# Patient Record
Sex: FEMALE | Race: BLACK OR AFRICAN AMERICAN | Employment: OTHER | URBAN - METROPOLITAN AREA
[De-identification: names, ages, dates, MRNs, and addresses within clinical notes are randomized per-mention and may not be internally consistent; named-entity substitution may affect disease eponyms.]

---

## 2016-09-16 LAB — PAP SMEAR, EXTERNAL: NORMAL

## 2016-11-16 LAB — HEMOCCULT STL QL: NEGATIVE

## 2017-01-02 DIAGNOSIS — E78.00 PURE HYPERCHOLESTEROLEMIA: ICD-10-CM

## 2017-01-03 RX ORDER — SIMVASTATIN 40 MG/1
TABLET, FILM COATED ORAL
Qty: 90 TAB | Refills: 1 | Status: SHIPPED | OUTPATIENT
Start: 2017-01-03 | End: 2018-04-12 | Stop reason: SDUPTHER

## 2017-01-03 NOTE — TELEPHONE ENCOUNTER
She is on the schedule for follow up this month-lipids last checked in August. November A1c not at goal

## 2017-01-27 ENCOUNTER — TELEPHONE (OUTPATIENT)
Dept: FAMILY MEDICINE CLINIC | Age: 67
End: 2017-01-27

## 2017-01-27 NOTE — TELEPHONE ENCOUNTER
----- Message from [facility] sent at 1/27/2017  9:46 AM EST -----  Regarding: Dr. Jaylon Perez  Pt requesting a call back today if poss with A1C numbers. Pt can be reached at [phone].

## 2017-01-30 NOTE — TELEPHONE ENCOUNTER
Spoke with patient, ID verified X 2. Patient wanted to know what her blood pressure and A1C was at last office visit. Blood pressure was 117/78 at last office visit. Last A1C was 7.8 and it was discussed at her last office visit with Dr. Paola Rock.

## 2017-02-06 ENCOUNTER — HOSPITAL ENCOUNTER (OUTPATIENT)
Dept: MAMMOGRAPHY | Age: 67
Discharge: HOME | End: 2017-02-06
Payer: MEDICARE

## 2017-02-06 DIAGNOSIS — Z12.31 VISIT FOR SCREENING MAMMOGRAM: ICD-10-CM

## 2017-02-06 PROCEDURE — 77063 BREAST TOMOSYNTHESIS BI: CPT

## 2017-02-27 ENCOUNTER — TELEPHONE (OUTPATIENT)
Dept: FAMILY MEDICINE CLINIC | Age: 67
End: 2017-02-27

## 2017-03-03 DIAGNOSIS — R11.14 BILIOUS VOMITING WITH NAUSEA: Primary | ICD-10-CM

## 2017-03-06 ENCOUNTER — TELEPHONE (OUTPATIENT)
Dept: FAMILY MEDICINE CLINIC | Age: 67
End: 2017-03-06

## 2017-03-06 NOTE — TELEPHONE ENCOUNTER
----- Message from David Gould sent at 3/6/2017  2:25 PM EST -----  Regarding: Lyndsey/telephone  Pt is returning a call from last Thursday or Friday. Pts number is [phone].

## 2017-03-06 NOTE — TELEPHONE ENCOUNTER
----- Message from Tova Craft sent at 3/6/2017  2:25 PM EST -----  Regarding: Lyndsey/telephone  Pt is returning a call from last Thursday or Friday. Pts number is [phone].

## 2017-03-08 LAB
ALBUMIN SERPL-MCNC: 4.3 G/DL (ref 3.6–4.8)
ALBUMIN/GLOB SERPL: 1.6 {RATIO} (ref 1.1–2.5)
ALP SERPL-CCNC: 79 IU/L (ref 39–117)
ALT SERPL-CCNC: 14 IU/L (ref 0–32)
AMYLASE SERPL-CCNC: 68 U/L (ref 31–124)
AST SERPL-CCNC: 13 IU/L (ref 0–40)
BILIRUB SERPL-MCNC: 0.3 MG/DL (ref 0–1.2)
BUN SERPL-MCNC: 12 MG/DL (ref 8–27)
BUN/CREAT SERPL: 14 (ref 11–26)
CALCIUM SERPL-MCNC: 9.5 MG/DL (ref 8.7–10.3)
CHLORIDE SERPL-SCNC: 94 MMOL/L (ref 96–106)
CO2 SERPL-SCNC: 25 MMOL/L (ref 18–29)
CREAT SERPL-MCNC: 0.86 MG/DL (ref 0.57–1)
GLOBULIN SER CALC-MCNC: 2.7 G/DL (ref 1.5–4.5)
GLUCOSE SERPL-MCNC: 136 MG/DL (ref 65–99)
LIPASE SERPL-CCNC: 35 U/L (ref 0–59)
POTASSIUM SERPL-SCNC: 4.1 MMOL/L (ref 3.5–5.2)
PROT SERPL-MCNC: 7 G/DL (ref 6–8.5)
SODIUM SERPL-SCNC: 136 MMOL/L (ref 134–144)

## 2017-03-13 ENCOUNTER — HOSPITAL ENCOUNTER (OUTPATIENT)
Dept: ULTRASOUND IMAGING | Age: 67
Discharge: HOME | End: 2017-03-13
Payer: MEDICARE

## 2017-03-13 DIAGNOSIS — N64.4 BREAST PAIN: ICD-10-CM

## 2017-03-13 PROCEDURE — 76642 ULTRASOUND BREAST LIMITED: CPT

## 2017-03-14 ENCOUNTER — OFFICE VISIT (OUTPATIENT)
Dept: FAMILY MEDICINE CLINIC | Age: 67
End: 2017-03-14

## 2017-03-14 VITALS
OXYGEN SATURATION: 100 % | TEMPERATURE: 97.9 F | RESPIRATION RATE: 18 BRPM | SYSTOLIC BLOOD PRESSURE: 94 MMHG | HEART RATE: 68 BPM | WEIGHT: 203.4 LBS | BODY MASS INDEX: 30.83 KG/M2 | DIASTOLIC BLOOD PRESSURE: 66 MMHG | HEIGHT: 68 IN

## 2017-03-14 DIAGNOSIS — I10 ESSENTIAL HYPERTENSION: ICD-10-CM

## 2017-03-14 DIAGNOSIS — R11.15 NON-INTRACTABLE CYCLICAL VOMITING WITH NAUSEA: ICD-10-CM

## 2017-03-14 DIAGNOSIS — M79.2 NEUROPATHIC PAIN OF FOOT, UNSPECIFIED LATERALITY: ICD-10-CM

## 2017-03-14 DIAGNOSIS — Z78.9 ACE INHIBITOR INTOLERANCE: ICD-10-CM

## 2017-03-14 DIAGNOSIS — H40.053 INTRAOCULAR PRESSURE INCREASE, BILATERAL: ICD-10-CM

## 2017-03-14 DIAGNOSIS — D50.8 OTHER IRON DEFICIENCY ANEMIA: ICD-10-CM

## 2017-03-14 DIAGNOSIS — E55.9 VITAMIN D DEFICIENCY: ICD-10-CM

## 2017-03-14 DIAGNOSIS — M79.7 FIBROMYALGIA: ICD-10-CM

## 2017-03-14 DIAGNOSIS — Z90.710 S/P TAH (TOTAL ABDOMINAL HYSTERECTOMY): ICD-10-CM

## 2017-03-14 DIAGNOSIS — E89.40 POSTSURGICAL MENOPAUSE: ICD-10-CM

## 2017-03-14 DIAGNOSIS — M79.2 RADICULAR PAIN IN RIGHT ARM: ICD-10-CM

## 2017-03-14 DIAGNOSIS — N64.4 BREAST PAIN, LEFT: ICD-10-CM

## 2017-03-14 DIAGNOSIS — E66.9 OBESITY, CLASS I, BMI 30-34.9: ICD-10-CM

## 2017-03-14 DIAGNOSIS — H26.9 CATARACT: ICD-10-CM

## 2017-03-14 DIAGNOSIS — E78.00 HIGH CHOLESTEROL: ICD-10-CM

## 2017-03-14 DIAGNOSIS — D56.3 THALASSEMIA MINOR: ICD-10-CM

## 2017-03-14 LAB — HBA1C MFR BLD HPLC: 8.4 %

## 2017-03-14 RX ORDER — SOLIFENACIN SUCCINATE 10 MG/1
10 TABLET, FILM COATED ORAL DAILY
COMMUNITY

## 2017-03-14 RX ORDER — ASPIRIN 81 MG/1
81 TABLET ORAL DAILY
Qty: 90 TAB | Refills: 3
Start: 2017-03-14

## 2017-03-14 NOTE — PROGRESS NOTES
Chief Complaint   Patient presents with    Diabetes    Results     labs    Referral Follow Up       1. Have you been to the ER, urgent care clinic since your last visit? Hospitalized since your last visit? No    2. Have you seen or consulted any other health care providers outside of the [address] [place] [address] since your last visit? Include any pap smears or colon screening. No    In the event something were to happen to you and you were unable to speak on your behalf, do you have an Advance Directive/ Living Will in place stating your wishes? NO    If yes, do we have a copy on file NO    If no, would you like information:    PT offered and accepted. Writer asked PT if there was any other concerns or issues she would like to talk to Dr. Rebecca Krueger about; PT stated \"No, I think that's it. \"  Writer faxed Dr. Khadijah Nichols, Dr. Edd Harden, Dr. Rowena Paz, Dr. Rowena Paz, Dr. King Valdez, and Dr. Yokasta Alberto for recent ov notes.

## 2017-03-14 NOTE — MR AVS SNAPSHOT
Visit Information Date & Time Provider Department Dept. Phone Encounter #  
 3/14/2017  8:45 AM [facility] [phone] [id number] Follow-up Instructions Return in about 3 months (around 6/14/2017) for bp. Upcoming Health Maintenance Date Due  
 EYE EXAM RETINAL OR DILATED Q1 4/12/2017* GLAUCOMA SCREENING Q2Y 4/13/2017* FOOT EXAM Q1 7/26/2017 MICROALBUMIN Q1 8/24/2017 LIPID PANEL Q1 8/24/2017 HEMOGLOBIN A1C Q6M 9/14/2017 MEDICARE YEARLY EXAM 12/8/2017 BREAST CANCER SCRN MAMMOGRAM 2/6/2019 COLONOSCOPY 5/27/2020 DTaP/Tdap/Td series (2 - Td) 3/14/2027 *Topic was postponed. The date shown is not the original due date. Allergies as of 3/14/2017  Review Complete On: 3/14/2017 By: Nataliya Vazquez DO Severity Noted Reaction Type Reactions Influenza Virus Vaccine, Specific High 10/13/2016    Swelling R ARM PAIN, NUMBNESS, SWELLING 
R HAND NUMBNESS AND TINGLING, WORSE IN 4TH AND FIFTH FINGERS Lisinopril  10/28/2009    Cough Singulair [Montelukast]  02/28/2013    Other (comments)  
 headache Current Immunizations  Reviewed on 2/29/2016 Name Date H1N1 FLU VACCINE 3/9/2010 Hepatitis B Vaccine 1/27/2009, 8/20/2008, 6/9/2008 Influenza High Dose Vaccine PF 9/7/2016, 11/11/2015 Influenza Vaccine Split 10/24/2012, 11/8/2011, 10/8/2010 Influenza Vaccine Whole 1/27/2009 Pneumococcal Conjugate (PCV-13) 11/11/2015 Pneumococcal Polysaccharide (PPSV-23) 12/7/2016 Pneumococcal Vaccine (Unspecified Type) 12/21/2005 Zoster Vaccine, Live 1/20/2014  
 dT Vaccine 6/9/2008 Not reviewed this visit You Were Diagnosed With   
  
 Codes Comments Uncontrolled type 2 diabetes mellitus with diabetic neuropathic arthropathy, without long-term current use of insulin ([facility]    -  Primary ICD-10-CM: E11.610, E11.65 ICD-9-CM: 250.62, 713.5 Essential hypertension     ICD-10-CM: I10 
ICD-9-CM: 401.9 Fibromyalgia     ICD-10-CM: M79.7 ICD-9-CM: 729.1 Radicular pain in right arm     ICD-10-CM: M79.2 ICD-9-CM: 729.2 into 4th and 5th fingers, due to neck vs shoulder spur vs other unchanged after PT and Neurontin Other iron deficiency anemia     ICD-10-CM: D50.8 stable High cholesterol     ICD-10-CM: E78.00 ICD-9-CM: 272.0 stable Vitamin D deficiency     ICD-10-CM: E55.9 ICD-9-CM: 268.9 stable Postsurgical menopause     ICD-10-CM: E89.40 ICD-9-CM: 627.4 S/P MARTIN (total abdominal hysterectomy)     ICD-10-CM: Z90.710 ICD-9-CM: V88.01 Intraocular pressure increase, bilateral     ICD-10-CM: H40.053 ICD-9-CM: 365.00 Cataract     ICD-10-CM: H26.9 ICD-9-CM: 366.9 ACE inhibitor intolerance     ICD-10-CM: Z78.9 ICD-9-CM: 995.27, E980.4 Breast pain, left     ICD-10-CM: N64.4 ICD-9-CM: 611.71 Non-intractable cyclical vomiting with nausea     ICD-10-CM: G43. A0 ICD-9-CM: 536.2 due to Viral vs other, resolved Thalassemia minor     ICD-10-CM: D56.3 ICD-9-CM: 282.46 Neuropathic pain of foot, unspecified laterality     ICD-10-CM: G57.90 ICD-9-CM: 355.8 unchanged off Neurontin Obesity, Class I, BMI 30-34.9     ICD-10-CM: E66.9 ICD-9-CM: 278.00 Vitals BP Pulse Temp Resp Height(growth percentile) Weight(growth percentile) 94/66 (BP 1 Location: Right arm, BP Patient Position: Sitting) 68 97.9 °F (36.6 °C) (Oral) 18 5' 8\" (1.727 m) 203 lb 6.4 oz (92.3 kg) SpO2 BMI OB Status Smoking Status 100% 30.93 kg/m2 Hysterectomy Never Smoker Vitals History BMI and BSA Data Body Mass Index Body Surface Area 30.93 kg/m 2 2.1 m 2 Preferred Pharmacy Pharmacy Name [phone], [address] [phone] Your Updated Medication List  
  
   
This list is accurate as of: 3/14/17  9:55 AM.  Always use your most recent med list.  
  
  
  
  
 aspirin delayed-release 81 mg tablet Take 1 Tab by mouth daily. Blood-Glucose Meter monitoring kit One Touch Ultra 2 Use to test blood sugar 1-2 per day (blood sugar before breakfast or 2 hours after any meal or at bedtime) DX.  E11.65  
  
 chlorhexidine 0.12 % solution Commonly known as:  PERIDEX EVENING PRIMROSE 500 mg Cap Generic drug:  evening primrose oil Take 1 Cap by mouth two (2) times a day. FLEXERIL 10 mg tablet Generic drug:  cyclobenzaprine Take 10 mg by mouth as needed. fluticasone 50 mcg/actuation nasal spray Commonly known as:  Bryan Guild 2 Sprays by Both Nostrils route daily. gabapentin 100 mg capsule Commonly known as:  NEURONTIN Take 1 Cap by mouth three (3) times daily. Indications: NEUROPATHIC PAIN  
  
 glimepiride 2 mg tablet Commonly known as:  AMARYL  
take 1 tablet by mouth every morning  
  
 glucose blood VI test strips strip Commonly known as:  ASCENSIA AUTODISC VI, ONE TOUCH ULTRA TEST VI  
Use to test blood sugar 1-2 per day (blood sugar before breakfast or 2 hours after any meal or at bedtime) DX. E11.65  
  
 simvastatin 40 mg tablet Commonly known as:  ZOCOR  
take 1 tablet by mouth at bedtime  
  
 traMADol 50 mg tablet Commonly known as:  ULTRAM  
Take 50 mg by mouth every six (6) hours as needed. traZODone 50 mg tablet Commonly known as:  Hessie Gurrola Take 50 mg by mouth as needed. Takes at bedtime prn  
  
 VAGIFEM 10 mcg Tab vaginal tablet Generic drug:  estradiol Insert 10 mcg into vagina daily. valACYclovir 500 mg tablet Commonly known as:  VALTREX  
  
 valsartan-hydroCHLOROthiazide 160-25 mg per tablet Commonly known as:  DIOVAN-HCT Take 1 Tab by mouth daily. Indications: HYPERTENSION  
  
 VESIcare 10 mg tablet Generic drug:  solifenacin Take 10 mg by mouth daily. VITAMIN B-12 1,000 mcg tablet Generic drug:  cyanocobalamin Take 1,000 mcg by mouth daily. XIGDUO XR 10-1,000 mg Tbph  
Generic drug:  dapagliflozin-metformin  
take 1 tablet by mouth daily for TYPE 2 DIABETES MELLITUS We Performed the Following AMB POC HEMOGLOBIN A1C [45113 CPT(R)] Follow-up Instructions Return in about 3 months (around 6/14/2017) for bp. Introducing [facility]! Dear Jose G House: Thank you for requesting a [employer] account. Our records indicate that you already have an active [employer] account. You can access your account anytime at [email or web address] Did you know that you can access your hospital and ER discharge instructions at any time in [employer]? You can also review all of your test results from your hospital stay or ER visit. Additional Information If you have questions, please visit the Frequently Asked Questions section of the [employer] website at [email or web address] Remember, [employer] is NOT to be used for urgent needs. For medical emergencies, dial 911. Now available from your iPhone and Android! Please provide this summary of care documentation to your next provider. Your primary care clinician is listed as Selena Arroyo. If you have any questions after today's visit, please call [phone].

## 2017-03-14 NOTE — PROGRESS NOTES
HISTORY OF PRESENT ILLNESS  Dinorah Luevano is a 77 y.o. female presents with Diabetes; Results (labs); and Referral Follow Up    Agree with nurse note. Diabetic, hypertensive pt with high cholesterol, Vit D deficiency, thalassemia minor, hx of iron deficiency anemia, and ACE inhibitor intolerance presents to the office with a BP of 94/66. For BP, she takes Diovan--25 mg daily, tolerating well. She weighs 203 lbs, gained 1 lb since last ov. In 11/2016, Hgb A1C was 7.8, up from 7.5 and 6.7. In 08/2016, LDL was 99. For DM, she takes Amaryl 2 mg qAM and Xigduo 10-1,000 mg daily, tolerating well. For cholesterol, she takes Zocor 40 mg once a week, tolerating well. Pt called our office on 2/27/17 because she was vomiting yellow-green bile. She was not able to eat anything and felt nauseated. She requested labs be ordered. Denies diarrhea, fever/chills, chest pain, abdominal pain, or other associated sxs. She requests her most recent labs from 3/7/17. Lipase was 35. Amylase was 68. Glucose was 136. Chloride was 94. HIV nonreactive. Hep C was negative. Pt with cataracts and increased IOPs. She is followed by optometrist, Dr. Irma Cabrales and plans to schedule her yearly exam soon. Pt is followed by podiatrist, Dr. Tresa Barrett, whom she last saw 7/26/16. Dx'd arthralgia of foot, contracted tendon, and hammer toe. Pt saw orthopedist, Dr. Jamey Kingsley on 2/16/17 for L knee pain. She had already seen Dr. Mandie Chávez who had injected her R shoulder with Cortisone the day prior. Dr. Isael Portillo also injected her L knee with Depomedrol due to DJD of L knee. F/U prn. Pt with fibromyalgia is followed by rheumatologist, Dr. Ori Chua. Pt with neuropathic foot pain and R arm radicular pain. She took Neurontin but reports the pain is unchanged. She sees her neurologist in 04/2017. Pt reports L breast pain radiating into her LUE.  She followed up with her GYN, Dr. Eamon Cortes who ordered a breast US. She had that performed yesterday and the results were normal. She now believes it may be due to dancing and rolling on the floor for her recent dance performance. Health Maintenance    Pt's most recent colonoscopy was on 5/27/15 with GI, Dr. Nora Osuna. F/U 5 years. Postsurgical menopausal pt is s/p MARTIN-BSO due to endometriosis and fibroids since 1994. She is followed by GYN, Dr. Sheran Meigs. Pt's most recent mammogram was on 2/6/17; normal.      Written by clem Sanchez, as dictated by Dr. Kayla Ramirez DO.    ROCHELLE    Review of Systems negative except as noted above in HPI. ALLERGIES:    Allergies   Allergen Reactions    Influenza Virus Vaccine, Specific Swelling     R ARM PAIN, NUMBNESS, SWELLING  R HAND NUMBNESS AND TINGLING, WORSE IN 4TH AND FIFTH FINGERS    Lisinopril Cough    Singulair [Montelukast] Other (comments)     headache       CURRENT MEDICATIONS:    Outpatient Prescriptions Marked as Taking for the 3/14/17 encounter (Office Visit) with Ervin Lopez DO   Medication Sig Dispense Refill    solifenacin (VESICARE) 10 mg tablet Take 10 mg by mouth daily.  aspirin delayed-release 81 mg tablet Take 1 Tab by mouth daily. 90 Tab 3    simvastatin (ZOCOR) 40 mg tablet take 1 tablet by mouth at bedtime 90 Tab 1    glimepiride (AMARYL) 2 mg tablet take 1 tablet by mouth every morning 60 Tab 5    valACYclovir (VALTREX) 500 mg tablet       chlorhexidine (PERIDEX) 0.12 % solution       XIGDUO XR 10-1,000 mg TBph take 1 tablet by mouth daily for TYPE 2 DIABETES MELLITUS 30 Tab 11    valsartan-hydrochlorothiazide (DIOVAN-HCT) 160-25 mg per tablet Take 1 Tab by mouth daily.  Indications: HYPERTENSION 30 Tab 5    glucose blood VI test strips (ASCENSIA AUTODISC VI, ONE TOUCH ULTRA TEST VI) strip Use to test blood sugar 1-2 per day (blood sugar before breakfast or 2 hours after any meal or at bedtime) DX. E11.65 100 Strip 11    Blood-Glucose Meter monitoring kit One Touch Ultra 2 Use to test blood sugar 1-2 per day (blood sugar before breakfast or 2 hours after any meal or at bedtime) DX.   E11.65 1 Kit 0    estradiol (VAGIFEM) 10 mcg tab vaginal tablet Insert 10 mcg into vagina daily.  fluticasone (FLONASE) 50 mcg/actuation nasal spray 2 Sprays by Both Nostrils route daily. 1 Bottle 0    cyanocobalamin (VITAMIN B-12) 1,000 mcg tablet Take 1,000 mcg by mouth daily.  evening primrose oil (EVENING PRIMROSE) 500 mg cap Take 1 Cap by mouth two (2) times a day.  cyclobenzaprine (FLEXERIL) 10 mg tablet Take 10 mg by mouth as needed.  trazodone (DESYREL) 50 mg tablet Take 50 mg by mouth as needed. Takes at bedtime prn       tramadol (ULTRAM) 50 mg tablet Take 50 mg by mouth every six (6) hours as needed. PAST MEDICAL HISTORY:    Past Medical History:   Diagnosis Date    Allergic rhinitis     Dr. Peter Cueva 2015    Dr. Antonia Humphries.  Chest pain 01/15/09    Dr. Juan Carlos Conway Chickenpox childhood    DDD (degenerative disc disease), cervical 10/2015    Dr. Bill Velazquez.  DDD (degenerative disc disease), lumbar 2014    Dr. Foster Lynch.  Diabetes ([address] 06/2007    Alfredito Bell, OD. Dr. Demond Smith, pod.  Endometriosis     Dr. Paula Aviles    Essential hypertension, benign     Dr. Sai Vizcaino, uterine     Dr. Fede Leong 08/2009    Dr. Hay Graff. Dr. Demond Smith.  Hand paresthesia 2011    Dr. Debi Mcgarry. Dr. Donald Pan.  CEUDOJZA(356.3) 2012    Dr. Bola Anand. Dr. Hay Graff, ENT.  Hemoptysis 06/13/07    Dr. Liz Almaraz    Hiatal hernia 05/27/15    small. Bina Jansen MD   [facility] Internal hemorrhoids 12/2006, 05/27/15    Dr. Yanique Loredo. Dr. Maria De Jesus Saldana.  Intraocular pressure increase 07/27/05    bilaterally. Dr. Ambrosio Clark. Dr. Alfredito Bell.  Knee pain 04/04/13    Bilaterally. due to fall. Dr. Bishop Howell.       Left knee pain 11/26/14    Left. due to OA, torn medial and lateral meniscal tears. Dr. Lyla Ngo. Dr. Margaret López.  Lumbar spondylosis 3/2008    Dr. Keenan Terrell. Dr. Marilyn Gamez    Paresthesia of foot     Left  due to Ornelas's Neuromo Sxs. Rashid Wilfrdeobrodie.  Proteinuria 2009    Dr. Jil Castellanos Right shoulder pain 01/30/06    Dr. Home Verdugo. impingement tendinitis    Right shoulder pain 07/2014    Dr. Kali Oneal.  Schatzki's ring 05/27/15    with partial obstruction at GE junction. Dr. Aleena Crane. Rossana Hammer     Dr. Keenan Terrell. Dr. Marilyn Gamez    Stenosis colon [facility]) 05/27/15    SIGMOID WITH TORTUOUS DESCENDING COLON. Dr. Mariusz Fuller. [facility].  Thalassemia minor 1980s    Vitamin D deficiency 05/2011    Kate Bruner NP       PAST SURGICAL HISTORY:    Past Surgical History:   Procedure Laterality Date    CYSTOSCOPY  07/2005    Dr. Venus Tony FOOT/TOES SURGERY PROC UNLISTED      Left Ornelsa's Neuroma. [id number] [facility] HX BREAST BIOPSY Left     12 years ago neg    HX BREAST LUMPECTOMY  10/15/08    Left intraductal papilloma. Dr. Shu Knott HX COLONOSCOPY  05/27/15    due to anemia, rectal bleeding. due q 5 yrs. Dr. Nellie Knott  2001, 12/07/06, 2011    Dr. Mario Gaona. due q 5 yrs.  HX ENDOSCOPY  05/27/15    due to anemia. Dr. Aleena Crane.  HX HEART CATHETERIZATION  01/09/09    Dr. Venancio Alarcon ARTHROSCOPY Left 04/17/15    due to [address]. Dr. Margaret López.  HX MYOMECTOMY  1974-1993    x4    HX MARTIN AND BSO  1994    endometriosis, fibroids.        FAMILY HISTORY:    Family History   Problem Relation Age of Onset   Markus Luria Hypertension Mother    Markus Luria Glaucoma Mother     Heart Failure Mother      CHF    Hypertension Father     Cancer Father      colon    Heart Attack Sister     Stroke Sister     Diabetes Sister      x 2 sisters    Diabetes Brother        SOCIAL HISTORY:    Social History     Social History    Marital status: SINGLE     Spouse name: N/A    Number of children: N/A    Years of education: N/A     Social History Main Topics    Smoking status: Never Smoker    Smokeless tobacco: Never Used    Alcohol use 0.0 oz/week     0 Standard drinks or equivalent per week      Comment: Social beer, social drinks, social shots of liquor,and social wine.  Drug use: No    Sexual activity: Not Asked     Other Topics Concern    None     Social History Narrative       IMMUNIZATIONS:    Immunization History   Administered Date(s) Administered    H1N1 Influenza Virus Vaccine 03/09/2010    Hepatitis B Vaccine 06/09/2008, 08/20/2008, 01/27/2009    Influenza High Dose Vaccine PF 11/11/2015, 09/07/2016    Influenza Vaccine Split 10/08/2010, 11/08/2011, 10/24/2012    Influenza Vaccine Whole 01/27/2009    Pneumococcal Conjugate (PCV-13) 11/11/2015    Pneumococcal Polysaccharide (PPSV-23) 12/07/2016    Pneumococcal Vaccine (Unspecified Type) 12/21/2005    Zoster Vaccine, Live 01/20/2014    dT Vaccine 06/09/2008         PHYSICAL EXAMINATION    Vital Signs    Visit Vitals    BP 94/66 (BP 1 Location: Right arm, BP Patient Position: Sitting)    Pulse 68    Temp 97.9 °F (36.6 °C) (Oral)    Resp 18    Ht 5' 8\" (1.727 m)    Wt 203 lb 6.4 oz (92.3 kg)    SpO2 100%    BMI 30.93 kg/m2       Weight Metrics 3/14/2017 12/7/2016 11/14/2016 10/13/2016 9/7/2016 2/29/2016 2/9/2016   Weight 203 lb 6.4 oz 202 lb 14.4 oz 203 lb 9.6 oz 200 lb 6.4 oz 200 lb 6.4 oz 195 lb 1.6 oz 195 lb 9.6 oz   BMI 30.93 kg/m2 30.85 kg/m2 30.73 kg/m2 30.25 kg/m2 30.25 kg/m2 29.43 kg/m2 29.51 kg/m2       General appearance - Well nourished. Well appearing. Well developed. No acute distress. Overweight. Head - Normocephalic. Atraumatic. Eyes - pupils equal and reactive. Extraocular eye movements intact. Sclera anicteric. Mildly injected sclera. Ears - Hearing is grossly normal bilaterally. Nose - normal and patent. No polyps noted.   No erythema. No discharge. Mouth - mucous membranes with adequate moisture. Posterior pharynx normal with cobblestone appearance. No erythema, white exudate or obstruction. Neck - supple. Midline trachea. No carotid bruits noted bilaterally. No thyromegaly noted. Chest - clear to auscultation bilaterally anteriorly and posteriorly. No wheezes. No rales or rhonchi. Breath sounds are symmetrical bilaterally. Unlabored respirations. Heart - normal rate. Regular rhythm. Normal S1, S2. No murmur noted. No rubs, clicks or gallops noted. Abdomen - soft and distended. No masses or organomegaly. No rebound, rigidity or guarding. Bowel sounds normal x 4 quadrants. No tenderness noted. Neurological - awake, alert and oriented to person, place, and time and event. Cranial nerves II through XII intact. Clear speech. Muscle strength is +5/5 x 4 extremities. Sensation is intact to light touch bilaterally. Steady gait. Heme/Lymph - peripheral pulses normal x 4 extremities. No peripheral edema is noted. Musculoskeletal - Intact x 4 extremities. Full ROM x 4 extremities. No pain with movement. Back exam - normal range of motion. No pain on palpation of the spinous processes in the cervical, thoracic, lumbar, sacral regions. No CVA tenderness. Skin - no rashes, erythema, ecchymosis, lacerations, abrasions, suspicious moles noted  Psychological -   normal behavior, dress and thought processes. Good insight. Good eye contact. Normal affect. Appropriate mood. Normal speech.       DATA REVIEWED    Results for orders placed or performed in visit on 64/82/86   METABOLIC PANEL, COMPREHENSIVE   Result Value Ref Range    Glucose 136 (H) 65 - 99 mg/dL    BUN 12 8 - 27 mg/dL    Creatinine 0.86 0.57 - 1.00 mg/dL    GFR est non-AA 71 >59 mL/min/1.73    GFR est AA 81 >59 mL/min/1.73    BUN/Creatinine ratio 14 11 - 26    Sodium 136 134 - 144 mmol/L    Potassium 4.1 3.5 - 5.2 mmol/L    Chloride 94 (L) 96 - 106 mmol/L    CO2 25 18 - 29 mmol/L    Calcium 9.5 8.7 - 10.3 mg/dL    Protein, total 7.0 6.0 - 8.5 g/dL    Albumin 4.3 3.6 - 4.8 g/dL    GLOBULIN, TOTAL 2.7 1.5 - 4.5 g/dL    A-G Ratio 1.6 1.1 - 2.5    Bilirubin, total 0.3 0.0 - 1.2 mg/dL    Alk. phosphatase 79 39 - 117 IU/L    AST (SGOT) 13 0 - 40 IU/L    ALT (SGPT) 14 0 - 32 IU/L   AMYLASE   Result Value Ref Range    Amylase 68 31 - 124 U/L   LIPASE   Result Value Ref Range    Lipase 35 0 - 59 U/L     Lab Results   Component Value Date/Time    WBC 6.9 11/17/2016 08:14 AM    HGB 11.7 11/17/2016 08:14 AM    HCT 35.5 11/17/2016 08:14 AM    PLATELET 474 66/78/3801 08:14 AM    MCV 69 11/17/2016 08:14 AM     Lab Results   Component Value Date/Time    Cholesterol, total 201 08/24/2016 08:19 AM    HDL Cholesterol 90 08/24/2016 08:19 AM    LDL, calculated 99 08/24/2016 08:19 AM    VLDL, calculated 12 08/24/2016 08:19 AM    Triglyceride 60 08/24/2016 08:19 AM    CHOL/HDL Ratio 1.9 04/26/2010 08:00 AM     Lab Results   Component Value Date/Time    Vitamin D 25-Hydroxy 25.1 10/20/2011 07:42 AM    VITAMIN D, 25-HYDROXY 40.5 11/17/2016 08:14 AM       Lab Results   Component Value Date/Time    Hemoglobin A1c 7.8 11/17/2016 08:14 AM    Hemoglobin A1c (POC) 6.9 01/20/2014 03:39 PM     Lab Results   Component Value Date/Time    TSH 3.440 02/15/2016 09:48 AM       ASSESSMENT and PLAN      ICD-10-CM ICD-9-CM    1. Uncontrolled type 2 diabetes mellitus with diabetic neuropathic arthropathy, without long-term current use of insulin (HCC) E11.610 250.62 aspirin delayed-release 81 mg tablet    E11.65 713.5 AMB POC HEMOGLOBIN A1C   2. Essential hypertension I10 401.9 aspirin delayed-release 81 mg tablet   3. Fibromyalgia M79.7 729.1    4. Radicular pain in right arm M79.2 729.2     into 4th and 5th fingers, due to neck vs shoulder spur vs other unchanged after PT and Neurontin   5. Other iron deficiency anemia D50.8      stable   6. High cholesterol E78.00 272.0     stable   7.  Vitamin D deficiency E55.9 268.9     stable   8. Postsurgical menopause E89.40 627.4    9. S/P MARTIN (total abdominal hysterectomy) Z90.710 V88.01    10. Intraocular pressure increase, bilateral H40.053 365.00    11. Cataract H26.9 366.9    12. ACE inhibitor intolerance Z78.9 995.27      E980.4    13. Breast pain, left N64.4 611.71    14. Non-intractable cyclical vomiting with nausea G43. A0 536.2     due to Viral vs other, resolved   15. Thalassemia minor D56.3 282.46    16. Neuropathic pain of foot, unspecified laterality G57.90 355.8     unchanged off Neurontin   17. Obesity, Class I, BMI 30-34.9 E66.9 278.00        Discussed the patient's BMI with her. The BMI follow up plan is as follows: I have counseled this patient on diet and exercise regimens. Addressed weight, diet and exercise with patient. Decrease carbohydrates (white foods, sweet foods, sweet drinks and alcohol), increase green leafy vegetables and protein (lean meats and beans) with each meal.  Avoid fried foods. Eat 3-5 small meals daily. Do not skip meals. Increase water intake. Increase physical activity to 30 minutes daily for health benefit or 60 minutes daily to prevent weight regain, as tolerated. Get 7-8 hours uninterrupted sleep nightly. Avoid caffeine due to breast pain. Chart reviewed and updated. Continue current medications and care. Most recent tests reviewed from 3/7/17. Recheck A1C today. Recent office visit notes from Dr. Briseno Monday reviewed. Get recent office visit notes from Dr. Julito Mcdaniel and Dr. Ana Niño. Advised pt to sign release. Notify GYN if breast pain worsens or persists. Counseled patient on health concerns:  Vomiting, DM, BP, Vit D deficiency, and neuropathic pain. Immunizations noted. Advised tetanus vaccine when pt has a cut, animal bite, or burn. Offered empathy, support, legitimation, prayers, partnership to patient. Praised patient for progress. Advance Care Booklet given at office visit.  Discussed with pt today. Declines honoring choices referral.   Follow-up Disposition:  Return in about 3 months (around 6/14/2017) for bp. Patient was offered a choice/choices in the treatment plan today. Patient expresses understanding of the plan and agrees with recommendations. Patient declines any additional handouts. Patient is satisfied with previous handouts received from our office    Written by clem Blake, as dictated by Dr. Hannah Alvarez DO. Documentation True and Accepted by Percilla Opitz.  Kush Zurita.

## 2017-03-14 NOTE — ACP (ADVANCE CARE PLANNING)
Discussed ACP with patient. Gave pt an [facility]. Patient prefers to read it on her own. Declines referral to [facility] team at this time. Patient will bring document to her next office visit or attach it to her MyChart record.

## 2017-05-18 DIAGNOSIS — I10 ESSENTIAL HYPERTENSION: ICD-10-CM

## 2017-05-18 NOTE — TELEPHONE ENCOUNTER
She was in the office in March is on for follow up in [place] [id number] pressure was 94/66 at March visit

## 2017-05-19 RX ORDER — VALSARTAN AND HYDROCHLOROTHIAZIDE 160; 25 MG/1; MG/1
TABLET ORAL
Qty: 30 TAB | Refills: 5 | Status: SHIPPED | OUTPATIENT
Start: 2017-05-19 | End: 2017-12-10 | Stop reason: SDUPTHER

## 2017-06-15 ENCOUNTER — LAB ONLY (OUTPATIENT)
Dept: FAMILY MEDICINE CLINIC | Age: 67
End: 2017-06-15

## 2017-06-15 DIAGNOSIS — E78.00 HIGH CHOLESTEROL: ICD-10-CM

## 2017-06-15 DIAGNOSIS — M79.2 NEUROPATHIC PAIN OF FOOT, UNSPECIFIED LATERALITY: ICD-10-CM

## 2017-06-15 DIAGNOSIS — I10 ESSENTIAL HYPERTENSION: ICD-10-CM

## 2017-06-15 DIAGNOSIS — D50.8 IRON DEFICIENCY ANEMIA SECONDARY TO INADEQUATE DIETARY IRON INTAKE: ICD-10-CM

## 2017-06-15 DIAGNOSIS — E55.9 VITAMIN D DEFICIENCY: ICD-10-CM

## 2017-06-16 LAB
25(OH)D3+25(OH)D2 SERPL-MCNC: 22.5 NG/ML (ref 30–100)
ALBUMIN SERPL-MCNC: 4.3 G/DL (ref 3.6–4.8)
ALBUMIN/CREAT UR: <21.1 MG/G CREAT (ref 0–30)
ALBUMIN/GLOB SERPL: 1.5 {RATIO} (ref 1.2–2.2)
ALP SERPL-CCNC: 68 IU/L (ref 39–117)
ALT SERPL-CCNC: 9 IU/L (ref 0–32)
APPEARANCE UR: CLEAR
AST SERPL-CCNC: 17 IU/L (ref 0–40)
BILIRUB SERPL-MCNC: 0.3 MG/DL (ref 0–1.2)
BILIRUB UR QL STRIP: NEGATIVE
BUN SERPL-MCNC: 14 MG/DL (ref 8–27)
BUN/CREAT SERPL: 14 (ref 12–28)
CALCIUM SERPL-MCNC: 9.5 MG/DL (ref 8.7–10.3)
CHLORIDE SERPL-SCNC: 95 MMOL/L (ref 96–106)
CHOLEST SERPL-MCNC: 207 MG/DL (ref 100–199)
CO2 SERPL-SCNC: 24 MMOL/L (ref 18–29)
COLOR UR: YELLOW
CREAT SERPL-MCNC: 0.98 MG/DL (ref 0.57–1)
CREAT UR-MCNC: 14.2 MG/DL
ERYTHROCYTE [DISTWIDTH] IN BLOOD BY AUTOMATED COUNT: 16.8 % (ref 12.3–15.4)
EST. AVERAGE GLUCOSE BLD GHB EST-MCNC: 197 MG/DL
GLOBULIN SER CALC-MCNC: 2.8 G/DL (ref 1.5–4.5)
GLUCOSE SERPL-MCNC: 170 MG/DL (ref 65–99)
GLUCOSE UR QL: ABNORMAL
HBA1C MFR BLD: 8.5 % (ref 4.8–5.6)
HCT VFR BLD AUTO: 34.8 % (ref 34–46.6)
HDLC SERPL-MCNC: 70 MG/DL
HGB BLD-MCNC: 11.2 G/DL (ref 11.1–15.9)
HGB UR QL STRIP: NEGATIVE
INTERPRETATION, 910389: NORMAL
IRON SERPL-MCNC: 67 UG/DL (ref 27–139)
KETONES UR QL STRIP: NEGATIVE
LDLC SERPL CALC-MCNC: 116 MG/DL (ref 0–99)
LEUKOCYTE ESTERASE UR QL STRIP: NEGATIVE
Lab: NORMAL
MCH RBC QN AUTO: 22.6 PG (ref 26.6–33)
MCHC RBC AUTO-ENTMCNC: 32.2 G/DL (ref 31.5–35.7)
MCV RBC AUTO: 70 FL (ref 79–97)
MICRO URNS: ABNORMAL
MICROALBUMIN UR-MCNC: <3 UG/ML
NITRITE UR QL STRIP: NEGATIVE
PH UR STRIP: 6.5 [PH] (ref 5–7.5)
PLATELET # BLD AUTO: 278 X10E3/UL (ref 150–379)
POTASSIUM SERPL-SCNC: 4 MMOL/L (ref 3.5–5.2)
PROT SERPL-MCNC: 7.1 G/DL (ref 6–8.5)
PROT UR QL STRIP: NEGATIVE
RBC # BLD AUTO: 4.95 X10E6/UL (ref 3.77–5.28)
SODIUM SERPL-SCNC: 137 MMOL/L (ref 134–144)
SP GR UR: 1.01 (ref 1–1.03)
TRIGL SERPL-MCNC: 107 MG/DL (ref 0–149)
TSH SERPL DL<=0.005 MIU/L-ACNC: 3.39 UIU/ML (ref 0.45–4.5)
UROBILINOGEN UR STRIP-MCNC: 0.2 MG/DL (ref 0.2–1)
VLDLC SERPL CALC-MCNC: 21 MG/DL (ref 5–40)
WBC # BLD AUTO: 5.7 X10E3/UL (ref 3.4–10.8)

## 2017-06-22 ENCOUNTER — OFFICE VISIT (OUTPATIENT)
Dept: FAMILY MEDICINE CLINIC | Age: 67
End: 2017-06-22

## 2017-06-22 VITALS
HEART RATE: 75 BPM | TEMPERATURE: 98.4 F | DIASTOLIC BLOOD PRESSURE: 75 MMHG | WEIGHT: 206.1 LBS | HEIGHT: 68 IN | BODY MASS INDEX: 31.24 KG/M2 | SYSTOLIC BLOOD PRESSURE: 116 MMHG | OXYGEN SATURATION: 99 % | RESPIRATION RATE: 16 BRPM

## 2017-06-22 DIAGNOSIS — N32.81 OAB (OVERACTIVE BLADDER): ICD-10-CM

## 2017-06-22 DIAGNOSIS — E55.9 VITAMIN D DEFICIENCY: ICD-10-CM

## 2017-06-22 DIAGNOSIS — Z78.9 ACE INHIBITOR INTOLERANCE: ICD-10-CM

## 2017-06-22 DIAGNOSIS — R63.5 WEIGHT GAIN: ICD-10-CM

## 2017-06-22 DIAGNOSIS — H40.053 INTRAOCULAR PRESSURE INCREASE, BILATERAL: ICD-10-CM

## 2017-06-22 DIAGNOSIS — R26.0 ATAXIA INVOLVING LEGS: ICD-10-CM

## 2017-06-22 DIAGNOSIS — H25.13 AGE-RELATED NUCLEAR CATARACT OF BOTH EYES: ICD-10-CM

## 2017-06-22 DIAGNOSIS — I10 ESSENTIAL HYPERTENSION: ICD-10-CM

## 2017-06-22 RX ORDER — OLMESARTAN MEDOXOMIL AND HYDROCHLOROTHIAZIDE 40/25 40; 25 MG/1; MG/1
TABLET ORAL
COMMUNITY
Start: 2016-02-02 | End: 2016-02-02

## 2017-06-22 RX ORDER — PIOGLITAZONE AND GLIMEPIRIDE 30; 2 MG/1; MG/1
TABLET ORAL
COMMUNITY
Start: 2016-02-02 | End: 2016-02-02

## 2017-06-22 RX ORDER — METHYLPREDNISOLONE 4 MG/1
TABLET ORAL
Refills: 0 | COMMUNITY
Start: 2017-04-17 | End: 2017-06-22 | Stop reason: ALTCHOICE

## 2017-06-22 RX ORDER — ERGOCALCIFEROL 1.25 MG/1
50000 CAPSULE ORAL
Qty: 5 CAP | Refills: 2 | Status: SHIPPED | OUTPATIENT
Start: 2017-06-22 | End: 2018-01-23 | Stop reason: ALTCHOICE

## 2017-06-22 RX ORDER — DIFLUNISAL 500 MG/1
TABLET, FILM COATED ORAL
Refills: 0 | COMMUNITY
Start: 2017-05-01 | End: 2017-06-22 | Stop reason: ALTCHOICE

## 2017-06-22 RX ORDER — OXYBUTYNIN CHLORIDE 10 MG/1
TABLET, EXTENDED RELEASE ORAL
COMMUNITY
Start: 2016-02-02 | End: 2016-02-02

## 2017-06-22 RX ORDER — DICLOFENAC SODIUM 75 MG/1
TABLET, DELAYED RELEASE ORAL
Refills: 0 | COMMUNITY
Start: 2017-04-17 | End: 2018-01-23 | Stop reason: ALTCHOICE

## 2017-06-22 RX ORDER — GINKGO BILOBA LEAF EXTRACT 60 MG
CAPSULE ORAL
COMMUNITY
Start: 2016-02-02 | End: 2016-02-02

## 2017-06-22 RX ORDER — GUAIFENESIN 100 MG/5ML
LIQUID (ML) ORAL
COMMUNITY
Start: 2016-02-02 | End: 2016-02-02

## 2017-06-22 RX ORDER — GLIMEPIRIDE 4 MG/1
4 TABLET ORAL
Qty: 60 TAB | Refills: 11 | Status: SHIPPED | OUTPATIENT
Start: 2017-06-22 | End: 2018-01-23 | Stop reason: ALTCHOICE

## 2017-06-22 NOTE — PROGRESS NOTES
Chief Complaint   Patient presents with    Blood Pressure Check    Results    Other     pt stated that she has been stumbling a lot more than usual.      1. Have you been to the ER, urgent care clinic since your last visit? Hospitalized since your last visit? No    2. Have you seen or consulted any other health care providers outside of the [address] [place] [address] since your last visit? Include any pap smears or colon screening. Yes, pt has seen Dr. aYo Cedeno, Dr. Floridalma Collins, and Dr. Alexia Maurer. In the event something were to happen to you and you were unable to speak on your behalf, do you have an Advance Directive/ Living Will in place stating your wishes? NO    If yes, do we have a copy on file NO    If no, would you like information:   Pt offered and accepted.

## 2017-06-22 NOTE — PROGRESS NOTES
HISTORY OF PRESENT ILLNESS  Edmar Martínez is a 79 y.o. female presents with Blood Pressure Check; Results; Gait Problem (pt stated that she has been stumbling a lot more than usual. ); Referral Follow Up; and Diabetes    Agree with nurse note. Uncontrolled diabetic, hypertensive pt with ACE inhibitor intolerance, Vit D deficiency, and dyslipidemia presents to the office with a BP of 116/75. For BP, she takes Diovan--25 mg daily, tolerating well. For DM, she takes Xigduo 10-1,000 mg daily and Amaryl 2 mg daily, tolerating well. She requests her most recent labs from 06/15/17. Iron was 67. Hgb A1C was 8.5,up from 8.4 in 03/2017 and 7.8 in 11/2016. Urine contained 2+ glucose. Urine microalbumin was <3.0. Vit D was 22.5. TSH was 3.39. Glucose was 170. LDL was 116. She does not take Zocor 40 mg regularly. Pt weighs 206 lbs, gained 3 lbs since last ov and 11 lbs since 02/2016. She retired 05/2016 and feels like she is more active now. She admits to eating more sweet and sour candy and chips. She also eats the bread at [place] and [facility] and normally she eats out 1-2x weekly. She drinks about 16 oz of lemonade, tequila, soda, or a small Heineken beer throughout her week. She drinks 60-90 oz of water daily. Pt complains of stumbling x4 years but it has become more frequent recently. She is stumbling on flat ground. She thinks it may be her R leg> L leg. Her neurologist is unaware of this. Denies leg weakness. Pt with OAB. She saw urologist, Dr. Gissell Angel in 02/2017. She did not tolerate Mybeteriq. Rx'd Vesicare. F/U 4 months. She is doing well on Vesicare. Health Maintenance    Pt's most recent colonoscopy was on 05/27/15 with GI, Dr. Vivi Banda. F/U 5 years. Pt saw optometrist, Dr. Matias Hartley on 03/21/17. Dx'd hyperopia bL, presbyopia, BL cataracts (worsening), and DM type 2 w/o retinopathy.      Written by clem Zaragoza, as dictated by Dr. Nato Diallo, DO.    ROS    Review of Systems negative except as noted above in HPI. ALLERGIES:    Allergies   Allergen Reactions    Influenza Virus Vaccine, Specific Swelling     R ARM PAIN, NUMBNESS, SWELLING  R HAND NUMBNESS AND TINGLING, WORSE IN 4TH AND FIFTH FINGERS    Lisinopril Cough     Other reaction(s): Lisinopril    Singulair [Montelukast] Other (comments)     headache       CURRENT MEDICATIONS:    Outpatient Prescriptions Marked as Taking for the 6/22/17 encounter (Office Visit) with Dallin Delvalle DO   Medication Sig Dispense Refill    diclofenac EC (VOLTAREN) 75 mg EC tablet take 1 tablet by mouth twice a day with food  0    sodium chloride (OCEAN) 0.65 % nasal spray 1 Spray as needed for Congestion.  glimepiride (AMARYL) 4 mg tablet Take 1 Tab by mouth every morning. Indications: type 2 diabetes mellitus 60 Tab 11    ergocalciferol (ERGOCALCIFEROL) 50,000 unit capsule Take 1 Cap by mouth every seven (7) days. Indications: VITAMIN D DEFICIENCY 5 Cap 2    valsartan-hydroCHLOROthiazide (DIOVAN-HCT) 160-25 mg per tablet take 1 tablet by mouth once daily 30 Tab 5    solifenacin (VESICARE) 10 mg tablet Take 10 mg by mouth daily.  aspirin delayed-release 81 mg tablet Take 1 Tab by mouth daily.  90 Tab 3    simvastatin (ZOCOR) 40 mg tablet take 1 tablet by mouth at bedtime 90 Tab 1    valACYclovir (VALTREX) 500 mg tablet       chlorhexidine (PERIDEX) 0.12 % solution       XIGDUO XR 10-1,000 mg TBph take 1 tablet by mouth daily for TYPE 2 DIABETES MELLITUS 30 Tab 11    glucose blood VI test strips (ASCENSIA AUTODISC VI, ONE TOUCH ULTRA TEST VI) strip Use to test blood sugar 1-2 per day (blood sugar before breakfast or 2 hours after any meal or at bedtime) DX. E11.65 100 Strip 11    Blood-Glucose Meter monitoring kit One Touch Ultra 2 Use to test blood sugar 1-2 per day (blood sugar before breakfast or 2 hours after any meal or at bedtime) DX.   E11.65 1 Kit 0    estradiol (VAGIFEM) 10 mcg tab vaginal tablet Insert 10 mcg into vagina daily.  fluticasone (FLONASE) 50 mcg/actuation nasal spray 2 Sprays by Both Nostrils route daily. 1 Bottle 0    evening primrose oil (EVENING PRIMROSE) 500 mg cap Take 1 Cap by mouth two (2) times a day.  cyclobenzaprine (FLEXERIL) 10 mg tablet Take 10 mg by mouth as needed.  trazodone (DESYREL) 50 mg tablet Take 50 mg by mouth as needed. Takes at bedtime prn       tramadol (ULTRAM) 50 mg tablet Take 50 mg by mouth every six (6) hours as needed. PAST MEDICAL HISTORY:    Past Medical History:   Diagnosis Date    Allergic rhinitis     Dr. Sebastian Carry 2015    Dr. Kaycee Helm.  Chest pain 01/15/09    Dr. Dirk Bran Chickenpox childhood    DDD (degenerative disc disease), cervical 10/2015    Dr. Foreign Peck.  DDD (degenerative disc disease), lumbar 2014    Dr. David Blood.  Diabetes ([facility] [address] 06/2007    America Griffin, OD. Dr. Marylene Sartorius, pod.  Endometriosis     Dr. Arias Jose    Essential hypertension, benign     Dr. Alina Veronica, uterine     Dr. Rebbeca Boxer Dr. Catherine Basques 08/2009    Dr. Daphne Medina. Dr. Marylene Sartorius.  Hand paresthesia 2011    Dr. Jace Bermudez. Dr. Uvaldo Schwab.  Headache 2012    Dr. Latha Morris. Dr. Daphne Medina, ENT.  Hemoptysis 06/13/07    Dr. Glen Rivera    Hiatal hernia 05/27/15    small. Mercedes Keene MD   [facility] Internal hemorrhoids 12/2006, 05/27/15    Dr. Lindy Coulter. Dr. Alphonsa Dubin.  Intraocular pressure increase 07/27/05    bilaterally. Dr. Heidi Johnson. Dr. America Griffin.  Knee pain 04/04/13    Bilaterally. due to fall. Dr. Arleth Najera.  Left knee pain 11/26/14    Left. due to OA, torn medial and lateral meniscal tears. Dr. Jeronimo Dove. Dr. Mirna Blunt.  Lumbar spondylosis 3/2008    Dr. Joe Guillen. Dr. 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 Janeth Boyd.     Proteinuria 2009    Dr. Laisha Mcgregor Right shoulder pain 01/30/06    Dr. Tremaine Evangelista. impingement tendinitis    Right shoulder pain 07/2014    Dr. Granville Epley.  Schatzki's ring 05/27/15    with partial obstruction at GE junction. Dr. Zia Anderson. Mio Ferrer. Dr. Olmos Becky    Stenosis colon [facility]) 05/27/15    SIGMOID WITH TORTUOUS DESCENDING COLON. Dr. Tawnya Coto. [facility].  Thalassemia minor 1980s    Vitamin D deficiency 05/2011    Aubrey Durán, SALEEM       PAST SURGICAL HISTORY:    Past Surgical History:   Procedure Laterality Date    CYSTOSCOPY  07/2005    Dr. Vanessa Coker FOOT/TOES SURGERY PROC UNLISTED      Left Ornelas's Neuroma. [address] [facility] HX BREAST BIOPSY Left     12 years ago neg    HX BREAST LUMPECTOMY  10/15/08    Left intraductal papilloma. Dr. Liz Angel HX COLONOSCOPY  05/27/15    due to anemia, rectal bleeding. due q 5 yrs. Dr. Micah Garcia  2001, 12/07/06, 2011    Dr. Bel Pascual. due q 5 yrs.  HX ENDOSCOPY  05/27/15    due to anemia. Dr. Zia Anderson.  HX HEART CATHETERIZATION  01/09/09    Dr. Starla Moreno ARTHROSCOPY Left 04/17/15    due to [address]. Dr. Kashmir Moyer.  HX MYOMECTOMY  1974-1993    x4    HX MARTIN AND BSO  1994    endometriosis, fibroids.        FAMILY HISTORY:    Family History   Problem Relation Age of Onset   [facility] Hypertension Mother     Glaucoma Mother     Heart Failure Mother      CHF    Hypertension Father     Cancer Father      colon    Heart Attack Sister     Stroke Sister     Diabetes Sister      x 2 sisters    Diabetes Brother        SOCIAL HISTORY:    Social History     Social History    Marital status: SINGLE     Spouse name: N/A    Number of children: N/A    Years of education: N/A     Social History Main Topics    Smoking status: Never Smoker    Smokeless tobacco: Never Used    Alcohol use 0.0 oz/week     0 Standard drinks or equivalent per week      Comment: Social beer, social drinks, social shots of liquor,and social wine.  Drug use: No    Sexual activity: Not Asked     Other Topics Concern    None     Social History Narrative       IMMUNIZATIONS:    Immunization History   Administered Date(s) Administered    H1N1 Influenza Virus Vaccine 03/09/2010    Hepatitis B Vaccine 06/09/2008, 08/20/2008, 01/27/2009    Influenza High Dose Vaccine PF 11/11/2015, 09/07/2016    Influenza Vaccine Split 10/08/2010, 11/08/2011, 10/24/2012    Influenza Vaccine Whole 01/27/2009    Pneumococcal Conjugate (PCV-13) 11/11/2015    Pneumococcal Polysaccharide (PPSV-23) 12/07/2016    Pneumococcal Vaccine (Unspecified Type) 12/21/2005    Zoster Vaccine, Live 01/20/2014    dT Vaccine 06/09/2008         PHYSICAL EXAMINATION    Vital Signs    Visit Vitals    /75 (BP 1 Location: Left arm, BP Patient Position: Sitting)    Pulse 75    Temp 98.4 °F (36.9 °C) (Oral)    Resp 16    Ht 5' 7.99\" (1.727 m)    Wt 206 lb 1.6 oz (93.5 kg)    SpO2 99%    BMI 31.34 kg/m2       Weight Metrics 6/22/2017 3/14/2017 12/7/2016 11/14/2016 10/13/2016 9/7/2016 2/29/2016   Weight 206 lb 1.6 oz 203 lb 6.4 oz 202 lb 14.4 oz 203 lb 9.6 oz 200 lb 6.4 oz 200 lb 6.4 oz 195 lb 1.6 oz   BMI 31.34 kg/m2 30.93 kg/m2 30.85 kg/m2 30.73 kg/m2 30.25 kg/m2 30.25 kg/m2 29.43 kg/m2       General appearance - Well nourished. Well appearing. Well developed. No acute distress. Obese. Head - Normocephalic. Atraumatic. Eyes - pupils equal and reactive. Extraocular eye movements intact. Sclera anicteric. Mildly injected sclera. Ears - Hearing is grossly normal bilaterally. Nose - normal and patent. No polyps noted. No erythema. No discharge. Mouth - mucous membranes with adequate moisture. Posterior pharynx normal with cobblestone appearance. No erythema, white exudate or obstruction. Neck - supple. Midline trachea. No carotid bruits noted bilaterally.   No thyromegaly noted.  Chest - clear to auscultation bilaterally anteriorly and posteriorly. No wheezes. No rales or rhonchi. Breath sounds are symmetrical bilaterally. Unlabored respirations. Heart - normal rate. Regular rhythm. Normal S1, S2. No murmur noted. No rubs, clicks or gallops noted. Abdomen - soft and distended. No masses or organomegaly. No rebound, rigidity or guarding. Bowel sounds normal x 4 quadrants. No tenderness noted. Neurological - awake, alert and oriented to person, place, and time and event. Cranial nerves II through XII intact. Clear speech. Muscle strength is +5/5 x 4 extremities. Sensation is intact to light touch bilaterally. Steady gait. Heme/Lymph - peripheral pulses normal x 4 extremities. No peripheral edema is noted. Musculoskeletal - Intact x 4 extremities. Full ROM x 4 extremities. No pain with movement. Back exam - normal range of motion. No pain on palpation of the spinous processes in the cervical, thoracic, lumbar, sacral regions. No CVA tenderness. Skin - no rashes, erythema, ecchymosis, lacerations, abrasions, suspicious moles noted  Psychological -   normal behavior, dress and thought processes. Good insight. Good eye contact. Normal affect. Appropriate mood. Normal speech.       DATA REVIEWED    Results for orders placed or performed in visit on 06/15/17   LIPID PANEL   Result Value Ref Range    Cholesterol, total 207 (H) 100 - 199 mg/dL    Triglyceride 107 0 - 149 mg/dL    HDL Cholesterol 70 >39 mg/dL    VLDL, calculated 21 5 - 40 mg/dL    LDL, calculated 116 (H) 0 - 99 mg/dL   METABOLIC PANEL, COMPREHENSIVE   Result Value Ref Range    Glucose 170 (H) 65 - 99 mg/dL    BUN 14 8 - 27 mg/dL    Creatinine 0.98 0.57 - 1.00 mg/dL    GFR est non-AA 60 >59 mL/min/1.73    GFR est AA 69 >59 mL/min/1.73    BUN/Creatinine ratio 14 12 - 28    Sodium 137 134 - 144 mmol/L    Potassium 4.0 3.5 - 5.2 mmol/L    Chloride 95 (L) 96 - 106 mmol/L    CO2 24 18 - 29 mmol/L    Calcium 9.5 8.7 - 10.3 mg/dL    Protein, total 7.1 6.0 - 8.5 g/dL    Albumin 4.3 3.6 - 4.8 g/dL    GLOBULIN, TOTAL 2.8 1.5 - 4.5 g/dL    A-G Ratio 1.5 1.2 - 2.2    Bilirubin, total 0.3 0.0 - 1.2 mg/dL    Alk. phosphatase 68 39 - 117 IU/L    AST (SGOT) 17 0 - 40 IU/L    ALT (SGPT) 9 0 - 32 IU/L   TSH 3RD GENERATION   Result Value Ref Range    TSH 3.390 0.450 - 4.500 uIU/mL   VITAMIN D, 25 HYDROXY   Result Value Ref Range    VITAMIN D, 25-HYDROXY 22.5 (L) 30.0 - 100.0 ng/mL   MICROALBUMIN, UR, RAND W/ MICROALBUMIN/CREA RATIO   Result Value Ref Range    Creatinine, urine 14.2 Not Estab. mg/dL    Microalbumin, urine <3.0 Not Estab. ug/mL    Microalb/Creat ratio (ug/mg creat.) <21.1 0.0 - 30.0 mg/g creat   URINALYSIS W/ RFLX MICROSCOPIC   Result Value Ref Range    Specific Gravity 1.008 1.005 - 1.030    pH (UA) 6.5 5.0 - 7.5    Color Yellow Yellow    Appearance Clear Clear    Leukocyte Esterase Negative Negative    Protein Negative Negative/Trace    Glucose 2+ (A) Negative    Ketone Negative Negative    Blood Negative Negative    Bilirubin Negative Negative    Urobilinogen 0.2 0.2 - 1.0 mg/dL    Nitrites Negative Negative    Microscopic Examination Comment    HEMOGLOBIN A1C WITH EAG   Result Value Ref Range    Hemoglobin A1c 8.5 (H) 4.8 - 5.6 %    Estimated average glucose 197 mg/dL   CBC W/O DIFF   Result Value Ref Range    WBC 5.7 3.4 - 10.8 x10E3/uL    RBC 4.95 3.77 - 5.28 x10E6/uL    HGB 11.2 11.1 - 15.9 g/dL    HCT 34.8 34.0 - 46.6 %    MCV 70 (L) 79 - 97 fL    MCH 22.6 (L) 26.6 - 33.0 pg    MCHC 32.2 31.5 - 35.7 g/dL    RDW 16.8 (H) 12.3 - 15.4 %    PLATELET 709 594 - 766 x10E3/uL   IRON   Result Value Ref Range    Iron 67 27 - 139 ug/dL   CVD REPORT   Result Value Ref Range    INTERPRETATION Note    DIABETES PATIENT EDUCATION   Result Value Ref Range    PDF Image Not applicable        ASSESSMENT and PLAN      ICD-10-CM ICD-9-CM    1.  Uncontrolled type 2 diabetes mellitus with diabetic neuropathic arthropathy, without long-term current use of insulin (HCC) E11.610 250.62 pioglitazone-glimepiride 30-2 mg per tablet    E11.65 713.5 dapagliflozin-metformin (XIGDUO XR) 5-500 mg TBph      glimepiride (AMARYL) 4 mg tablet   2. Essential hypertension I10 401.9 olmesartan-hydroCHLOROthiazide (BENICAR HCT) 40-25 mg per tablet      aspirin (CHILD ASPIRIN) 81 mg chewable tablet    stable   3. Ataxia involving legs R26.0 781.2 REFERRAL TO NEUROLOGY    R>L   4. Age-related nuclear cataract of both eyes H25.13 366.16    5. Weight gain R63.5 783.1     11# since 02/2016   6. Intraocular pressure increase, bilateral H40.053 365.00    7. ACE inhibitor intolerance Z78.9 995.27      E980.4    8. Vitamin D deficiency E55.9 268.9 ergocalciferol (ERGOCALCIFEROL) 50,000 unit capsule   9. OAB (overactive bladder) N32.81 596.51     stable on vesicare        Discussed the patient's BMI with her. The BMI follow up plan is as follows: I have counseled this patient on diet and exercise regimens. Decrease carbohydrates (white foods, sweet foods, sweet drinks and alcohol), increase green leafy vegetables and protein (lean meats and beans) with each meal.  Avoid fried foods. Eat 3-5 small meals daily. Do not skip meals. Increase water intake. Increase physical activity to 30 minutes daily for health benefit or 60 minutes daily to prevent weight regain, as tolerated. Get 7-8 hours uninterrupted sleep nightly. Chart reviewed and updated. Continue current medications and care. Increase Amaryl 2 mg to 4 mg; continue with Xigduo 10-1,000 mg. Start Rx Vitamin D 50,000IU weekly x3 months and then take OTC Vitamin D 5,000IU daily. Prescriptions written and sent to pharmacy; medication side effects discussed. Vit D 50,000IU. Amaryl 4 mg. Most recent tests reviewed from 06/2017. Recent office visit notes from Dr. Devon Luna and Dr. Roseann Oates reviewed. Referrals given; patient urged to keep appointments with specialists. Neurology. Counseled patient on health concerns: DM, Vit D deficiency, and ataxia. Immunizations noted. Offered empathy, support, legitimation, prayers, partnership to patient. Praised patient for progress. Follow-up Disposition:  Return in about 3 months (around 9/22/2017) for DM, BP check . Patient was offered a choice/choices in the treatment plan today. Patient expresses understanding of the plan and agrees with recommendations. Patient declines any additional handouts. Patient is satisfied with previous handouts received from our office    Written by clem Greene, as dictated by Dr. Keyona Ríos DO. Documentation True and Accepted by Matthewjackie Fletcher.  Prisca Allen.

## 2017-06-22 NOTE — MR AVS SNAPSHOT
Visit Information Date & Time Provider Department Dept. Phone Encounter #  
 6/22/2017  2:30 PM DO Demetri Reynolds [phone] [id number] Follow-up Instructions Return in about 3 months (around 9/22/2017) for DM, BP check . Upcoming Health Maintenance Date Due INFLUENZA AGE 9 TO ADULT 8/1/2017 FOOT EXAM Q1 11/29/2017 MEDICARE YEARLY EXAM 12/8/2017 HEMOGLOBIN A1C Q6M 12/15/2017 EYE EXAM RETINAL OR DILATED Q1 3/21/2018 MICROALBUMIN Q1 6/15/2018 LIPID PANEL Q1 6/15/2018 BREAST CANCER SCRN MAMMOGRAM 2/6/2019 GLAUCOMA SCREENING Q2Y 3/21/2019 COLONOSCOPY 5/27/2020 DTaP/Tdap/Td series (2 - Td) 3/14/2027 Allergies as of 6/22/2017  Review Complete On: 6/22/2017 By: Ashley Chapa Severity Noted Reaction Type Reactions Influenza Virus Vaccine, Specific High 10/13/2016    Swelling R ARM PAIN, NUMBNESS, SWELLING 
R HAND NUMBNESS AND TINGLING, WORSE IN 4TH AND FIFTH FINGERS Lisinopril Medium 10/28/2009    Cough Other reaction(s): Lisinopril Singulair [Montelukast]  02/28/2013    Other (comments)  
 headache Current Immunizations  Reviewed on 2/29/2016 Name Date H1N1 FLU VACCINE 3/9/2010 Hepatitis B Vaccine 1/27/2009, 8/20/2008, 6/9/2008 Influenza High Dose Vaccine PF 9/7/2016, 11/11/2015 Influenza Vaccine Split 10/24/2012, 11/8/2011, 10/8/2010 Influenza Vaccine Whole 1/27/2009 Pneumococcal Conjugate (PCV-13) 11/11/2015 Pneumococcal Polysaccharide (PPSV-23) 12/7/2016 Pneumococcal Vaccine (Unspecified Type) 12/21/2005 Zoster Vaccine, Live 1/20/2014  
 dT Vaccine 6/9/2008 Not reviewed this visit You Were Diagnosed With   
  
 Codes Comments Uncontrolled type 2 diabetes mellitus with diabetic neuropathic arthropathy, without long-term current use of insulin ([facility]    -  Primary ICD-10-CM: E11.610, E11.65 ICD-9-CM: 250.62, 713.5 Essential hypertension     ICD-10-CM: I10 
ICD-9-CM: 401.9 stable Ataxia involving legs     ICD-10-CM: R26.0 ICD-9-CM: 781.2 R>L Age-related nuclear cataract of both eyes     ICD-10-CM: H25.13 ICD-9-CM: 366.16 Weight gain     ICD-10-CM: R63.5 ICD-9-CM: 783.1 11# since 02/2016 Intraocular pressure increase, bilateral     ICD-10-CM: H40.053 ICD-9-CM: 365.00   
 ACE inhibitor intolerance     ICD-10-CM: Z78.9 ICD-9-CM: 995.27, E980.4 Vitamin D deficiency     ICD-10-CM: E55.9 ICD-9-CM: 268.9 Vitals BP Pulse Temp Resp Height(growth percentile) Weight(growth percentile) 116/75 (BP 1 Location: Left arm, BP Patient Position: Sitting) 75 98.4 °F (36.9 °C) (Oral) 16 5' 7.99\" (1.727 m) 206 lb 1.6 oz (93.5 kg) SpO2 BMI OB Status Smoking Status 99% 31.34 kg/m2 Hysterectomy Never Smoker BMI and BSA Data Body Mass Index Body Surface Area  
 31.34 kg/m 2 2.12 m 2 Preferred Pharmacy Pharmacy Name Phone [facility], [address] [phone] Your Updated Medication List  
  
   
This list is accurate as of: 6/22/17  4:35 PM.  Always use your most recent med list.  
  
  
  
  
 aspirin delayed-release 81 mg tablet Take 1 Tab by mouth daily. Blood-Glucose Meter monitoring kit One Touch Ultra 2 Use to test blood sugar 1-2 per day (blood sugar before breakfast or 2 hours after any meal or at bedtime) DX.  E11.65  
  
 chlorhexidine 0.12 % solution Commonly known as:  PERIDEX  
  
 diclofenac EC 75 mg EC tablet Commonly known as:  VOLTAREN  
take 1 tablet by mouth twice a day with food  
  
 ergocalciferol 50,000 unit capsule Commonly known as:  ERGOCALCIFEROL Take 1 Cap by mouth every seven (7) days. Indications: VITAMIN D DEFICIENCY  
  
 EVENING PRIMROSE 500 mg Cap Generic drug:  evening primrose oil Take 1 Cap by mouth two (2) times a day. FLEXERIL 10 mg tablet Generic drug:  cyclobenzaprine Take 10 mg by mouth as needed. fluticasone 50 mcg/actuation nasal spray Commonly known as:  Vilonia Petite 2 Sprays by Both Nostrils route daily. glimepiride 4 mg tablet Commonly known as:  AMARYL Take 1 Tab by mouth every morning. Indications: type 2 diabetes mellitus  
  
 glucose blood VI test strips strip Commonly known as:  ASCENSIA AUTODISC VI, ONE TOUCH ULTRA TEST VI  
Use to test blood sugar 1-2 per day (blood sugar before breakfast or 2 hours after any meal or at bedtime) DX. E11.65  
  
 simvastatin 40 mg tablet Commonly known as:  ZOCOR  
take 1 tablet by mouth at bedtime  
  
 sodium chloride 0.65 % nasal spray Commonly known as:  OCEAN  
1 Spray as needed for Congestion. traMADol 50 mg tablet Commonly known as:  ULTRAM  
Take 50 mg by mouth every six (6) hours as needed. traZODone 50 mg tablet Commonly known as:  Juan Manuel Mallet Take 50 mg by mouth as needed. Takes at bedtime prn  
  
 VAGIFEM 10 mcg Tab vaginal tablet Generic drug:  estradiol Insert 10 mcg into vagina daily. valACYclovir 500 mg tablet Commonly known as:  VALTREX  
  
 valsartan-hydroCHLOROthiazide 160-25 mg per tablet Commonly known as:  DIOVAN-HCT  
take 1 tablet by mouth once daily VESIcare 10 mg tablet Generic drug:  solifenacin Take 10 mg by mouth daily. XIGDUO XR 10-1,000 mg Tbph  
Generic drug:  dapagliflozin-metformin  
take 1 tablet by mouth daily for TYPE 2 DIABETES MELLITUS Prescriptions Sent to Pharmacy Refills  
 glimepiride (AMARYL) 4 mg tablet 11 Sig: Take 1 Tab by mouth every morning. Indications: type 2 diabetes mellitus Class: Normal  
 Pharmacy: [id number] [address], [address] Ph #: [phone] Route: Oral  
 ergocalciferol (ERGOCALCIFEROL) 50,000 unit capsule 2 Sig: Take 1 Cap by mouth every seven (7) days.  Indications: VITAMIN D DEFICIENCY  
 Class: Normal  
 Pharmacy: [id number] [address] [facility], [address], [address] #: [phone] Route: Oral  
  
We Performed the Following REFERRAL TO NEUROLOGY [FBX85 Custom] Comments:  
 Please evaluate patient for increased stumbling, Ataxia R>L Follow-up Instructions Return in about 3 months (around 9/22/2017) for DM, BP check . Referral Information Referral ID Referred By Referred To  
  
 [id number] Lino Leyva MD   
   [address] [facility] [address] [facility] [address] Phone: [phone] Fax: [phone] Visits Status Start Date End Date 1 New Request 6/22/17 6/22/18 If your referral has a status of pending review or denied, additional information will be sent to support the outcome of this decision. Introducing hospitals & HEALTH SERVICES! Dear Yenifer Wallace: Thank you for requesting a [employer] account. Our records indicate that you already have an active [employer] account. You can access your account anytime at [email or web address] Did you know that you can access your hospital and ER discharge instructions at any time in [employer]? You can also review all of your test results from your hospital stay or ER visit. Additional Information If you have questions, please visit the Frequently Asked Questions section of the [employer]mill website at [email or web address] Remember, Readmill is NOT to be used for urgent needs. For medical emergencies, dial 911. Now available from your iPhone and Android! Please provide this summary of care documentation to your next provider. Your primary care clinician is listed as Tia Simms. If you have any questions after today's visit, please call [phone].

## 2017-08-16 ENCOUNTER — OFFICE VISIT (OUTPATIENT)
Dept: FAMILY MEDICINE CLINIC | Age: 67
End: 2017-08-16

## 2017-08-16 VITALS
RESPIRATION RATE: 16 BRPM | DIASTOLIC BLOOD PRESSURE: 72 MMHG | WEIGHT: 200.2 LBS | SYSTOLIC BLOOD PRESSURE: 103 MMHG | OXYGEN SATURATION: 99 % | BODY MASS INDEX: 30.34 KG/M2 | HEART RATE: 71 BPM | HEIGHT: 68 IN | TEMPERATURE: 98.5 F

## 2017-08-16 DIAGNOSIS — E78.00 HIGH CHOLESTEROL: ICD-10-CM

## 2017-08-16 DIAGNOSIS — G56.22 ULNAR NEUROPATHY OF LEFT UPPER EXTREMITY: ICD-10-CM

## 2017-08-16 DIAGNOSIS — E66.9 OBESITY, CLASS I, BMI 30-34.9: ICD-10-CM

## 2017-08-16 DIAGNOSIS — H25.13 AGE-RELATED NUCLEAR CATARACT OF BOTH EYES: ICD-10-CM

## 2017-08-16 DIAGNOSIS — R63.4 WEIGHT LOSS: ICD-10-CM

## 2017-08-16 DIAGNOSIS — G44.52 NEW DAILY PERSISTENT HEADACHE: Primary | ICD-10-CM

## 2017-08-16 DIAGNOSIS — H35.362 DRUSEN OF MACULA, LEFT: ICD-10-CM

## 2017-08-16 DIAGNOSIS — M79.7 FIBROMYALGIA: ICD-10-CM

## 2017-08-16 DIAGNOSIS — I10 ESSENTIAL HYPERTENSION: ICD-10-CM

## 2017-08-16 DIAGNOSIS — M47.812 NECK ARTHRITIS: ICD-10-CM

## 2017-08-16 RX ORDER — PREGABALIN 75 MG/1
CAPSULE ORAL
COMMUNITY
End: 2018-01-23 | Stop reason: ALTCHOICE

## 2017-08-16 RX ORDER — BUTALBITAL, ACETAMINOPHEN AND CAFFEINE 50; 325; 40 MG/1; MG/1; MG/1
1 TABLET ORAL
Qty: 30 TAB | Refills: 1 | Status: SHIPPED | OUTPATIENT
Start: 2017-08-16 | End: 2019-01-28 | Stop reason: ALTCHOICE

## 2017-08-16 NOTE — PATIENT INSTRUCTIONS
Neck: Exercises  Your Care Instructions  Here are some examples of typical rehabilitation exercises for your condition. Start each exercise slowly. Ease off the exercise if you start to have pain. Your doctor or physical therapist will tell you when you can start these exercises and which ones will work best for you. How to do the exercises  Note: Stretching should make you feel a gentle stretch, but no pain. Stop any strengthening exercise that makes pain worse. Neck stretch    1. This stretch works best if you keep your shoulder down as you lean away from it. To help you remember to do this, start by relaxing your shoulders and lightly holding on to your thighs or your chair. 2. Tilt your head toward your shoulder and hold for 15 to 30 seconds. Let the weight of your head stretch your muscles. 3. If you would like a little added stretch, use your hand to gently and steadily pull your head toward your shoulder. For example, keeping your right shoulder down, lean your head to the left. 4. Repeat 2 to 4 times toward each shoulder. Diagonal neck stretch    1. Turn your head slightly toward the direction you will be stretching, and tilt your head diagonally toward your chest and hold for 15 to 30 seconds. 2. If you would like a little added stretch, use your hand to gently and steadily pull your head forward on the diagonal.  3. Repeat 2 to 4 times toward each side. Dorsal glide stretch    1. Sit or stand tall and look straight ahead. 2. Slowly tuck your chin as you glide your head backward over your body  3. Hold for a count of 6, and then relax for up to 10 seconds. 4. Repeat 8 to 12 times. Note: The dorsal glide stretches the back of the neck. If you feel pain, do not glide so far back. Some people find this exercise easier to do while lying on their backs with an ice pack on the neck. Chest and shoulder stretch    1.  Sit or stand tall and glide your head backward as in the dorsal glide stretch. 2. Raise both arms so that your hands are next to your ears. 3. Take a deep breath, and as you breathe out, lower your elbows down and behind your back. You will feel your shoulder blades slide down and together, and at the same time you will feel a stretch across your chest and the front of your shoulders. 4. Hold for about 6 seconds, and then relax for up to 10 seconds. 5. Repeat 8 to 12 times. Strengthening: Hands on head    1. Move your head backward, forward, and side to side against gentle pressure from your hands, holding each position for about 6 seconds. 2. Repeat 8 to 12 times. Follow-up care is a key part of your treatment and safety. Be sure to make and go to all appointments, and call your doctor if you are having problems. It's also a good idea to know your test results and keep a list of the medicines you take. Where can you learn more? Go to http://gregg-zunilda.info/. Enter P975 in the search box to learn more about \"Neck: Exercises. \"  Current as of: March 21, 2017  Content Version: 11.3  © 5757-7994 [employer]. Care instructions adapted under license by [employer] (which disclaims liability or warranty for this information). If you have questions about a medical condition or this instruction, always ask your healthcare professional. [address] any warranty or liability for your use of this information. Neck Arthritis: Exercises  Your Care Instructions  Here are some examples of typical rehabilitation exercises for your condition. Start each exercise slowly. Ease off the exercise if you start to have pain. Your doctor or physical therapist will tell you when you can start these exercises and which ones will work best for you. How to do the exercises  Neck stretches to the side    5. This stretch works best if you keep your shoulder down as you lean away from it.  To help you remember to do this, start by relaxing your shoulders and lightly holding on to your thighs or your chair. 6. Tilt your head toward your shoulder and hold for 15 to 30 seconds. Let the weight of your head stretch your muscles. 7. Repeat 2 to 4 times toward each shoulder. Chin tuck    4. Lie on the floor with a rolled-up towel under your neck. Your head should be touching the floor. 5. Slowly bring your chin toward your chest.  6. Hold for a count of 6, and then relax for up to 10 seconds. 7. Repeat 8 to 12 times. Active cervical rotation    5. Sit in a firm chair, or stand up straight. 6. Keeping your chin level, turn your head to the right, and hold for 15 to 30 seconds. 7. Turn your head to the left and hold for 15 to 30 seconds. 8. Repeat 2 to 4 times to each side. Shoulder blade squeeze    6. While standing, squeeze your shoulder blades together. 7. Do not raise your shoulders up as you are squeezing. 8. Hold for 6 seconds. 9. Repeat 8 to 12 times. Shoulder rolls    3. Sit comfortably with your feet shoulder-width apart. You can also do this exercise standing up. 4. Roll your shoulders up, then back, and then down in a smooth, circular motion. 5. Repeat 2 to 4 times. Follow-up care is a key part of your treatment and safety. Be sure to make and go to all appointments, and call your doctor if you are having problems. It's also a good idea to know your test results and keep a list of the medicines you take. Where can you learn more? Go to http://gregg-zunilda.info/. Enter X921 in the search box to learn more about \"Neck Arthritis: Exercises. \"  Current as of: March 21, 2017  Content Version: 11.3  © 4507-8406 [employer]. Care instructions adapted under license by [employer] (which disclaims liability or warranty for this information).  If you have questions about a medical condition or this instruction, always ask your healthcare professional. [place] [address] any warranty or liability for your use of this information. Healthy Upper Back: Exercises  Your Care Instructions  Here are some examples of exercises for your upper back. Start each exercise slowly. Ease off the exercise if you start to have pain. Your doctor or physical therapist will tell you when you can start these exercises and which ones will work best for you. How to do the exercises  Lower neck and upper back stretch    8. Stretch your arms out in front of your body. Clasp one hand on top of your other hand. 9. Gently reach out so that you feel your shoulder blades stretching away from each other. 10. Gently bend your head forward. 11. Hold for 15 to 30 seconds. 12. Repeat 2 to 4 times. Midback stretch    Note: If you have knee pain, do not do this exercise. 8. Kneel on the floor, and sit back on your ankles. 9. Lean forward, place your hands on the floor, and stretch your arms out in front of you. Rest your head between your arms. 10. Gently push your chest toward the floor, reaching as far in front of you as possible. 11. Hold for 15 to 30 seconds. 12. Repeat 2 to 4 times. Shoulder rolls    9. Sit comfortably with your feet shoulder-width apart. You can also do this exercise while standing. 10. Roll your shoulders up, then back, and then down in a smooth, circular motion. 11. Repeat 2 to 4 times. Wall push-up    10. Stand against a wall with your feet about 12 to 24 inches back from the wall. If you feel any pain when you do this exercise, stand closer to the wall. 11. Place your hands on the wall slightly wider apart than your shoulders, and lean forward. 12. Gently lean your body toward the wall. Then push back to your starting position. Keep the motion smooth and controlled. 13. Repeat 8 to 12 times. Resisted shoulder blade squeeze    Note: For this exercise, you will need elastic exercise material, such as surgical tubing or Thera-Band.   6. Sit or stand, holding the band in both hands in front of you. Keep your elbows close to your sides, bent at a 90-degree angle. Your palms should face up. 7. Squeeze your shoulder blades together, and move your arms to the outside, stretching the band. Be sure to keep your elbows at your sides while you do this. 8. Relax. 9. Repeat 8 to 12 times. Resisted rows    Note: For this exercise, you will need elastic exercise material, such as surgical tubing or Thera-Band. 1. Put the band around a solid object, such as a bedpost, at about waist level. Hold one end of the band in each hand. 2. With your elbows at your sides and bent to 90 degrees, pull the band back to move your shoulder blades toward each other. Return to the starting position. 3. Repeat 8 to 12 times. Follow-up care is a key part of your treatment and safety. Be sure to make and go to all appointments, and call your doctor if you are having problems. It's also a good idea to know your test results and keep a list of the medicines you take. Where can you learn more? Go to http://gregg-zunilda.info/. Enter J409 in the search box to learn more about \"Healthy Upper Back: Exercises. \"  Current as of: March 21, 2017  Content Version: 11.3  © 2629-1534 [employer]. Care instructions adapted under license by [employer] (which disclaims liability or warranty for this information). If you have questions about a medical condition or this instruction, always ask your healthcare professional. [place] [address] any warranty or liability for your use of this information.

## 2017-08-16 NOTE — PROGRESS NOTES
HISTORY OF PRESENT ILLNESS  Eduardo Hamilton is a 79 y.o. female presents with Headache (sharp/dull HA's; occurring for about a month and a half; starts out sharp and then goes dull; \"feels like a band is going around my head\", pt pointed to her forehead when stating it; 5/10 on pain scale; gets some relief with aleve and tramadol; \"won't go away, won't quit\") and Referral Follow Up    Agree with nurse note. Uncontrolled diabetic, hypertensive pt with high cholesterol presents to the office with a BP of 103/72. In 06/2017, Hgb A1C was 8.5. Blood sugars are 125-135. She weighs 200 lbs, lost 6 lbs since 06/2017. She started doing lunges and crunches. Pt with hx of neck arthritis and fibromyalgia. She complains of daily and constant headaches x1.5 months, which she describes as dull. She has had neck and back pain along her spine x4 months. She saw her rheumatologist, Dr. Vee Santillan yesterday and was started on Lyrica 75 mg. This morning her headache was improved but still present. She began buying larger print because she felt like she was straining her eyes to read normal size print. She was referred to Dr. Ronnie Stringer for an EMG by her neurologist, Dr. Vicki Carrasco. EMG on 05/08/17 for R hand numbness and shoulder pain showed ulnar neuropathy at RUE elbow. Dr Zeina Glover recommended she follow up with Dr. Leyla Stinson for intervention but she has not yet done that. She did receive an injection in her R shoulder, administered by Dr. Harry Macdonald. Health Maintenance    She saw optometrist, Dr. Mynor Solorzano on 03/21/17. Dx'd BL hyperopia, presbyopia, BL cataracts, DM type 2 without retinopathy, and L macula drusen. Recommended Areds II Vitamins for drusen. Written by clem Monreal, as dictated by Dr. Abhay Carvalho DO.    ROS    Review of Systems negative except as noted above in HPI.     ALLERGIES:    Allergies   Allergen Reactions    Influenza Virus Vaccine, Specific Swelling     R ARM PAIN, NUMBNESS, SWELLING  R HAND NUMBNESS AND TINGLING, WORSE IN 4TH AND FIFTH FINGERS    Lisinopril Cough     Other reaction(s): Lisinopril    Singulair [Montelukast] Other (comments)     headache       CURRENT MEDICATIONS:    Outpatient Prescriptions Marked as Taking for the 8/16/17 encounter (Office Visit) with Yue Caroline, DO   Medication Sig Dispense Refill    pregabalin (LYRICA) 75 mg capsule Take  by mouth.  butalbital-acetaminophen-caffeine (FIORICET, ESGIC) -40 mg per tablet Take 1 Tab by mouth every six (6) hours as needed for Headache. Max Daily Amount: 4 Tabs. Indications: MIGRAINE, TENSION-TYPE HEADACHE 30 Tab 1    sodium chloride (OCEAN) 0.65 % nasal spray 1 Spray as needed for Congestion.  glimepiride (AMARYL) 4 mg tablet Take 1 Tab by mouth every morning. Indications: type 2 diabetes mellitus 60 Tab 11    ergocalciferol (ERGOCALCIFEROL) 50,000 unit capsule Take 1 Cap by mouth every seven (7) days. Indications: VITAMIN D DEFICIENCY 5 Cap 2    valsartan-hydroCHLOROthiazide (DIOVAN-HCT) 160-25 mg per tablet take 1 tablet by mouth once daily 30 Tab 5    solifenacin (VESICARE) 10 mg tablet Take 10 mg by mouth daily.  aspirin delayed-release 81 mg tablet Take 1 Tab by mouth daily.  90 Tab 3    simvastatin (ZOCOR) 40 mg tablet take 1 tablet by mouth at bedtime 90 Tab 1    valACYclovir (VALTREX) 500 mg tablet       chlorhexidine (PERIDEX) 0.12 % solution       XIGDUO XR 10-1,000 mg TBph take 1 tablet by mouth daily for TYPE 2 DIABETES MELLITUS 30 Tab 11    glucose blood VI test strips (ASCENSIA AUTODISC VI, ONE TOUCH ULTRA TEST VI) strip Use to test blood sugar 1-2 per day (blood sugar before breakfast or 2 hours after any meal or at bedtime) DX. E11.65 100 Strip 11    Blood-Glucose Meter monitoring kit One Touch Ultra 2 Use to test blood sugar 1-2 per day (blood sugar before breakfast or 2 hours after any meal or at bedtime) DX.   E11.65 1 Kit 0    estradiol (VAGIFEM) 10 mcg tab vaginal tablet Insert 10 mcg into vagina daily.  fluticasone (FLONASE) 50 mcg/actuation nasal spray 2 Sprays by Both Nostrils route daily. 1 Bottle 0    evening primrose oil (EVENING PRIMROSE) 500 mg cap Take 1 Cap by mouth two (2) times a day.  cyclobenzaprine (FLEXERIL) 10 mg tablet Take 10 mg by mouth as needed.  trazodone (DESYREL) 50 mg tablet Take 50 mg by mouth as needed. Takes at bedtime prn       tramadol (ULTRAM) 50 mg tablet Take 50 mg by mouth every six (6) hours as needed. PAST MEDICAL HISTORY:    Past Medical History:   Diagnosis Date    Allergic rhinitis     Dr. Johana Silvar 2015    Dr. Blanca Fernandez.  Chest pain 01/15/09    Dr. Lilliam Zaragoza Chickenpox childhood    DDD (degenerative disc disease), cervical 10/2015    Dr. Rosanna Kohli.  DDD (degenerative disc disease), lumbar 2014    Dr. Nathaly Fernandez.  Diabetes ([facility] [address] [address] 06/2007    Delmar Loaiza, OD. Dr. Tyler Mullen, pod.  Endometriosis     Dr. Daniel Judd    Essential hypertension, benign     Dr. Yeison Piña, uterine     Dr. Leigh Back Jeffry 08/2009    Dr. Love Hood. Dr. Tyler Mullen.  Hand paresthesia 2011    Dr. Cassidy Nelson. Dr. Domi Tsai.  Headache 2012    Dr. Neda Calvillo. Dr. Love Hood, ENT.  Hemoptysis 06/13/07    Dr. Susie Odom    Hiatal hernia 05/27/15    small. John Nielsen MD   [address] [place] [address] Internal hemorrhoids 12/2006, 05/27/15    Dr. Yennifer Sanford. Dr. Sue Barboza.  Intraocular pressure increase 07/27/05    bilaterally. Dr. Alberta Schulte. Dr. Delmar Loazia.  Knee pain 04/04/13    Bilaterally. due to fall. Dr. Audrey Cummings.  Left knee pain 11/26/14    Left. due to OA, torn medial and lateral meniscal tears. Dr. Harrison Schaeffer. Dr. Verner Ned.  Lumbar spondylosis 3/2008    Dr. Milton Willis. Dr. 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 Thurlow Severin.     Proteinuria 2009    Dr. Marlin Freed Right shoulder pain 01/30/06    Dr. Chon Bowman. impingement tendinitis    Right shoulder pain 07/2014    Dr. Karoline Medina.  Schatzki's ring 05/27/15    with partial obstruction at GE junction. Dr. Jani York. Topher Chen. Dr. 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 Dr. Frank Linn. [facility].  Thalassemia minor 1980s    Vitamin D deficiency 05/2011    Kevin Mart NP       PAST SURGICAL HISTORY:    Past Surgical History:   Procedure Laterality Date    CYSTOSCOPY  07/2005    Dr. Jazmine Preston FOOT/TOES SURGERY PROC UNLISTED      Left Ornelas's Neuroma. [id number] [facility] HX BREAST BIOPSY Left     12 years ago neg    HX BREAST LUMPECTOMY  10/15/08    Left intraductal papilloma. Dr. Lucien Thompson HX COLONOSCOPY  05/27/15    due to anemia, rectal bleeding. due q 5 yrs. Dr. Clementina Hirsch  2001, 12/07/06, 2011    Dr. Megha Cobb. due q 5 yrs.  HX ENDOSCOPY  05/27/15    due to anemia. Dr. Jani York.  HX HEART CATHETERIZATION  01/09/09    Dr. Betancur Oms ARTHROSCOPY Left 04/17/15    due to [address]. Dr. Елена Licea.  HX MYOMECTOMY  1974-1993    x4    HX MARTIN AND BSO  1994    endometriosis, fibroids.        FAMILY HISTORY:    Family History   Problem Relation Age of Onset   Woodson Sous Hypertension Mother    Woodson Sous Glaucoma Mother     Heart Failure Mother      CHF    Hypertension Father     Cancer Father      colon    Heart Attack Sister     Stroke Sister     Diabetes Sister      x 2 sisters    Diabetes Brother        SOCIAL HISTORY:    Social History     Social History    Marital status: SINGLE     Spouse name: N/A    Number of children: N/A    Years of education: N/A     Social History Main Topics    Smoking status: Never Smoker    Smokeless tobacco: Never Used    Alcohol use 0.0 oz/week     0 Standard drinks or equivalent per week      Comment: Social beer, social drinks, social shots of liquor,and social wine.  Drug use: No    Sexual activity: Not Asked     Other Topics Concern    None     Social History Narrative       IMMUNIZATIONS:    Immunization History   Administered Date(s) Administered    H1N1 Influenza Virus Vaccine 03/09/2010    Hepatitis B Vaccine 06/09/2008, 08/20/2008, 01/27/2009    Influenza High Dose Vaccine PF 11/11/2015, 09/07/2016    Influenza Vaccine Split 10/08/2010, 11/08/2011, 10/24/2012    Influenza Vaccine Whole 01/27/2009    Pneumococcal Conjugate (PCV-13) 11/11/2015    Pneumococcal Polysaccharide (PPSV-23) 12/07/2016    ZZZ-RETIRED (DO NOT USE) Pneumococcal Vaccine (Unspecified Type) 12/21/2005    Zoster Vaccine, Live 01/20/2014    dT Vaccine 06/09/2008         PHYSICAL EXAMINATION    Vital Signs    Visit Vitals    /72 (BP 1 Location: Right arm, BP Patient Position: Sitting)    Pulse 71    Temp 98.5 °F (36.9 °C) (Oral)    Resp 16    Ht 5' 7.99\" (1.727 m)    Wt 200 lb 3.2 oz (90.8 kg)    SpO2 99%    BMI 30.45 kg/m2       Weight Metrics 8/16/2017 6/22/2017 3/14/2017 12/7/2016 11/14/2016 10/13/2016 9/7/2016   Weight 200 lb 3.2 oz 206 lb 1.6 oz 203 lb 6.4 oz 202 lb 14.4 oz 203 lb 9.6 oz 200 lb 6.4 oz 200 lb 6.4 oz   BMI 30.45 kg/m2 31.34 kg/m2 30.93 kg/m2 30.85 kg/m2 30.73 kg/m2 30.25 kg/m2 30.25 kg/m2       General appearance - Well nourished. Well appearing. Well developed. No acute distress. Obese. Head - Normocephalic. Atraumatic.  +2/4 temporal artery pulses. Non-tender. Eyes - pupils equal and reactive. Extraocular eye movements intact. Sclera anicteric. Mildly injected sclera. Ears - Hearing is grossly normal bilaterally. Nose - normal and patent. No polyps noted. No erythema. No discharge. Mouth - mucous membranes with adequate moisture. Posterior pharynx normal with cobblestone appearance. No erythema, white exudate or obstruction.   Neck - supple. Midline trachea. No carotid bruits noted bilaterally. No thyromegaly noted. Chest - clear to auscultation bilaterally anteriorly and posteriorly. No wheezes. No rales or rhonchi. Breath sounds are symmetrical bilaterally. Unlabored respirations. Heart - normal rate. Regular rhythm. Normal S1, S2. No murmur noted. No rubs, clicks or gallops noted. Abdomen - soft and distended. No masses or organomegaly. No rebound, rigidity or guarding. Bowel sounds normal x 4 quadrants. No tenderness noted. Neurological - awake, alert and oriented to person, place, and time and event. Cranial nerves II through XII intact. Clear speech. Muscle strength is +5/5 x 4 extremities. Sensation is intact to light touch bilaterally. Steady gait. Heme/Lymph - peripheral pulses normal x 4 extremities. No peripheral edema is noted. Musculoskeletal - Intact x 4 extremities. Full ROM x 4 extremities. No pain with movement. Back exam - normal range of motion. No pain on palpation of the spinous processes in the cervical, thoracic, lumbar, sacral regions. No CVA tenderness. Increased muscle tension in upper thoracic region paravertebral musculature. Skin - no rashes, erythema, ecchymosis, lacerations, abrasions, suspicious moles noted  Psychological -   normal behavior, dress and thought processes. Good insight. Good eye contact. Normal affect. Appropriate mood. Normal speech.       DATA REVIEWED    Results for orders placed or performed in visit on 06/15/17   LIPID PANEL   Result Value Ref Range    Cholesterol, total 207 (H) 100 - 199 mg/dL    Triglyceride 107 0 - 149 mg/dL    HDL Cholesterol 70 >39 mg/dL    VLDL, calculated 21 5 - 40 mg/dL    LDL, calculated 116 (H) 0 - 99 mg/dL   METABOLIC PANEL, COMPREHENSIVE   Result Value Ref Range    Glucose 170 (H) 65 - 99 mg/dL    BUN 14 8 - 27 mg/dL    Creatinine 0.98 0.57 - 1.00 mg/dL    GFR est non-AA 60 >59 mL/min/1.73    GFR est AA 69 >59 mL/min/1.73 BUN/Creatinine ratio 14 12 - 28    Sodium 137 134 - 144 mmol/L    Potassium 4.0 3.5 - 5.2 mmol/L    Chloride 95 (L) 96 - 106 mmol/L    CO2 24 18 - 29 mmol/L    Calcium 9.5 8.7 - 10.3 mg/dL    Protein, total 7.1 6.0 - 8.5 g/dL    Albumin 4.3 3.6 - 4.8 g/dL    GLOBULIN, TOTAL 2.8 1.5 - 4.5 g/dL    A-G Ratio 1.5 1.2 - 2.2    Bilirubin, total 0.3 0.0 - 1.2 mg/dL    Alk.  phosphatase 68 39 - 117 IU/L    AST (SGOT) 17 0 - 40 IU/L    ALT (SGPT) 9 0 - 32 IU/L   TSH 3RD GENERATION   Result Value Ref Range    TSH 3.390 0.450 - 4.500 uIU/mL   VITAMIN D, 25 HYDROXY   Result Value Ref Range    VITAMIN D, 25-HYDROXY 22.5 (L) 30.0 - 100.0 ng/mL   MICROALBUMIN, UR, RAND W/ MICROALBUMIN/CREA RATIO   Result Value Ref Range    Creatinine, urine 14.2 Not Estab. mg/dL    Microalbumin, urine <3.0 Not Estab. ug/mL    Microalb/Creat ratio (ug/mg creat.) <21.1 0.0 - 30.0 mg/g creat   URINALYSIS W/ RFLX MICROSCOPIC   Result Value Ref Range    Specific Gravity 1.008 1.005 - 1.030    pH (UA) 6.5 5.0 - 7.5    Color Yellow Yellow    Appearance Clear Clear    Leukocyte Esterase Negative Negative    Protein Negative Negative/Trace    Glucose 2+ (A) Negative    Ketone Negative Negative    Blood Negative Negative    Bilirubin Negative Negative    Urobilinogen 0.2 0.2 - 1.0 mg/dL    Nitrites Negative Negative    Microscopic Examination Comment    HEMOGLOBIN A1C WITH EAG   Result Value Ref Range    Hemoglobin A1c 8.5 (H) 4.8 - 5.6 %    Estimated average glucose 197 mg/dL   CBC W/O DIFF   Result Value Ref Range    WBC 5.7 3.4 - 10.8 x10E3/uL    RBC 4.95 3.77 - 5.28 x10E6/uL    HGB 11.2 11.1 - 15.9 g/dL    HCT 34.8 34.0 - 46.6 %    MCV 70 (L) 79 - 97 fL    MCH 22.6 (L) 26.6 - 33.0 pg    MCHC 32.2 31.5 - 35.7 g/dL    RDW 16.8 (H) 12.3 - 15.4 %    PLATELET 668 157 - 228 x10E3/uL   IRON   Result Value Ref Range    Iron 67 27 - 139 ug/dL   CVD REPORT   Result Value Ref Range    INTERPRETATION Note    DIABETES PATIENT EDUCATION   Result Value Ref Range PDF Image Not applicable        ASSESSMENT and PLAN      ICD-10-CM ICD-9-CM    1. New daily persistent headache G44.52 339.42 butalbital-acetaminophen-caffeine (FIORICET, ESGIC) -40 mg per tablet      REFERRAL TO NEUROLOGY    due to ?eye strain vs other   2. Uncontrolled type 2 diabetes mellitus with diabetic neuropathic arthropathy, without long-term current use of insulin ([facility]) E11.610 250.62     E11.65 713.5    3. Fibromyalgia M79.7 729.1 pregabalin (LYRICA) 75 mg capsule   4. Essential hypertension I10 401.9    5. Ulnar neuropathy of left upper extremity G56.22 354.2    6. High cholesterol E78.00 272.0    7. Drusen of macula, left H35.362 362.57    8. Neck arthritis ([facility]) M46.92 721.0    9. Obesity, Class I, BMI 30-34.9 E66.9 278.00    10. Age-related nuclear cataract of both eyes H25.13 366.16    11. Weight loss R63.4 783.21     6# due to efforts       Discussed the patient's BMI with her. The BMI follow up plan is as follows: I have counseled this patient on diet and exercise regimens. Decrease carbohydrates (white foods, sweet foods, sweet drinks and alcohol), increase green leafy vegetables and protein (lean meats and beans) with each meal.  Avoid fried foods. Eat 3-5 small meals daily. Do not skip meals. Increase water intake. Increase physical activity to 30 minutes daily for health benefit or 60 minutes daily to prevent weight regain, as tolerated. Get 7-8 hours uninterrupted sleep nightly. Chart reviewed and updated. Continue current medications and care. Try Fioricet -40 mg prn q6 hours. Will try to avoid oral steroids at this time due to uncontrolled DM. Recommend pt follow up with neurologist and ophthalmogist if headaches persist. Go to ER if headache becomes worst headache of life or if blurry/double vision develops. Controlled Substance Agreement reviewed and signed. Prescription given to patient during office visit today. Fioricet -40 mg.  Cautioned pt about addictive potential.   Most recent tests reviewed. Recent office visit notes from Dr. Kendall Gonzalez and Dr. Cuenca reviewed. Counseled patient on health concerns: Headaches, neck care, and upper back care. Immunizations noted. Offered empathy, support, legitimation, prayers, partnership to patient. Praised patient for progress. Follow-up Disposition:  Return in about 2 months (around 10/16/2017) for taylor, dm. Patient was offered a choice/choices in the treatment plan today. Patient expresses understanding of the plan and agrees with recommendations. Written by clem Roberto, as dictated by Dr. Jnuaid Linares DO. Documentation True and Accepted by Avni Berg. Naga Moreno. Patient Instructions        Neck: Exercises  Your Care Instructions  Here are some examples of typical rehabilitation exercises for your condition. Start each exercise slowly. Ease off the exercise if you start to have pain. Your doctor or physical therapist will tell you when you can start these exercises and which ones will work best for you. How to do the exercises  Note: Stretching should make you feel a gentle stretch, but no pain. Stop any strengthening exercise that makes pain worse. Neck stretch    1. This stretch works best if you keep your shoulder down as you lean away from it. To help you remember to do this, start by relaxing your shoulders and lightly holding on to your thighs or your chair. 2. Tilt your head toward your shoulder and hold for 15 to 30 seconds. Let the weight of your head stretch your muscles. 3. If you would like a little added stretch, use your hand to gently and steadily pull your head toward your shoulder. For example, keeping your right shoulder down, lean your head to the left. 4. Repeat 2 to 4 times toward each shoulder. Diagonal neck stretch    1.  Turn your head slightly toward the direction you will be stretching, and tilt your head diagonally toward your chest and hold for 15 to 30 seconds. 2. If you would like a little added stretch, use your hand to gently and steadily pull your head forward on the diagonal.  3. Repeat 2 to 4 times toward each side. Dorsal glide stretch    1. Sit or stand tall and look straight ahead. 2. Slowly tuck your chin as you glide your head backward over your body  3. Hold for a count of 6, and then relax for up to 10 seconds. 4. Repeat 8 to 12 times. Note: The dorsal glide stretches the back of the neck. If you feel pain, do not glide so far back. Some people find this exercise easier to do while lying on their backs with an ice pack on the neck. Chest and shoulder stretch    1. Sit or stand tall and glide your head backward as in the dorsal glide stretch. 2. Raise both arms so that your hands are next to your ears. 3. Take a deep breath, and as you breathe out, lower your elbows down and behind your back. You will feel your shoulder blades slide down and together, and at the same time you will feel a stretch across your chest and the front of your shoulders. 4. Hold for about 6 seconds, and then relax for up to 10 seconds. 5. Repeat 8 to 12 times. Strengthening: Hands on head    1. Move your head backward, forward, and side to side against gentle pressure from your hands, holding each position for about 6 seconds. 2. Repeat 8 to 12 times. Follow-up care is a key part of your treatment and safety. Be sure to make and go to all appointments, and call your doctor if you are having problems. It's also a good idea to know your test results and keep a list of the medicines you take. Where can you learn more? Go to http://gregg-zunilda.info/. Enter P975 in the search box to learn more about \"Neck: Exercises. \"  Current as of: March 21, 2017  Content Version: 11.3  © 6769-3836 [employer]. Care instructions adapted under license by "[employer] (which disclaims liability or warranty for this information).  If you have questions about a medical condition or this instruction, always ask your healthcare professional. [address] any warranty or liability for your use of this information. Neck Arthritis: Exercises  Your Care Instructions  Here are some examples of typical rehabilitation exercises for your condition. Start each exercise slowly. Ease off the exercise if you start to have pain. Your doctor or physical therapist will tell you when you can start these exercises and which ones will work best for you. How to do the exercises  Neck stretches to the side    5. This stretch works best if you keep your shoulder down as you lean away from it. To help you remember to do this, start by relaxing your shoulders and lightly holding on to your thighs or your chair. 6. Tilt your head toward your shoulder and hold for 15 to 30 seconds. Let the weight of your head stretch your muscles. 7. Repeat 2 to 4 times toward each shoulder. Chin tuck    4. Lie on the floor with a rolled-up towel under your neck. Your head should be touching the floor. 5. Slowly bring your chin toward your chest.  6. Hold for a count of 6, and then relax for up to 10 seconds. 7. Repeat 8 to 12 times. Active cervical rotation    5. Sit in a firm chair, or stand up straight. 6. Keeping your chin level, turn your head to the right, and hold for 15 to 30 seconds. 7. Turn your head to the left and hold for 15 to 30 seconds. 8. Repeat 2 to 4 times to each side. Shoulder blade squeeze    6. While standing, squeeze your shoulder blades together. 7. Do not raise your shoulders up as you are squeezing. 8. Hold for 6 seconds. 9. Repeat 8 to 12 times. Shoulder rolls    3. Sit comfortably with your feet shoulder-width apart. You can also do this exercise standing up. 4. Roll your shoulders up, then back, and then down in a smooth, circular motion. 5. Repeat 2 to 4 times.   Follow-up care is a key part of your treatment and safety. Be sure to make and go to all appointments, and call your doctor if you are having problems. It's also a good idea to know your test results and keep a list of the medicines you take. Where can you learn more? Go to http://gregg-zunilda.info/. Enter O849 in the search box to learn more about \"Neck Arthritis: Exercises. \"  Current as of: March 21, 2017  Content Version: 11.3  © 5074-7230 [employer]. Care instructions adapted under license by [employer] (which disclaims liability or warranty for this information). If you have questions about a medical condition or this instruction, always ask your healthcare professional. [address] any warranty or liability for your use of this information. Healthy Upper Back: Exercises  Your Care Instructions  Here are some examples of exercises for your upper back. Start each exercise slowly. Ease off the exercise if you start to have pain. Your doctor or physical therapist will tell you when you can start these exercises and which ones will work best for you. How to do the exercises  Lower neck and upper back stretch    8. Stretch your arms out in front of your body. Clasp one hand on top of your other hand. 9. Gently reach out so that you feel your shoulder blades stretching away from each other. 10. Gently bend your head forward. 11. Hold for 15 to 30 seconds. 12. Repeat 2 to 4 times. Midback stretch    Note: If you have knee pain, do not do this exercise. 8. Kneel on the floor, and sit back on your ankles. 9. Lean forward, place your hands on the floor, and stretch your arms out in front of you. Rest your head between your arms. 10. Gently push your chest toward the floor, reaching as far in front of you as possible. 11. Hold for 15 to 30 seconds. 12. Repeat 2 to 4 times. Shoulder rolls    9. Sit comfortably with your feet shoulder-width apart.  You can also do this exercise while standing. 10. Roll your shoulders up, then back, and then down in a smooth, circular motion. 11. Repeat 2 to 4 times. Wall push-up    10. Stand against a wall with your feet about 12 to 24 inches back from the wall. If you feel any pain when you do this exercise, stand closer to the wall. 11. Place your hands on the wall slightly wider apart than your shoulders, and lean forward. 12. Gently lean your body toward the wall. Then push back to your starting position. Keep the motion smooth and controlled. 13. Repeat 8 to 12 times. Resisted shoulder blade squeeze    Note: For this exercise, you will need elastic exercise material, such as surgical tubing or Thera-Band. 6. Sit or stand, holding the band in both hands in front of you. Keep your elbows close to your sides, bent at a 90-degree angle. Your palms should face up. 7. Squeeze your shoulder blades together, and move your arms to the outside, stretching the band. Be sure to keep your elbows at your sides while you do this. 8. Relax. 9. Repeat 8 to 12 times. Resisted rows    Note: For this exercise, you will need elastic exercise material, such as surgical tubing or Thera-Band. 1. Put the band around a solid object, such as a bedpost, at about waist level. Hold one end of the band in each hand. 2. With your elbows at your sides and bent to 90 degrees, pull the band back to move your shoulder blades toward each other. Return to the starting position. 3. Repeat 8 to 12 times. Follow-up care is a key part of your treatment and safety. Be sure to make and go to all appointments, and call your doctor if you are having problems. It's also a good idea to know your test results and keep a list of the medicines you take. Where can you learn more? Go to http://gregg-zunilda.info/. Enter E128 in the search box to learn more about \"Healthy Upper Back: Exercises. \"  Current as of: March 21, 2017  Content Version: 11.3  © 0918-4225 Healthwise, Incorporated. Care instructions adapted under license by [employer] (which disclaims liability or warranty for this information). If you have questions about a medical condition or this instruction, always ask your healthcare professional. [address] any warranty or liability for your use of this information.

## 2017-08-16 NOTE — PROGRESS NOTES
Chief Complaint   Patient presents with    Headache     sharp/dull HA's; occurring for about a month and a half; starts out sharp and then goes dull; \"feels like a band is going around my head\", pt pointed to her forehead when stating it; 5/10 on pain scale; gets some relief with aleve and tramadol; \"won't go away, won't quit\"     1. Have you been to the ER, urgent care clinic since your last visit? Hospitalized since your last visit? No    2. Have you seen or consulted any other health care providers outside of the [address] [place] [address] since your last visit? Include any pap smears or colon screening. Pt has seen Dr. Oliverio Villa and Dr. Tonio Cotter since lov    In the event something were to happen to you and you were unable to speak on your behalf, do you have an Advance Directive/ Living Will in place stating your wishes? NO    If yes, do we have a copy on file NO    If no, would you like information:   Pt offered and declined. Pt stated that she saw Dr. Tonio Cotter yesterday, 8/15/17, and he placed on a trial run with Lyrica, and pt stated that this morning the band feeling across her head was not as bad.

## 2017-08-16 NOTE — MR AVS SNAPSHOT
Visit Information Date & Time Provider Department Dept. Phone Encounter #  
 8/16/2017  9:30 AM DO Mg [facility] [phone] Follow-up Instructions Return in about 2 months (around 10/16/2017) for taylor, dm. Your Appointments 9/26/2017  8:30 AM  
LAB with LAB [facility] ([facility] [place]) Appt Note: fastening lab [address] [facility] [address]  
  
   
 [address]  
  
    
 10/3/2017 10:00 AM  
ROUTINE CARE with Mary Daniel DO [facility] ([facility] [place]) Appt Note: 3 month follow up for dm bp check [facility] [address]  
[phone]  
  
   
 [address] [facility] [address] [facility] Upcoming Health Maintenance Date Due INFLUENZA AGE 9 TO ADULT 12/14/2017* FOOT EXAM Q1 11/29/2017 MEDICARE YEARLY EXAM 12/8/2017 HEMOGLOBIN A1C Q6M 12/15/2017 EYE EXAM RETINAL OR DILATED Q1 3/21/2018 MICROALBUMIN Q1 6/15/2018 LIPID PANEL Q1 6/15/2018 BREAST CANCER SCRN MAMMOGRAM 2/6/2019 GLAUCOMA SCREENING Q2Y 3/21/2019 COLONOSCOPY 5/27/2020 DTaP/Tdap/Td series (2 - Td) 3/14/2027 *Topic was postponed. The date shown is not the original due date. Allergies as of 8/16/2017  Review Complete On: 8/16/2017 By: Curt Velazquez Severity Noted Reaction Type Reactions Influenza Virus Vaccine, Specific High 10/13/2016    Swelling R ARM PAIN, NUMBNESS, SWELLING 
R HAND NUMBNESS AND TINGLING, WORSE IN 4TH AND FIFTH FINGERS Lisinopril Medium 10/28/2009    Cough Other reaction(s): Lisinopril Singulair [Montelukast]  02/28/2013    Other (comments)  
 headache Current Immunizations  Reviewed on 2/29/2016 Name Date H1N1 FLU VACCINE 3/9/2010 Hepatitis B Vaccine 1/27/2009, 8/20/2008, 6/9/2008 Influenza High Dose Vaccine PF 9/7/2016, 11/11/2015 Influenza Vaccine Split 10/24/2012, 11/8/2011, 10/8/2010 Influenza Vaccine Whole 1/27/2009 Pneumococcal Conjugate (PCV-13) 11/11/2015 Pneumococcal Polysaccharide (PPSV-23) 12/7/2016 ZZZ-RETIRED (DO NOT USE) Pneumococcal Vaccine (Unspecified Type) 12/21/2005 Zoster Vaccine, Live 1/20/2014  
 dT Vaccine 6/9/2008 Not reviewed this visit You Were Diagnosed With   
  
 Codes Comments New daily persistent headache    -  Primary ICD-10-CM: G44.52 
ICD-9-CM: 339.42 due to ?eye strain vs other Uncontrolled type 2 diabetes mellitus with diabetic neuropathic arthropathy, without long-term current use of insulin (HCC)     ICD-10-CM: E11.610, E11.65 ICD-9-CM: 250.62, 713.5 Fibromyalgia     ICD-10-CM: M79.7 ICD-9-CM: 729.1 Essential hypertension     ICD-10-CM: I10 
ICD-9-CM: 401.9 Ulnar neuropathy of left upper extremity     ICD-10-CM: G56.22 
ICD-9-CM: 354.2 High cholesterol     ICD-10-CM: E78.00 ICD-9-CM: 272.0 Drusen of macula, left     ICD-10-CM: H35.362 ICD-9-CM: 362.57 Neck arthritis ([facility] [address]     ICD-10-CM: M46.92 
ICD-9-CM: 721.0 Obesity, Class I, BMI 30-34.9     ICD-10-CM: E66.9 ICD-9-CM: 278.00 Age-related nuclear cataract of both eyes     ICD-10-CM: H25.13 ICD-9-CM: 366.16 Weight loss     ICD-10-CM: R63.4 ICD-9-CM: 783.21 6# due to efforts Vitals BP Pulse Temp Resp Height(growth percentile) Weight(growth percentile) 103/72 (BP 1 Location: Right arm, BP Patient Position: Sitting) 71 98.5 °F (36.9 °C) (Oral) 16 5' 7.99\" (1.727 m) 200 lb 3.2 oz (90.8 kg) SpO2 BMI OB Status Smoking Status 99% 30.45 kg/m2 Hysterectomy Never Smoker BMI and BSA Data Body Mass Index Body Surface Area  
 30.45 kg/m 2 2.09 m 2 Preferred Pharmacy Pharmacy Name Phone [facility], [address] [phone] Your Updated Medication List  
  
   
This list is accurate as of: 8/16/17 10:16 AM.  Always use your most recent med list.  
  
  
  
  
 aspirin delayed-release 81 mg tablet Take 1 Tab by mouth daily. Blood-Glucose Meter monitoring kit One Touch Ultra 2 Use to test blood sugar 1-2 per day (blood sugar before breakfast or 2 hours after any meal or at bedtime) DX.  E11.65  
  
 butalbital-acetaminophen-caffeine -40 mg per tablet Commonly known as:  Maia Gerold Take 1 Tab by mouth every six (6) hours as needed for Headache. Max Daily Amount: 4 Tabs. Indications: MIGRAINE, TENSION-TYPE HEADACHE  
  
 chlorhexidine 0.12 % solution Commonly known as:  PERIDEX  
  
 diclofenac EC 75 mg EC tablet Commonly known as:  VOLTAREN  
take 1 tablet by mouth twice a day with food  
  
 ergocalciferol 50,000 unit capsule Commonly known as:  ERGOCALCIFEROL Take 1 Cap by mouth every seven (7) days. Indications: VITAMIN D DEFICIENCY  
  
 EVENING PRIMROSE 500 mg Cap Generic drug:  evening primrose oil Take 1 Cap by mouth two (2) times a day. FLEXERIL 10 mg tablet Generic drug:  cyclobenzaprine Take 10 mg by mouth as needed. fluticasone 50 mcg/actuation nasal spray Commonly known as:  Teo Gladys 2 Sprays by Both Nostrils route daily. glimepiride 4 mg tablet Commonly known as:  AMARYL Take 1 Tab by mouth every morning. Indications: type 2 diabetes mellitus  
  
 glucose blood VI test strips strip Commonly known as:  ASCENSIA AUTODISC VI, ONE TOUCH ULTRA TEST VI  
Use to test blood sugar 1-2 per day (blood sugar before breakfast or 2 hours after any meal or at bedtime) DX. E11.65 LYRICA 75 mg capsule Generic drug:  pregabalin Take  by mouth. simvastatin 40 mg tablet Commonly known as:  ZOCOR  
take 1 tablet by mouth at bedtime  
  
 sodium chloride 0.65 % nasal spray Commonly known as:  OCEAN  
1 Spray as needed for Congestion. traMADol 50 mg tablet Commonly known as:  ULTRAM  
Take 50 mg by mouth every six (6) hours as needed. traZODone 50 mg tablet Commonly known as:  [place] Odor Take 50 mg by mouth as needed. Takes at bedtime prn  
  
 VAGIFEM 10 mcg Tab vaginal tablet Generic drug:  estradiol Insert 10 mcg into vagina daily. valACYclovir 500 mg tablet Commonly known as:  VALTREX  
  
 valsartan-hydroCHLOROthiazide 160-25 mg per tablet Commonly known as:  DIOVAN-HCT  
take 1 tablet by mouth once daily VESIcare 10 mg tablet Generic drug:  solifenacin Take 10 mg by mouth daily. XIGDUO XR 10-1,000 mg Tbph  
Generic drug:  dapagliflozin-metformin  
take 1 tablet by mouth daily for TYPE 2 DIABETES MELLITUS Prescriptions Printed Refills  
 butalbital-acetaminophen-caffeine (FIORICET, ESGIC) -40 mg per tablet 1 Sig: Take 1 Tab by mouth every six (6) hours as needed for Headache. Max Daily Amount: 4 Tabs. Indications: MIGRAINE, TENSION-TYPE HEADACHE Class: Print Route: Oral  
  
We Performed the Following REFERRAL TO NEUROLOGY [OQD01 Custom] Comments:  
 Please evaluate patient for daily HA Follow-up Instructions Return in about 2 months (around 10/16/2017) for taylor, dm. Referral Information Referral ID Referred By Referred To  
  
 [id number] Wing Cristian MD   
   [place] [address] [address] [facility], [address] [facility] Phone: [phone] Fax: [phone] Visits Status Start Date End Date 1 New Request 8/16/17 8/16/18 If your referral has a status of pending review or denied, additional information will be sent to support the outcome of this decision. Introducing [facility] & HE[facility]! Dear Mya Olivier: Thank you for requesting a Broadcast.mobi account. Our records indicate that you already have an active [email or web address] account.   You can access your account anytime at [email or web address] Did you know that you can access your hospital and ER discharge instructions at any time in [employer]? You can also review all of your test results from your hospital stay or ER visit. Additional Information If you have questions, please visit the Frequently Asked Questions section of the [employer] website at [email or web address] Remember, [employer] is NOT to be used for urgent needs. For medical emergencies, dial 911. Now available from your iPhone and Android! Please provide this summary of care documentation to your next provider. Your primary care clinician is listed as Thera Goodell. If you have any questions after today's visit, please call [phone].

## 2017-09-07 ENCOUNTER — TELEPHONE (OUTPATIENT)
Dept: FAMILY MEDICINE CLINIC | Age: 67
End: 2017-09-07

## 2017-09-07 NOTE — TELEPHONE ENCOUNTER
Patient wants to know if dr Ilene Guillen is going to give her a discount card for xigduo and if a refill is going to be sent to her pharmacy. Her contact # is [phone].

## 2017-09-08 NOTE — TELEPHONE ENCOUNTER
Spoke with patient, ID verified X 2; Spoken with patient c/w medication. Just waiting for prior authorization for the medication. Will notify patient  when approval or disapproval of medication.  Patient understood

## 2017-09-15 NOTE — TELEPHONE ENCOUNTER
Spoke with patient, ID verified X 2; Spoken with patient c/w medication.  Patient states will come Monday a.m and pick-up samples d/t waiting for prior authorization approval

## 2017-10-09 ENCOUNTER — LAB ONLY (OUTPATIENT)
Dept: FAMILY MEDICINE CLINIC | Age: 67
End: 2017-10-09

## 2017-10-09 DIAGNOSIS — E55.9 VITAMIN D DEFICIENCY: ICD-10-CM

## 2017-10-10 LAB
25(OH)D3+25(OH)D2 SERPL-MCNC: 36.6 NG/ML (ref 30–100)
ALBUMIN SERPL-MCNC: 4.4 G/DL (ref 3.6–4.8)
ALBUMIN/GLOB SERPL: 1.8 {RATIO} (ref 1.2–2.2)
ALP SERPL-CCNC: 75 IU/L (ref 39–117)
ALT SERPL-CCNC: 7 IU/L (ref 0–32)
AST SERPL-CCNC: 14 IU/L (ref 0–40)
BILIRUB SERPL-MCNC: 0.4 MG/DL (ref 0–1.2)
BUN SERPL-MCNC: 12 MG/DL (ref 8–27)
BUN/CREAT SERPL: 13 (ref 12–28)
CALCIUM SERPL-MCNC: 9.8 MG/DL (ref 8.7–10.3)
CHLORIDE SERPL-SCNC: 95 MMOL/L (ref 96–106)
CHOLEST SERPL-MCNC: 179 MG/DL (ref 100–199)
CO2 SERPL-SCNC: 27 MMOL/L (ref 18–29)
CREAT SERPL-MCNC: 0.93 MG/DL (ref 0.57–1)
EST. AVERAGE GLUCOSE BLD GHB EST-MCNC: 200 MG/DL
GLOBULIN SER CALC-MCNC: 2.5 G/DL (ref 1.5–4.5)
GLUCOSE SERPL-MCNC: 208 MG/DL (ref 65–99)
HBA1C MFR BLD: 8.6 % (ref 4.8–5.6)
HDLC SERPL-MCNC: 73 MG/DL
INTERPRETATION, 910389: NORMAL
LDLC SERPL CALC-MCNC: 91 MG/DL (ref 0–99)
Lab: NORMAL
POTASSIUM SERPL-SCNC: 4.2 MMOL/L (ref 3.5–5.2)
PROT SERPL-MCNC: 6.9 G/DL (ref 6–8.5)
SODIUM SERPL-SCNC: 140 MMOL/L (ref 134–144)
THYROPEROXIDASE AB SERPL-ACNC: 12 IU/ML (ref 0–34)
TRIGL SERPL-MCNC: 73 MG/DL (ref 0–149)
TSH SERPL DL<=0.005 MIU/L-ACNC: 3.69 UIU/ML (ref 0.45–4.5)
VLDLC SERPL CALC-MCNC: 15 MG/DL (ref 5–40)

## 2017-10-13 NOTE — TELEPHONE ENCOUNTER
Future Appointments:  10/16/2017 2:30 PM    DO YONY Pete      Last Appointment With Me:  8/16/2017      Last Appointment My Department:  8/16/2017    Lab Results   Component Value Date/Time    Hemoglobin A1c 8.6 10/09/2017 08:45 AM    Hemoglobin A1c (POC) 8.4 03/14/2017 09:58 AM

## 2017-10-16 ENCOUNTER — OFFICE VISIT (OUTPATIENT)
Dept: FAMILY MEDICINE CLINIC | Age: 67
End: 2017-10-16

## 2017-10-16 VITALS
TEMPERATURE: 98.1 F | HEART RATE: 66 BPM | DIASTOLIC BLOOD PRESSURE: 90 MMHG | WEIGHT: 205.9 LBS | BODY MASS INDEX: 31.2 KG/M2 | RESPIRATION RATE: 16 BRPM | OXYGEN SATURATION: 99 % | SYSTOLIC BLOOD PRESSURE: 155 MMHG | HEIGHT: 68 IN

## 2017-10-16 DIAGNOSIS — E66.9 OBESITY, CLASS I, BMI 30-34.9: ICD-10-CM

## 2017-10-16 DIAGNOSIS — G56.22 ULNAR NEUROPATHY OF LEFT UPPER EXTREMITY: ICD-10-CM

## 2017-10-16 DIAGNOSIS — Z78.9 ACE INHIBITOR INTOLERANCE: ICD-10-CM

## 2017-10-16 DIAGNOSIS — I10 ESSENTIAL HYPERTENSION: ICD-10-CM

## 2017-10-16 DIAGNOSIS — D56.3 THALASSEMIA MINOR: ICD-10-CM

## 2017-10-16 DIAGNOSIS — E55.9 VITAMIN D DEFICIENCY: ICD-10-CM

## 2017-10-16 DIAGNOSIS — G44.049 CHRONIC PAROXYSMAL HEMICRANIA, NOT INTRACTABLE: ICD-10-CM

## 2017-10-16 DIAGNOSIS — L82.1 SEBORRHEIC KERATOSIS: ICD-10-CM

## 2017-10-16 NOTE — PROGRESS NOTES
Chief Complaint   Patient presents with    Headache    Diabetes    Results     1. Have you been to the ER, urgent care clinic since your last visit? Hospitalized since your last visit? No    2. Have you seen or consulted any other health care providers outside of the [address] [place] [address] since your last visit? Include any pap smears or colon screening. No    In the event something were to happen to you and you were unable to speak on your behalf, do you have an Advance Directive/ Living Will in place stating your wishes? NO    If yes, do we have a copy on file NO    If no, would you like information:   Pt offered and declined.       Eye exam due in 07/25/2018  Foot exam due in 07/25/2018  A1C was 8.6 10/09/2017

## 2017-10-16 NOTE — MR AVS SNAPSHOT
Visit Information Date & Time Provider Department Dept. Phone Encounter #  
 10/16/2017  2:30 PM Navi Ng DO Dem[facility] [phone] [id number] Follow-up Instructions Return in about 3 months (around 1/16/2018) for DM, REFERRAL FOLLOW UP. Upcoming Health Maintenance Date Due INFLUENZA AGE 9 TO ADULT 12/14/2017* MEDICARE YEARLY EXAM 12/8/2017 HEMOGLOBIN A1C Q6M 4/9/2018 MICROALBUMIN Q1 6/15/2018 FOOT EXAM Q1 7/25/2018 EYE EXAM RETINAL OR DILATED Q1 7/25/2018 LIPID PANEL Q1 10/9/2018 BREAST CANCER SCRN MAMMOGRAM 2/6/2019 GLAUCOMA SCREENING Q2Y 7/25/2019 COLONOSCOPY 5/27/2020 DTaP/Tdap/Td series (2 - Td) 3/14/2027 *Topic was postponed. The date shown is not the original due date. Allergies as of 10/16/2017  Review Complete On: 10/16/2017 By: Rosa Elena Jordan Severity Noted Reaction Type Reactions Influenza Virus Vaccine, Specific High 10/13/2016    Swelling R ARM PAIN, NUMBNESS, SWELLING 
R HAND NUMBNESS AND TINGLING, WORSE IN 4TH AND FIFTH FINGERS Lisinopril Medium 10/28/2009    Cough Other reaction(s): Lisinopril Singulair [Montelukast]  02/28/2013    Other (comments)  
 headache Current Immunizations  Reviewed on 10/16/2017 Name Date H1N1 FLU VACCINE 3/9/2010 Hepatitis B Vaccine 1/27/2009, 8/20/2008, 6/9/2008 Influenza High Dose Vaccine PF 9/7/2016, 11/11/2015 Influenza Vaccine Split 10/24/2012, 11/8/2011, 10/8/2010 Influenza Vaccine Whole 1/27/2009 Pneumococcal Conjugate (PCV-13) 11/11/2015 Pneumococcal Polysaccharide (PPSV-23) 12/7/2016 ZZZ-RETIRED (DO NOT USE) Pneumococcal Vaccine (Unspecified Type) 12/21/2005 Zoster Vaccine, Live 1/20/2014  
 dT Vaccine 6/9/2008 Reviewed by Navi Ng DO on 10/16/2017 at  4:44 PM  
You Were Diagnosed With   
  
 Codes Comments  Uncontrolled type 2 diabetes mellitus with diabetic neuropathic arthropathy, without long-term current use of insulin ([facility] [address]    -  Primary ICD-10-CM: E11.610, E11.65 ICD-9-CM: 250.62, 713.5 worse without Xigduo and with increased Amaryl 4 mg to BID Essential hypertension     ICD-10-CM: I10 
ICD-9-CM: 401.9 ACE inhibitor intolerance     ICD-10-CM: Z78.9 ICD-9-CM: 995.27, E980.4 Obesity, Class I, BMI 30-34.9     ICD-10-CM: E66.9 ICD-9-CM: 278.00 Chronic paroxysmal hemicrania, not intractable     ICD-10-CM: G44.049 ICD-9-CM: 339.04 frontal due to sinus vs eye strain vs other Seborrheic keratosis     ICD-10-CM: L82.1 ICD-9-CM: 702.19 Vitamin D deficiency     ICD-10-CM: E55.9 ICD-9-CM: 268.9 STABLE Thalassemia minor     ICD-10-CM: D56.3 ICD-9-CM: 282.46 Ulnar neuropathy of left upper extremity     ICD-10-CM: G56.22 
ICD-9-CM: 354. 2 Vitals BP Pulse Temp Resp Height(growth percentile) Weight(growth percentile) 155/90 (BP 1 Location: Right arm, BP Patient Position: Sitting) 66 98.1 °F (36.7 °C) (Oral) 16 5' 7.99\" (1.727 m) 205 lb 14.4 oz (93.4 kg) SpO2 BMI OB Status Smoking Status 99% 31.31 kg/m2 Hysterectomy Never Smoker Vitals History BMI and BSA Data Body Mass Index Body Surface Area  
 31.31 kg/m 2 2.12 m 2 Preferred Pharmacy Pharmacy Name Phone [facility] [address], [address] [phone] Your Updated Medication List  
  
   
This list is accurate as of: 10/16/17  4:45 PM.  Always use your most recent med list.  
  
  
  
  
 aspirin delayed-release 81 mg tablet Take 1 Tab by mouth daily. Blood-Glucose Meter monitoring kit One Touch Ultra 2 Use to test blood sugar 1-2 per day (blood sugar before breakfast or 2 hours after any meal or at bedtime) DX.  E11.65  
  
 butalbital-acetaminophen-caffeine -40 mg per tablet Commonly known as:  Sheria Loser Take 1 Tab by mouth every six (6) hours as needed for Headache.  Max Daily Amount: 4 Tabs. Indications: MIGRAINE, TENSION-TYPE HEADACHE  
  
 chlorhexidine 0.12 % solution Commonly known as:  PERIDEX  
  
 diclofenac EC 75 mg EC tablet Commonly known as:  VOLTAREN  
take 1 tablet by mouth twice a day with food  
  
 empagliflozin-metFORMIN 12.5-1,000 mg per tablet Commonly known as:  Omar Brothers Take 1 Tab by mouth two (2) times daily (with meals). Indications: type 2 diabetes mellitus  
  
 ergocalciferol 50,000 unit capsule Commonly known as:  ERGOCALCIFEROL Take 1 Cap by mouth every seven (7) days. Indications: VITAMIN D DEFICIENCY  
  
 EVENING PRIMROSE 500 mg Cap Generic drug:  evening primrose oil Take 1 Cap by mouth two (2) times a day. FLEXERIL 10 mg tablet Generic drug:  cyclobenzaprine Take 10 mg by mouth as needed. fluticasone 50 mcg/actuation nasal spray Commonly known as:  Shelvia Birks 2 Sprays by Both Nostrils route daily. glimepiride 4 mg tablet Commonly known as:  AMARYL Take 1 Tab by mouth every morning. Indications: type 2 diabetes mellitus  
  
 glucose blood VI test strips strip Commonly known as:  ASCENSIA AUTODISC VI, ONE TOUCH ULTRA TEST VI  
Use to test blood sugar 1-2 per day (blood sugar before breakfast or 2 hours after any meal or at bedtime) DX. E11.65 LYRICA 75 mg capsule Generic drug:  pregabalin Take  by mouth. simvastatin 40 mg tablet Commonly known as:  ZOCOR  
take 1 tablet by mouth at bedtime  
  
 sodium chloride 0.65 % nasal spray Commonly known as:  OCEAN  
1 Spray as needed for Congestion. traMADol 50 mg tablet Commonly known as:  ULTRAM  
Take 50 mg by mouth every six (6) hours as needed. traZODone 50 mg tablet Commonly known as:  Ace Oh Take 50 mg by mouth as needed. Takes at bedtime prn  
  
 VAGIFEM 10 mcg Tab vaginal tablet Generic drug:  estradiol Insert 10 mcg into vagina daily. valACYclovir 500 mg tablet Commonly known as:  VALTREX valsartan-hydroCHLOROthiazide 160-25 mg per tablet Commonly known as:  DIOVAN-HCT  
take 1 tablet by mouth once daily VESIcare 10 mg tablet Generic drug:  solifenacin Take 10 mg by mouth daily. Prescriptions Printed Refills  
 empagliflozin-metFORMIN (SYNJARDY) 12.5-1,000 mg per tablet 5 Sig: Take 1 Tab by mouth two (2) times daily (with meals). Indications: type 2 diabetes mellitus Class: Print Route: Oral  
  
Follow-up Instructions Return in about 3 months (around 1/16/2018) for DM, REFERRAL FOLLOW UP. Introducing [facility]! Dear Jose G House: Thank you for requesting a [employer] account. Our records indicate that you already have an active [employer] account. You can access your account anytime at [email or web address] Did you know that you can access your hospital and ER discharge instructions at any time in [employer]? You can also review all of your test results from your hospital stay or ER visit. Additional Information If you have questions, please visit the Frequently Asked Questions section of the [employer] website at [email or web address] Remember, [employer] is NOT to be used for urgent needs. For medical emergencies, dial 911. Now available from your iPhone and Android! Please provide this summary of care documentation to your next provider. Your primary care clinician is listed as Selena Arroyo. If you have any questions after today's visit, please call [phone].

## 2017-10-16 NOTE — PROGRESS NOTES
HISTORY OF PRESENT ILLNESS  Ilene Bolton is a 79 y.o. female presents with Headache; Diabetes; Results; and Blood Pressure Check    Agree with nurse note. Diabetic, hypertensive pt with Vit D deficiency, thalassemia minor, and ACE inhibitor intolerance presents to the office with a BP of 155/90. For BP, she takes Diovan--25 mg daily, tolerating well. She continues with a frontal headache. She has been off Xigduo 10-1,000 mg due to awaiting prior auth response. She was on it x1 year and was told it would cost over $300 per month. She took Amaryl 4 mg BID about a month then she ran out of Amaryl 4 mg about 1.5 weeks ago. She requests her most recent labs from 10/09/17. Vit D 36.6, up from 22.5. Hgb A1C 8.6, up from 8.5. LDL 91, down from 116. Cr 0.93. Chloride 95. TSH 3.69. She saw ENT, Dr. Brigitte Sen on 08/21/17 for headache. He removed cerumen from her L ear and recommended she follow up with neurology for headache. He also recommended she use mineral oil for dry EACs. She reports following up with neurologist, Dr. Monty Veloz. She had a CT scan and follows up with at the end of this month. He recommended she take Tylenol daily and ok to take Fioricet prn. She also mentions Dr. Birgit Simpson called her shoulder pain L ulnar neuropathy. She saw dermatologist, Dr. Briseida Berry for hair scalp. Dx'd seborrheic keratosis and cherry angioma. Health Maintenance    Pt's most recent colonoscopy was on 05/27/15 with GI, Dr. Ankit Wheeler. Pt's most recent mammogram was on 02/06/17; normal.     Written by clem Avila, as dictated by Dr. Kady Bergman DO.    ROS    Review of Systems negative except as noted above in HPI.     ALLERGIES:    Allergies   Allergen Reactions    Influenza Virus Vaccine, Specific Swelling     R ARM PAIN, NUMBNESS, SWELLING  R HAND NUMBNESS AND TINGLING, WORSE IN 4TH AND FIFTH FINGERS    Lisinopril Cough     Other reaction(s): Lisinopril    Singulair [Montelukast] Other (comments)     headache       CURRENT MEDICATIONS:      PAST MEDICAL HISTORY:    Past Medical History:   Diagnosis Date    Allergic rhinitis     Dr. Adolfo Petty 2015    Dr. Salma Pruett.  Chest pain 01/15/09    Dr. Chastity Tolliver Chickenpox childhood    DDD (degenerative disc disease), cervical 10/2015    Dr. Gretel Man.  DDD (degenerative disc disease), lumbar 2014    Dr. Gonzales Guerrier.  Diabetes ([address] 06/2007    Steph Loredo, OD. Dr. Anu Mcelroy, pod.  Endometriosis     Dr. Becki Sutherland    Essential hypertension, benign     Dr. Joanna Henderson, uterine     Dr. Manpreet Plata 08/2009    Dr. Doretha Perez. Dr. Anu Mcelroy.  Hand paresthesia 2011    Dr. Henna Prajapati. Dr. Barbara Romero.  [facility]    Dr. Ariana Gillette. Dr. Doretha Perez, ENT.  Hemoptysis 06/13/07    Dr. Kavita Naranjo    Hiatal hernia 05/27/15    small. Bonita Connors MD   [facility] Internal hemorrhoids 12/2006, 05/27/15    Dr. Charlie Silva. Dr. Gardenia Polk.  Intraocular pressure increase 07/27/05    bilaterally. Dr. Harry Tony. Dr. Steph Loredo.  Knee pain 04/04/13    Bilaterally. due to fall. Dr. Sabrina Leong.  Left knee pain 11/26/14    Left. due to OA, torn medial and lateral meniscal tears. Dr. Harry Hollins. Dr. Yamel Morales.  Lumbar spondylosis 3/2008    Dr. Rogelio Sprague. Dr. 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 Woody Nones.  Proteinuria 2009    Dr. Valeria Bryan Right shoulder pain 01/30/06    Dr. Astrid Abbott. impingement tendinitis    Right shoulder pain 07/2014    Dr. Sriram Stokes.  Schatzki's ring 05/27/15    with partial obstruction at GE junction. Dr. Gardenia Polk. Jacque Bumpers Dr. Elio Raman. Dr. Wiley Khanna    Stenosis colon 05/27/15    SIGMOID WITH TORTUOUS DESCENDING COLON. Dr. Myra Garcia. [place].     Thalassemia minor 1980s    Vitamin D deficiency 2011    Grzegorz Blood, [place]       PAST SURGICAL HISTORY:    Past Surgical History:   Procedure Laterality Date    CYSTOSCOPY  2005    Dr. Yonas Gonzalez   1986    FOOT/TOES SURGERY PROC UNLISTED      Left Ornelas's Neuroma. [id number] [facility] HX BREAST BIOPSY Left     12 years ago neg    HX BREAST LUMPECTOMY  10/15/08    Left intraductal papilloma. Dr. Lazaro Guzman HX COLONOSCOPY  05/27/15    due to anemia, rectal bleeding. due q 5 yrs. Dr. Hannah Cruz  , 06,     Dr. Suma Meza. due q 5 yrs.  HX ENDOSCOPY  05/27/15    due to anemia. Dr. Leo Carter.  HX HEART CATHETERIZATION  09    Dr. Shadi Hanna ARTHROSCOPY Left 04/17/15    due to [address]. Dr. Surya Olguin.  HX MYOMECTOMY  -1993    x4    HX MARTIN AND BSO      endometriosis, fibroids. FAMILY HISTORY:    Family History   Problem Relation Age of Onset   Verlinda Smoker Hypertension Mother    Verlinda Smoker Glaucoma Mother     Heart Failure Mother      CHF    Hypertension Father     Cancer Father      colon    Heart Attack Sister     Stroke Sister     Diabetes Sister      x 2 sisters    Diabetes Brother        SOCIAL HISTORY:    Social History     Social History    Marital status: SINGLE     Spouse name: N/A    Number of children: N/A    Years of education: N/A     Social History Main Topics    Smoking status: Never Smoker    Smokeless tobacco: Never Used    Alcohol use 0.0 oz/week     0 Standard drinks or equivalent per week      Comment: Social beer, social drinks, social shots of liquor,and social wine.     Drug use: No    Sexual activity: Not Asked     Other Topics Concern    None     Social History Narrative       IMMUNIZATIONS:    Immunization History   Administered Date(s) Administered    H1N1 Influenza Virus Vaccine 2010    Hepatitis B Vaccine 2008, 2008, 2009    Influenza High Dose Vaccine PF 11/11/2015, 09/07/2016    Influenza Vaccine Split 10/08/2010, 11/08/2011, 10/24/2012    Influenza Vaccine Whole 01/27/2009    Pneumococcal Conjugate (PCV-13) 11/11/2015    Pneumococcal Polysaccharide (PPSV-23) 12/07/2016    ZZZ-RETIRED (DO NOT USE) Pneumococcal Vaccine (Unspecified Type) 12/21/2005    Zoster Vaccine, Live 01/20/2014    dT Vaccine 06/09/2008         PHYSICAL EXAMINATION    Vital Signs    Visit Vitals    /90 (BP 1 Location: Right arm, BP Patient Position: Sitting)    Pulse 66    Temp 98.1 °F (36.7 °C) (Oral)    Resp 16    Ht 5' 7.99\" (1.727 m)    Wt 205 lb 14.4 oz (93.4 kg)    SpO2 99%    BMI 31.31 kg/m2       Weight Metrics 10/16/2017 8/16/2017 6/22/2017 3/14/2017 12/7/2016 11/14/2016 10/13/2016   Weight 205 lb 14.4 oz 200 lb 3.2 oz 206 lb 1.6 oz 203 lb 6.4 oz 202 lb 14.4 oz 203 lb 9.6 oz 200 lb 6.4 oz   BMI 31.31 kg/m2 30.45 kg/m2 31.34 kg/m2 30.93 kg/m2 30.85 kg/m2 30.73 kg/m2 30.25 kg/m2       General appearance - Well nourished. Well appearing. Well developed. No acute distress. Overweight. Head - Normocephalic. Atraumatic. Eyes - pupils equal and reactive. Extraocular eye movements intact. Sclera anicteric. Mildly injected sclera. Ears - Hearing is grossly normal bilaterally. Nose - normal and patent. No polyps noted. No erythema. No discharge. Mouth - mucous membranes with adequate moisture. Posterior pharynx normal with cobblestone appearance. No erythema, white exudate or obstruction. Neck - supple. Midline trachea. No carotid bruits noted bilaterally. No thyromegaly noted. Chest - clear to auscultation bilaterally anteriorly and posteriorly. No wheezes. No rales or rhonchi. Breath sounds are symmetrical bilaterally. Unlabored respirations. Heart - normal rate. Regular rhythm. Normal S1, S2. No murmur noted. No rubs, clicks or gallops noted. Abdomen - soft and nondistended. No masses or organomegaly.   No rebound, rigidity or guarding. Bowel sounds normal x 4 quadrants. No tenderness noted. Neurological - awake, alert and oriented to person, place, and time and event. Cranial nerves II through XII intact. Clear speech. Muscle strength is +5/5 x 4 extremities. Sensation is intact to light touch bilaterally. Steady gait. Heme/Lymph - peripheral pulses normal x 4 extremities. No peripheral edema is noted. Musculoskeletal - Intact x 4 extremities. Full ROM x 4 extremities. No pain with movement. Back exam - normal range of motion. No pain on palpation of the spinous processes in the cervical, thoracic, lumbar, sacral regions. No CVA tenderness. Skin - no rashes, erythema, ecchymosis, lacerations, abrasions, suspicious moles noted  Psychological -   normal behavior, dress and thought processes. Good insight. Good eye contact. Normal affect. Appropriate mood. Normal speech.       DATA REVIEWED    Results for orders placed or performed in visit on 10/09/17   VITAMIN D, 25 HYDROXY   Result Value Ref Range    VITAMIN D, 25-HYDROXY 36.6 30.0 - 100.0 ng/mL   HEMOGLOBIN A1C WITH EAG   Result Value Ref Range    Hemoglobin A1c 8.6 (H) 4.8 - 5.6 %    Estimated average glucose 200 mg/dL   LIPID PANEL   Result Value Ref Range    Cholesterol, total 179 100 - 199 mg/dL    Triglyceride 73 0 - 149 mg/dL    HDL Cholesterol 73 >39 mg/dL    VLDL, calculated 15 5 - 40 mg/dL    LDL, calculated 91 0 - 99 mg/dL   METABOLIC PANEL, COMPREHENSIVE   Result Value Ref Range    Glucose 208 (H) 65 - 99 mg/dL    BUN 12 8 - 27 mg/dL    Creatinine 0.93 0.57 - 1.00 mg/dL    GFR est non-AA 64 >59 mL/min/1.73    GFR est AA 74 >59 mL/min/1.73    BUN/Creatinine ratio 13 12 - 28    Sodium 140 134 - 144 mmol/L    Potassium 4.2 3.5 - 5.2 mmol/L    Chloride 95 (L) 96 - 106 mmol/L    CO2 27 18 - 29 mmol/L    Calcium 9.8 8.7 - 10.3 mg/dL    Protein, total 6.9 6.0 - 8.5 g/dL    Albumin 4.4 3.6 - 4.8 g/dL    GLOBULIN, TOTAL 2.5 1.5 - 4.5 g/dL    A-G Ratio 1.8 1.2 - 2.2    Bilirubin, total 0.4 0.0 - 1.2 mg/dL    Alk. phosphatase 75 39 - 117 IU/L    AST (SGOT) 14 0 - 40 IU/L    ALT (SGPT) 7 0 - 32 IU/L   TSH 3RD GENERATION   Result Value Ref Range    TSH 3.690 0.450 - 4.500 uIU/mL   THYROID PEROXIDASE (TPO) AB   Result Value Ref Range    Thyroid peroxidase Ab 12 0 - 34 IU/mL   CVD REPORT   Result Value Ref Range    INTERPRETATION Note    DIABETES PATIENT EDUCATION   Result Value Ref Range    PDF Image Not applicable        ASSESSMENT and PLAN      ICD-10-CM ICD-9-CM    1. Uncontrolled type 2 diabetes mellitus with diabetic neuropathic arthropathy, without long-term current use of insulin (HCC) E11.610 250.62 empagliflozin-metFORMIN (SYNJARDY) 12.5-1,000 mg per tablet    E11.65 713.5     worse without Xigduo and with increased Amaryl 4 mg to BID   2. Essential hypertension I10 401.9    3. ACE inhibitor intolerance Z78.9 995.27      E980.4    4. Obesity, Class I, BMI 30-34.9 E66.9 278.00    5. Chronic paroxysmal hemicrania, not intractable G44.049 339.04     frontal due to sinus vs eye strain vs other   6. Seborrheic keratosis L82.1 702.19    7. Vitamin D deficiency E55.9 268.9     STABLE   8. Thalassemia minor D56.3 282.46    9. Ulnar neuropathy of left upper extremity G56.22 354.2        Discussed the patient's BMI with her. The BMI follow up plan is as follows: I have counseled this patient on diet and exercise regimens. Decrease carbohydrates (white foods, sweet foods, sweet drinks and alcohol), increase green leafy vegetables and protein (lean meats and beans) with each meal.  Avoid fried foods. Eat 3-5 small meals daily. Do not skip meals. Increase water intake. Increase physical activity to 30 minutes daily for health benefit or 60 minutes daily to prevent weight regain, as tolerated. Get 7-8 hours uninterrupted sleep nightly. Chart reviewed and updated. Continue current medications and care. Stop Amaryl 4 mg BID.  Change from Xigduo 10-1,000 mg to Synjardy 12.5-1,000 mg daily. After 1 week, increase Synjardy to BID. Prescriptions written and sent to pharmacy; medication side effects discussed. Synjardy 12.5-1,000 mg. Most recent tests reviewed from 10/09/17. Recent office visit notes from Dr. Andi Yanez and Dr. René Thomas reviewed. Get recent office visit notes from Dr. Dalila Sweeney. Advised pt to sign release. Counseled patient on health concerns:  DM, BP, and headache. Immunizations noted. Offered empathy, support, legitimation, prayers, partnership to patient. Praised patient for progress. Follow-up Disposition:  Return in about 3 months (around 1/16/2018) for DM, REFERRAL FOLLOW UP. Patient was offered a choice/choices in the treatment plan today. Patient expresses understanding of the plan and agrees with recommendations. Patient declines any additional handouts. Patient is satisfied with previous handouts received from our office    Written by clem Munoz, as dictated by Dr. Meliton Morgan DO. Documentation True and Accepted by Jolanta Hampton.

## 2017-10-17 ENCOUNTER — TELEPHONE (OUTPATIENT)
Dept: FAMILY MEDICINE CLINIC | Age: 67
End: 2017-10-17

## 2017-10-18 NOTE — TELEPHONE ENCOUNTER
Writer called pt back after speaking to Dr. Mey Denson about medications. Writer spoke with pt, pt verified . Writer told pt that Dr. Mey Denson wants her to take Invokamet 150/1,000mg once daily for 2 weeks or until PA for Synjardy comes back, and if Trevin Oz is approved, pt can then switch over to that medication. Pt made aware that she will have to  the samples Dr. Mey Denson wants to give her of the Invokamet and that she will also have to do a NV for her flu shot. Pt verbalized understanding and stated that she appreciated this very much. Pt stated that she will be in tomorrow morning, 10/19/2017, between 830am-9am to  her medications and receive her flu shot.

## 2017-10-18 NOTE — TELEPHONE ENCOUNTER
Writer called pt back per request. Pt answered phone, verified . Pt had a question regarding her medication for her DM. Stated that she has not had any medications in a couple months, and Dr. Arleen Hart did not refill her Glimiperide since she gave rx for Synjardy, which needed a PA to be done. Writer told pt that PA was done yesterday, 10/17/17, and we are just waiting for her health insurance to send a response. Pt stated that she wants to know if she should take anything until Diann Draper gets approved. And also stated that she was suppose to get her flu shot as well, but did not get it prior to leaving office. Writer told pt that I would speak to Dr. Arleen Hart when she was done with her pts and will call her back.

## 2017-10-19 ENCOUNTER — CLINICAL SUPPORT (OUTPATIENT)
Dept: FAMILY MEDICINE CLINIC | Age: 67
End: 2017-10-19

## 2017-10-19 VITALS — TEMPERATURE: 97.3 F

## 2017-10-19 DIAGNOSIS — Z23 ENCOUNTER FOR IMMUNIZATION: ICD-10-CM

## 2017-10-19 NOTE — PROGRESS NOTES
Chief Complaint   Patient presents with    Immunization/Injection     flu shot    Medication Refill     came to  sample medication      Pt came in for flu shot and to  2 week supply of invokamet. Pt's synjardy needed PA; PA has been done and in process. Invokamet is only temporary until PA is approved or rejected.      Flu shot was given in left arm via IM injection

## 2017-10-23 ENCOUNTER — DOCUMENTATION ONLY (OUTPATIENT)
Dept: FAMILY MEDICINE CLINIC | Age: 67
End: 2017-10-23

## 2017-10-23 NOTE — PROGRESS NOTES
Writer called insurance company to check status of PA for [employer]. Insurance stated that they did not receive the PA done on covermymeds last week by writer. Insurance sent over form to PA to be done. Writer filled out form and faxed it back to number given on cover sheet.

## 2017-10-24 RX ORDER — DAPAGLIFLOZIN AND METFORMIN HYDROCHLORIDE 10; 1000 MG/1; MG/1
TABLET, FILM COATED, EXTENDED RELEASE ORAL
Qty: 30 TAB | Status: SHIPPED | OUTPATIENT
Start: 2017-10-24 | End: 2018-01-23 | Stop reason: ALTCHOICE

## 2017-10-25 ENCOUNTER — TELEPHONE (OUTPATIENT)
Dept: FAMILY MEDICINE CLINIC | Age: 67
End: 2017-10-25

## 2017-10-25 NOTE — TELEPHONE ENCOUNTER
Writer called pt to notify her that the PA done for Synjardy was approved. Writer spoke with pt, pt verified . Writer notified pt that her [place] [employer] was approved, and her pharmacy should also be getting a notifcation as well so they can fill the medication for pt. Writer also stated that she will fax approval notice to pharmacy so they will have it. Pt verbalized understanding and appreciation.

## 2017-10-25 NOTE — TELEPHONE ENCOUNTER
----- Message from Ana Haines sent at 10/25/2017  8:11 AM EDT -----  Regarding: Lyndsey/Rx  Pt is requesting a preauthorization for a new diabetic medication and she did not know the name of the medication. Pts number is [phone] and [facility].

## 2017-10-30 ENCOUNTER — TELEPHONE (OUTPATIENT)
Dept: FAMILY MEDICINE CLINIC | Age: 67
End: 2017-10-30

## 2017-10-30 NOTE — TELEPHONE ENCOUNTER
PT call stating that her Synjardy prescription cost is 191.00 and that it  is to much. She need something cheaper.  Pt phne number is [phone]

## 2017-11-01 NOTE — TELEPHONE ENCOUNTER
----- Message from Alis Lucero sent at 11/1/2017  9:13 AM EDT -----  Regarding: Dr. Chilango Freire  Pt requested a callback regarding new Rx \"Synjardy\" , stated she left message 2 other times last week but no response as of yet. Best contact: [phone].

## 2017-11-01 NOTE — TELEPHONE ENCOUNTER
Writer called back in regards to cost of Synjardy, and to speak to pt about calling her health insurance to see what medication, like Synjardy, they will cover and to ask if they will cover Invokamet, which pt was given samples of for 2 weeks to hold pt over, and that when health insurance lets pt know, to let staff know so a rx can be sent into pharmacy for pt. Pt did not answer. Writer left  requesting phone call back when she received message.

## 2017-11-02 ENCOUNTER — TELEPHONE (OUTPATIENT)
Dept: FAMILY MEDICINE CLINIC | Age: 67
End: 2017-11-02

## 2017-11-02 NOTE — TELEPHONE ENCOUNTER
----- Message from Phoebe Izaguirre sent at 11/2/2017 10:10 AM EDT -----  Regarding: Dr. Claire Arredondo  Pt. (e)[phone] is requesting to speak with Dr. Therese Gallo regarding her RX for [place].

## 2017-11-02 NOTE — TELEPHONE ENCOUNTER
----- Message from Lary Anders sent at 11/2/2017 10:10 AM EDT -----  Regarding: Dr. Mya Farmer  Pt. (e)[phone] is requesting to speak with Dr. Aryan Bradley regarding her RX for [place].

## 2017-11-03 NOTE — TELEPHONE ENCOUNTER
Writer tried calling pt on Wednesday. Went straight to pt's VM box. Spoke with Dr. Taz Mary regarding Kamari Leary. Per Dr. Taz Mary, pt needs to call her health insurance and see which medication, like Synjardy, they will cover, so we can send in a rx to the pharmacy for pt. Pt was given a 2 week sample of Invokamet, and can also ask her insurance if they will cover that medication as well.

## 2017-11-08 NOTE — TELEPHONE ENCOUNTER
Writer spoke with pt regarding [place] being too expensive. Writer told pt that Dr. Blayne Somers wants pt to call her health insurance and ask them if they will cover the Invokamet that she has been taking, and if they did not cover that medication, to ask the insurance what they will cover fully or what will be cheaper for pt to take. Pt verbalized understanding and asked if Dr. Blayne Somers would approve another week or two sample for Invokamet to hold her over incase medication health insurance will cover will need another PA done. Writer stated that I will talk to Dr. Blayne Somers when she gets out of pt's room and then call pt back to let her know. Pt stated that she will call office back as well as soon as she knows which medication health insurance will cover.

## 2017-11-10 ENCOUNTER — TELEPHONE (OUTPATIENT)
Dept: FAMILY MEDICINE CLINIC | Age: 67
End: 2017-11-10

## 2017-11-10 NOTE — TELEPHONE ENCOUNTER
----- Message from Gema Alexandra sent at 11/10/2017 10:09 AM EST -----  Regarding: Dr. Maykel Ricci is returning a missed call From Nurse Chaitanya Kendrick. Unable to reach psr at the practice. Best Contact: [phone].

## 2017-11-10 NOTE — TELEPHONE ENCOUNTER
Writer called pt back regarding the Synjardy. Writer spoke with pt, pt verified . Pt notified writer that she spoke with someone through her health insurance and was told that they will give her a $250 co-pay card and will be able to get the [place] for a year. Writer verbalized understanding and stated that she will let Dr. Whitehead Retort know as soon as she gets done seeing patients. Pt verbalized appreciation and understanding.

## 2017-11-13 ENCOUNTER — OFFICE VISIT (OUTPATIENT)
Dept: FAMILY MEDICINE CLINIC | Age: 67
End: 2017-11-13

## 2017-11-13 ENCOUNTER — TELEPHONE (OUTPATIENT)
Dept: FAMILY MEDICINE CLINIC | Age: 67
End: 2017-11-13

## 2017-11-13 VITALS
SYSTOLIC BLOOD PRESSURE: 107 MMHG | HEIGHT: 67 IN | RESPIRATION RATE: 18 BRPM | WEIGHT: 205.6 LBS | BODY MASS INDEX: 32.27 KG/M2 | TEMPERATURE: 98.6 F | DIASTOLIC BLOOD PRESSURE: 68 MMHG | OXYGEN SATURATION: 97 % | HEART RATE: 90 BPM

## 2017-11-13 DIAGNOSIS — R05.9 COUGH: Primary | ICD-10-CM

## 2017-11-13 DIAGNOSIS — J06.9 URI, ACUTE: ICD-10-CM

## 2017-11-13 RX ORDER — ALBUTEROL SULFATE 90 UG/1
1-2 AEROSOL, METERED RESPIRATORY (INHALATION)
Qty: 1 INHALER | Refills: 0 | Status: SHIPPED | COMMUNITY
Start: 2017-11-13 | End: 2018-05-25 | Stop reason: ALTCHOICE

## 2017-11-13 NOTE — MR AVS SNAPSHOT
Visit Information Date & Time Provider Department Dept. Phone Encounter #  
 11/13/2017  4:40 PM Leanne Johnson NP [facility] [phone] [id number] Your Appointments 1/16/2018  8:40 AM  
LAB with LAB YONY Jessie Arthur (GILLES Godfrey) Appt Note: Dr. Unique Turpin Fasting; Dr. Unique Turpin Fasting [address] [facility] [address] [facility] [address]  
[phone]  
  
   
 [address] [place] [address]  
  
    
 1/23/2018  9:30 AM  
ROUTINE CARE with DO Jessie Waldrop 74 (GILLES Foleysper) Appt Note: 3 MO F/U DM & Results [address] [facility] [address] [facility] [address]  
[phone]  
  
   
 [address] [place] [address] Upcoming Health Maintenance Date Due  
 MEDICARE YEARLY EXAM 12/8/2017 HEMOGLOBIN A1C Q6M 4/9/2018 MICROALBUMIN Q1 6/15/2018 FOOT EXAM Q1 7/25/2018 EYE EXAM RETINAL OR DILATED Q1 7/25/2018 LIPID PANEL Q1 10/9/2018 BREAST CANCER SCRN MAMMOGRAM 2/6/2019 GLAUCOMA SCREENING Q2Y 7/25/2019 COLONOSCOPY 5/27/2020 DTaP/Tdap/Td series (2 - Td) 3/14/2027 Allergies as of 11/13/2017  Review Complete On: 11/13/2017 By: Leanne Johnson NP Severity Noted Reaction Type Reactions Influenza Virus Vaccine, Specific High 10/13/2016    Swelling R ARM PAIN, NUMBNESS, SWELLING 
R HAND NUMBNESS AND TINGLING, WORSE IN 4TH AND FIFTH FINGERS Lisinopril Medium 10/28/2009    Cough Other reaction(s): Lisinopril Singulair [Montelukast]  02/28/2013    Other (comments)  
 headache Current Immunizations  Reviewed on 11/13/2017 Name Date H1N1 FLU VACCINE 3/9/2010 Hepatitis B Vaccine 1/27/2009, 8/20/2008, 6/9/2008 Influenza High Dose Vaccine PF 10/19/2017, 9/7/2016, 11/11/2015 Influenza Vaccine Split 10/24/2012, 11/8/2011, 10/8/2010 Influenza Vaccine Whole 1/27/2009 Pneumococcal Conjugate (PCV-13) 11/11/2015 Pneumococcal Polysaccharide (PPSV-23) 12/7/2016 ZZZ-RETIRED (DO NOT USE) Pneumococcal Vaccine (Unspecified Type) 12/21/2005 Zoster Vaccine, Live 1/20/2014  
 dT Vaccine 6/9/2008 Reviewed by Chayo Murphy LPN on 66/34/8400 at  4:21 PM  
 Reviewed by Chayo Murphy LPN on 30/53/4575 at  4:21 PM  
You Were Diagnosed With   
  
 Codes Comments Cough    -  Primary ICD-10-CM: P93 ICD-9-CM: 786.2   
 URI, acute     ICD-10-CM: J06.9 ICD-9-CM: 465.9 Vitals OB Status Smoking Status Hysterectomy Never Smoker Preferred Pharmacy Pharmacy Name Phone [phone], [address] [phone] Your Updated Medication List  
  
   
This list is accurate as of: 11/13/17  5:01 PM.  Always use your most recent med list.  
  
  
  
  
 albuterol 90 mcg/actuation inhaler Commonly known as:  PROVENTIL HFA, VENTOLIN HFA, PROAIR HFA Take 1-2 Puffs by inhalation every four (4) hours as needed for Wheezing. aspirin delayed-release 81 mg tablet Take 1 Tab by mouth daily. Blood-Glucose Meter monitoring kit One Touch Ultra 2 Use to test blood sugar 1-2 per day (blood sugar before breakfast or 2 hours after any meal or at bedtime) DX.  E11.65  
  
 butalbital-acetaminophen-caffeine -40 mg per tablet Commonly known as:  [place] Seen Take 1 Tab by mouth every six (6) hours as needed for Headache. Max Daily Amount: 4 Tabs. Indications: MIGRAINE, TENSION-TYPE HEADACHE  
  
 canagliflozin-metFORMIN 150-1,000 mg Tab Commonly known as:  Lucas Denver Take 1 Tab by mouth daily. chlorhexidine 0.12 % solution Commonly known as:  PERIDEX  
  
 diclofenac EC 75 mg EC tablet Commonly known as:  VOLTAREN  
take 1 tablet by mouth twice a day with food  
  
 empagliflozin-metFORMIN 12.5-1,000 mg per tablet Commonly known as:  Omar Huber Take 1 Tab by mouth two (2) times daily (with meals). Indications: type 2 diabetes mellitus  
  
 ergocalciferol 50,000 unit capsule Commonly known as:  ERGOCALCIFEROL Take 1 Cap by mouth every seven (7) days. Indications: VITAMIN D DEFICIENCY  
  
 EVENING PRIMROSE 500 mg Cap Generic drug:  evening primrose oil Take 1 Cap by mouth two (2) times a day. FLEXERIL 10 mg tablet Generic drug:  cyclobenzaprine Take 10 mg by mouth as needed. fluticasone 50 mcg/actuation nasal spray Commonly known as:  Deanna Fryer 2 Sprays by Both Nostrils route daily. glimepiride 4 mg tablet Commonly known as:  AMARYL Take 1 Tab by mouth every morning. Indications: type 2 diabetes mellitus  
  
 glucose blood VI test strips strip Commonly known as:  ASCENSIA AUTODISC VI, ONE TOUCH ULTRA TEST VI  
Use to test blood sugar 1-2 per day (blood sugar before breakfast or 2 hours after any meal or at bedtime) DX. E11.65 LYRICA 75 mg capsule Generic drug:  pregabalin Take  by mouth. simvastatin 40 mg tablet Commonly known as:  ZOCOR  
take 1 tablet by mouth at bedtime  
  
 sodium chloride 0.65 % nasal spray Commonly known as:  OCEAN  
1 Spray as needed for Congestion. traMADol 50 mg tablet Commonly known as:  ULTRAM  
Take 50 mg by mouth every six (6) hours as needed. traZODone 50 mg tablet Commonly known as:  Raymond Chencho Take 50 mg by mouth as needed. Takes at bedtime prn  
  
 VAGIFEM 10 mcg Tab vaginal tablet Generic drug:  estradiol Insert 10 mcg into vagina daily. valACYclovir 500 mg tablet Commonly known as:  VALTREX  
  
 valsartan-hydroCHLOROthiazide 160-25 mg per tablet Commonly known as:  DIOVAN-HCT  
take 1 tablet by mouth once daily VESIcare 10 mg tablet Generic drug:  solifenacin Take 10 mg by mouth daily. XIGDUO XR 10-1,000 mg Tbph  
Generic drug:  dapagliflozin-metformin  
take 1 tablet by mouth daily Patient Instructions 1) Albuterol inhaler to help you breathe better. If you still have symptoms by Friday, please call me and I will send in a prescription for azythrimycin. Albuterol (By breathing) Albuterol (al-BUE-ter-ol) Treats or prevents bronchospasm. Brand Name(s): AccuNeb, ProAir HFA, ProAir RespiClick, Proventil HFA, Ventolin HFA There may be other brand names for this medicine. When This Medicine Should Not Be Used: This medicine is not right for everyone. Do not use it if you had an allergic reaction to albuterol or milk proteins. How to Use This Medicine:  
Aerosol, Powder Under Pressure, Suspension, Solution, Powder · Your doctor will tell you how much medicine to use. Do not use more than directed. Ask your doctor or pharmacist if you have questions about how to use your inhaler, nebulizer, or medicine. · Solution:  
¨ You will use this medicine with an inhaler device called a nebulizer. The nebulizer turns the medicine into a fine mist that you breathe in through your mouth and to your lungs. Your caregiver will show you how to use your nebulizer. ¨ Store unopened vials of this medicine at room temperature, away from heat and direct light. Do not freeze. An open vial of medicine must be used right away. · Aerosol inhaler: ¨ Shake the inhaler well just before each use. Avoid spraying this medicine into your eyes. ¨ Test spray in the air before using for the first time or if the inhaler has not been used for a while. ¨ If you are supposed to use more than one puff, wait 1 to 2 minutes before inhaling the second puff. Repeat these steps for the next puff, starting with shaking the inhaler. ¨ Clean the inhaler mouthpiece at least once a week with warm running water for 30 seconds. Let it air dry completely. ¨ Store the canister at room temperature, away from heat and direct light. Do not freeze.  Do not keep this medicine inside a car where it could be exposed to extreme heat or cold. Do not poke holes in the canister or throw it into a fire, even if the canister is empty. · ProAir® Respiclick® inhaler: ¨ Make sure the cap is closed. Do not open the cap unless you are going to use the medicine. ¨ Hold the inhaler upright. Open the cap fully until you hear a click. Your inhaler is now ready to use. ¨ Close the cap firmly over the mouthpiece after each use. ¨ Keep the inhaler clean and dry at all times. Do not wash or put any part of the inhaler in water. ¨ To clean the mouthpiece, wipe it gently with a dry cloth or tissue. ¨ Keep the medicine in the foil pouch until you are ready to use it. Store at room temperature, away from heat and direct light. Do not freeze. · Read and follow the patient instructions that come with this medicine. Talk to your doctor or pharmacist if you have any questions. · Missed dose: Take a dose as soon as you remember. If it is almost time for your next dose, wait until then and take a regular dose. Do not take extra medicine to make up for a missed dose. Drugs and Foods to Avoid: Ask your doctor or pharmacist before using any other medicine, including over-the-counter medicines, vitamins, and herbal products. · Some foods and medicines can affect how albuterol works. Tell your doctor if you are using any of the following: ¨ Digoxin ¨ Beta-blocker medicine ¨ Diuretic (water pill) ¨ Medicine for depression or an MAO inhibitor within the past 2 weeks Warnings While Using This Medicine: · Tell your doctor if you are pregnant or breastfeeding, or if you have kidney disease, diabetes, heart disease, heart rhythm problems, high blood pressure, overactive thyroid, or a history of seizures. · This medicine may cause the following problems:  
¨ Paradoxical bronchospasm (increased trouble breathing right after use) ¨ Low potassium levels in the blood · If you use a corticosteroid medicine to control your asthma, keep using it as instructed by your doctor. · Call your doctor if your symptoms do not improve or if they get worse. · If any of your asthma medicines do not seem to be working as well as usual, call your doctor right away. Do not change your doses or stop using your medicines without asking your doctor. · Your doctor will check your progress and the effects of this medicine at regular visits. Keep all appointments. · Keep all medicine out of the reach of children. Never share your medicine with anyone. Possible Side Effects While Using This Medicine:  
Call your doctor right away if you notice any of these side effects: · Allergic reaction: Itching or hives, swelling in your face or hands, swelling or tingling in your mouth or throat, chest tightness, trouble breathing · Dry mouth, increased thirst, muscle cramps, nausea, vomiting · Fast, pounding, or uneven heartbeat, chest pain · Lightheadedness, dizziness, or fainting · Trouble breathing, increased wheezing, cough, chest tightness If you notice these less serious side effects, talk with your doctor: · Cough, sore throat, runny or stuffy nose · Headache · Tremors, nervousness An upper respiratory infection (URI) is an infection of the throat and nose. It is also known as \"the common cold\". 90% of these infections are caused by a virus. Viral infections do not respond to antibiotic treatment, only bacteria are killed by antibiotics. Therefore, supportive treatment is recommended to help with symptoms. Symptoms usually subside after 7-10 days (or longer if you smoke). If symptoms worsen or persist after 14 days, or if you have fever over 101.3, fever for 5 days or more, wheezing, or shortness of breath, please contact the office. To prevent URIs, wash hands frequently (epecially before and after eating - use hand  if you are in a public place.) Eat a healthy diet, get regular exercise and sufficient sleep.  Reduce your exposure to cigarette smoke. If you need to cough or sneeze, use the crook of your arm to cover your mouth. Supportive Care 1) Alternate 400mg Ibuprofen and 650mg Tylenol every 4 hours as needed for sinus pain and discomfort. Make sure to take Ibuprofen with food as it can cause stomach upset. Do not exceed 3000 mg Tylenol per day. 2) Take a daily antihistamine to reduce symptoms such as sneezing, runny nose and itchy eyes. You can buy these over the counter (OTC) (no prescription needed) such as Claritin, Allegra or Zyrtec. Take this daily as it takes 3-4 weeks for the full therapeutic effect to take place. Follow directions on package. If symptoms of sneezing, coughing and runny nose are severe, you can try taking Benadryl at night before bed. However, Benadryl can cause drowsiness, so please use caution. Elderly people should not use Benadryl due to side effects and increase risk of falling. 3) OTC Robitussin or Delsym for cough relief. Follow directions on package. Do not exceed maximum dose. May cause drowsiness. If you have high blood pressure or kidney problems, avoid cough medicines that contain decongestants  such as pseudoephedrine, ephedrine, phenylephrine, naphazoline and oxymetazoline. 4) Use an OTC decongestant nasal spray such as Flonase, Nasonex, or Rhinocort. These contain a steroid and will help reduce congestion. You can spray 2x in each nostril two times a day. Use the opposite hand of the nostril you are spraying and look down at your toes when you administer the nasal spray. This ensures the spray is applied to the nasal tissue properly. If you use Afrin for nasal congestion, DO NOT use for more than 5 days (due to rebound congestion) 5) Increase your fluid consumption, especially water. Eat a well-balanced diet and avoid dairy and sugar as these can increase or thicken congestion.  
 
6) Warm salt water gargle can help alleviate sore throat (1 teaspoon in 8 oz warm water) 7) Honey is a natural cough suppressor - can take a teaspoon of honey for cough or mix with hot tea. Local honey can help inoculate against local pollen that causes allergic reactions. (It has to be local honey from our area. You can usually find local honey at farmers' markets.) 8) Humidify air, especially in bedroom at night, as it may help with nasal and throat dryness. Breathing in steam from a shower may help loosen mucus and soothe an irritated throat. 9) Get plenty of rest! 
 
If symptoms persist for more than 10 days or if you have a fever above 102, please call the office. Complications of URI include an infection of the skin around the eye and other infections. Watch for signs of fever, chills, body aches or any areas of red, warm, swollen and tender skin. Call immediately if you notice these signs. Introducing [facility]! Dear Althea Llamas: Thank you for requesting a Lob account. Our records indicate that you already have an active Lob account. You can access your account anytime at [email or web address] Did you know that you can access your hospital and ER discharge instructions at any time in Lob? You can also review all of your test results from your hospital stay or ER visit. Additional Information If you have questions, please visit the Frequently Asked Questions section of the Lob website at [email or web address] Remember, Lob is NOT to be used for urgent needs. For medical emergencies, dial 911. Now available from your iPhone and Android! Please provide this summary of care documentation to your next provider. Your primary care clinician is listed as Alisson Cueva. If you have any questions after today's visit, please call [phone].

## 2017-11-13 NOTE — PROGRESS NOTES
S: Ventura Soria is a 79 y.o. female who presents with  cough    Assessment/Plan:  1. Cough, URI  - Instructions for supportive care given  -albuterol 1-2 puffs prn  -advised pt if she still has sx for more than 2 weeks, (Friday, 11/17) will call in zpack for her         HPI:  Hoarse   Itchy throat  Chain coughing through night; Back hurts     Sx started: 11/6/17 - started with itchy throat and hoarse by Friday   Cough is worse at night  Productive cough - hard to get up and comes up dark (yellow/green)  Itchy throat, but sore  + HA  No nasal discharge  No watery eye   No Fever or chills  No sinus pressure  No SOB or wheezing    Relieving factors: tried tea and juice and chicken soup and water but nothing has helped   Does tap class on Tuesdays and was fatigued     Social history:   Nutrition: drinking a lot of fluids  Occupation: retired - part time at [employer]   Social: lives with grandsababy (9yo); daughter and nephew  Tobacco: none    Review of Systems:  - Constitutional symptoms: no fatigue  - Cardiovascular: no chest pain or palpitations  - Respiratory: no cough or shortness of breath  - Gastrointestinal: no nausea or vomiting    I reviewed the following:  Past Medical History:   Diagnosis Date    Allergic rhinitis     Dr. Zia Lassiter 2015    Dr. Morena Houston.  Chest pain 01/15/09    Dr. Jeannie Saldaña Chickenpox childhood    DDD (degenerative disc disease), cervical 10/2015    Dr. Radha Craft.  DDD (degenerative disc disease), lumbar 2014    Dr. Farhat Tripp.  Diabetes ([facility] [address] 06/2007    Yulia Adame, OD. Dr. Keyanna Peña, pod.  Endometriosis     Dr. Alexander Cord    Essential hypertension, benign     Dr. Woody Everett, uterine     Dr. Nix Horse     Dr. Joseph Manus 08/2009    Dr. Consuelo Charles. Dr. Keyanna Peña.  Hand paresthesia 2011    Dr. Risa Samayoa. Dr. Paradise White.     RESJNTHO(221.3) 2012 Dr. Cheyenne Shelton. Dr. Andre Phan, ENT.  Hemoptysis 06/13/07    Dr. Cullen Castellanos    Hiatal hernia 05/27/15    small. MD Waqar Mcnamara Internal hemorrhoids 12/2006, 05/27/15    Dr. [facility]. Dr. Jani Bhatt.  Intraocular pressure increase 07/27/05    bilaterally. Dr. Celia Pierce. Dr. Shweta Card.  Knee pain 04/04/13    Bilaterally. due to fall. Dr. Tarik Quintana.  Left knee pain 11/26/14    Left. due to OA, torn medial and lateral meniscal tears[facility].  Lumbar spondylosis 3/2008    Dr. Etienne Vegas. Dr. Chuck Edwards    Paresthesia of foot     Left  due to Ornelas's Neuromo Sxs. Maya Guzman.  Proteinuria 2009    Dr. Courtney Titus Right shoulder pain 01/30/06    Dr. Ferdinand Burroughs. impingement tendinitis    Right shoulder pain 07/2014    Dr. Emery Delgado.  Schatzki's ring 05/27/15    with partial obstruction at GE junction. Dr. Jani Bhatt. Rosaura Lav     Dr. Etienne Vegas. Dr. 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 Dr. Barbara Gurrola. [facility].  Thalassemia minor 1980s    Vitamin D deficiency 05/2011    Darling Fuller, [place]        Current Outpatient Prescriptions   Medication Sig Dispense Refill    XIGDUO XR 10-1,000 mg TBph take 1 tablet by mouth daily 30 Tab PRN    canagliflozin-metFORMIN (INVOKAMET) 150-1,000 mg tab Take 1 Tab by mouth daily. 14 Tab 0    empagliflozin-metFORMIN (SYNJARDY) 12.5-1,000 mg per tablet Take 1 Tab by mouth two (2) times daily (with meals). Indications: type 2 diabetes mellitus 60 Tab 5    pregabalin (LYRICA) 75 mg capsule Take  by mouth.  butalbital-acetaminophen-caffeine (FIORICET, ESGIC) -40 mg per tablet Take 1 Tab by mouth every six (6) hours as needed for Headache. Max Daily Amount: 4 Tabs.  Indications: MIGRAINE, TENSION-TYPE HEADACHE 30 Tab 1    diclofenac EC (VOLTAREN) 75 mg EC tablet take 1 tablet by mouth twice a day with food  0    sodium chloride (OCEAN) 0.65 % nasal spray 1 Spray as needed for Congestion.  glimepiride (AMARYL) 4 mg tablet Take 1 Tab by mouth every morning. Indications: type 2 diabetes mellitus 60 Tab 11    ergocalciferol (ERGOCALCIFEROL) 50,000 unit capsule Take 1 Cap by mouth every seven (7) days. Indications: VITAMIN D DEFICIENCY 5 Cap 2    valsartan-hydroCHLOROthiazide (DIOVAN-HCT) 160-25 mg per tablet take 1 tablet by mouth once daily 30 Tab 5    solifenacin (VESICARE) 10 mg tablet Take 10 mg by mouth daily.  aspirin delayed-release 81 mg tablet Take 1 Tab by mouth daily. 90 Tab 3    simvastatin (ZOCOR) 40 mg tablet take 1 tablet by mouth at bedtime 90 Tab 1    valACYclovir (VALTREX) 500 mg tablet       chlorhexidine (PERIDEX) 0.12 % solution       glucose blood VI test strips (ASCENSIA AUTODISC VI, ONE TOUCH ULTRA TEST VI) strip Use to test blood sugar 1-2 per day (blood sugar before breakfast or 2 hours after any meal or at bedtime) DX. E11.65 100 Strip 11    Blood-Glucose Meter monitoring kit One Touch Ultra 2 Use to test blood sugar 1-2 per day (blood sugar before breakfast or 2 hours after any meal or at bedtime) DX.   E11.65 1 Kit 0    estradiol (VAGIFEM) 10 mcg tab vaginal tablet Insert 10 mcg into vagina daily.  fluticasone (FLONASE) 50 mcg/actuation nasal spray 2 Sprays by Both Nostrils route daily. 1 Bottle 0    evening primrose oil (EVENING PRIMROSE) 500 mg cap Take 1 Cap by mouth two (2) times a day.  cyclobenzaprine (FLEXERIL) 10 mg tablet Take 10 mg by mouth as needed.  trazodone (DESYREL) 50 mg tablet Take 50 mg by mouth as needed. Takes at bedtime prn       tramadol (ULTRAM) 50 mg tablet Take 50 mg by mouth every six (6) hours as needed.           Allergies   Allergen Reactions    Influenza Virus Vaccine, Specific Swelling     R ARM PAIN, NUMBNESS, SWELLING  R HAND NUMBNESS AND TINGLING, WORSE IN 4TH AND FIFTH FINGERS    Lisinopril Cough     Other reaction(s): Lisinopril    Singulair [Montelukast] Other (comments) headache       O: VS: There were no vitals taken for this visit. GENERAL: Kady Monzon is in no acute distress. Non-toxic. HEAD:  Normocephalic. Atraumatic. Non tender sinuses x 4. EYE: PERRLA. EOMs intact. Sclera anicteric without injection. No drainage or discharge. EARS: Hearing intact bilaterally. External ear canals normal without evidence of blood or swelling. Bilateral TM's intact, pearly grey with landmarks visible. No erythema or effusion. NOSE: Patent. Nasal turbinates pink. No polyps noted. Mild erythema. No discharge. MOUTH: mucous membranes pink and moist. Posterior pharynx normal with cobblestone appearance. Mild erythema, no white exudate or obstruction. NECK: supple. Midline trachea. No anterior cervical lymphadenopathy noted. RESP: Breath sounds are symmetrical bilaterally. Unlabored without SOB. Speaking in full sentences. Clear to auscultation bilaterally anteriorly and posteriorly. No wheezes. No rales or rhonchi. CV: normal rate. Regular rhythm. S1, S2 audible. No murmur noted. No rubs, clicks or gallops noted. SKIN: Skin is warm and dry. Turgor is normal.   ______________________________________________________________________  Patient education was done. Advised on nutrition, tobacco, and alcohol . Counseling included discussion of diagnosis, differentials, treatment options, prescribed treatment, warning signs and follow up. Medication risks/benefits, costs, interactions and alternatives discussed with patient.      Patient verbalized understanding and agreed to plan of care. If you are not feeling better or you begin to feel worse or develop new symptoms in 3-4 days please call. Follow up in 1-2 weeks as needed or if symptoms progress.

## 2017-11-13 NOTE — PATIENT INSTRUCTIONS
1) Albuterol inhaler to help you breathe better. If you still have symptoms by Friday, please call me and I will send in a prescription for azythrimycin. Albuterol (By breathing)   Albuterol (al-BUE-ter-ol)  Treats or prevents bronchospasm. Brand Name(s): AccuNeb, ProAir HFA, ProAir RespiClick, Proventil HFA, Ventolin HFA   There may be other brand names for this medicine. When This Medicine Should Not Be Used: This medicine is not right for everyone. Do not use it if you had an allergic reaction to albuterol or milk proteins. How to Use This Medicine:   Aerosol, Powder Under Pressure, Suspension, Solution, Powder  · Your doctor will tell you how much medicine to use. Do not use more than directed. Ask your doctor or pharmacist if you have questions about how to use your inhaler, nebulizer, or medicine. · Solution:   ¨ You will use this medicine with an inhaler device called a nebulizer. The nebulizer turns the medicine into a fine mist that you breathe in through your mouth and to your lungs. Your caregiver will show you how to use your nebulizer. ¨ Store unopened vials of this medicine at room temperature, away from heat and direct light. Do not freeze. An open vial of medicine must be used right away. · Aerosol inhaler:   ¨ Shake the inhaler well just before each use. Avoid spraying this medicine into your eyes. ¨ Test spray in the air before using for the first time or if the inhaler has not been used for a while. ¨ If you are supposed to use more than one puff, wait 1 to 2 minutes before inhaling the second puff. Repeat these steps for the next puff, starting with shaking the inhaler. ¨ Clean the inhaler mouthpiece at least once a week with warm running water for 30 seconds. Let it air dry completely. ¨ Store the canister at room temperature, away from heat and direct light. Do not freeze. Do not keep this medicine inside a car where it could be exposed to extreme heat or cold.  Do not poke holes in the canister or throw it into a fire, even if the canister is empty. · ProAir® Respiclick® inhaler:   ¨ Make sure the cap is closed. Do not open the cap unless you are going to use the medicine. ¨ Hold the inhaler upright. Open the cap fully until you hear a click. Your inhaler is now ready to use. ¨ Close the cap firmly over the mouthpiece after each use. ¨ Keep the inhaler clean and dry at all times. Do not wash or put any part of the inhaler in water. ¨ To clean the mouthpiece, wipe it gently with a dry cloth or tissue. ¨ Keep the medicine in the foil pouch until you are ready to use it. Store at room temperature, away from heat and direct light. Do not freeze. · Read and follow the patient instructions that come with this medicine. Talk to your doctor or pharmacist if you have any questions. · Missed dose: Take a dose as soon as you remember. If it is almost time for your next dose, wait until then and take a regular dose. Do not take extra medicine to make up for a missed dose. Drugs and Foods to Avoid:   Ask your doctor or pharmacist before using any other medicine, including over-the-counter medicines, vitamins, and herbal products. · Some foods and medicines can affect how albuterol works. Tell your doctor if you are using any of the following:  ¨ Digoxin  ¨ Beta-blocker medicine  ¨ Diuretic (water pill)  ¨ Medicine for depression or an MAO inhibitor within the past 2 weeks  Warnings While Using This Medicine:   · Tell your doctor if you are pregnant or breastfeeding, or if you have kidney disease, diabetes, heart disease, heart rhythm problems, high blood pressure, overactive thyroid, or a history of seizures. · This medicine may cause the following problems:   ¨ Paradoxical bronchospasm (increased trouble breathing right after use)  ¨ Low potassium levels in the blood  · If you use a corticosteroid medicine to control your asthma, keep using it as instructed by your doctor.   · Call your doctor if your symptoms do not improve or if they get worse. · If any of your asthma medicines do not seem to be working as well as usual, call your doctor right away. Do not change your doses or stop using your medicines without asking your doctor. · Your doctor will check your progress and the effects of this medicine at regular visits. Keep all appointments. · Keep all medicine out of the reach of children. Never share your medicine with anyone. Possible Side Effects While Using This Medicine:   Call your doctor right away if you notice any of these side effects:  · Allergic reaction: Itching or hives, swelling in your face or hands, swelling or tingling in your mouth or throat, chest tightness, trouble breathing  · Dry mouth, increased thirst, muscle cramps, nausea, vomiting  · Fast, pounding, or uneven heartbeat, chest pain  · Lightheadedness, dizziness, or fainting  · Trouble breathing, increased wheezing, cough, chest tightness  If you notice these less serious side effects, talk with your doctor:   · Cough, sore throat, runny or stuffy nose  · Headache  · Tremors, nervousness      An upper respiratory infection (URI) is an infection of the throat and nose. It is also known as \"the common cold\". 90% of these infections are caused by a virus. Viral infections do not respond to antibiotic treatment, only bacteria are killed by antibiotics. Therefore, supportive treatment is recommended to help with symptoms. Symptoms usually subside after 7-10 days (or longer if you smoke). If symptoms worsen or persist after 14 days, or if you have fever over 101.3, fever for 5 days or more, wheezing, or shortness of breath, please contact the office. To prevent URIs, wash hands frequently (epecially before and after eating - use hand  if you are in a public place.) Eat a healthy diet, get regular exercise and sufficient sleep. Reduce your exposure to cigarette smoke.   If you need to cough or sneeze, use the crook of your arm to cover your mouth. Supportive Care    1) Alternate 400mg Ibuprofen and 650mg Tylenol every 4 hours as needed for sinus pain and discomfort. Make sure to take Ibuprofen with food as it can cause stomach upset. Do not exceed 3000 mg Tylenol per day. 2) Take a daily antihistamine to reduce symptoms such as sneezing, runny nose and itchy eyes. You can buy these over the counter (OTC) (no prescription needed) such as Claritin, Allegra or Zyrtec. Take this daily as it takes 3-4 weeks for the full therapeutic effect to take place. Follow directions on package. If symptoms of sneezing, coughing and runny nose are severe, you can try taking Benadryl at night before bed. However, Benadryl can cause drowsiness, so please use caution. Elderly people should not use Benadryl due to side effects and increase risk of falling. 3) OTC Robitussin or Delsym for cough relief. Follow directions on package. Do not exceed maximum dose. May cause drowsiness. If you have high blood pressure or kidney problems, avoid cough medicines that contain decongestants -- such as pseudoephedrine, ephedrine, phenylephrine, naphazoline and oxymetazoline. 4) Use an OTC decongestant nasal spray such as Flonase, Nasonex, or Rhinocort. These contain a steroid and will help reduce congestion. You can spray 2x in each nostril two times a day. Use the opposite hand of the nostril you are spraying and look down at your toes when you administer the nasal spray. This ensures the spray is applied to the nasal tissue properly. If you use Afrin for nasal congestion, DO NOT use for more than 5 days (due to rebound congestion)     5) Increase your fluid consumption, especially water. Eat a well-balanced diet and avoid dairy and sugar as these can increase or thicken congestion.     6) Warm salt water gargle can help alleviate sore throat (1 teaspoon in 8 oz warm water)    7) Honey is a natural cough suppressor - can take a teaspoon of honey for cough or mix with hot tea. Local honey can help inoculate against local pollen that causes allergic reactions. (It has to be local honey from our area. You can usually find local honey at farmers' markets.)     8) Humidify air, especially in bedroom at night, as it may help with nasal and throat dryness. Breathing in steam from a shower may help loosen mucus and soothe an irritated throat. 9) Get plenty of rest!    If symptoms persist for more than 10 days or if you have a fever above 102, please call the office. Complications of URI include an infection of the skin around the eye and other infections. Watch for signs of fever, chills, body aches or any areas of red, warm, swollen and tender skin. Call immediately if you notice these signs.

## 2017-11-13 NOTE — TELEPHONE ENCOUNTER
Writer called pt back. Writer spoke with pt, pt verified . Pt stated that she has had a cough, itchy throat, and chest congestion since last week; stated that she was going to wait before coming in, thinking that \"it would just go away. \" Pt stated that it has not gone away, and would like Dr. Mark Padilla to call in a cough syrup or ABT for her to help it go away. Writer stated that Dr. Mark Padilla would want pt to come in to be evaluated by a provider here. Writer stated to pt that Dr. Mark Padilla was booked today, but Henry Mares NP has openings this afternoon, if she would like to see the NP and could come in today. Pt verbalized understanding and stated that she could come in today and would see the NP. Writer named off of all of the opening slots available, pt chose 3:00pm slot. Writer schedule pt for 3:00pm with Henry Mares NP. Verifed time and date with pt. Pt verbalized understanding, acknowledgement, and appreciation.

## 2017-11-13 NOTE — TELEPHONE ENCOUNTER
----- Message from Tasha Maret sent at 11/13/2017  7:34 AM EST -----  Regarding: Dr. Kim Dacosta  Pt is requesting a call back from [facility], Dr. Jesús mckeon and she can be reached at [phone].

## 2017-11-14 ENCOUNTER — TELEPHONE (OUTPATIENT)
Dept: FAMILY MEDICINE CLINIC | Age: 67
End: 2017-11-14

## 2017-11-14 NOTE — TELEPHONE ENCOUNTER
Writer called albuterol inhaler into pharmacy, spoke with Electa Senate. Electa Senate verified albuterol order, quantity, and directions.

## 2017-11-14 NOTE — TELEPHONE ENCOUNTER
----- Message from Magdalena Miller Dr sent at 11/14/2017  8:18 AM EST -----  Regarding: Dr. Trino Wood / Stella Viera: [phone]  Pt called to check on the Rx that was sent to the [employer] at [facility] and  Mariama Zavala. yesterday for her upper respiratory infection.

## 2017-11-17 ENCOUNTER — TELEPHONE (OUTPATIENT)
Dept: FAMILY MEDICINE CLINIC | Age: 67
End: 2017-11-17

## 2017-11-17 NOTE — TELEPHONE ENCOUNTER
Received V.O from Sola Wilson NP for Z-pack 2 tabs on the first day then 1 tab daily for 4 days. Writer called into [employer] on file. Pharmacist verified ordered and verbalized acknowledgement.

## 2017-11-17 NOTE — TELEPHONE ENCOUNTER
----- Message from Rosana Bernheim sent at 11/17/2017  8:38 AM EST -----  Regarding: Starr/telephone  Pt is requesting a Rx for the Z Pack called to [address] [place] [address]. Pts number is [phone].

## 2017-11-28 ENCOUNTER — TELEPHONE (OUTPATIENT)
Dept: FAMILY MEDICINE CLINIC | Age: 67
End: 2017-11-28

## 2017-11-28 NOTE — TELEPHONE ENCOUNTER
----- Message from Magdalena Miller Dr sent at 11/28/2017 10:33 AM EST -----  Regarding: Dr. Ashu Montemayor / Scooby Arrieta: [phone]  Pt request another Z-pack to be sent to [facility].

## 2017-11-29 ENCOUNTER — OFFICE VISIT (OUTPATIENT)
Dept: FAMILY MEDICINE CLINIC | Age: 67
End: 2017-11-29

## 2017-11-29 VITALS
WEIGHT: 194.1 LBS | HEIGHT: 67 IN | DIASTOLIC BLOOD PRESSURE: 76 MMHG | BODY MASS INDEX: 30.46 KG/M2 | HEART RATE: 79 BPM | TEMPERATURE: 98.2 F | SYSTOLIC BLOOD PRESSURE: 117 MMHG | RESPIRATION RATE: 18 BRPM | OXYGEN SATURATION: 93 %

## 2017-11-29 DIAGNOSIS — R05.9 COUGH: Primary | ICD-10-CM

## 2017-11-29 RX ORDER — ESTRADIOL 10 UG/1
10 INSERT VAGINAL 2 TIMES WEEKLY
COMMUNITY
Start: 2017-10-30 | End: 2018-01-23 | Stop reason: ALTCHOICE

## 2017-11-29 RX ORDER — GLIMEPIRIDE 4 MG/1
10 TABLET ORAL AS NEEDED
COMMUNITY
Start: 2017-08-30 | End: 2018-01-23 | Stop reason: ALTCHOICE

## 2017-11-29 RX ORDER — SOLIFENACIN SUCCINATE 10 MG/1
10 TABLET, FILM COATED ORAL DAILY
COMMUNITY
Start: 2017-10-18 | End: 2018-01-23 | Stop reason: SDUPTHER

## 2017-11-29 RX ORDER — VALACYCLOVIR HYDROCHLORIDE 500 MG/1
500 TABLET, FILM COATED ORAL DAILY
COMMUNITY
Start: 2017-10-30 | End: 2018-01-23 | Stop reason: SDUPTHER

## 2017-11-29 NOTE — MR AVS SNAPSHOT
Visit Information Date & Time Provider Department Dept. Phone Encounter #  
 11/29/2017  8:00 AM Angelita Nava NP [facility] [phone] [id number] Your Appointments 1/16/2018  8:40 AM  
LAB with LAB [facility] [employer] (GILLES Foleysper) Appt Note: Dr. Desean Lagos Fasting; Dr. Desean Lagos Fasting [address] [facility] [address]  
[phone]  
  
   
 [address]  
  
    
 1/23/2018  9:30 AM  
ROUTINE CARE with DO Demetri Lopez (GILLES Mataer) Appt Note: 3 MO F/U DM & Results [address] [facility] [address]  
[phone]  
  
   
 [address] [facility] [address] [facility] [address] [facility] Upcoming Health Maintenance Date Due  
 MEDICARE YEARLY EXAM 12/8/2017 HEMOGLOBIN A1C Q6M 4/9/2018 MICROALBUMIN Q1 6/15/2018 FOOT EXAM Q1 7/25/2018 EYE EXAM RETINAL OR DILATED Q1 7/25/2018 LIPID PANEL Q1 10/9/2018 BREAST CANCER SCRN MAMMOGRAM 2/6/2019 GLAUCOMA SCREENING Q2Y 7/25/2019 COLONOSCOPY 5/27/2020 DTaP/Tdap/Td series (2 - Td) 3/14/2027 Allergies as of 11/29/2017  Review Complete On: 11/29/2017 By: Catrachita Hylton LPN Severity Noted Reaction Type Reactions Influenza Virus Vaccine, Specific High 10/13/2016    Swelling R ARM PAIN, NUMBNESS, SWELLING 
R HAND NUMBNESS AND TINGLING, WORSE IN 4TH AND FIFTH FINGERS Lisinopril Medium 10/28/2009    Cough Other reaction(s): Lisinopril Singulair [Montelukast]  02/28/2013    Other (comments)  
 headache Current Immunizations  Reviewed on 11/13/2017 Name Date H1N1 FLU VACCINE 3/9/2010 Hepatitis B Vaccine 1/27/2009, 8/20/2008, 6/9/2008 Influenza High Dose Vaccine PF 10/19/2017, 9/7/2016, 11/11/2015 Influenza Vaccine Split 10/24/2012, 11/8/2011, 10/8/2010 Influenza Vaccine Whole 1/27/2009 Pneumococcal Conjugate (PCV-13) 11/11/2015 Pneumococcal Polysaccharide (PPSV-23) 12/7/2016 ZZZ-RETIRED (DO NOT USE) Pneumococcal Vaccine (Unspecified Type) 12/21/2005 Zoster Vaccine, Live 1/20/2014  
 dT Vaccine 6/9/2008 Not reviewed this visit You Were Diagnosed With   
  
 Codes Comments Cough    -  Primary ICD-10-CM: V53 ICD-9-CM: 042. 2 Vitals BP Pulse Temp Resp Height(growth percentile) Weight(growth percentile) 117/76 (BP 1 Location: Left arm, BP Patient Position: Sitting) 79 98.2 °F (36.8 °C) (Oral) 18 5' 7\" (1.702 m) 194 lb 1.6 oz (88 kg) SpO2 BMI OB Status Smoking Status 93% 30.4 kg/m2 Hysterectomy Never Smoker BMI and BSA Data Body Mass Index Body Surface Area  
 30.4 kg/m 2 2.04 m 2 Your Updated Medication List  
  
   
This list is accurate as of: 11/29/17  9:43 AM.  Always use your most recent med list.  
  
  
  
  
 albuterol 90 mcg/actuation inhaler Commonly known as:  PROVENTIL HFA, VENTOLIN HFA, PROAIR HFA Take 1-2 Puffs by inhalation every four (4) hours as needed for Wheezing. aspirin delayed-release 81 mg tablet Take 1 Tab by mouth daily. Blood-Glucose Meter monitoring kit One Touch Ultra 2 Use to test blood sugar 1-2 per day (blood sugar before breakfast or 2 hours after any meal or at bedtime) DX.  E11.65  
  
 butalbital-acetaminophen-caffeine -40 mg per tablet Commonly known as:  Vivi Halls Take 1 Tab by mouth every six (6) hours as needed for Headache. Max Daily Amount: 4 Tabs. Indications: MIGRAINE, TENSION-TYPE HEADACHE  
  
 canagliflozin-metFORMIN 150-1,000 mg Tab Commonly known as:  Chaneta Bonine Take 1 Tab by mouth daily. chlorhexidine 0.12 % solution Commonly known as:  PERIDEX  
  
 diclofenac EC 75 mg EC tablet Commonly known as:  VOLTAREN  
take 1 tablet by mouth twice a day with food  
  
 empagliflozin-metFORMIN 12.5-1,000 mg per tablet Commonly known as:  Omar Huber Take 1 Tab by mouth two (2) times daily (with meals). Indications: type 2 diabetes mellitus  
  
 ergocalciferol 50,000 unit capsule Commonly known as:  ERGOCALCIFEROL Take 1 Cap by mouth every seven (7) days. Indications: VITAMIN D DEFICIENCY  
  
 EVENING PRIMROSE 500 mg Cap Generic drug:  evening primrose oil Take 1 Cap by mouth two (2) times a day. FLEXERIL 10 mg tablet Generic drug:  cyclobenzaprine Take 10 mg by mouth as needed. fluticasone 50 mcg/actuation nasal spray Commonly known as:  Solis Sacks 2 Sprays by Both Nostrils route daily. * glimepiride 4 mg tablet Commonly known as:  AMARYL Take 1 Tab by mouth every morning. Indications: type 2 diabetes mellitus * glimepiride 4 mg tablet Commonly known as:  AMARYL Take 10 mg by mouth as needed. glucose blood VI test strips strip Commonly known as:  ASCENSIA AUTODISC VI, ONE TOUCH ULTRA TEST VI  
Use to test blood sugar 1-2 per day (blood sugar before breakfast or 2 hours after any meal or at bedtime) DX. E11.65 LYRICA 75 mg capsule Generic drug:  pregabalin Take  by mouth. simvastatin 40 mg tablet Commonly known as:  ZOCOR  
take 1 tablet by mouth at bedtime  
  
 sodium chloride 0.65 % nasal spray Commonly known as:  OCEAN  
1 Spray as needed for Congestion. traMADol 50 mg tablet Commonly known as:  ULTRAM  
Take 50 mg by mouth every six (6) hours as needed. traZODone 50 mg tablet Commonly known as:  Jadiel [place] Take 50 mg by mouth as needed. Takes at bedtime prn * VAGIFEM 10 mcg Tab vaginal tablet Generic drug:  estradiol Insert 10 mcg into vagina daily. * YUVAFEM 10 mcg Tab vaginal tablet Generic drug:  estradiol Take 10 mg by mouth two (2) times a week. * valACYclovir 500 mg tablet Commonly known as:  VALTREX * valACYclovir 500 mg tablet Commonly known as:  VALTREX Take 500 mg by mouth daily. valsartan-hydroCHLOROthiazide 160-25 mg per tablet Commonly known as:  DIOVAN-HCT  
take 1 tablet by mouth once daily * VESIcare 10 mg tablet Generic drug:  solifenacin Take 10 mg by mouth daily. * VESIcare 10 mg tablet Generic drug:  solifenacin Take 10 mg by mouth daily. XIGDUO XR 10-1,000 mg Tbph  
Generic drug:  dapagliflozin-metformin  
take 1 tablet by mouth daily * Notice: This list has 8 medication(s) that are the same as other medications prescribed for you. Read the directions carefully, and ask your doctor or other care provider to review them with you. To-Do List   
 11/29/2017 Imaging:  XR CHEST PA LAT Patient Instructions 1) Chest xray today to rule out pneumonia - if this is negative, will assume it is a concurrent viral infection and supportive care measures will help manage symptoms. An upper respiratory infection (URI) is an infection of the throat and nose. It is also known as \"the common cold\". 90% of these infections are caused by a virus. Viral infections do not respond to antibiotic treatment, only bacteria are killed by antibiotics. Therefore, supportive treatment is recommended to help with symptoms. Symptoms usually subside after 7-10 days (or longer if you smoke). If symptoms worsen or persist after 14 days, or if you have fever over 101.3, fever for 5 days or more, wheezing, or shortness of breath, please contact the office. To prevent URIs, wash hands frequently (epecially before and after eating - use hand  if you are in a public place.) Eat a healthy diet, get regular exercise and sufficient sleep. Reduce your exposure to cigarette smoke. If you need to cough or sneeze, use the crook of your arm to cover your mouth. Supportive Care 1) Alternate 400mg Ibuprofen and 650mg Tylenol every 4 hours as needed for sinus pain and discomfort.  Make sure to take Ibuprofen with food as it can cause stomach upset. Do not exceed 3000 mg Tylenol per day. 2) Take a daily antihistamine to reduce symptoms such as sneezing, runny nose and itchy eyes. You can buy these over the counter (OTC) (no prescription needed) such as Claritin, Allegra or Zyrtec. Take this daily as it takes 3-4 weeks for the full therapeutic effect to take place. Follow directions on package. If symptoms of sneezing, coughing and runny nose are severe, you can try taking Benadryl at night before bed. However, Benadryl can cause drowsiness, so please use caution. Elderly people should not use Benadryl due to side effects and increase risk of falling. 3) OTC Robitussin or Delsym for cough relief. Follow directions on package. Do not exceed maximum dose. May cause drowsiness. If you have high blood pressure or kidney problems, avoid cough medicines that contain decongestants  such as pseudoephedrine, ephedrine, phenylephrine, naphazoline and oxymetazoline. 4) Use an OTC decongestant nasal spray such as Flonase, Nasonex, or Rhinocort. These contain a steroid and will help reduce congestion. You can spray 2x in each nostril two times a day. Use the opposite hand of the nostril you are spraying and look down at your toes when you administer the nasal spray. This ensures the spray is applied to the nasal tissue properly. If you use Afrin for nasal congestion, DO NOT use for more than 5 days (due to rebound congestion) 5) Increase your fluid consumption, especially water. Eat a well-balanced diet and avoid dairy and sugar as these can increase or thicken congestion. 6) Warm salt water gargle can help alleviate sore throat (1 teaspoon in 8 oz warm water) 7) Honey is a natural cough suppressor - can take a teaspoon of honey for cough or mix with hot tea. Local honey can help inoculate against local pollen that causes allergic reactions.   (It has to be local honey from our area.  You can usually find local honey at [address] 8) Humidify air, especially in bedroom at night, as it may help with nasal and throat dryness. Breathing in steam from a shower may help loosen mucus and soothe an irritated throat. 9) Get plenty of rest! 
 
10) Emergen C is a vitamin supplement containing high doses of vitamin C, Vitamin B12 and B6 that can is marketed to help prevent or stop colds (although this has not been confirmed by scientific research) If symptoms persist for more than 10 days or if you have a fever above 102, please call the office. Complications of URI include an infection of the skin around the eye and other infections. Watch for signs of fever, chills, body aches or any areas of red, warm, swollen and tender skin. Call immediately if you notice these signs. Introducing [facility]! Dear Denisse Mendez: Thank you for requesting a [employer] account. Our records indicate that you already have an active [employer] account. You can access your account anytime at [email or web address] Did you know that you can access your hospital and ER discharge instructions at any time in [employer]? You can also review all of your test results from your hospital stay or ER visit. Additional Information If you have questions, please visit the Frequently Asked Questions section of the [employer] website at [email or web address] Remember, [employer] is NOT to be used for urgent needs. For medical emergencies, dial 911. Now available from your iPhone and Android! Please provide this summary of care documentation to your next provider. Your primary care clinician is listed as Caitlyn Field. If you have any questions after today's visit, please call [phone].

## 2017-11-29 NOTE — PATIENT INSTRUCTIONS
1) Chest xray today to rule out pneumonia - if this is negative, will assume it is a concurrent viral infection and supportive care measures will help manage symptoms. An upper respiratory infection (URI) is an infection of the throat and nose. It is also known as \"the common cold\". 90% of these infections are caused by a virus. Viral infections do not respond to antibiotic treatment, only bacteria are killed by antibiotics. Therefore, supportive treatment is recommended to help with symptoms. Symptoms usually subside after 7-10 days (or longer if you smoke). If symptoms worsen or persist after 14 days, or if you have fever over 101.3, fever for 5 days or more, wheezing, or shortness of breath, please contact the office. To prevent URIs, wash hands frequently (epecially before and after eating - use hand  if you are in a public place.) Eat a healthy diet, get regular exercise and sufficient sleep. Reduce your exposure to cigarette smoke. If you need to cough or sneeze, use the crook of your arm to cover your mouth. Supportive Care    1) Alternate 400mg Ibuprofen and 650mg Tylenol every 4 hours as needed for sinus pain and discomfort. Make sure to take Ibuprofen with food as it can cause stomach upset. Do not exceed 3000 mg Tylenol per day. 2) Take a daily antihistamine to reduce symptoms such as sneezing, runny nose and itchy eyes. You can buy these over the counter (OTC) (no prescription needed) such as Claritin, Allegra or Zyrtec. Take this daily as it takes 3-4 weeks for the full therapeutic effect to take place. Follow directions on package. If symptoms of sneezing, coughing and runny nose are severe, you can try taking Benadryl at night before bed. However, Benadryl can cause drowsiness, so please use caution. Elderly people should not use Benadryl due to side effects and increase risk of falling. 3) OTC Robitussin or Delsym for cough relief. Follow directions on package. Do not exceed maximum dose. May cause drowsiness. If you have high blood pressure or kidney problems, avoid cough medicines that contain decongestants -- such as pseudoephedrine, ephedrine, phenylephrine, naphazoline and oxymetazoline. 4) Use an OTC decongestant nasal spray such as Flonase, Nasonex, or Rhinocort. These contain a steroid and will help reduce congestion. You can spray 2x in each nostril two times a day. Use the opposite hand of the nostril you are spraying and look down at your toes when you administer the nasal spray. This ensures the spray is applied to the nasal tissue properly. If you use Afrin for nasal congestion, DO NOT use for more than 5 days (due to rebound congestion)     5) Increase your fluid consumption, especially water. Eat a well-balanced diet and avoid dairy and sugar as these can increase or thicken congestion. 6) Warm salt water gargle can help alleviate sore throat (1 teaspoon in 8 oz warm water)    7) Honey is a natural cough suppressor - can take a teaspoon of honey for cough or mix with hot tea. Local honey can help inoculate against local pollen that causes allergic reactions. (It has to be local honey from our area. You can usually find local honey at farmers' markets.)     8) Humidify air, especially in bedroom at night, as it may help with nasal and throat dryness. Breathing in steam from a shower may help loosen mucus and soothe an irritated throat. 9) Get plenty of rest!    10) Emergen C is a vitamin supplement containing high doses of vitamin C, Vitamin B12 and B6 that can is marketed to help prevent or stop colds (although this has not been confirmed by scientific research)     If symptoms persist for more than 10 days or if you have a fever above 102, please call the office. Complications of URI include an infection of the skin around the eye and other infections.  Watch for signs of fever, chills, body aches or any areas of red, warm, swollen and tender skin. Call immediately if you notice these signs.

## 2017-11-29 NOTE — PROGRESS NOTES
S: Lincoln Decker is a 79 y.o. female who presents with runny nose, cough    Assessment/Plan:    1. Cough, Bronchitis  -persistent cough with increasing URI sx; strep negative today  -completed Zpack 11/26/17  -CXR to r/o PNA; if negative will assume concurrent viral infection  -instructions for supportive care given. HPI:  Lucy Dull - completed 3 days ago - felt better after taking it   + cough  +productive cough - yellow   +nasal discharge  +SOB  +sore throat  +watery eyes  +HA  +sinus pressure   +fatigue    Social history:   Nutrition: appetite ok  Drink: coffee, tea, juice (cranberry, oj)    Review of Systems:  - Constitutional symptoms: no fatigue  - Cardiovascular: no chest pain or palpitations  - Respiratory: no cough or shortness of breath  - Gastrointestinal: no nausea or vomiting    I reviewed the following:  Past Medical History:   Diagnosis Date    Allergic rhinitis     Dr. Rula Wheeler 2015    Dr. Ladonna Dobbs.  Chest pain 01/15/09    Dr. Ricky Day Chickenpox childhood    DDD (degenerative disc disease), cervical 10/2015    Dr. Bj Zimmerman.  DDD (degenerative disc disease), lumbar 2014    Dr. Rich Daniel.  Diabetes ([facility] [address] 06/2007    Karthik Soto, OD. Dr. Renato Aviles, pod.  Endometriosis     Dr. Beatriz Kim    Essential hypertension, benign     Dr. Gilberto Celestin, uterine     Dr. Jenna Harry Manual 08/2009    Dr. Brielle Rueda. Dr. Renato Aviles.  Hand paresthesia 2011    Dr. Laurine Ormond. Dr. Darío Styles.  QCENBIMQ(744.3) 2012    Dr. Christensen Doctor. Dr. Brielle Rueda, ENT.  Hemoptysis 06/13/07    Dr. Julius Ring    Hiatal hernia 05/27/15    small. Gema Powell MD   [address] [place] [address] Internal hemorrhoids 12/2006, 05/27/15    Dr. Malina Murphy. Dr. Martin Contreras.  Intraocular pressure increase 07/27/05    bilaterally. Dr. Ramón Manzo. Dr. Karthik Soto.  Knee pain 04/04/13    Bilaterally. due to fall. Dr. Silvio Day.  Left knee pain 11/26/14    Left. due to OA, torn medial and lateral meniscal tears. Dr. Keerthi Brewster. Dr. Alpa Ivey.  Lumbar spondylosis 3/2008    Dr. Regina Grimes. Dr. Adelina West    Paresthesia of foot     Left  due to Ornelas's Neuromo Sxs. Kit Salle.  Proteinuria 2009    Dr. Abi Vargas Right shoulder pain 01/30/06    Dr. Avani Stoddard. impingement tendinitis    Right shoulder pain 07/2014    Dr. Claude Loser.  Schatzki's ring 05/27/15    with partial obstruction at GE junction. Dr. Enid Sanchez. Anders Tapia Cea. Dr. 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 Dr. Arron Santiago. [facility].  Thalassemia minor 1980s    Vitamin D deficiency 05/2011    Rosibel Hammer, [place]        Current Outpatient Prescriptions   Medication Sig Dispense Refill    glimepiride (AMARYL) 4 mg tablet Take 10 mg by mouth as needed.  valACYclovir (VALTREX) 500 mg tablet Take 500 mg by mouth daily.  solifenacin (VESICARE) 10 mg tablet Take 10 mg by mouth daily.  estradiol (YUVAFEM) 10 mcg tab vaginal tablet Take 10 mg by mouth two (2) times a week.  albuterol (PROVENTIL HFA, VENTOLIN HFA, PROAIR HFA) 90 mcg/actuation inhaler Take 1-2 Puffs by inhalation every four (4) hours as needed for Wheezing. 1 Inhaler 0    empagliflozin-metFORMIN (SYNJARDY) 12.5-1,000 mg per tablet Take 1 Tab by mouth two (2) times daily (with meals). Indications: type 2 diabetes mellitus 60 Tab 5    pregabalin (LYRICA) 75 mg capsule Take  by mouth.  butalbital-acetaminophen-caffeine (FIORICET, ESGIC) -40 mg per tablet Take 1 Tab by mouth every six (6) hours as needed for Headache. Max Daily Amount: 4 Tabs. Indications: MIGRAINE, TENSION-TYPE HEADACHE 30 Tab 1    diclofenac EC (VOLTAREN) 75 mg EC tablet take 1 tablet by mouth twice a day with food  0    sodium chloride (OCEAN) 0.65 % nasal spray 1 Spray as needed for Congestion.       ergocalciferol (ERGOCALCIFEROL) 50,000 unit capsule Take 1 Cap by mouth every seven (7) days. Indications: VITAMIN D DEFICIENCY 5 Cap 2    valsartan-hydroCHLOROthiazide (DIOVAN-HCT) 160-25 mg per tablet take 1 tablet by mouth once daily 30 Tab 5    solifenacin (VESICARE) 10 mg tablet Take 10 mg by mouth daily.  aspirin delayed-release 81 mg tablet Take 1 Tab by mouth daily. 90 Tab 3    simvastatin (ZOCOR) 40 mg tablet take 1 tablet by mouth at bedtime 90 Tab 1    valACYclovir (VALTREX) 500 mg tablet       chlorhexidine (PERIDEX) 0.12 % solution       glucose blood VI test strips (ASCENSIA AUTODISC VI, ONE TOUCH ULTRA TEST VI) strip Use to test blood sugar 1-2 per day (blood sugar before breakfast or 2 hours after any meal or at bedtime) DX. E11.65 100 Strip 11    Blood-Glucose Meter monitoring kit One Touch Ultra 2 Use to test blood sugar 1-2 per day (blood sugar before breakfast or 2 hours after any meal or at bedtime) DX.   E11.65 1 Kit 0    estradiol (VAGIFEM) 10 mcg tab vaginal tablet Insert 10 mcg into vagina daily.  evening primrose oil (EVENING PRIMROSE) 500 mg cap Take 1 Cap by mouth two (2) times a day.  cyclobenzaprine (FLEXERIL) 10 mg tablet Take 10 mg by mouth as needed.  trazodone (DESYREL) 50 mg tablet Take 50 mg by mouth as needed. Takes at bedtime prn       tramadol (ULTRAM) 50 mg tablet Take 50 mg by mouth every six (6) hours as needed.  XIGDUO XR 10-1,000 mg TBph take 1 tablet by mouth daily 30 Tab PRN    canagliflozin-metFORMIN (INVOKAMET) 150-1,000 mg tab Take 1 Tab by mouth daily. 14 Tab 0    glimepiride (AMARYL) 4 mg tablet Take 1 Tab by mouth every morning. Indications: type 2 diabetes mellitus 60 Tab 11    fluticasone (FLONASE) 50 mcg/actuation nasal spray 2 Sprays by Both Nostrils route daily.  1 Bottle 0        Allergies   Allergen Reactions    Influenza Virus Vaccine, Specific Swelling     R ARM PAIN, NUMBNESS, SWELLING  R HAND NUMBNESS AND TINGLING, WORSE IN 4TH AND FIFTH FINGERS    Lisinopril Cough     Other reaction(s): Lisinopril    Singulair [Montelukast] Other (comments)     headache       O: VS:   Visit Vitals    /76 (BP 1 Location: Left arm, BP Patient Position: Sitting)    Pulse 79    Temp 98.2 °F (36.8 °C) (Oral)    Resp 18    Ht 5' 7\" (1.702 m)    Wt 194 lb 1.6 oz (88 kg)    SpO2 93%    BMI 30.4 kg/m2       Data Reviewed:  Rapid Strep: negative    GENERAL: Amish Pappas is in no acute distress. Non-toxic. HEAD:  Normocephalic. Atraumatic. EYE: PERRLA. EOMs intact. Sclera anicteric without injection. Watery drainage noted. EARS: Hearing intact bilaterally. External ear canals normal without evidence of blood or swelling. Bilateral TM's intact, pearly grey with landmarks visible. No erythema or effusion. NOSE: Patent. Nasal turbinates pink. No polyps noted. No erythema in left nare; mild in right nare. Clear discharge. MOUTH: mucous membranes pink and moist. Posterior pharynx normal with cobblestone appearance. Mild erythema, no white exudate or obstruction. NECK: supple. Midline trachea. No anterior cervical lymphadenopathy noted. RESP: Breath sounds are symmetrical bilaterally. Unlabored without SOB. Speaking in full sentences. Clear to auscultation bilaterally anteriorly and posteriorly. No wheezes. No rales or rhonchi. CV: normal rate. Regular rhythm. S1, S2 audible. No murmur noted. No rubs, clicks or gallops noted. SKIN: Skin is warm and dry. Turgor is normal.   ______________________________________________________________________  Patient education was done. Advised on nutrition,rest, increasing fluid consumption. Counseling included discussion of diagnosis, differentials, treatment options, prescribed treatment, warning signs and follow up. Medication risks/benefits, costs, interactions and alternatives discussed with patient.      Patient verbalized understanding and agreed to plan of care.  If you are not feeling better or you begin to feel worse or develop new symptoms in 3-4 days please call. Follow up in 1-2 weeks as needed or if symptoms progress.

## 2017-11-29 NOTE — PROGRESS NOTES
Chief Complaint   Patient presents with    Sore Throat     Reviewed record in preparation for visit and have obtained necessary documentation. Identified pt with two pt identifiers(name and ). Chief Complaint   Patient presents with    Sore Throat       Vitals:    17 0924   BP: 117/76   Pulse: 79   Resp: 18   Temp: 98.2 °F (36.8 °C)   TempSrc: Oral   SpO2: 93%   Weight: 194 lb 1.6 oz (88 kg)   Height: 5' 7\" (1.702 m)   PainSc:   5   PainLoc: Back       Coordination of Care Questionnaire:  :     1) Have you been to an emergency room, urgent care clinic since your last visit? no   Hospitalized since your last visit? no             2) Have you seen or consulted any other health care providers outside of [address] [place] [address] since your last visit? no  (Include any pap smears or colon screenings in this section.)    3) In the event something were to happen to you and you were unable to speak on your behalf, do you have an Advance Directive/ Living Will in place stating your wishes? NO    Do you have an Advance Directive on file? no    4) Are you interested in receiving information on Advance Directives? NO    Patient is accompanied by self I have received verbal consent from Mirtha Zuniga to discuss any/all medical information while they are present in the room.     Negative for Strep Throat

## 2018-01-16 ENCOUNTER — LAB ONLY (OUTPATIENT)
Dept: FAMILY MEDICINE CLINIC | Age: 68
End: 2018-01-16

## 2018-01-17 LAB
ALBUMIN SERPL-MCNC: 4.3 G/DL (ref 3.6–4.8)
ALBUMIN/GLOB SERPL: 1.6 {RATIO} (ref 1.2–2.2)
ALP SERPL-CCNC: 68 IU/L (ref 39–117)
ALT SERPL-CCNC: 6 IU/L (ref 0–32)
AST SERPL-CCNC: 9 IU/L (ref 0–40)
BILIRUB SERPL-MCNC: 0.3 MG/DL (ref 0–1.2)
BUN SERPL-MCNC: 12 MG/DL (ref 8–27)
BUN/CREAT SERPL: 13 (ref 12–28)
CALCIUM SERPL-MCNC: 9.8 MG/DL (ref 8.7–10.3)
CHLORIDE SERPL-SCNC: 93 MMOL/L (ref 96–106)
CO2 SERPL-SCNC: 26 MMOL/L (ref 18–29)
CREAT SERPL-MCNC: 0.95 MG/DL (ref 0.57–1)
EST. AVERAGE GLUCOSE BLD GHB EST-MCNC: 237 MG/DL
GLOBULIN SER CALC-MCNC: 2.7 G/DL (ref 1.5–4.5)
GLUCOSE SERPL-MCNC: 182 MG/DL (ref 65–99)
HBA1C MFR BLD: 9.9 % (ref 4.8–5.6)
POTASSIUM SERPL-SCNC: 3.8 MMOL/L (ref 3.5–5.2)
PROT SERPL-MCNC: 7 G/DL (ref 6–8.5)
SODIUM SERPL-SCNC: 137 MMOL/L (ref 134–144)

## 2018-01-23 ENCOUNTER — OFFICE VISIT (OUTPATIENT)
Dept: FAMILY MEDICINE CLINIC | Age: 68
End: 2018-01-23

## 2018-01-23 VITALS
HEIGHT: 67 IN | DIASTOLIC BLOOD PRESSURE: 62 MMHG | TEMPERATURE: 98.6 F | WEIGHT: 192.7 LBS | SYSTOLIC BLOOD PRESSURE: 94 MMHG | OXYGEN SATURATION: 98 % | BODY MASS INDEX: 30.25 KG/M2 | RESPIRATION RATE: 16 BRPM | HEART RATE: 85 BPM

## 2018-01-23 DIAGNOSIS — I10 ESSENTIAL HYPERTENSION: ICD-10-CM

## 2018-01-23 DIAGNOSIS — Z23 ENCOUNTER FOR IMMUNIZATION: ICD-10-CM

## 2018-01-23 DIAGNOSIS — E66.9 OBESITY, CLASS I, BMI 30-34.9: ICD-10-CM

## 2018-01-23 DIAGNOSIS — J30.1 CHRONIC SEASONAL ALLERGIC RHINITIS DUE TO POLLEN: ICD-10-CM

## 2018-01-23 DIAGNOSIS — H25.13 AGE-RELATED NUCLEAR CATARACT OF BOTH EYES: ICD-10-CM

## 2018-01-23 DIAGNOSIS — L30.9 DERMATITIS: ICD-10-CM

## 2018-01-23 DIAGNOSIS — D56.3 THALASSEMIA MINOR: ICD-10-CM

## 2018-01-23 DIAGNOSIS — Z00.00 INITIAL MEDICARE ANNUAL WELLNESS VISIT: Primary | ICD-10-CM

## 2018-01-23 DIAGNOSIS — Z13.31 SCREENING FOR DEPRESSION: ICD-10-CM

## 2018-01-23 DIAGNOSIS — R51.9 FRONTAL HEADACHE: ICD-10-CM

## 2018-01-23 DIAGNOSIS — Z78.9 ACE INHIBITOR INTOLERANCE: ICD-10-CM

## 2018-01-23 DIAGNOSIS — L82.1 SEBORRHEIC KERATOSES: ICD-10-CM

## 2018-01-23 DIAGNOSIS — M79.7 FIBROMYALGIA: ICD-10-CM

## 2018-01-23 DIAGNOSIS — E78.00 HIGH CHOLESTEROL: ICD-10-CM

## 2018-01-23 DIAGNOSIS — E55.9 VITAMIN D DEFICIENCY: ICD-10-CM

## 2018-01-23 DIAGNOSIS — Z13.39 SCREENING FOR ALCOHOLISM: ICD-10-CM

## 2018-01-23 DIAGNOSIS — L71.9 ACNE ROSACEA: ICD-10-CM

## 2018-01-23 DIAGNOSIS — H40.053 RAISED INTRAOCULAR PRESSURE OF BOTH EYES: ICD-10-CM

## 2018-01-23 DIAGNOSIS — M79.2 NEUROPATHIC PAIN OF FOOT, UNSPECIFIED LATERALITY: ICD-10-CM

## 2018-01-23 DIAGNOSIS — Z90.710 S/P TAH (TOTAL ABDOMINAL HYSTERECTOMY): ICD-10-CM

## 2018-01-23 RX ORDER — ERGOCALCIFEROL 1.25 MG/1
50000 CAPSULE ORAL
Qty: 5 CAP | Refills: 2 | Status: CANCELLED | OUTPATIENT
Start: 2018-01-23

## 2018-01-23 NOTE — PATIENT INSTRUCTIONS
Shingles: Care Instructions  Your Care Instructions    Shingles (herpes zoster) causes pain and a blistered rash. The rash can appear anywhere on the body but will be on only one side of the body, the left or right. It will be in a band, a strip, or a small area. The pain can be very severe. Shingles can also cause tingling or itching in the area of the rash. The blisters scab over after a few days and heal in 2 to 4 weeks. Medicines can help you feel better and may help prevent more serious problems caused by shingles. Shingles is caused by the same virus that causes chickenpox. When you have chickenpox, the virus gets into your nerve roots and stays there (becomes dormant) long after you get over the chickenpox. If the virus becomes active again, it can cause shingles. Follow-up care is a key part of your treatment and safety. Be sure to make and go to all appointments, and call your doctor if you are having problems. It's also a good idea to know your test results and keep a list of the medicines you take. How can you care for yourself at home? · Be safe with medicines. Take your medicines exactly as prescribed. Call your doctor if you think you are having a problem with your medicine. Antiviral medicine helps you get better faster. · Try not to scratch or pick at the blisters. They will crust over and fall off on their own if you leave them alone. · Put cool, wet cloths on the area to relieve pain and itching. You can also use calamine lotion. Try not to use so much lotion that it cakes and is hard to get off. · Put cornstarch or baking soda on the sores to help dry them out so they heal faster. · Do not use thick ointment, such as petroleum jelly, on the sores. This will keep them from drying and healing. · To help remove loose crusts, soak them in tap water. This can help decrease oozing, and dry and soothe the skin.   · Take an over-the-counter pain medicine, such as acetaminophen (Tylenol), ibuprofen (Advil, Motrin), or naproxen (Aleve). Read and follow all instructions on the label. · Avoid close contact with people until the blisters have healed. It is very important for you to avoid contact with anyone who has never had chickenpox or the chickenpox vaccine. Pregnant women, young babies, and anyone else who has a hard time fighting infection (such as someone with HIV, diabetes, or cancer) is especially at risk. When should you call for help? Call your doctor now or seek immediate medical care if:  ? · You have a new or higher fever. ? · You have a severe headache and a stiff neck. ? · You lose the ability to think clearly. ? · The rash spreads to your forehead, nose, eyes, or eyelids. ? · You have eye pain, or your vision gets worse. ? · You have new pain in your face, or you cannot move the muscles in your face. ? · Blisters spread to new parts of your body. ? Watch closely for changes in your health, and be sure to contact your doctor if:  ? · The rash has not healed after 2 to 4 weeks. ? · You still have pain after the rash has healed. Where can you learn more? Go to http://gregg-zunilda.info/. Delphine Ramos in the search box to learn more about \"Shingles: Care Instructions. \"  Current as of: March 3, 2017  Content Version: 11.4  © 1242-8610 [employer]. Care instructions adapted under license by [employer] (which disclaims liability or warranty for this information). If you have questions about a medical condition or this instruction, always ask your healthcare professional. [address] any warranty or liability for your use of this information. Rosacea: Care Instructions  Your Care Instructions  Rosacea (say \"aar-QPM-ecps\") is a skin condition that can cause redness, pimples, and red lines on the nose, cheeks, chin, and forehead.  It is often mistaken for acne because it can cause outbreaks with bumps like pimples. Rosacea can also cause burning and soreness in your eyes. Rosacea is usually controlled by using medicine and avoiding alcohol, the sun, and other things that can make rosacea worse. Your doctor may have prescribed medicines or other treatment. If antibiotics do not control the rosacea, your doctor may try other medicines. Follow-up care is a key part of your treatment and safety. Be sure to make and go to all appointments, and call your doctor if you are having problems. It's also a good idea to know your test results and keep a list of the medicines you take. How can you care for yourself at home? · Take your medicines exactly as prescribed. Call your doctor if you think you are having a problem with your medicine. · If your doctor prescribed antibiotics, take them as directed. Do not stop taking them just because you feel better. You need to take the full course of antibiotics. · Always wear sunscreen on exposed skin. Make sure to use a broad-spectrum sunscreen that has a sun protection factor (SPF) of 30 or higher. Use it every day, even when it is cloudy. · Use soaps, lotions, and makeup made for sensitive skin that do not contain alcohol, are not abrasive, and will not clog pores. · Avoid rubbing or scrubbing your face. · Green-based makeup may help mask the redness of an outbreak. Your doctor may be able to refer you to someone who can help you use makeup to cover redness and bumps. · If you have rosacea on your eyelids, put a warm, wet towel, or compress, on your eyes several times a day. Gently wash your eyelids with a washcloth or an eyelid cleanser that is sold in drugstores. Use artificial tears if your eyes feel dry. · Make a list or keep a diary of things that may trigger your rosacea. Use the diary every day for several weeks. Avoid whatever you find that makes your rosacea worse. These triggers may include:  ¨ Harsh weather.  Wear a hat and scarf to shield your face from the cold and wind. Use a moisturizer during the winter to keep your face moist.  ¨ Stress. Eat a healthy diet and get plenty of exercise and sleep. ¨ Alcohol, spicy foods, or hot drinks. Avoid or limit these if they make your rosacea worse. ¨ Getting too hot when you exercise. Try working out for a shorter time. In the summer, exercise during the cool morning hours. ¨ Hot showers. Take warm or cool showers and avoid hot tubs and saunas. When should you call for help? Watch closely for changes in your health, and be sure to contact your doctor if:  ? · You do not get better as expected. Where can you learn more? Go to http://gregg-zunilda.info/. Enter R851 in the search box to learn more about \"Rosacea: Care Instructions. \"  Current as of: October 13, 2016  Content Version: 11.4  © 8854-8323 [employer]. Care instructions adapted under license by [employer] (which disclaims liability or warranty for this information). If you have questions about a medical condition or this instruction, always ask your healthcare professional. [place] [address] any warranty or liability for your use of this information. Medicare Wellness Visit, Female    The best way to live healthy is to have a healthy lifestyle by eating a well-balanced diet, exercising regularly, limiting alcohol and stopping smoking. Regular physical exams and screening tests are another way to keep healthy. Preventive exams provided by your health care provider can find health problems before they become diseases or illnesses. Preventive services including immunizations, screening tests, monitoring and exams can help you take care of your own health. All people over age 72 should have a pneumovax  and and a prevnar shot to prevent pneumonia. These are once in a lifetime unless you and your provider decide differently.     All people over 65 should have a yearly flu shot and a tetanus vaccine every 10 years. A bone mass density to screen for osteoporosis or thinning of the bones should be done every 2 years after 65. Screening for diabetes mellitus with a blood sugar test should be done every year. Glaucoma is a disease of the eye due to increased ocular pressure that can lead to blindness and it should be done every year by an eye professional.    Cardiovascular screening tests that check for elevated lipids (fatty part of blood) which can lead to heart disease and strokes should be done every 5 years. Colorectal screening that evaluates for blood or polyps in your colon should be done yearly as a stool test or every five years as a flexible sigmoidoscope or every 10 years as a colonoscopy up to age 76. Breast cancer screening with a mammogram is recommended biennially  for women age 54-69. Screening for cervical cancer with a pap smear and pelvic exam is recommended for women after age 72 years every 2 years up to age 79 or when the provider and patient decide to stop. If there is a history of cervical abnormalities or other increased risk for cancer then the test is recommended yearly. Hepatitis C screening is also recommended for anyone born between [birth year] through [address]. A shingles vaccine is also recommended once in a lifetime after age 61. Your Medicare Wellness Exam is recommended annually. Here is a list of your current Health Maintenance items with a due date:  Health Maintenance Due   Topic Date Due    Annual Well Visit  12/08/2017          Rosacea: Care Instructions  Your Care Instructions  Rosacea (say \"vlt-OZX-edbq\") is a skin condition that can cause redness, pimples, and red lines on the nose, cheeks, chin, and forehead. It is often mistaken for acne because it can cause outbreaks with bumps like pimples. Rosacea can also cause burning and soreness in your eyes.   Rosacea is usually controlled by using medicine and avoiding alcohol, the sun, and other things that can make rosacea worse. Your doctor may have prescribed medicines or other treatment. If antibiotics do not control the rosacea, your doctor may try other medicines. Follow-up care is a key part of your treatment and safety. Be sure to make and go to all appointments, and call your doctor if you are having problems. It's also a good idea to know your test results and keep a list of the medicines you take. How can you care for yourself at home? · Take your medicines exactly as prescribed. Call your doctor if you think you are having a problem with your medicine. · If your doctor prescribed antibiotics, take them as directed. Do not stop taking them just because you feel better. You need to take the full course of antibiotics. · Always wear sunscreen on exposed skin. Make sure to use a broad-spectrum sunscreen that has a sun protection factor (SPF) of 30 or higher. Use it every day, even when it is cloudy. · Use soaps, lotions, and makeup made for sensitive skin that do not contain alcohol, are not abrasive, and will not clog pores. · Avoid rubbing or scrubbing your face. · Green-based makeup may help mask the redness of an outbreak. Your doctor may be able to refer you to someone who can help you use makeup to cover redness and bumps. · If you have rosacea on your eyelids, put a warm, wet towel, or compress, on your eyes several times a day. Gently wash your eyelids with a washcloth or an eyelid cleanser that is sold in drugstores. Use artificial tears if your eyes feel dry. · Make a list or keep a diary of things that may trigger your rosacea. Use the diary every day for several weeks. Avoid whatever you find that makes your rosacea worse. These triggers may include:  ¨ Harsh weather. Wear a hat and scarf to shield your face from the cold and wind. Use a moisturizer during the winter to keep your face moist.  ¨ Stress. Eat a healthy diet and get plenty of exercise and sleep.   ¨ Alcohol, spicy foods, or hot drinks. Avoid or limit these if they make your rosacea worse. ¨ Getting too hot when you exercise. Try working out for a shorter time. In the summer, exercise during the cool morning hours. ¨ Hot showers. Take warm or cool showers and avoid hot tubs and saunas. When should you call for help? Watch closely for changes in your health, and be sure to contact your doctor if:  ? · You do not get better as expected. Where can you learn more? Go to http://gregg-zunilda.info/. Enter G407 in the search box to learn more about \"Rosacea: Care Instructions. \"  Current as of: October 13, 2016  Content Version: 11.4  © 3618-0216 [employer]. Care instructions adapted under license by [employer] (which disclaims liability or warranty for this information). If you have questions about a medical condition or this instruction, always ask your healthcare professional. [address] any warranty or liability for your use of this information.

## 2018-01-23 NOTE — PROGRESS NOTES
This is an Initial Medicare Annual Wellness Exam (AWV) (Performed 12 months after IPPE or effective date of Medicare Part B enrollment, Once in a lifetime)    I have reviewed the patient's medical history in detail and updated the computerized patient record. History     Past Medical History:   Diagnosis Date    Allergic rhinitis     Dr. Eliana Smart 2015    Dr. Destiny Shaw.  Chest pain 01/15/09    Dr. Damari Estrada Chickenpox childhood    DDD (degenerative disc disease), cervical 10/2015    Dr. Rosmery Jerome.  DDD (degenerative disc disease), lumbar 2014    Dr. Cody Ardon.  Diabetes ([facility] [address] 06/2007    Shakira Mazariegos, OD. Dr. Ariadna Vega, pod.  Endometriosis     Dr. Chandrakant Gong    Essential hypertension, benign     DrSherri Pena, uterine     Dr. Terese Khoury 08/2009    Dr. Michael Awan. Dr. Ariadna Vega.  Hand paresthesia 2011    Dr. Rashel Coronado. Dr. Kelsey Swanson.  [id number](538.9) 2012    Dr. Stephanie Diana. Dr. iMchael Awan, ENT.  Hemoptysis 06/13/07    Dr. Lisandro Lopez    Hiatal hernia 05/27/15    small. Amador Manzanares MD   [facility] Internal hemorrhoids 12/2006, 05/27/15    Dr. Rick Olivas. Dr. Ron Goins.  Intraocular pressure increase 07/27/05    bilaterally. Dr. Christopher Clark. Dr. Shakira Mazariegos.  Knee pain 04/04/13    Bilaterally. due to fall. Dr. Mcgrath Shoulder.  Left knee pain 11/26/14    Left. due to OA, torn medial and lateral meniscal tears. Dr. Mccullough Situ. Dr. Radha Linton.  Lumbar spondylosis 3/2008    Dr. Gale Angel. Dr. Estephania Silva    Paresthesia of foot     Left  due to Ornelas's Neuromo Sxs. Sunita . due to Diabetic Neuropathy. Dr. Ariadna Vega.  Proteinuria 2009    Dr. Elyssa Stephens Right shoulder pain 01/30/06    Dr. Foster Lamar. impingement tendinitis    Right shoulder pain 07/2014    Dr. Paty Mccabe.     Schatzki's ring 05/27/15    with partial obstruction at GE junction. Dr. Raj Echevarria. Brendan Cooler     Dr. Lion Cole. Dr. Jeong Ham    Stenosis colon 05/27/15    SIGMOID WITH TORTUOUS DESCENDING COLON. Dr. Gladys Jack. [facility].  Thalassemia minor 1980s    Vitamin D deficiency 05/2011    Bethany Horne NP      Past Surgical History:   Procedure Laterality Date    CYSTOSCOPY  07/2005    Dr. Tierra Luis FOOT/TOES SURGERY PROC UNLISTED      Left Ornelas's Neuroma. [id number] [facility] HX BREAST BIOPSY Left     12 years ago neg    HX BREAST LUMPECTOMY  10/15/08    Left intraductal papilloma. Dr. Ori Galeana HX COLONOSCOPY  05/27/15    due to anemia, rectal bleeding. due q 5 yrs. Dr. Jenna Grider  2001, 12/07/06, 2011    Dr. Mai Lynch. due q 5 yrs.  HX ENDOSCOPY  05/27/15    due to anemia. Dr. Raj Echevarria.  HX HEART CATHETERIZATION  01/09/09    Dr. Rogelio Holcomb ARTHROSCOPY Left 04/17/15    due to [address]. Dr. Saloni Boyd.  HX MYOMECTOMY  1974-1993    x4    HX MARTIN AND BSO  1994    endometriosis, fibroids. Current Outpatient Prescriptions   Medication Sig Dispense Refill    diph,pertuss,acel,,tetanus vac,PF, (ADACEL) 2 Lf-(2.5-5-3-5 mcg)-5Lf/0.5 mL syrg vaccine 0.5 mL by IntraMUSCular route once for 1 dose. 0.5 mL 0    valsartan-hydroCHLOROthiazide (DIOVAN-HCT) 160-25 mg per tablet take 1 tablet by mouth once daily 30 Tab 11    empagliflozin-metFORMIN (SYNJARDY) 12.5-1,000 mg per tablet Take 1 Tab by mouth two (2) times daily (with meals). Indications: type 2 diabetes mellitus 60 Tab 5    butalbital-acetaminophen-caffeine (FIORICET, ESGIC) -40 mg per tablet Take 1 Tab by mouth every six (6) hours as needed for Headache. Max Daily Amount: 4 Tabs. Indications: MIGRAINE, TENSION-TYPE HEADACHE 30 Tab 1    sodium chloride (OCEAN) 0.65 % nasal spray 1 Spray as needed for Congestion.  solifenacin (VESICARE) 10 mg tablet Take 10 mg by mouth daily.  aspirin delayed-release 81 mg tablet Take 1 Tab by mouth daily. 90 Tab 3    simvastatin (ZOCOR) 40 mg tablet take 1 tablet by mouth at bedtime 90 Tab 1    valACYclovir (VALTREX) 500 mg tablet       glucose blood VI test strips (ASCENSIA AUTODISC VI, ONE TOUCH ULTRA TEST VI) strip Use to test blood sugar 1-2 per day (blood sugar before breakfast or 2 hours after any meal or at bedtime) DX. E11.65 100 Strip 11    Blood-Glucose Meter monitoring kit One Touch Ultra 2 Use to test blood sugar 1-2 per day (blood sugar before breakfast or 2 hours after any meal or at bedtime) DX.   E11.65 1 Kit 0    estradiol (VAGIFEM) 10 mcg tab vaginal tablet Insert 10 mcg into vagina daily.  evening primrose oil (EVENING PRIMROSE) 500 mg cap Take 1 Cap by mouth two (2) times a day.  cyclobenzaprine (FLEXERIL) 10 mg tablet Take 10 mg by mouth as needed.  trazodone (DESYREL) 50 mg tablet Take 50 mg by mouth as needed. Takes at bedtime prn       tramadol (ULTRAM) 50 mg tablet Take 50 mg by mouth every six (6) hours as needed.  albuterol (PROVENTIL HFA, VENTOLIN HFA, PROAIR HFA) 90 mcg/actuation inhaler Take 1-2 Puffs by inhalation every four (4) hours as needed for Wheezing.  1 Inhaler 0     Allergies   Allergen Reactions    Influenza Virus Vaccine, Specific Swelling     R ARM PAIN, NUMBNESS, SWELLING  R HAND NUMBNESS AND TINGLING, WORSE IN 4TH AND FIFTH FINGERS    Lisinopril Cough     Other reaction(s): Lisinopril    Singulair [Montelukast] Other (comments)     headache     Family History   Problem Relation Age of Onset    Hypertension Mother    Leanor Una Glaucoma Mother     Heart Failure Mother      CHF    Hypertension Father     Cancer Father      colon    Heart Attack Sister     Stroke Sister     Diabetes Sister      x 2 sisters    Diabetes Brother      Social History   Substance Use Topics    Smoking status: Never Smoker    Smokeless tobacco: Never Used    Alcohol use 0.0 oz/week     0 Standard drinks or equivalent per week      Comment: Social beer, social drinks, social shots of liquor,and social wine. Patient Active Problem List   Diagnosis Code    Internal hemorrhoids K64.8    Lumbar spondylosis M47.816    Fibromyalgia M79.7    Headache(784.0) R51    Neck arthritis ([address] M46.92    Thalassemia minor D56.3    Vitamin D deficiency E55.9    Left knee pain M25.562    Right shoulder pain M25.511    Essential hypertension I10    Stenosis colon K56.699    Schatzki's ring K22.2    Hiatal hernia K44.9    Allergic rhinitis due to pollen J30.1    Iron deficiency anemia D50.9    Intraocular pressure increase H40.059    Neck pain M54.2    High cholesterol E78.00    Cataract H26.9    ACE inhibitor intolerance Z78.9    S/P MARTIN (total abdominal hysterectomy) Z90.710    Neuropathic pain of foot G57.90    Advance care planning Z71.89    Uncontrolled type 2 diabetes mellitus with diabetic neuropathic arthropathy, without long-term current use of insulin ([facility]) E11.610, E11.65    Obesity, Class I, BMI 30-34.9 E66.9    Ulnar neuropathy of left upper extremity G56.22    Acne rosacea L71.9       Depression Risk Factor Screening:     PHQ over the last two weeks 1/23/2018   Little interest or pleasure in doing things Not at all   Feeling down, depressed or hopeless Not at all   Total Score PHQ 2 0     Alcohol Risk Factor Screening: On any occasion in the past three months you have had more than 3 drinks containing alcohol. Functional Ability and Level of Safety:   PT AMBULATES WITHOUT ASSISTANCE OR DIFFICULTY    Hearing Loss  Hearing is good. Activities of Daily Living  The home contains: no safety equipment. Patient does total self care    Fall Risk  Fall Risk Assessment, last 12 mths 1/23/2018   Able to walk? Yes   Fall in past 12 months?  No       Abuse Screen  Patient is not abused    Cognitive Screening   Evaluation of Cognitive Function:  Has your family/caregiver stated any concerns about your memory: no  Normal, Mini Cog test    Patient Care Team   Patient Care Team:  Jessica Powers DO as PCP - Adolfo Rojo MD (Gastroenterology)  Lluvia Ivey MD (Obstetrics & Gynecology)  Lula Stearns MD (Otolaryngology)  Aria oJhnson MD (Orthopedic Surgery)  Burton Hollins MD (Orthopedic Surgery)  Kendra Aponte MD (Rheumatology)  Tamra Cerda OD (Optometry)  Sj Callaway MD (Podiatry)  Cheyenne Mathis MD (Cardiology)  Waqar Arnold MD (Urology)  Padilla Hernandez MD (Orthopedic Surgery)  Malinda Mitchell MD (Neurology)  Ramin Hauser MD (Neurology)  Aundria Kehr, DO (Dermatology)  Sj Callaway MD (Podiatry)    Assessment/Plan   Education and counseling provided:  Are appropriate based on today's review and evaluation  End-of-Life planning (with patient's consent)  Pneumococcal Vaccine  Influenza Vaccine  Screening Mammography  Screening Pap and pelvic (covered once every 2 years)  Colorectal cancer screening tests  Cardiovascular screening blood test  Bone mass measurement (DEXA)  Screening for glaucoma  Diabetes screening test  Medical nutrition therapy for individuals with diabetes or renal disease    Diagnoses and all orders for this visit:    1. Initial Medicare annual wellness visit    2. Uncontrolled type 2 diabetes mellitus with diabetic neuropathic arthropathy, without long-term current use of insulin ([facility] [address]  -     REFERRAL TO ENDOCRINOLOGY  -     REFERRAL TO OPHTHALMOLOGY    3. Essential hypertension  Comments:  stable    4. High cholesterol  Comments:  stable    5. Fibromyalgia    6. Raised intraocular pressure of both eyes  -     REFERRAL TO OPHTHALMOLOGY    7. Frontal headache  Comments:  due to sinus congestion vs other, intermittently  Orders:  -     REFERRAL TO OPHTHALMOLOGY    8. Obesity, Class I, BMI 30-34.9    9. ACE inhibitor intolerance    10. Age-related nuclear cataract of both eyes  -     REFERRAL TO OPHTHALMOLOGY    11.  Chronic seasonal allergic rhinitis due to pollen    12. Vitamin D deficiency  Comments:  stable    13. Thalassemia minor    14. Seborrheic keratoses    15. Neuropathic pain of foot, unspecified laterality  Comments:  due to Diabetic Neuropathy, stable    16. S/P MARTIN (total abdominal hysterectomy)    17. Dermatitis  Comments:  due to shingles at dermatome C6 and C7 vs other     18. Acne rosacea    19. Depression screening    20. Screening for alcoholism  -     Annual  Alcohol Screen 15 min ()    21. Screening for depression  -     Depression Screen Annual    22. Encounter for immunization  -     Tetanus, Diphtheria Toxoids and Acellular Pertussis Vaccine (TDAP)    Other orders  -     diph,pertuss,acel,,tetanus vac,PF, (ADACEL) 2 Lf-(2.5-5-3-5 mcg)-5Lf/0.5 mL syrg vaccine; 0.5 mL by IntraMUSCular route once for 1 dose.          Health Maintenance Due   Topic Date Due    MEDICARE YEARLY EXAM  12/08/2017

## 2018-01-23 NOTE — PROGRESS NOTES
HISTORY OF PRESENT ILLNESS  Breezy Bartholomew is a 79 y.o. female presents with Diabetes; Annual Wellness Visit; Results; and Rash    Agree with nurse note. Uncontrolled diabetic, hypertensive pt with high cholesterol, ACE inhibitor intolerance, vit d deficiency, thalassemia minor, and fibromyalgia presents to the office with a BP of 94/62. She weighs 192 lbs, down 2 lbs since 11/2017. She had labs on 01/16/18. Cr 0.95, up from 0.93. Cl 93, down from 95. Hgb A1c 9.9, up from 8.6. She was off medication for DM x1 month due to insurance coverage issues. She is currently taking Synjardy 12.5-1,000 mg daily, tolerating well. She has had headaches off and on recently. She takes Fioricet or Tylenol prn. Denies head trauma, double or blurry vision, nasal congestion, PND, or other associated sxs. She saw dermatologist, Dr. Karthik Gorman on 08/31/17. Cherry angioma tx'd with electrocautery. Seborrheic keratosis tx'd with LN2. F/U prn. She complains of an itchy rash on the back of her head, neck, and along her RUE. Onset last week. She applied anti-itch cream. She vomited Sunday but otherwise has felt fine. She also has had redness across her BL cheeks and wonders if that is related. No new make-up, soap, detergent, or medications. Shingles vaccine 01/20/14. Health Maintenance    Initial Annual [address] completed today. Pt's most recent colonoscopy was on 05/27/15 with GI, Dr. Nolvia Walker. F/U 5 years. Pt with cataracts and hx of elevated IOPs. She plans to schedule her yearly eye exam soon. She will see podiatrist, Dr. Maryam Lin Thursday. In 07/2017, she was dx'd with diabetic neuropathy and hammer toes. Pt with hx of MARTIN. She sees GYN, Dr. Lluvia Ivey for annuals and he also orders her mammograms and bone density screening. Written by clem Topete, as dictated by Dr. Kingsley Paulson DO.    ROS    Review of Systems negative except as noted above in HPI.     ALLERGIES: Allergies   Allergen Reactions    Influenza Virus Vaccine, Specific Swelling     R ARM PAIN, NUMBNESS, SWELLING  R HAND NUMBNESS AND TINGLING, WORSE IN 4TH AND FIFTH FINGERS    Lisinopril Cough     Other reaction(s): Lisinopril    Singulair [Montelukast] Other (comments)     headache       CURRENT MEDICATIONS:    Outpatient Prescriptions Marked as Taking for the 1/23/18 encounter (Office Visit) with Elisa , DO   Medication Sig Dispense Refill    diph,pertuss,acel,,tetanus vac,PF, (ADACEL) 2 Lf-(2.5-5-3-5 mcg)-5Lf/0.5 mL syrg vaccine 0.5 mL by IntraMUSCular route once for 1 dose. 0.5 mL 0    valsartan-hydroCHLOROthiazide (DIOVAN-HCT) 160-25 mg per tablet take 1 tablet by mouth once daily 30 Tab 11    empagliflozin-metFORMIN (SYNJARDY) 12.5-1,000 mg per tablet Take 1 Tab by mouth two (2) times daily (with meals). Indications: type 2 diabetes mellitus 60 Tab 5    butalbital-acetaminophen-caffeine (FIORICET, ESGIC) -40 mg per tablet Take 1 Tab by mouth every six (6) hours as needed for Headache. Max Daily Amount: 4 Tabs. Indications: MIGRAINE, TENSION-TYPE HEADACHE 30 Tab 1    sodium chloride (OCEAN) 0.65 % nasal spray 1 Spray as needed for Congestion.  solifenacin (VESICARE) 10 mg tablet Take 10 mg by mouth daily.  aspirin delayed-release 81 mg tablet Take 1 Tab by mouth daily.  90 Tab 3    simvastatin (ZOCOR) 40 mg tablet take 1 tablet by mouth at bedtime 90 Tab 1    valACYclovir (VALTREX) 500 mg tablet       glucose blood VI test strips (ASCENSIA AUTODISC VI, ONE TOUCH ULTRA TEST VI) strip Use to test blood sugar 1-2 per day (blood sugar before breakfast or 2 hours after any meal or at bedtime) DX. E11.65 100 Strip 11    Blood-Glucose Meter monitoring kit One Touch Ultra 2 Use to test blood sugar 1-2 per day (blood sugar before breakfast or 2 hours after any meal or at bedtime) DX.   E11.65 1 Kit 0    estradiol (VAGIFEM) 10 mcg tab vaginal tablet Insert 10 mcg into vagina daily.      evening primrose oil (EVENING PRIMROSE) 500 mg cap Take 1 Cap by mouth two (2) times a day.  cyclobenzaprine (FLEXERIL) 10 mg tablet Take 10 mg by mouth as needed.  trazodone (DESYREL) 50 mg tablet Take 50 mg by mouth as needed. Takes at bedtime prn       tramadol (ULTRAM) 50 mg tablet Take 50 mg by mouth every six (6) hours as needed. PAST MEDICAL HISTORY:    Past Medical History:   Diagnosis Date    Allergic rhinitis     Dr. Chata Soto 2015    Dr. Maya Miller.  Chest pain 01/15/09    Dr. Isabelle Govea Chickenpox childhood    DDD (degenerative disc disease), cervical 10/2015    Dr. Tia Perez.  DDD (degenerative disc disease), lumbar 2014    Dr. Harpreet Fernandez.  Diabetes ([facility] [address] 06/2007    Mian Jimenez, OD. Dr. Gen Santana, pod.  Endometriosis     Dr. Bhargav Lemon    Essential hypertension, benign     Dr. Monika Mendoza, uterine     Dr. Erenest Skiff Dr. Mearl Gottron 08/2009    Dr. Raya Gutierrez. Dr. Gen Santana.  Hand paresthesia 2011    Dr. Gisela Brandon. Dr. Yasmeen Martin.  XZSIMNOT(073.5) 2012    Dr. Sher Salazar. Dr. Raya Gutierrez, ENT.  Hemoptysis 06/13/07    Dr. Jordan Yuan    Hiatal hernia 05/27/15    small. Trino Lemon MD   [facility] Internal hemorrhoids 12/2006, 05/27/15    Dr. Rizwan Syed. Dr. Guerita Ward.  Intraocular pressure increase 07/27/05    bilaterally. Dr. Newton Blanton. Dr. Mian Jimenez.  Knee pain 04/04/13    Bilaterally. due to fall. Dr. Nikki Pressley.  Left knee pain 11/26/14    Left. due to OA, torn medial and lateral meniscal tears. Dr. Elena Arechiga. Dr. Butch Fernández.  Lumbar spondylosis 3/2008    Dr. Rusty Jose. Dr. Michel Mcelroy    Paresthesia of foot     Left  due to Ornelas's Neuromo Sxs. Tasneem Bones. due to Diabetic Neuropathy. Dr. Gen Santana.  Proteinuria 2009    Dr. Noé Reynoso Right shoulder pain 01/30/06    Dr. Francisco Yao.   impingement tendinitis    Right shoulder pain 07/2014    Dr. Gifford Bloch.  Schatzki's ring 05/27/15    with partial obstruction at GE junction. Dr. Radha Gallegos. Ree Orn     Dr. Liset Jesus. Dr. Porter Angel    Stenosis colon 05/27/15    SIGMOID WITH TORTUOUS DESCENDING COLON. Dr. Soraya Baer. Ryan.  Thalassemia minor 1980s    Vitamin D deficiency 05/2011    Oscar De Luna NP       PAST SURGICAL HISTORY:    Past Surgical History:   Procedure Laterality Date    CYSTOSCOPY  07/2005    Dr. Temitope Carpenter FOOT/TOES SURGERY PROC UNLISTED      Left Ornelas's Neuroma. [id number] [facility] HX BREAST BIOPSY Left     12 years ago neg    HX BREAST LUMPECTOMY  10/15/08    Left intraductal papilloma. Dr. Elian Garcia HX COLONOSCOPY  05/27/15    due to anemia, rectal bleeding. due q 5 yrs. Dr. Katherine Mays  2001, 12/07/06, 2011    Dr. Velma Garcia. due q 5 yrs.  HX ENDOSCOPY  05/27/15    due to anemia. Dr. Radha Gallegos.  HX HEART CATHETERIZATION  01/09/09    Dr. Shant Richmond ARTHROSCOPY Left 04/17/15    due to [address]. Dr. Rachel Juarez.  HX MYOMECTOMY  1974-1993    x4    HX MARTIN AND BSO  1994    endometriosis, fibroids. FAMILY HISTORY:    Family History   Problem Relation Age of Onset   [address] [facility] Hypertension Mother    [facility] Glaucoma Mother     Heart Failure Mother      CHF    Hypertension Father     Cancer Father      colon    Heart Attack Sister     Stroke Sister     Diabetes Sister      x 2 sisters    Diabetes Brother        SOCIAL HISTORY:    Social History     Social History    Marital status: SINGLE     Spouse name: N/A    Number of children: N/A    Years of education: N/A     Social History Main Topics    Smoking status: Never Smoker    Smokeless tobacco: Never Used    Alcohol use 0.0 oz/week     0 Standard drinks or equivalent per week      Comment: Social beer, social drinks, social shots of liquor,and social wine.  Drug use:  No  Sexual activity: Not Asked     Other Topics Concern    None     Social History Narrative       IMMUNIZATIONS:    Immunization History   Administered Date(s) Administered    H1N1 Influenza Virus Vaccine 03/09/2010    Hepatitis B Vaccine 06/09/2008, 08/20/2008, 01/27/2009    Influenza High Dose Vaccine PF 11/11/2015, 09/07/2016, 10/19/2017    Influenza Vaccine Split 10/08/2010, 11/08/2011, 10/24/2012    Influenza Vaccine Whole 01/27/2009    Pneumococcal Conjugate (PCV-13) 11/11/2015    Pneumococcal Polysaccharide (PPSV-23) 12/07/2016    ZZZ-RETIRED (DO NOT USE) Pneumococcal Vaccine (Unspecified Type) 12/21/2005    Zoster Vaccine, Live 01/20/2014    dT Vaccine 06/09/2008         PHYSICAL EXAMINATION    Vital Signs    Visit Vitals    BP 94/62 (BP 1 Location: Right arm, BP Patient Position: Sitting)    Pulse 85    Temp 98.6 °F (37 °C) (Oral)    Resp 16    Ht 5' 7\" (1.702 m)    Wt 192 lb 11.2 oz (87.4 kg)    SpO2 98%    BMI 30.18 kg/m2       Weight Metrics 1/23/2018 11/29/2017 11/13/2017 10/16/2017 8/16/2017 6/22/2017 3/14/2017   Weight 192 lb 11.2 oz 194 lb 1.6 oz 205 lb 9.6 oz 205 lb 14.4 oz 200 lb 3.2 oz 206 lb 1.6 oz 203 lb 6.4 oz   BMI 30.18 kg/m2 30.4 kg/m2 32.2 kg/m2 31.31 kg/m2 30.45 kg/m2 31.34 kg/m2 30.93 kg/m2       General appearance - Well nourished. Well appearing. Well developed. No acute distress. Obese. Head - Normocephalic. Atraumatic. Eyes - pupils equal and reactive. Extraocular eye movements intact. Sclera anicteric. Mildly injected sclera. Ears - Hearing is grossly normal bilaterally. Nose - normal and patent. No polyps noted. No erythema. No discharge. Mouth - mucous membranes with adequate moisture. Posterior pharynx normal with cobblestone appearance. No erythema, white exudate or obstruction. Neck - supple. Midline trachea. No carotid bruits noted bilaterally. No thyromegaly noted.   Chest - clear to auscultation bilaterally anteriorly and posteriorly. No wheezes. No rales or rhonchi. Breath sounds are symmetrical bilaterally. Unlabored respirations. Heart - normal rate. Regular rhythm. Normal S1, S2. No murmur noted. No rubs, clicks or gallops noted. Abdomen - soft and distended. No masses or organomegaly. No rebound, rigidity or guarding. Bowel sounds normal x 4 quadrants. No tenderness noted. Neurological - awake, alert and oriented to person, place, and time and event. Cranial nerves II through XII intact. Clear speech. Muscle strength is +5/5 x 4 extremities. Sensation is intact to light touch bilaterally. Steady gait. Heme/Lymph - peripheral pulses normal x 4 extremities. No peripheral edema is noted. Musculoskeletal - Intact x 4 extremities. Full ROM x 4 extremities. No pain with movement. Back exam - normal range of motion. No pain on palpation of the spinous processes in the cervical, thoracic, lumbar, sacral regions. No CVA tenderness. Skin - no erythema, ecchymosis, lacerations, abrasions, suspicious moles noted. Small, solitary, eraser sized, hyperpigmented patches at base of R neck following dermatome C6/C7 extending across R shoulder blade at dermatome C6/C7 as faint, hyperpigmented, flat patches. Along posterior C7 dermatome as three hyperpigmented patches with mild scaling and crusting in central patch. No discharge or erythema. Psychological -   normal behavior, dress and thought processes. Good insight. Good eye contact. Normal affect. Appropriate mood. Normal speech.       DATA REVIEWED    Lab Results   Component Value Date/Time    WBC 5.7 06/15/2017 08:57 AM    HGB 11.2 06/15/2017 08:57 AM    HCT 34.8 06/15/2017 08:57 AM    PLATELET 776 35/90/6168 08:57 AM    MCV 70 06/15/2017 08:57 AM     Lab Results   Component Value Date/Time    Sodium 137 01/16/2018 08:47 AM    Potassium 3.8 01/16/2018 08:47 AM    Chloride 93 01/16/2018 08:47 AM    CO2 26 01/16/2018 08:47 AM    Anion gap 11 09/03/2010 08:45 AM    Glucose 182 01/16/2018 08:47 AM    BUN 12 01/16/2018 08:47 AM    Creatinine 0.95 01/16/2018 08:47 AM    BUN/Creatinine ratio 13 01/16/2018 08:47 AM    GFR est AA 72 01/16/2018 08:47 AM    GFR est non-AA 62 01/16/2018 08:47 AM    Calcium 9.8 01/16/2018 08:47 AM    Bilirubin, total 0.3 01/16/2018 08:47 AM    AST (SGOT) 9 01/16/2018 08:47 AM    Alk. phosphatase 68 01/16/2018 08:47 AM    Protein, total 7.0 01/16/2018 08:47 AM    Albumin 4.3 01/16/2018 08:47 AM    Globulin 3.5 09/03/2010 08:45 AM    A-G Ratio 1.6 01/16/2018 08:47 AM    ALT (SGPT) 6 01/16/2018 08:47 AM     Lab Results   Component Value Date/Time    Cholesterol, total 179 10/09/2017 08:45 AM    HDL Cholesterol 73 10/09/2017 08:45 AM    LDL, calculated 91 10/09/2017 08:45 AM    VLDL, calculated 15 10/09/2017 08:45 AM    Triglyceride 73 10/09/2017 08:45 AM    CHOL/HDL Ratio 1.9 04/26/2010 08:00 AM     Lab Results   Component Value Date/Time    Vitamin D 25-Hydroxy 25.1 10/20/2011 07:42 AM    VITAMIN D, 25-HYDROXY 36.6 10/09/2017 08:45 AM       Lab Results   Component Value Date/Time    Hemoglobin A1c 9.9 01/16/2018 08:47 AM    Hemoglobin A1c (POC) 8.4 03/14/2017 09:58 AM     Lab Results   Component Value Date/Time    TSH 3.690 10/09/2017 08:45 AM     Lab Results   Component Value Date/Time    Microalbumin/Creat ratio (mg/g creat) 5 09/03/2010 08:45 AM    Microalb/Creat ratio (ug/mg creat.) <21.1 06/15/2017 08:57 AM    Microalbumin,urine random 1.21 09/03/2010 08:45 AM       ASSESSMENT and PLAN      ICD-10-CM ICD-9-CM    1. Initial Medicare annual wellness visit Z00.00 V70.0    2. Uncontrolled type 2 diabetes mellitus with diabetic neuropathic arthropathy, without long-term current use of insulin ([facility]) E11.610 250.62 REFERRAL TO ENDOCRINOLOGY    E11.65 713.5 REFERRAL TO OPHTHALMOLOGY   3. Essential hypertension I10 401.9     stable   4. High cholesterol E78.00 272.0     stable   5. Fibromyalgia M79.7 729.1    6.  Raised intraocular pressure of both eyes H40.053 365.00 REFERRAL TO OPHTHALMOLOGY   7. Frontal headache R51 784.0 REFERRAL TO OPHTHALMOLOGY    due to sinus congestion vs other, intermittently   8. Obesity, Class I, BMI 30-34.9 E66.9 278.00    9. ACE inhibitor intolerance Z78.9 995.27      E980.4    10. Age-related nuclear cataract of both eyes H25.13 366.16 REFERRAL TO OPHTHALMOLOGY   11. Chronic seasonal allergic rhinitis due to pollen J30.1 477.0    12. Vitamin D deficiency E55.9 268.9     stable   13. Thalassemia minor D56.3 282.46    14. Seborrheic keratoses L82.1 702.19    15. Neuropathic pain of foot, unspecified laterality G57.90 355.8     due to Diabetic Neuropathy, stable   16. S/P MARTIN (total abdominal hysterectomy) Z90.710 V88.01    17. Dermatitis L30.9 692.9     due to shingles at dermatome C6 and C7 vs other    18. Acne rosacea L71.9 695.3    19. Screening for alcoholism Z13.89 V79.1 [place] ANNUAL ALCOHOL SCREEN 15 MIN   20. [facility]89 [facility]   21. Encounter for immunization Z23 V03.89 TETANUS, DIPHTHERIA TOXOIDS AND ACELLULAR PERTUSSIS VACCINE (TDAP), IN INDIVIDS. >=7, IM       Discussed the patient's BMI with her. The BMI follow up plan is as follows: I have counseled this patient on diet and exercise regimens. Decrease carbohydrates (white foods, sweet foods, sweet drinks and alcohol), increase green leafy vegetables and protein (lean meats and beans) with each meal.  Avoid fried foods. Eat 3-5 small meals daily. Do not skip meals. Increase water intake. Increase physical activity to 30 minutes daily for health benefit or 60 minutes daily to prevent weight regain, as tolerated. Get 7-8 hours uninterrupted sleep nightly. Chart reviewed and updated. Continue current medications and care. Reduce Diovan--25 mg to 1/2 tab daily. Most recent tests reviewed. Recent office visit notes from Dr. [facility], Dr. Toño Rodriguez, and Dr. Humberto Patterson reviewed.   Referrals given; patient urged to keep appointments with specialists. Endocrinology. Ophthalmology. Counseled patient on health concerns:  DM, BP, and dermatitis. Relevant handouts given and discussed with patient. Immunizations noted. Administered TDAP today. Offered empathy, support, legitimation, prayers, partnership to patient. Praised patient for progress. Advance Care Booklet given at office visit. Referred to honoring choices; staff message sent to facilitator. Follow-up Disposition:  Return in about 4 months (around 5/23/2018) for referral follow up, dm, bp. Patient was offered a choice/choices in the treatment plan today. Patient expresses understanding of the plan and agrees with recommendations. More than 50 mins spent face to face with patient and more than 50% of this time spent in counseling and coordinating care. Written by clem Baugh, as dictated by Dr. Hung Conte DO. Documentation True and Accepted by Masoud Pelayo. Yg Dwyer. Patient Instructions          Shingles: Care Instructions  Your Care Instructions    Shingles (herpes zoster) causes pain and a blistered rash. The rash can appear anywhere on the body but will be on only one side of the body, the left or right. It will be in a band, a strip, or a small area. The pain can be very severe. Shingles can also cause tingling or itching in the area of the rash. The blisters scab over after a few days and heal in 2 to 4 weeks. Medicines can help you feel better and may help prevent more serious problems caused by shingles. Shingles is caused by the same virus that causes chickenpox. When you have chickenpox, the virus gets into your nerve roots and stays there (becomes dormant) long after you get over the chickenpox. If the virus becomes active again, it can cause shingles. Follow-up care is a key part of your treatment and safety. Be sure to make and go to all appointments, and call your doctor if you are having problems.  It's also a good idea to know your test results and keep a list of the medicines you take. How can you care for yourself at home? · Be safe with medicines. Take your medicines exactly as prescribed. Call your doctor if you think you are having a problem with your medicine. Antiviral medicine helps you get better faster. · Try not to scratch or pick at the blisters. They will crust over and fall off on their own if you leave them alone. · Put cool, wet cloths on the area to relieve pain and itching. You can also use calamine lotion. Try not to use so much lotion that it cakes and is hard to get off. · Put cornstarch or baking soda on the sores to help dry them out so they heal faster. · Do not use thick ointment, such as petroleum jelly, on the sores. This will keep them from drying and healing. · To help remove loose crusts, soak them in tap water. This can help decrease oozing, and dry and soothe the skin. · Take an over-the-counter pain medicine, such as acetaminophen (Tylenol), ibuprofen (Advil, Motrin), or naproxen (Aleve). Read and follow all instructions on the label. · Avoid close contact with people until the blisters have healed. It is very important for you to avoid contact with anyone who has never had chickenpox or the chickenpox vaccine. Pregnant women, young babies, and anyone else who has a hard time fighting infection (such as someone with HIV, diabetes, or cancer) is especially at risk. When should you call for help? Call your doctor now or seek immediate medical care if:  ? · You have a new or higher fever. ? · You have a severe headache and a stiff neck. ? · You lose the ability to think clearly. ? · The rash spreads to your forehead, nose, eyes, or eyelids. ? · You have eye pain, or your vision gets worse. ? · You have new pain in your face, or you cannot move the muscles in your face. ? · Blisters spread to new parts of your body. ? Watch closely for changes in your health, and be sure to contact your doctor if:  ? · The rash has not healed after 2 to 4 weeks. ? · You still have pain after the rash has healed. Where can you learn more? Go to http://gregg-zunilda.info/. Emily Samuel in the search box to learn more about \"Shingles: Care Instructions. \"  Current as of: March 3, 2017  Content Version: 11.4  © 2347-3354 [employer]. Care instructions adapted under license by [employer] (which disclaims liability or warranty for this information). If you have questions about a medical condition or this instruction, always ask your healthcare professional. [address] any warranty or liability for your use of this information. Rosacea: Care Instructions  Your Care Instructions  Rosacea (say \"[id number]-[id number]\") is a skin condition that can cause redness, pimples, and red lines on the nose, cheeks, chin, and forehead. It is often mistaken for acne because it can cause outbreaks with bumps like pimples. Rosacea can also cause burning and soreness in your eyes. Rosacea is usually controlled by using medicine and avoiding alcohol, the sun, and other things that can make rosacea worse. Your doctor may have prescribed medicines or other treatment. If antibiotics do not control the rosacea, your doctor may try other medicines. Follow-up care is a key part of your treatment and safety. Be sure to make and go to all appointments, and call your doctor if you are having problems. It's also a good idea to know your test results and keep a list of the medicines you take. How can you care for yourself at home? · Take your medicines exactly as prescribed. Call your doctor if you think you are having a problem with your medicine. · If your doctor prescribed antibiotics, take them as directed. Do not stop taking them just because you feel better. You need to take the full course of antibiotics. · Always wear sunscreen on exposed skin.  Make sure to use a broad-spectrum sunscreen that has a sun protection factor (SPF) of 30 or higher. Use it every day, even when it is cloudy. · Use soaps, lotions, and makeup made for sensitive skin that do not contain alcohol, are not abrasive, and will not clog pores. · Avoid rubbing or scrubbing your face. · Green-based makeup may help mask the redness of an outbreak. Your doctor may be able to refer you to someone who can help you use makeup to cover redness and bumps. · If you have rosacea on your eyelids, put a warm, wet towel, or compress, on your eyes several times a day. Gently wash your eyelids with a washcloth or an eyelid cleanser that is sold in [employer]. Use artificial tears if your eyes feel dry. · Make a list or keep a diary of things that may trigger your rosacea. Use the diary every day for several weeks. Avoid whatever you find that makes your rosacea worse. These triggers may include:  ¨ Harsh weather. Wear a hat and scarf to shield your face from the cold and wind. Use a moisturizer during the winter to keep your face moist.  ¨ Stress. Eat a healthy diet and get plenty of exercise and sleep. ¨ Alcohol, spicy foods, or hot drinks. Avoid or limit these if they make your rosacea worse. ¨ Getting too hot when you exercise. Try working out for a shorter time. In the summer, exercise during the cool morning hours. ¨ Hot showers. Take warm or cool showers and avoid hot tubs and saunas. When should you call for help? Watch closely for changes in your health, and be sure to contact your doctor if:  ? · You do not get better as expected. Where can you learn more? Go to http://gregg-zunilda.info/. Enter M745 in the search box to learn more about \"Rosacea: Care Instructions. \"  Current as of: October 13, 2016  Content Version: 11.4  © 4523-1621 [employer].  Care instructions adapted under license by [employer] (which disclaims liability or warranty for this information). If you have questions about a medical condition or this instruction, always ask your healthcare professional. [place] [address] any warranty or liability for your use of this information. Medicare Wellness Visit, Female    The best way to live healthy is to have a healthy lifestyle by eating a well-balanced diet, exercising regularly, limiting alcohol and stopping smoking. Regular physical exams and screening tests are another way to keep healthy. Preventive exams provided by your health care provider can find health problems before they become diseases or illnesses. Preventive services including immunizations, screening tests, monitoring and exams can help you take care of your own health. All people over age 72 should have a pneumovax  and and a prevnar shot to prevent pneumonia. These are once in a lifetime unless you and your provider decide differently. All people over 65 should have a yearly flu shot and a tetanus vaccine every 10 years. A bone mass density to screen for osteoporosis or thinning of the bones should be done every 2 years after 65. Screening for diabetes mellitus with a blood sugar test should be done every year. Glaucoma is a disease of the eye due to increased ocular pressure that can lead to blindness and it should be done every year by an eye professional.    Cardiovascular screening tests that check for elevated lipids (fatty part of blood) which can lead to heart disease and strokes should be done every 5 years. Colorectal screening that evaluates for blood or polyps in your colon should be done yearly as a stool test or every five years as a flexible sigmoidoscope or every 10 years as a colonoscopy up to age 76. Breast cancer screening with a mammogram is recommended biennially  for women age 54-69.     Screening for cervical cancer with a pap smear and pelvic exam is recommended for women after age 72 years every 2 years up to age 79 or when the provider and patient decide to stop. If there is a history of cervical abnormalities or other increased risk for cancer then the test is recommended yearly. Hepatitis C screening is also recommended for anyone born between [birth year] through [address]. A shingles vaccine is also recommended once in a lifetime after age 61. Your Medicare Wellness Exam is recommended annually. Here is a list of your current Health Maintenance items with a due date:  Health Maintenance Due   Topic Date Due    Annual Well Visit  12/08/2017          Rosacea: Care Instructions  Your Care Instructions  Rosacea (say \"Kimberly\") is a skin condition that can cause redness, pimples, and red lines on the nose, cheeks, chin, and forehead. It is often mistaken for acne because it can cause outbreaks with bumps like pimples. Rosacea can also cause burning and soreness in your eyes. Rosacea is usually controlled by using medicine and avoiding alcohol, the sun, and other things that can make rosacea worse. Your doctor may have prescribed medicines or other treatment. If antibiotics do not control the rosacea, your doctor may try other medicines. Follow-up care is a key part of your treatment and safety. Be sure to make and go to all appointments, and call your doctor if you are having problems. It's also a good idea to know your test results and keep a list of the medicines you take. How can you care for yourself at home? · Take your medicines exactly as prescribed. Call your doctor if you think you are having a problem with your medicine. · If your doctor prescribed antibiotics, take them as directed. Do not stop taking them just because you feel better. You need to take the full course of antibiotics. · Always wear sunscreen on exposed skin. Make sure to use a broad-spectrum sunscreen that has a sun protection factor (SPF) of 30 or higher. Use it every day, even when it is cloudy.   · Use soaps, lotions, and makeup made for sensitive skin that do not contain alcohol, are not abrasive, and will not clog pores. · Avoid rubbing or scrubbing your face. · Green-based makeup may help mask the redness of an outbreak. Your doctor may be able to refer you to someone who can help you use makeup to cover redness and bumps. · If you have rosacea on your eyelids, put a warm, wet towel, or compress, on your eyes several times a day. Gently wash your eyelids with a washcloth or an eyelid cleanser that is sold in [employer]. Use artificial tears if your eyes feel dry. · Make a list or keep a diary of things that may trigger your rosacea. Use the diary every day for several weeks. Avoid whatever you find that makes your rosacea worse. These triggers may include:  ¨ Harsh weather. Wear a hat and scarf to shield your face from the cold and wind. Use a moisturizer during the winter to keep your face moist.  ¨ Stress. Eat a healthy diet and get plenty of exercise and sleep. ¨ Alcohol, spicy foods, or hot drinks. Avoid or limit these if they make your rosacea worse. ¨ Getting too hot when you exercise. Try working out for a shorter time. In the summer, exercise during the cool morning hours. ¨ Hot showers. Take warm or cool showers and avoid hot tubs and saunas. When should you call for help? Watch closely for changes in your health, and be sure to contact your doctor if:  ? · You do not get better as expected. Where can you learn more? Go to http://gregg-zunilda.info/. Enter Q225 in the search box to learn more about \"Rosacea: Care Instructions. \"  Current as of: October 13, 2016  Content Version: 11.4  © 5171-4835 [email or web address]. Care instructions adapted under license by [employer] (which disclaims liability or warranty for this information).  If you have questions about a medical condition or this instruction, always ask your healthcare professional. Noel Soriano any warranty or liability for your use of this information.

## 2018-01-23 NOTE — MR AVS SNAPSHOT
[address] 
[address] [place] [address] 
[phone] Patient: Wiley Steward MRN:  [id number] Visit Information Date & Time Provider Department Dept. Phone Encounter #  
 1/23/2018  9:30 AM Thompson Araujo DO [facility] [phone] [id number] Follow-up Instructions Return in about 4 months (around 5/23/2018) for referral follow up, dm, bp. Routing History Upcoming Health Maintenance Date Due MICROALBUMIN Q1 6/15/2018 HEMOGLOBIN A1C Q6M 7/16/2018 FOOT EXAM Q1 7/25/2018 EYE EXAM RETINAL OR DILATED Q1 7/25/2018 LIPID PANEL Q1 10/9/2018 MEDICARE YEARLY EXAM 1/24/2019 BREAST CANCER SCRN MAMMOGRAM 2/6/2019 GLAUCOMA SCREENING Q2Y 7/25/2019 COLONOSCOPY 5/27/2020 DTaP/Tdap/Td series (2 - Td) 3/14/2027 Allergies as of 1/23/2018  Review Complete On: 1/23/2018 By: Luis Manuel Granados Severity Noted Reaction Type Reactions Influenza Virus Vaccine, Specific High 10/13/2016    Swelling R ARM PAIN, NUMBNESS, SWELLING 
R HAND NUMBNESS AND TINGLING, WORSE IN 4TH AND FIFTH FINGERS Lisinopril Medium 10/28/2009    Cough Other reaction(s): Lisinopril Singulair [Montelukast]  02/28/2013    Other (comments)  
 headache Current Immunizations  Reviewed on 1/23/2018 Name Date H1N1 FLU VACCINE 3/9/2010 Hepatitis B Vaccine 1/27/2009, 8/20/2008, 6/9/2008 Influenza High Dose Vaccine PF 10/19/2017, 9/7/2016, 11/11/2015 Influenza Vaccine Split 10/24/2012, 11/8/2011, 10/8/2010 Influenza Vaccine Whole 1/27/2009 Pneumococcal Conjugate (PCV-13) 11/11/2015 Pneumococcal Polysaccharide (PPSV-23) 12/7/2016 Tdap  Incomplete ZZZ-RETIRED (DO NOT USE) Pneumococcal Vaccine (Unspecified Type) 12/21/2005 Zoster Vaccine, Live 1/20/2014  
 dT Vaccine 6/9/2008  Reviewed by Thompson Araujo DO on 1/23/2018 at 10:42 AM  
 You Were Diagnosed With   
  
 Codes Comments Initial Medicare annual wellness visit    -  Primary ICD-10-CM: Z00.00 ICD-9-CM: V70.0 Uncontrolled type 2 diabetes mellitus with diabetic neuropathic arthropathy, without long-term current use of insulin (HCC)     ICD-10-CM: E11.610, E11.65 ICD-9-CM: 250.62, 713.5 Essential hypertension     ICD-10-CM: I10 
ICD-9-CM: 401.9 stable High cholesterol     ICD-10-CM: E78.00 ICD-9-CM: 272.0 stable Fibromyalgia     ICD-10-CM: M79.7 ICD-9-CM: 729.1 Raised intraocular pressure of both eyes     ICD-10-CM: H40.053 ICD-9-CM: 365.00 Frontal headache     ICD-10-CM: R51 ICD-9-CM: 784.0 due to sinus congestion vs other, intermittently Obesity, Class I, BMI 30-34.9     ICD-10-CM: E66.9 ICD-9-CM: 278.00   
 ACE inhibitor intolerance     ICD-10-CM: Z78.9 ICD-9-CM: 995.27, E980.4 Age-related nuclear cataract of both eyes     ICD-10-CM: H25.13 ICD-9-CM: 366.16 Chronic seasonal allergic rhinitis due to pollen     ICD-10-CM: J30.1 ICD-9-CM: 477.0 Vitamin D deficiency     ICD-10-CM: E55.9 ICD-9-CM: 268.9 stable Thalassemia minor     ICD-10-CM: D56.3 ICD-9-CM: 282.46 Seborrheic keratoses     ICD-10-CM: L82.1 ICD-9-CM: 702.19 Neuropathic pain of foot, unspecified laterality     ICD-10-CM: G57.90 ICD-9-CM: 355.8 due to Diabetic Neuropathy, stable S/P MARTIN (total abdominal hysterectomy)     ICD-10-CM: Z90.710 ICD-9-CM: V88.01 Dermatitis     ICD-10-CM: L30.9 ICD-9-CM: 692.9 due to shingles at dermatome C6 and C7 vs other Acne rosacea     ICD-10-CM: L71.9 ICD-9-CM: 695.3 Screening for alcoholism     ICD-10-CM: Z13.89 ICD-9-CM: V79.1 Screening for depression     ICD-10-CM: Z13.89 ICD-9-CM: V79.0 Encounter for immunization     ICD-10-CM: C36 ICD-9-CM: V03.89 Vitals BP Pulse Temp Resp Height(growth percentile) Weight(growth percentile) 94/62 (BP 1 Location: Right arm, BP Patient Position: Sitting) 85 98.6 °F (37 °C) (Oral) 16 5' 7\" (1.702 m) 192 lb 11.2 oz (87.4 kg) SpO2 BMI OB Status Smoking Status 98% 30.18 kg/m2 Hysterectomy Never Smoker Vitals History BMI and BSA Data Body Mass Index Body Surface Area  
 30.18 kg/m 2 2.03 m 2 Preferred Pharmacy Pharmacy Name Phone [phone], [address] [phone] Your Updated Medication List  
  
   
This list is accurate as of: 1/23/18 11:15 AM.  Always use your most recent med list.  
  
  
  
  
 albuterol 90 mcg/actuation inhaler Commonly known as:  PROVENTIL HFA, VENTOLIN HFA, PROAIR HFA Take 1-2 Puffs by inhalation every four (4) hours as needed for Wheezing. aspirin delayed-release 81 mg tablet Take 1 Tab by mouth daily. Blood-Glucose Meter monitoring kit One Touch Ultra 2 Use to test blood sugar 1-2 per day (blood sugar before breakfast or 2 hours after any meal or at bedtime) DX.  E11.65  
  
 butalbital-acetaminophen-caffeine -40 mg per tablet Commonly known as:  Gladstone Sprinkles Take 1 Tab by mouth every six (6) hours as needed for Headache. Max Daily Amount: 4 Tabs. Indications: MIGRAINE, TENSION-TYPE HEADACHE  
  
 diph,pertuss(acel),tetanus vac(PF) 2 Lf-(2.5-5-3-5 mcg)-5Lf/0.5 mL Syrg vaccine Commonly known as:  ADACEL  
0.5 mL by IntraMUSCular route once for 1 dose. empagliflozin-metFORMIN 12.5-1,000 mg per tablet Commonly known as:  Omar Brothers Take 1 Tab by mouth two (2) times daily (with meals). Indications: type 2 diabetes mellitus EVENING PRIMROSE 500 mg Cap Generic drug:  evening primrose oil Take 1 Cap by mouth two (2) times a day. FLEXERIL 10 mg tablet Generic drug:  cyclobenzaprine Take 10 mg by mouth as needed. glucose blood VI test strips strip Commonly known as:  ASCENSIA AUTODISC VI, ONE TOUCH ULTRA TEST VI  
 Use to test blood sugar 1-2 per day (blood sugar before breakfast or 2 hours after any meal or at bedtime) DX. E11.65  
  
 simvastatin 40 mg tablet Commonly known as:  ZOCOR  
take 1 tablet by mouth at bedtime  
  
 sodium chloride 0.65 % nasal spray Commonly known as:  OCEAN  
1 Spray as needed for Congestion. traMADol 50 mg tablet Commonly known as:  ULTRAM  
Take 50 mg by mouth every six (6) hours as needed. traZODone 50 mg tablet Commonly known as:  Nick Haring Take 50 mg by mouth as needed. Takes at bedtime prn  
  
 VAGIFEM 10 mcg Tab vaginal tablet Generic drug:  estradiol Insert 10 mcg into vagina daily. valACYclovir 500 mg tablet Commonly known as:  VALTREX  
  
 valsartan-hydroCHLOROthiazide 160-25 mg per tablet Commonly known as:  DIOVAN-HCT  
take 1 tablet by mouth once daily VESIcare 10 mg tablet Generic drug:  solifenacin Take 10 mg by mouth daily. Prescriptions Printed Refills diph,pertuss,acel,,tetanus vac,PF, (ADACEL) 2 Lf-(2.5-5-3-5 mcg)-5Lf/0.5 mL syrg vaccine 0 Si.5 mL by IntraMUSCular route once for 1 dose. Class: Print Route: IntraMUSCular We Performed the Following [facility] 68 [[id number] [facility]] [place] ANNUAL ALCOHOL SCREEN 15 MIN [id number] [facility]] REFERRAL TO ENDOCRINOLOGY [BPH68 Custom] Comments:  
 Please evaluate pt for uncontrolled DM. REFERRAL TO OPHTHALMOLOGY [REF57 Custom] Comments:  
 Uncontrolled DM, IOP  
 TETANUS, DIPHTHERIA TOXOIDS AND ACELLULAR PERTUSSIS VACCINE (TDAP), IN INDIVIDS. >=7, IM [id number] CPT(R)] Follow-up Instructions Return in about 4 months (around 2018) for referral follow up, dm, bp. Referral Information Referral ID Referred By Referred To  
  
 [id number] Anne Kinney MD   
   [place] [address] [address] [place] [address] [address] Phone: [phone] Fax: [phone] Visits Status Start Date End Date 1 New Request 1/23/18 1/23/19 If your referral has a status of pending review or denied, additional information will be sent to support the outcome of this decision. Referral ID Referred By Referred To  
 [id number] [facility] [employer] [address] [place], [address] Visits Status Start Date End Date 1 New Request 1/23/18 1/23/19 If your referral has a status of pending review or denied, additional information will be sent to support the outcome of this decision. Patient Instructions Shingles: Care Instructions Your Care Instructions Shingles (herpes zoster) causes pain and a blistered rash. The rash can appear anywhere on the body but will be on only one side of the body, the left or right. It will be in a band, a strip, or a small area. The pain can be very severe. Shingles can also cause tingling or itching in the area of the rash. The blisters scab over after a few days and heal in 2 to 4 weeks. Medicines can help you feel better and may help prevent more serious problems caused by shingles. Shingles is caused by the same virus that causes chickenpox. When you have chickenpox, the virus gets into your nerve roots and stays there (becomes dormant) long after you get over the chickenpox. If the virus becomes active again, it can cause shingles. Follow-up care is a key part of your treatment and safety. Be sure to make and go to all appointments, and call your doctor if you are having problems. It's also a good idea to know your test results and keep a list of the medicines you take. How can you care for yourself at home? · Be safe with medicines. Take your medicines exactly as prescribed. Call your doctor if you think you are having a problem with your medicine. Antiviral medicine helps you get better faster. · Try not to scratch or pick at the blisters.  They will crust over and fall off on their own if you leave them alone. · Put cool, wet cloths on the area to relieve pain and itching. You can also use calamine lotion. Try not to use so much lotion that it cakes and is hard to get off. · Put cornstarch or baking soda on the sores to help dry them out so they heal faster. · Do not use thick ointment, such as petroleum jelly, on the sores. This will keep them from drying and healing. · To help remove loose crusts, soak them in tap water. This can help decrease oozing, and dry and soothe the skin. · Take an over-the-counter pain medicine, such as acetaminophen (Tylenol), ibuprofen (Advil, Motrin), or naproxen (Aleve). Read and follow all instructions on the label. · Avoid close contact with people until the blisters have healed. It is very important for you to avoid contact with anyone who has never had chickenpox or the chickenpox vaccine. Pregnant women, young babies, and anyone else who has a hard time fighting infection (such as someone with HIV, diabetes, or cancer) is especially at risk. When should you call for help? Call your doctor now or seek immediate medical care if: 
? · You have a new or higher fever. ? · You have a severe headache and a stiff neck. ? · You lose the ability to think clearly. ? · The rash spreads to your forehead, nose, eyes, or eyelids. ? · You have eye pain, or your vision gets worse. ? · You have new pain in your face, or you cannot move the muscles in your face. ? · Blisters spread to new parts of your body. ? Watch closely for changes in your health, and be sure to contact your doctor if: 
? · The rash has not healed after 2 to 4 weeks. ? · You still have pain after the rash has healed. Where can you learn more? Go to http://gregg-zunilda.info/. Liudmila Creed in the search box to learn more about \"Shingles: Care Instructions. \" Current as of: March 3, 2017 Content Version: 11.4 © 8418-7427 Healthwise, FanFound. Care instructions adapted under license by HelloBooks (which disclaims liability or warranty for this information). If you have questions about a medical condition or this instruction, always ask your healthcare professional. [address] any warranty or liability for your use of this information. Rosacea: Care Instructions Your Care Instructions Rosacea (say \"Kimberly\") is a skin condition that can cause redness, pimples, and red lines on the nose, cheeks, chin, and forehead. It is often mistaken for acne because it can cause outbreaks with bumps like pimples. Rosacea can also cause burning and soreness in your eyes. Rosacea is usually controlled by using medicine and avoiding alcohol, the sun, and other things that can make rosacea worse. Your doctor may have prescribed medicines or other treatment. If antibiotics do not control the rosacea, your doctor may try other medicines. Follow-up care is a key part of your treatment and safety. Be sure to make and go to all appointments, and call your doctor if you are having problems. It's also a good idea to know your test results and keep a list of the medicines you take. How can you care for yourself at home? · Take your medicines exactly as prescribed. Call your doctor if you think you are having a problem with your medicine. · If your doctor prescribed antibiotics, take them as directed. Do not stop taking them just because you feel better. You need to take the full course of antibiotics. · Always wear sunscreen on exposed skin. Make sure to use a broad-spectrum sunscreen that has a sun protection factor (SPF) of 30 or higher. Use it every day, even when it is cloudy. · Use soaps, lotions, and makeup made for sensitive skin that do not contain alcohol, are not abrasive, and will not clog pores. · Avoid rubbing or scrubbing your face. · Green-based makeup may help mask the redness of an outbreak. Your doctor may be able to refer you to someone who can help you use makeup to cover redness and bumps. · If you have rosacea on your eyelids, put a warm, wet towel, or compress, on your eyes several times a day. Gently wash your eyelids with a washcloth or an eyelid cleanser that is sold in drugstores. Use artificial tears if your eyes feel dry. · Make a list or keep a diary of things that may trigger your rosacea. Use the diary every day for several weeks. Avoid whatever you find that makes your rosacea worse. These triggers may include: 
¨ Harsh weather. Wear a hat and scarf to shield your face from the cold and wind. Use a moisturizer during the winter to keep your face moist. 
¨ Stress. Eat a healthy diet and get plenty of exercise and sleep. ¨ Alcohol, spicy foods, or hot drinks. Avoid or limit these if they make your rosacea worse. ¨ Getting too hot when you exercise. Try working out for a shorter time. In the summer, exercise during the cool morning hours. ¨ Hot showers. Take warm or cool showers and avoid hot tubs and saunas. When should you call for help? Watch closely for changes in your health, and be sure to contact your doctor if: 
? · You do not get better as expected. Where can you learn more? Go to http://gregg-zunilda.info/. Enter V181 in the search box to learn more about \"Rosacea: Care Instructions. \" Current as of: October 13, 2016 Content Version: 11.4 © 3301-1317 [employer] Holograms. Care instructions adapted under license by [employer] It (which disclaims liability or warranty for this information). If you have questions about a medical condition or this instruction, always ask your healthcare professional. [place] [address] any warranty or liability for your use of this information. Medicare Wellness Visit, Female The best way to live healthy is to have a healthy lifestyle by eating a well-balanced diet, exercising regularly, limiting alcohol and stopping smoking. Regular physical exams and screening tests are another way to keep healthy. Preventive exams provided by your health care provider can find health problems before they become diseases or illnesses. Preventive services including immunizations, screening tests, monitoring and exams can help you take care of your own health. All people over age 72 should have a pneumovax  and and a prevnar shot to prevent pneumonia. These are once in a lifetime unless you and your provider decide differently. All people over 65 should have a yearly flu shot and a tetanus vaccine every 10 years. A bone mass density to screen for osteoporosis or thinning of the bones should be done every 2 years after 65. Screening for diabetes mellitus with a blood sugar test should be done every year. Glaucoma is a disease of the eye due to increased ocular pressure that can lead to blindness and it should be done every year by an eye professional. 
 
Cardiovascular screening tests that check for elevated lipids (fatty part of blood) which can lead to heart disease and strokes should be done every 5 years. Colorectal screening that evaluates for blood or polyps in your colon should be done yearly as a stool test or every five years as a flexible sigmoidoscope or every 10 years as a colonoscopy up to age 76. Breast cancer screening with a mammogram is recommended biennially  for women age 54-69. Screening for cervical cancer with a pap smear and pelvic exam is recommended for women after age 72 years every 2 years up to age 79 or when the provider and patient decide to stop. If there is a history of cervical abnormalities or other increased risk for cancer then the test is recommended yearly.  
 
Hepatitis C screening is also recommended for anyone born between [birth year] through 1965. A shingles vaccine is also recommended once in a lifetime after age 61. Your Medicare Wellness Exam is recommended annually. Here is a list of your current Health Maintenance items with a due date: 
Health Maintenance Due Topic Date Due  
 Annual Well Visit  12/08/2017 Rosacea: Care Instructions Your Care Instructions Rosacea (say \"Kimberly\") is a skin condition that can cause redness, pimples, and red lines on the nose, cheeks, chin, and forehead. It is often mistaken for acne because it can cause outbreaks with bumps like pimples. Rosacea can also cause burning and soreness in your eyes. Rosacea is usually controlled by using medicine and avoiding alcohol, the sun, and other things that can make rosacea worse. Your doctor may have prescribed medicines or other treatment. If antibiotics do not control the rosacea, your doctor may try other medicines. Follow-up care is a key part of your treatment and safety. Be sure to make and go to all appointments, and call your doctor if you are having problems. It's also a good idea to know your test results and keep a list of the medicines you take. How can you care for yourself at home? · Take your medicines exactly as prescribed. Call your doctor if you think you are having a problem with your medicine. · If your doctor prescribed antibiotics, take them as directed. Do not stop taking them just because you feel better. You need to take the full course of antibiotics. · Always wear sunscreen on exposed skin. Make sure to use a broad-spectrum sunscreen that has a sun protection factor (SPF) of 30 or higher. Use it every day, even when it is cloudy. · Use soaps, lotions, and makeup made for sensitive skin that do not contain alcohol, are not abrasive, and will not clog pores. · Avoid rubbing or scrubbing your face. · Green-based makeup may help mask the redness of an outbreak.  Your doctor may be able to refer you to someone who can help you use makeup to cover redness and bumps. · If you have rosacea on your eyelids, put a warm, wet towel, or compress, on your eyes several times a day. Gently wash your eyelids with a washcloth or an eyelid cleanser that is sold in drugstores. Use artificial tears if your eyes feel dry. · Make a list or keep a diary of things that may trigger your rosacea. Use the diary every day for several weeks. Avoid whatever you find that makes your rosacea worse. These triggers may include: 
¨ Harsh weather. Wear a hat and scarf to shield your face from the cold and wind. Use a moisturizer during the winter to keep your face moist. 
¨ Stress. Eat a healthy diet and get plenty of exercise and sleep. ¨ Alcohol, spicy foods, or hot drinks. Avoid or limit these if they make your rosacea worse. ¨ Getting too hot when you exercise. Try working out for a shorter time. In the summer, exercise during the cool morning hours. ¨ Hot showers. Take warm or cool showers and avoid hot tubs and saunas. When should you call for help? Watch closely for changes in your health, and be sure to contact your doctor if: 
? · You do not get better as expected. Where can you learn more? Go to http://gregg-zunilda.info/. Enter V673 in the search box to learn more about \"Rosacea: Care Instructions. \" Current as of: October 13, 2016 Content Version: 11.4 © 9916-5386 Healthwise, Incorporated. Care instructions adapted under license by Superplayer (which disclaims liability or warranty for this information). If you have questions about a medical condition or this instruction, always ask your healthcare professional. [place] [address] any warranty or liability for your use of this information. Introducing hospitals & HEALTH SERVICES! Dear Kamilla Salinas: Thank you for requesting a [employer] account.   Our records indicate that you already have an active Ep[employer]yme account. You can access your account anytime at [email or web address]://"Mosec, Mobile Secretary". [employer]ica/"Mosec, Mobile Secretary" Did you know that you can access your hospital and ER discharge instructions at any time in Epizyme? You can also review all of your test results from your hospital stay or ER visit. Additional Information If you have questions, please visit the Frequently Asked Questions section of the Epizyme website at https://"Mosec, Mobile Secretary". [employer]/"Mose[employer], Mobile Secretary"/. Remember, Ep[employer]yme is NOT to be used for urgent needs. For medical emergencies, dial 911. Now available from your iPhone and Android! Please provide this summary of care documentation to your next provider. Your primary care clinician is listed as Ghassan Galeana. If you have any questions after today's visit, please call [phone].

## 2018-01-23 NOTE — PROGRESS NOTES
Chief Complaint   Patient presents with    Diabetes    Annual Wellness Visit    Results    Rash     1. Have you been to the ER, urgent care clinic since your last visit? Hospitalized since your last visit? No    2. Have you seen or consulted any other health care providers outside of the [address] [place] [address] since your last visit? Include any pap smears or colon screening. Yes, pt saw Dr. Donella Goodpasture    In the event something were to happen to you and you were unable to speak on your behalf, do you have an Advance Directive/ Living Will in place stating your wishes? NO    If yes, do we have a copy on file NO    If no, would you like information:   Offered and declined. A1C was 9.9 on 01/16/2018  Foot exam not due until 07/25/2018  Eye exam not due until 07/25/2018    Pt stated that she has a rash/spots all over her right arm, neck, and has broken out on her face. States that it is itchy, denies any pain or discomfort. Came about a week ago.

## 2018-01-23 NOTE — ACP (ADVANCE CARE PLANNING)
Re-discussed ACP with patient. Gave her another Right to [facility]. Accepts a referral to [facility] team at this time. Staff message sent to [facility].

## 2018-01-31 ENCOUNTER — TELEPHONE (OUTPATIENT)
Dept: FAMILY MEDICINE CLINIC | Age: 68
End: 2018-01-31

## 2018-01-31 NOTE — TELEPHONE ENCOUNTER
----- Message from Sheran Blizzard sent at 1/31/2018  2:19 PM EST -----  Regarding: Dr. Harini Beckford: [phone]  Pt. requesting an exam for a lung X.R.

## 2018-02-07 ENCOUNTER — HOSPITAL ENCOUNTER (OUTPATIENT)
Dept: MAMMOGRAPHY | Age: 68
Discharge: HOME | End: 2018-02-07
Payer: MEDICARE

## 2018-02-07 DIAGNOSIS — Z12.31 VISIT FOR SCREENING MAMMOGRAM: ICD-10-CM

## 2018-02-07 PROCEDURE — 77067 SCR MAMMO BI INCL CAD: CPT

## 2018-02-12 ENCOUNTER — HOSPITAL ENCOUNTER (OUTPATIENT)
Dept: GENERAL RADIOLOGY | Age: 68
Discharge: HOME | End: 2018-02-12
Payer: MEDICARE

## 2018-02-12 ENCOUNTER — HOSPITAL ENCOUNTER (OUTPATIENT)
Dept: MAMMOGRAPHY | Age: 68
Discharge: HOME | End: 2018-02-12
Payer: MEDICARE

## 2018-02-12 DIAGNOSIS — N95.8 POSTARTIFICIAL MENOPAUSAL SYNDROME: ICD-10-CM

## 2018-02-12 DIAGNOSIS — R05.9 COUGH: ICD-10-CM

## 2018-02-12 PROCEDURE — 77080 DXA BONE DENSITY AXIAL: CPT

## 2018-02-12 PROCEDURE — 71046 X-RAY EXAM CHEST 2 VIEWS: CPT

## 2018-04-10 ENCOUNTER — OFFICE VISIT (OUTPATIENT)
Dept: ENDOCRINOLOGY | Age: 68
End: 2018-04-10

## 2018-04-10 VITALS
RESPIRATION RATE: 16 BRPM | SYSTOLIC BLOOD PRESSURE: 125 MMHG | OXYGEN SATURATION: 98 % | HEIGHT: 67 IN | HEART RATE: 85 BPM | DIASTOLIC BLOOD PRESSURE: 71 MMHG | BODY MASS INDEX: 30.32 KG/M2 | WEIGHT: 193.2 LBS

## 2018-04-10 DIAGNOSIS — E11.40 TYPE 2 DIABETES MELLITUS WITH DIABETIC NEUROPATHY, WITHOUT LONG-TERM CURRENT USE OF INSULIN (HCC): Primary | ICD-10-CM

## 2018-04-10 DIAGNOSIS — I10 ESSENTIAL HYPERTENSION: ICD-10-CM

## 2018-04-10 DIAGNOSIS — E66.9 OBESITY, CLASS I, BMI 30-34.9: ICD-10-CM

## 2018-04-10 DIAGNOSIS — E78.00 HIGH CHOLESTEROL: ICD-10-CM

## 2018-04-10 LAB — GLUCOSE POC: 263 MG/DL

## 2018-04-10 RX ORDER — PENICILLIN V POTASSIUM 500 MG/1
TABLET, FILM COATED ORAL
Refills: 0 | COMMUNITY
Start: 2018-02-15 | End: 2018-05-25 | Stop reason: ALTCHOICE

## 2018-04-10 RX ORDER — HYDROCODONE BITARTRATE AND ACETAMINOPHEN 5; 325 MG/1; MG/1
TABLET ORAL
Refills: 0 | COMMUNITY
Start: 2018-02-15 | End: 2018-05-25 | Stop reason: ALTCHOICE

## 2018-04-10 NOTE — PATIENT INSTRUCTIONS
Diabetes Instructions:  - start Trulicity 1.39CU (yellow label) weekly for 2 weeks   - after 2 weeks, increase to the full 1.5mg (blue label) dose weekly  - continue Synjardy twice a day    Check blood sugar at least TWICE a day: every morning when you wake up and at bedtime. Keep a record of your values and bring this or your glucometer with you to your next visit. Diet instructions:  Reduce the amount of carbohydrates in your diet. This means not just avoiding sweets, sodas, or desserts but also reducing the amount of bread, pasta, rice, potatoes, corn, and crackers that you eat. Do not have more than one serving of carbs per meal (for example: do not eat a sandwich and potato chips in the same meal). Focus on eating mostly protein (meat, poultry, fish, shellfish, eggs), healthy fats (avocados, nuts, cheese, olive or coconut oil) and non-starchy vegetables (greens, carrots, tomatoes, bell peppers, broccoli, brussels sprouts, green beans, etc). If you fill yourself up with these foods, you won't even want the carbs. An outline of the basics of a low-carb diet:     AIM FOR LESS THAN 30 GRAMS OF CARBS PER MEAL  Read food labels! Avoid food with added sugar or anything with more than 5g sugar per serving. Minimize your fruit intake as much as possible, no more than one serving per day.  When you do eat fruit, choose lower sugar options like fresh berries.     BREAKFAST: whole eggs, veggies, omelet, plain yogurt, de la torre, sausage, protein shake or low-carb protein bar (Atkins, Quest, etc)  LUNCH: salad with meat/poultry, veggies, cheese, nuts; low-carb wrap with veggies and meat, soup with meat/veggies  DINNER: any type of meat/poultry or seafood (without breading), non-starchy vegetables, carb substitutes like cauliflower rice or zucchini noodles     SNACK OPTIONS: cheese (string cheese or individually wrapped snack size cheese), carrots and celery with Ranch or blue cheese dressing, nuts (almonds, pistachios, walnuts, etc), meat (snack size salami, ham, or turkey), pork rinds, jerkey    SWEETS (in moderation): dark chocolate (greater than 75% cocoa), low-sugar ice cream (Halo Top, Breyers or Matteo's No Sugar Added)    BEVERAGES: water, water, water  Other options: unsweet tea (okay to add a pack of Splenda or Stevia if needed), sparkling water without calories (ex: [employer], Balaji, etc), coffee (unsweetened creamer is fine)

## 2018-04-10 NOTE — PROGRESS NOTES
Endocrinology New Patient Visit    Chief Complaint: Type 2 diabetes    Referring provider: Clau Hernandez DO    History of Present Illness: Benjamín Lawrence is a 76 y.o. female who was referred for evaluation of uncontrolled type 2 diabetes mellitus with last A1c 9.9% in January. She was diagnosed with diabetes around 2006. She was initially treated with metformin. Current glycemic medication regimen is Synjardy 12.5-1000mg BID. This was prescribed in October but started in December (maybe, pt cannot remember). Home blood glucose monitoring frequency: 2-3 times/week  Glucose range at home: 120-150. POC glucose in clinic today is 263. Known complications include neuropathy. Last eye exam was July 2017. Weight is stably elevated. Eats 3 meals/day and does snack twice a day. Drinks water, coffee or tea with Splenda. Has a regular soda on occasion.  Typical meals are as follows:  Breakfast: oatmeal (packets - maple and brown sugar), fruit (apple or halo)  Snacks: \"junk\", chips, fruit, almonds  Lunch: sandwich or salad with chicken  Dinner: goes out on occasion    Review of Systems as above, otherwise a 10 pt review is negative    Problem List:  Patient Active Problem List   Diagnosis Code    Internal hemorrhoids K64.8    Lumbar spondylosis M47.816    Fibromyalgia M79.7    Headache(784.0) R51    Neck arthritis ([address] M46.92    Thalassemia minor D56.3    Vitamin D deficiency E55.9    Left knee pain M25.562    Right shoulder pain M25.511    Essential hypertension I10    Stenosis colon ([address] K56.699    Schatzki's ring K22.2    Hiatal hernia K44.9    Allergic rhinitis due to pollen J30.1    Iron deficiency anemia D50.9    Intraocular pressure increase H40.059    Neck pain M54.2    High cholesterol E78.00    Cataract H26.9    ACE inhibitor intolerance Z78.9    S/P MARTIN (total abdominal hysterectomy) Z90.710    Neuropathic pain of foot G57.90    Advance care planning Z71.89    Uncontrolled type 2 diabetes mellitus with diabetic neuropathic arthropathy, without long-term current use of insulin ([facility]) E11.610, E11.65    Obesity, Class I, BMI 30-34.9 E66.9    Ulnar neuropathy of left upper extremity G56.22    Acne rosacea L71.9    Type 2 diabetes mellitus with diabetic neuropathy ([facility]) E11.40       Past Medical History:  Past Medical History:   Diagnosis Date    Allergic rhinitis     Dr. Joseluis Easley 2015    Dr. Concepcion Medeiros.  Chest pain 01/15/09    Dr. Elva Pastrana Chickenpox childhood    DDD (degenerative disc disease), cervical 10/2015    Dr. Travon Sawyer.  DDD (degenerative disc disease), lumbar 2014    Dr. Pablo Gilliland.  Diabetes ([facility] 06/2007    Cristin Rapp, OD. Dr. Nasreen Yu, pod.  Endometriosis     Dr. Sandoval Heal    Endometriosis     Essential hypertension, benign     Dr. Cleve Moser, uterine     Dr. Jesika Butler 08/2009    Dr. Michelle Cantu. Dr. Nasreen Yu.  Hand paresthesia 2011    Dr. Milind Lin. Dr. Brown Favorite.  GLRECRGD(810.6) 2012    Dr. Mattie Galvin. Dr. Michelle Cantu, ENT.  Hemoptysis 06/13/07    Dr. Samara Keita    Hiatal hernia 05/27/15    small. MD Matt Vanesas Internal hemorrhoids 12/2006, 05/27/15    Dr. Lexii Howard. Dr. Danielle Gordon.  Intraocular pressure increase 07/27/05    bilaterally. Dr. Isabel Leal. Dr. Cristin Rapp.  Knee pain 04/04/13    Bilaterally. due to fall. Dr. Samantha Servin.  Left knee pain 11/26/14    Left. due to OA, torn medial and lateral meniscal tears. Dr. Barry Graham. Dr. Alisa Villarreal.  Lumbar spondylosis 3/2008    Dr. Irving Weber. Dr. Cortney Montejo    Menopause 1994    Paresthesia of foot     Left  due to Ornelas's Neuromo Sxs. Aretta Radha. due to Diabetic Neuropathy. Dr. Nasreen Yu.  Proteinuria 2009    Dr. Dolores Ireland Right shoulder pain 01/30/06    Dr. Junior Galo. impingement tendinitis    Right shoulder pain 07/2014    Dr. Garcia Phlegm.  Schatzki's ring 05/27/15    with partial obstruction at GE junction. Dr. Vicente Younger. Bebe Field. Dr. Carolee Irwin    Stenosis colon [facility]) 05/27/15    SIGMOID WITH TORTUOUS DESCENDING COLON. Dr. Fidel Hutchison. [facility].  Thalassemia minor 1980s    Vitamin D deficiency 05/2011    Angela Cummins NP       Past Surgical History:  Past Surgical History:   Procedure Laterality Date    CYSTOSCOPY  07/2005    Dr. Ros Rodríguez FOOT/TOES SURGERY PROC UNLISTED      Left Ornelas's Neuroma. [id number] [facility] HX BREAST BIOPSY Left     12 years ago neg    HX BREAST LUMPECTOMY  10/15/08    Left intraductal papilloma. Dr. Savannah Licona HX COLONOSCOPY  05/27/15    due to anemia, rectal bleeding. due q 5 yrs. Dr. Josephine Garcia  2001, 12/07/06, 2011    Dr. Mimi Koehler. due q 5 yrs.  HX ENDOSCOPY  05/27/15    due to anemia. Dr. Vicente Younger.  HX HEART CATHETERIZATION  01/09/09    Dr. Natarajan Scales ARTHROSCOPY Left 04/17/15    due to [address]. Dr. Cy Maza.  HX MYOMECTOMY  1974-1993    x4    HX OOPHORECTOMY Bilateral 1994    HX MARTIN AND BSO  1994    endometriosis, fibroids. Social History:  Social History     Social History    Marital status: SINGLE     Spouse name: N/A    Number of children: N/A    Years of education: N/A     Occupational History    Not on file. Social History Main Topics    Smoking status: Never Smoker    Smokeless tobacco: Never Used    Alcohol use 0.0 oz/week     0 Standard drinks or equivalent per week      Comment: Social beer, social drinks, social shots of liquor,and social wine.     Drug use: No    Sexual activity: Not on file     Other Topics Concern    Not on file     Social History Narrative       Family History:  Family History   Problem Relation Age of Onset    Hypertension Mother     Glaucoma Mother  Heart Failure Mother      CHF    Hypertension Father     Cancer Father      colon    Heart Attack Sister     Stroke Sister     Diabetes Sister      x 2 sisters    Diabetes Brother     Breast Cancer Maternal Aunt 38       Medications:     Current Outpatient Prescriptions:     valsartan-hydroCHLOROthiazide (DIOVAN-HCT) 160-25 mg per tablet, take 1 tablet by mouth once daily, Disp: 30 Tab, Rfl: 11    empagliflozin-metFORMIN (SYNJARDY) 12.5-1,000 mg per tablet, Take 1 Tab by mouth two (2) times daily (with meals). Indications: type 2 diabetes mellitus, Disp: 60 Tab, Rfl: 5    sodium chloride (OCEAN) 0.65 % nasal spray, 1 Spray as needed for Congestion. , Disp: , Rfl:     solifenacin (VESICARE) 10 mg tablet, Take 10 mg by mouth daily. , Disp: , Rfl:     aspirin delayed-release 81 mg tablet, Take 1 Tab by mouth daily. , Disp: 90 Tab, Rfl: 3    simvastatin (ZOCOR) 40 mg tablet, take 1 tablet by mouth at bedtime, Disp: 90 Tab, Rfl: 1    valACYclovir (VALTREX) 500 mg tablet, , Disp: , Rfl:     glucose blood VI test strips (ASCENSIA AUTODISC VI, ONE TOUCH ULTRA TEST VI) strip, Use to test blood sugar 1-2 per day (blood sugar before breakfast or 2 hours after any meal or at bedtime) DX. E11.65, Disp: 100 Strip, Rfl: 11    Blood-Glucose Meter monitoring kit, One Touch Ultra 2 Use to test blood sugar 1-2 per day (blood sugar before breakfast or 2 hours after any meal or at bedtime) DX.  E11.65, Disp: 1 Kit, Rfl: 0    estradiol (VAGIFEM) 10 mcg tab vaginal tablet, Insert 10 mcg into vagina daily. , Disp: , Rfl:     evening primrose oil (EVENING PRIMROSE) 500 mg cap, Take 1 Cap by mouth two (2) times a day., Disp: , Rfl:     cyclobenzaprine (FLEXERIL) 10 mg tablet, Take 10 mg by mouth as needed. , Disp: , Rfl:     trazodone (DESYREL) 50 mg tablet, Take 50 mg by mouth as needed. Takes at bedtime prn , Disp: , Rfl:     tramadol (ULTRAM) 50 mg tablet, Take 50 mg by mouth every six (6) hours as needed. , Disp: , Rfl:     HYDROcodone-acetaminophen (NORCO) 5-325 mg per tablet, take 1 to 2 tablets by mouth every 4 to 6 hours if needed for pain, Disp: , Rfl: 0    penicillin v potassium (VEETID) 500 mg tablet, take 1 tablet by mouth four times a day until finished, Disp: , Rfl: 0    albuterol (PROVENTIL HFA, VENTOLIN HFA, PROAIR HFA) 90 mcg/actuation inhaler, Take 1-2 Puffs by inhalation every four (4) hours as needed for Wheezing., Disp: 1 Inhaler, Rfl: 0    butalbital-acetaminophen-caffeine (FIORICET, ESGIC) -40 mg per tablet, Take 1 Tab by mouth every six (6) hours as needed for Headache. Max Daily Amount: 4 Tabs. Indications: MIGRAINE, TENSION-TYPE HEADACHE, Disp: 30 Tab, Rfl: 1    Allergies: Allergies   Allergen Reactions    Influenza Virus Vaccine, Specific Swelling     R ARM PAIN, NUMBNESS, SWELLING  R HAND NUMBNESS AND TINGLING, WORSE IN 4TH AND FIFTH FINGERS    Lisinopril Cough     Other reaction(s): Lisinopril    Singulair [Montelukast] Other (comments)     headache       Physical Examination:  Blood pressure 125/71, pulse 85, resp. rate 16, height 5' 7\" (1.702 m), weight 193 lb 3.2 oz (87.6 kg), SpO2 98 %. Gen: no acute distress  HEENT: mucous membranes moist  CAD: normal rate, regular rhythm. No murmur rubs or gallops  PULM: clear to ausculation, no wheezes, rhonchis or rales.   EXT: no clubbing, cyanosis or edema  Neuro: grossly non focal  Psych: pleasant, good insight into medical hx  Skin: warm, dry    Diabetic foot exam:   Left: Filament test normal sensation with micro filament   Pulse DP: 2+ (normal)   Pulse PT: 2+ (normal)   Deformities: Yes - corns present, no ulcerations  Right: Filament test normal sensation with micro filament   Pulse DP: 2+ (normal)   Pulse PT: 2+ (normal)   Deformities: Yes - corns present, no ulcerations    Clinical Data Review:  Lab Results   Component Value Date/Time    Hemoglobin A1c 9.9 (H) 01/16/2018 08:47 AM    Hemoglobin A1c (POC) 8.4 03/14/2017 09:58 AM     Lab Results   Component Value Date/Time    Sodium 137 01/16/2018 08:47 AM    Potassium 3.8 01/16/2018 08:47 AM    Chloride 93 (L) 01/16/2018 08:47 AM    CO2 26 01/16/2018 08:47 AM    Anion gap 11 09/03/2010 08:45 AM    Glucose 182 (H) 01/16/2018 08:47 AM    BUN 12 01/16/2018 08:47 AM    Creatinine 0.95 01/16/2018 08:47 AM    BUN/Creatinine ratio 13 01/16/2018 08:47 AM    GFR est AA 72 01/16/2018 08:47 AM    GFR est non-AA 62 01/16/2018 08:47 AM    Calcium 9.8 01/16/2018 08:47 AM     Lab Results   Component Value Date/Time    Microalbumin/Creat ratio (mg/g creat) 5 09/03/2010 08:45 AM    Microalb/Creat ratio (ug/mg creat.) <21.1 06/15/2017 08:57 AM    Microalbumin,urine random 1.21 09/03/2010 08:45 AM     Lab Results   Component Value Date/Time    Cholesterol, total 179 10/09/2017 08:45 AM    HDL Cholesterol 73 10/09/2017 08:45 AM    LDL, calculated 91 10/09/2017 08:45 AM    VLDL, calculated 15 10/09/2017 08:45 AM    Triglyceride 73 10/09/2017 08:45 AM    CHOL/HDL Ratio 1.9 04/26/2010 08:00 AM       Assessment and Plan:  Jeri Albright is a 76 y.o. female here for type 2 diabetes, control of which is not at goal on dual agent therapy with metformin and SGLT2i. Recommend adding GLP1a and reducing dietary carbohydrate intake, rather than starting insulin. Personally educated injection technique and provided dietary advice on a lower-carb diet. We discussed the importance of good glycemic control (A1c <7%) to further reduce the risks of microvascular and macrovascular complications.      Glycemic Medication Changes:  - start Trulicity 7.75YV (yellow label) weekly for 2 weeks   - after 2 weeks, increase to the full 1.5mg (blue label) dose weekly  - continue Synjardy twice a day  - check BG 1-2 times/day    DM Health Maintenance: pertinent items updated in HM tab  A1c: recheck at next visit  Cv/Lipids: on simvastatin, lipids UTD  BP/Renal: BP at goal, on ARB, microalbumin UTD and nonelevated  Vaccines: per PCP  Podiatry: no active issues, encouraged well-fitting footwear and daily inspection  Neuro: + sx of neuropathy, updated foot exam today  Ophtho: yearly eye exam recommended  Diet and exercise: discussed healthy eating and exercise recommendations, particularly reduction in dietary carbohydrates    I spent 60 minutes with the patient today and > 50% of the time was spent counseling the patient about diabetes management including medication options and dietary modification. Patient verbalized an understanding and will return to clinic in 3 months. Thank you for the opportunity to participate in this patient's care.     Stephan Murillo MD  [facility]  [facility]

## 2018-04-10 NOTE — PROGRESS NOTES
Lab Results   Component Value Date/Time    Hemoglobin A1c 9.9 (H) 01/16/2018 08:47 AM    Hemoglobin A1c 8.6 (H) 10/09/2017 08:45 AM    Hemoglobin A1c 8.5 (H) 06/15/2017 08:57 AM

## 2018-04-10 NOTE — MR AVS SNAPSHOT
[address] [facility] [address] [facility] 
[phone] Patient: Sarah Good MRN:  [id number] Visit Information Date & Time Provider Department Dept. Phone Encounter #  
 4/10/2018 11:10 AM Akanksha Bruno, [address] [facility] Diabetes and Endocrinology [phone] Your Appointments 5/18/2018  9:00 AM  
LAB with LAB BRFP Jessie 74 (GILLES Godfrey) Appt Note: Fasting lab [address] [facility] [address]  
[phone]  
  
   
 [address]  
  
    
 5/25/2018 10:00 AM  
ROUTINE CARE with DO Jessie Rivas 74 (GILLESBLAIR Godfrey) Appt Note: Follow up  
 [address] 
[address] [facility] [address]  
[phone]  
  
   
 [address] [facility] [address] [facility] [address] [place] [address] Upcoming Health Maintenance Date Due MICROALBUMIN Q1 6/15/2018 HEMOGLOBIN A1C Q6M 7/16/2018 FOOT EXAM Q1 7/25/2018 EYE EXAM RETINAL OR DILATED Q1 7/25/2018 LIPID PANEL Q1 10/9/2018 MEDICARE YEARLY EXAM 1/24/2019 GLAUCOMA SCREENING Q2Y 9/15/2019 BREAST CANCER SCRN MAMMOGRAM 2/7/2020 COLONOSCOPY 5/27/2020 DTaP/Tdap/Td series (2 - Td) 1/23/2028 Allergies as of 4/10/2018  Review Complete On: 4/10/2018 By: Jose Faria Severity Noted Reaction Type Reactions Influenza Virus Vaccine, Specific High 10/13/2016    Swelling R ARM PAIN, NUMBNESS, SWELLING 
R HAND NUMBNESS AND TINGLING, WORSE IN 4TH AND FIFTH FINGERS Lisinopril Medium 10/28/2009    Cough Other reaction(s): Lisinopril Singulair [Montelukast]  02/28/2013    Other (comments)  
 headache Current Immunizations  Reviewed on 1/23/2018 Name Date H1N1 FLU VACCINE 3/9/2010 Hepatitis B Vaccine 1/27/2009, 8/20/2008, 6/9/2008 Influenza High Dose Vaccine PF 10/19/2017, 9/7/2016, 11/11/2015 Influenza Vaccine Split 10/24/2012, 11/8/2011, 10/8/2010 Influenza Vaccine Whole 1/27/2009 Pneumococcal Conjugate (PCV-13) 11/11/2015 Pneumococcal Polysaccharide (PPSV-23) 12/7/2016 Tdap 1/23/2018 ZZZ-RETIRED (DO NOT USE) Pneumococcal Vaccine (Unspecified Type) 12/21/2005 Zoster Vaccine, Live 1/20/2014  
 dT Vaccine 6/9/2008 Not reviewed this visit You Were Diagnosed With   
  
 Codes Comments Type 2 diabetes mellitus with diabetic neuropathy, without long-term current use of insulin (HCC)    -  Primary ICD-10-CM: E11.40 ICD-9-CM: 250.60, 357.2 Vitals BP Pulse Resp Height(growth percentile) Weight(growth percentile) SpO2  
 125/71 85 16 5' 7\" (1.702 m) 193 lb 3.2 oz (87.6 kg) 98% BMI OB Status Smoking Status 30.26 kg/m2 Hysterectomy Never Smoker Vitals History BMI and BSA Data Body Mass Index Body Surface Area  
 30.26 kg/m 2 2.03 m 2 Preferred Pharmacy Pharmacy Name Phone [phone], [address] [phone] Your Updated Medication List  
  
   
This list is accurate as of 4/10/18 12:04 PM.  Always use your most recent med list.  
  
  
  
  
 albuterol 90 mcg/actuation inhaler Commonly known as:  PROVENTIL HFA, VENTOLIN HFA, PROAIR HFA Take 1-2 Puffs by inhalation every four (4) hours as needed for Wheezing. aspirin delayed-release 81 mg tablet Take 1 Tab by mouth daily. Blood-Glucose Meter monitoring kit One Touch Ultra 2 Use to test blood sugar 1-2 per day (blood sugar before breakfast or 2 hours after any meal or at bedtime) DX.  E11.65  
  
 butalbital-acetaminophen-caffeine -40 mg per tablet Commonly known as:  Tim Longest Take 1 Tab by mouth every six (6) hours as needed for Headache. Max Daily Amount: 4 Tabs. Indications: MIGRAINE, TENSION-TYPE HEADACHE * dulaglutide 1.5 mg/0.5 mL sub-q pen Commonly known as:  TRULICITY 0.5 mL by SubCUTAneous route every seven (7) days. * dulaglutide 0.75 mg/0.5 mL sub-q pen Commonly known as:  TRULICITY  
0.5 mL by SubCUTAneous route every seven (7) days. empagliflozin-metFORMIN 12.5-1,000 mg per tablet Commonly known as:  Omar Huber Take 1 Tab by mouth two (2) times daily (with meals). Indications: type 2 diabetes mellitus EVENING PRIMROSE 500 mg Cap Generic drug:  evening primrose oil Take 1 Cap by mouth two (2) times a day. FLEXERIL 10 mg tablet Generic drug:  cyclobenzaprine Take 10 mg by mouth as needed. glucose blood VI test strips strip Commonly known as:  ASCENSIA AUTODISC VI, ONE TOUCH ULTRA TEST VI  
Use to test blood sugar 1-2 per day (blood sugar before breakfast or 2 hours after any meal or at bedtime) DX. E11.65 HYDROcodone-acetaminophen 5-325 mg per tablet Commonly known as:  Max Anedrson  
take 1 to 2 tablets by mouth every 4 to 6 hours if needed for pain  
  
 penicillin v potassium 500 mg tablet Commonly known as:  VEETID  
take 1 tablet by mouth four times a day until finished  
  
 simvastatin 40 mg tablet Commonly known as:  ZOCOR  
take 1 tablet by mouth at bedtime  
  
 sodium chloride 0.65 % nasal squeeze bottle Commonly known as:  OCEAN  
1 Spray as needed for Congestion. traMADol 50 mg tablet Commonly known as:  ULTRAM  
Take 50 mg by mouth every six (6) hours as needed. traZODone 50 mg tablet Commonly known as:  Charleroi Ort Take 50 mg by mouth as needed. Takes at bedtime prn  
  
 VAGIFEM 10 mcg Tab vaginal tablet Generic drug:  estradiol Insert 10 mcg into vagina daily. valACYclovir 500 mg tablet Commonly known as:  VALTREX  
  
 valsartan-hydroCHLOROthiazide 160-25 mg per tablet Commonly known as:  DIOVAN-HCT  
take 1 tablet by mouth once daily VESIcare 10 mg tablet Generic drug:  solifenacin Take 10 mg by mouth daily. * Notice: This list has 2 medication(s) that are the same as other medications prescribed for you. Read the directions carefully, and ask your doctor or other care provider to review them with you. Prescriptions Sent to Pharmacy Refills  
 dulaglutide (TRULICITY) 1.5 QF/2.7 mL sub-q pen 11 Si.5 mL by SubCUTAneous route every seven (7) days. Class: Normal  
 Pharmacy: [facility] [place] [address] [address], [address] [place] [address] #: [phone] Route: SubCUTAneous We Performed the Following AMB POC GLUCOSE, QUANTITATIVE, BLOOD [74466 CPT(R)] Patient Instructions Diabetes Instructions: 
- start Trulicity 4.41PW (yellow label) weekly for 2 weeks 
 - after 2 weeks, increase to the full 1.5mg (blue label) dose weekly 
- continue Synjardy twice a day Check blood sugar at least TWICE a day: every morning when you wake up and at bedtime. Keep a record of your values and bring this or your glucometer with you to your next visit. Diet instructions: 
Reduce the amount of carbohydrates in your diet. This means not just avoiding sweets, sodas, or desserts but also reducing the amount of bread, pasta, rice, potatoes, corn, and crackers that you eat. Do not have more than one serving of carbs per meal (for example: do not eat a sandwich and potato chips in the same meal). Focus on eating mostly protein (meat, poultry, fish, shellfish, eggs), healthy fats (avocados, nuts, cheese, olive or coconut oil) and non-starchy vegetables (greens, carrots, tomatoes, bell peppers, broccoli, brussels sprouts, green beans, etc). If you fill yourself up with these foods, you won't even want the carbs. An outline of the basics of a low-carb diet: 
  
AIM FOR LESS THAN 30 GRAMS OF CARBS PER MEAL Read food labels! Avoid food with added sugar or anything with more than 5g sugar per serving.   
 
Minimize your fruit intake as much as possible, no more than one serving per day. When you do eat fruit, choose lower sugar options like fresh berries. 
  
BREAKFAST: whole eggs, veggies, omelet, plain yogurt, de la torre, sausage, protein shake or low-carb protein bar (Atkins, Quest, etc) LUNCH: salad with meat/poultry, veggies, cheese, nuts; low-carb wrap with veggies and meat, soup with meat/veggies DINNER: any type of meat/poultry or seafood (without breading), non-starchy vegetables, carb substitutes like cauliflower rice or zucchini noodles 
  
SNACK OPTIONS: cheese (string cheese or individually wrapped snack size cheese), carrots and celery with Ranch or blue cheese dressing, nuts (almonds, pistachios, walnuts, etc), meat (snack size salami, ham, or turkey), pork rinds, Kristeen Rash SWEETS (in moderation): dark chocolate (greater than 75% cocoa), low-sugar ice cream (Halo Top, Breyers or Matteo's No Sugar Added) BEVERAGES: water, water, water Other options: unsweet tea (okay to add a pack of Splenda or Stevia if needed), sparkling water without calories (ex: [place], Balaji, etc), coffee (unsweetened creamer is fine) Introducing [facility]! Dear Morgan Moya: Thank you for requesting a [employer] account. Our records indicate that you already have an active [employer] account. You can access your account anytime at [email or web address] Did you know that you can access your hospital and ER discharge instructions at any time in [employer]? You can also review all of your test results from your hospital stay or ER visit. Additional Information If you have questions, please visit the Frequently Asked Questions section of the [employer] website at [email or web address] Remember, [employer] is NOT to be used for urgent needs. For medical emergencies, dial 911. Now available from your iPhone and Android! Please provide this summary of care documentation to your next provider. Your primary care clinician is listed as Monty Banuelos. If you have any questions after today's visit, please call [phone].

## 2018-04-24 ENCOUNTER — TELEPHONE (OUTPATIENT)
Dept: FAMILY MEDICINE CLINIC | Age: 68
End: 2018-04-24

## 2018-04-24 RX ORDER — INSULIN PUMP SYRINGE, 3 ML
EACH MISCELLANEOUS
Qty: 1 KIT | Refills: 0 | Status: CANCELLED | OUTPATIENT
Start: 2018-04-24

## 2018-04-24 NOTE — TELEPHONE ENCOUNTER
PCP: Mauricio Reyes DO    Last appt: 1/23/2018  Future Appointments  Date Time Provider China Zoe   5/18/2018 9:00 AM LAB BRFP BRCECILE GILLES SCHED   5/25/2018 10:00 AM DO YONY Carrasco SCHED   8/7/2018 9:10 AM Erendira Carter MD RDE [address] [facility]       Requested Prescriptions     Pending Prescriptions Disp Refills    Blood-Glucose Meter monitoring kit 1 Kit 0     Sig: One Touch Ultra 2 Use to test blood sugar 1-2 per day (blood sugar before breakfast or 2 hours after any meal or at bedtime) DX.   E11.65    glucose blood VI test strips (ASCENSIA AUTODISC VI, ONE TOUCH ULTRA TEST VI) strip 100 Strip 11     Sig: Use to test blood sugar 1-2 per day (blood sugar before breakfast or 2 hours after any meal or at bedtime) DX. E11.65     Lab Results   Component Value Date/Time    Sodium 137 01/16/2018 08:47 AM    Potassium 3.8 01/16/2018 08:47 AM    Chloride 93 (L) 01/16/2018 08:47 AM    CO2 26 01/16/2018 08:47 AM    Anion gap 11 09/03/2010 08:45 AM    Glucose 182 (H) 01/16/2018 08:47 AM    BUN 12 01/16/2018 08:47 AM    Creatinine 0.95 01/16/2018 08:47 AM    BUN/Creatinine ratio 13 01/16/2018 08:47 AM    GFR est AA 72 01/16/2018 08:47 AM    GFR est non-AA 62 01/16/2018 08:47 AM    Calcium 9.8 01/16/2018 08:47 AM     Lab Results   Component Value Date/Time    Hemoglobin A1c 9.9 (H) 01/16/2018 08:47 AM    Hemoglobin A1c (POC) 8.4 03/14/2017 09:58 AM     Lab Results   Component Value Date/Time    Cholesterol, total 179 10/09/2017 08:45 AM    HDL Cholesterol 73 10/09/2017 08:45 AM    LDL, calculated 91 10/09/2017 08:45 AM    VLDL, calculated 15 10/09/2017 08:45 AM    Triglyceride 73 10/09/2017 08:45 AM    CHOL/HDL Ratio 1.9 04/26/2010 08:00 AM     Lab Results   Component Value Date/Time    TSH 3.690 10/09/2017 08:45 AM

## 2018-04-24 NOTE — TELEPHONE ENCOUNTER
Patient calling to say that in the process of moving she has lost her meter and test strips. She wants to know if a script for a new meter and test strips can be called int her pharmacy [facility] [phone]. Her contact # is [phone].

## 2018-05-02 NOTE — TELEPHONE ENCOUNTER
Requested DM supplies pended in this encounter, cancelled by writer, and re-pended in a different refill encounter due to Dr. Pacheco Quivers not be able to see the pended supplies to sign off on.

## 2018-05-02 NOTE — TELEPHONE ENCOUNTER
PCP: Paty Marinelli DO    Last appt: 1/23/2018  Future Appointments  Date Time Provider China Zoe   5/18/2018 9:00 AM LAB [facility] BRCECILE GILLES SCHED   5/25/2018 10:00 AM DO YONY Wilson SCHED   8/7/2018 9:10 AM Diego Currie MD RDE [address] [facility]       Requested Prescriptions     Pending Prescriptions Disp Refills    Blood-Glucose Meter monitoring kit 1 Kit 0     Sig: One Touch Ultra 2 Use to test blood sugar 1-2 per day (blood sugar before breakfast or 2 hours after any meal or at bedtime) DX.   E11.65    glucose blood VI test strips (ASCENSIA AUTODISC VI, ONE TOUCH ULTRA TEST VI) strip 100 Strip 11     Sig: Use to test blood sugar 1-2 per day (blood sugar before breakfast or 2 hours after any meal or at bedtime) DX. E11.65     Lab Results   Component Value Date/Time    Sodium 137 01/16/2018 08:47 AM    Potassium 3.8 01/16/2018 08:47 AM    Chloride 93 (L) 01/16/2018 08:47 AM    CO2 26 01/16/2018 08:47 AM    Anion gap 11 09/03/2010 08:45 AM    Glucose 182 (H) 01/16/2018 08:47 AM    BUN 12 01/16/2018 08:47 AM    Creatinine 0.95 01/16/2018 08:47 AM    BUN/Creatinine ratio 13 01/16/2018 08:47 AM    GFR est AA 72 01/16/2018 08:47 AM    GFR est non-AA 62 01/16/2018 08:47 AM    Calcium 9.8 01/16/2018 08:47 AM     Lab Results   Component Value Date/Time    Hemoglobin A1c 9.9 (H) 01/16/2018 08:47 AM    Hemoglobin A1c (POC) 8.4 03/14/2017 09:58 AM     Lab Results   Component Value Date/Time    Cholesterol, total 179 10/09/2017 08:45 AM    HDL Cholesterol 73 10/09/2017 08:45 AM    LDL, calculated 91 10/09/2017 08:45 AM    VLDL, calculated 15 10/09/2017 08:45 AM    Triglyceride 73 10/09/2017 08:45 AM    CHOL/HDL Ratio 1.9 04/26/2010 08:00 AM     Lab Results   Component Value Date/Time    TSH 3.690 10/09/2017 08:45 AM

## 2018-05-05 RX ORDER — INSULIN PUMP SYRINGE, 3 ML
EACH MISCELLANEOUS
Qty: 1 KIT | Refills: 0 | Status: SHIPPED | OUTPATIENT
Start: 2018-05-05

## 2018-05-07 ENCOUNTER — DOCUMENTATION ONLY (OUTPATIENT)
Dept: FAMILY MEDICINE CLINIC | Age: 68
End: 2018-05-07

## 2018-05-18 ENCOUNTER — LAB ONLY (OUTPATIENT)
Dept: FAMILY MEDICINE CLINIC | Age: 68
End: 2018-05-18

## 2018-05-18 DIAGNOSIS — E78.00 HIGH CHOLESTEROL: ICD-10-CM

## 2018-05-18 DIAGNOSIS — E55.9 VITAMIN D DEFICIENCY: ICD-10-CM

## 2018-05-18 DIAGNOSIS — E11.40 TYPE 2 DIABETES MELLITUS WITH DIABETIC NEUROPATHY, WITHOUT LONG-TERM CURRENT USE OF INSULIN (HCC): Primary | ICD-10-CM

## 2018-05-18 DIAGNOSIS — I10 ESSENTIAL HYPERTENSION: ICD-10-CM

## 2018-05-19 LAB
25(OH)D3+25(OH)D2 SERPL-MCNC: 30.9 NG/ML (ref 30–100)
ALBUMIN SERPL-MCNC: 4.2 G/DL (ref 3.6–4.8)
ALBUMIN/CREAT UR: 9.4 MG/G CREAT (ref 0–30)
ALBUMIN/GLOB SERPL: 1.6 {RATIO} (ref 1.2–2.2)
ALP SERPL-CCNC: 58 IU/L (ref 39–117)
ALT SERPL-CCNC: 6 IU/L (ref 0–32)
APPEARANCE UR: CLEAR
AST SERPL-CCNC: 11 IU/L (ref 0–40)
BILIRUB SERPL-MCNC: 0.3 MG/DL (ref 0–1.2)
BILIRUB UR QL STRIP: NEGATIVE
BUN SERPL-MCNC: 16 MG/DL (ref 8–27)
BUN/CREAT SERPL: 17 (ref 12–28)
CALCIUM SERPL-MCNC: 9.9 MG/DL (ref 8.7–10.3)
CHLORIDE SERPL-SCNC: 97 MMOL/L (ref 96–106)
CHOLEST SERPL-MCNC: 123 MG/DL (ref 100–199)
CO2 SERPL-SCNC: 24 MMOL/L (ref 18–29)
COLOR UR: YELLOW
CREAT SERPL-MCNC: 0.95 MG/DL (ref 0.57–1)
CREAT UR-MCNC: 79.9 MG/DL
EST. AVERAGE GLUCOSE BLD GHB EST-MCNC: 183 MG/DL
GFR SERPLBLD CREATININE-BSD FMLA CKD-EPI: 62 ML/MIN/1.73
GFR SERPLBLD CREATININE-BSD FMLA CKD-EPI: 71 ML/MIN/1.73
GLOBULIN SER CALC-MCNC: 2.7 G/DL (ref 1.5–4.5)
GLUCOSE SERPL-MCNC: 100 MG/DL (ref 65–99)
GLUCOSE UR QL: ABNORMAL
HBA1C MFR BLD: 8 % (ref 4.8–5.6)
HDLC SERPL-MCNC: 55 MG/DL
HGB UR QL STRIP: NEGATIVE
INTERPRETATION, 910389: NORMAL
KETONES UR QL STRIP: NEGATIVE
LDLC SERPL CALC-MCNC: 55 MG/DL (ref 0–99)
LEUKOCYTE ESTERASE UR QL STRIP: NEGATIVE
Lab: NORMAL
MICRO URNS: ABNORMAL
MICROALBUMIN UR-MCNC: 7.5 UG/ML
NITRITE UR QL STRIP: NEGATIVE
PH UR STRIP: 5.5 [PH] (ref 5–7.5)
POTASSIUM SERPL-SCNC: 3.7 MMOL/L (ref 3.5–5.2)
PROT SERPL-MCNC: 6.9 G/DL (ref 6–8.5)
PROT UR QL STRIP: NEGATIVE
SODIUM SERPL-SCNC: 138 MMOL/L (ref 134–144)
SP GR UR: 1.03 (ref 1–1.03)
TRIGL SERPL-MCNC: 66 MG/DL (ref 0–149)
UROBILINOGEN UR STRIP-MCNC: 0.2 MG/DL (ref 0.2–1)
VLDLC SERPL CALC-MCNC: 13 MG/DL (ref 5–40)

## 2018-05-25 ENCOUNTER — OFFICE VISIT (OUTPATIENT)
Dept: FAMILY MEDICINE CLINIC | Age: 68
End: 2018-05-25

## 2018-05-25 VITALS
RESPIRATION RATE: 18 BRPM | OXYGEN SATURATION: 96 % | HEIGHT: 67 IN | TEMPERATURE: 97.9 F | HEART RATE: 81 BPM | BODY MASS INDEX: 28.33 KG/M2 | SYSTOLIC BLOOD PRESSURE: 122 MMHG | DIASTOLIC BLOOD PRESSURE: 72 MMHG | WEIGHT: 180.5 LBS

## 2018-05-25 DIAGNOSIS — M79.2 NEUROPATHIC PAIN OF FOOT, UNSPECIFIED LATERALITY: ICD-10-CM

## 2018-05-25 DIAGNOSIS — I10 ESSENTIAL HYPERTENSION: Primary | ICD-10-CM

## 2018-05-25 DIAGNOSIS — D56.3 THALASSEMIA MINOR: ICD-10-CM

## 2018-05-25 DIAGNOSIS — E55.9 VITAMIN D DEFICIENCY: ICD-10-CM

## 2018-05-25 DIAGNOSIS — R63.4 WEIGHT LOSS: ICD-10-CM

## 2018-05-25 DIAGNOSIS — H40.053 RAISED INTRAOCULAR PRESSURE OF BOTH EYES: ICD-10-CM

## 2018-05-25 DIAGNOSIS — Z78.9 ACE INHIBITOR INTOLERANCE: ICD-10-CM

## 2018-05-25 DIAGNOSIS — L63.9 ALOPECIA AREATA: ICD-10-CM

## 2018-05-25 DIAGNOSIS — K13.79 ORAL BLEEDING: ICD-10-CM

## 2018-05-25 DIAGNOSIS — E78.00 HIGH CHOLESTEROL: ICD-10-CM

## 2018-05-25 RX ORDER — ERGOCALCIFEROL 1.25 MG/1
50000 CAPSULE ORAL
Qty: 5 CAP | Refills: 2 | Status: SHIPPED | OUTPATIENT
Start: 2018-05-25 | End: 2018-09-02 | Stop reason: SDUPTHER

## 2018-05-25 NOTE — PROGRESS NOTES
HISTORY OF PRESENT ILLNESS  Ivania Chance is a 76 y.o. female presents with Results (follow up); Referral Follow Up; Blood Pressure Check (follow up); Diabetes (follow up); and Shingles (Patient wants to know about new injection. )    Agree with nurse note. Uncontrolled diabetic, hypertensive pt with high cholesterol, vit d deficiency, thalassemia minor, hx of iron deficiency anemia, and hx of ACE inhibitor intolerance presents to the office with a BP of 122/72. For BP, she takes Diovan--25 mg daily, tolerating well. She had labs on 05/18/18 and would like to discuss the results. LDL 55. Glucose 100. Cr 0.95. Vit D 30.9. Hgb A1c 8, down from 9.9. Urine micro 7.5. UA showed 3+ glucose. For cholesterol, she takes Simvastatin 40 mg daily, tolerating well. Patient denies vision changes, headaches, dizziness, chest pain, SOB, or swelling. She saw endocrinologist, Dr. Hay Mandel on 04/10/18 for evaluation of uncontrolled DM. A1c in 01/2018 was 9.9. Dr. Federico Arredondo added Trulicity 4.37 mg weekly x2 weeks then titrate up to 1.5 mg weekly. She also wanted the pt to continue Synjardy BID. Check blood sugars 1-2x daily. F/U 3 months. She weighs 180 lbs, down 12 lbs since 01/2018 and 25 lbs since 11/2017. She attributes some of her weight loss to Trulicity, which she started about a month ago in 04/2018. She also has been watching what she eats. She eats fried food less often now. She drinks close to 64 oz of water daily. She drinks 1 diet coke, sprite zero, or lemonade daily. She does lunges x15 mins daily and will add walking x15 mins daily at the request of her podiatrist.     Pt with alopecia areata. She reports seeing dermatologist, Dr. Jeremiah Boyd for another bx due to hair loss. Per pt, her hair loss is not hormonal.     She mentions that she has some bleeding from her under dental bridge on the R. She wonders if this has contributed to her anemia off and on through the years.      Health Maintenance    Pt with hx of cataracts and elevated IOPs. She has an eye exam scheduled with optometrist, Dr. Anup Venegas in 09/2018. Pt's most recent colonoscopy was on 05/27/15 with GI, Dr. Nadia Osgood. F/U 5 years. She saw podiatrist, Dr. Bev Cosme yesterday and reports everything went well. Pt's most recent mammogram was on 02/07/18; normal.     Written by clem Broussard, as dictated by Dr. Erick Desouza DO.    ROS    Review of Systems negative except as noted above in HPI. ALLERGIES:    Allergies   Allergen Reactions    Influenza Virus Vaccine, Specific Swelling     R ARM PAIN, NUMBNESS, SWELLING  R HAND NUMBNESS AND TINGLING, WORSE IN 4TH AND FIFTH FINGERS    Lisinopril Cough     Other reaction(s): Lisinopril    Singulair [Montelukast] Other (comments)     headache       CURRENT MEDICATIONS:    Outpatient Prescriptions Marked as Taking for the 5/25/18 encounter (Office Visit) with Stephanie Crowe DO   Medication Sig Dispense Refill    ergocalciferol (ERGOCALCIFEROL) 50,000 unit capsule Take 1 Cap by mouth every seven (7) days. Indications: Vitamin D Deficiency 5 Cap 2    Blood-Glucose Meter monitoring kit One Touch Ultra 2 Use to test blood sugar 1-2 per day (blood sugar before breakfast or 2 hours after any meal or at bedtime) DX.   E11.65 1 Kit 0    glucose blood VI test strips (ASCENSIA AUTODISC VI, ONE TOUCH ULTRA TEST VI) strip Use to test blood sugar 1-2 per day (blood sugar before breakfast or 2 hours after any meal or at bedtime) DX. E11.65 100 Strip 3    simvastatin (ZOCOR) 40 mg tablet take 1 tablet by mouth at bedtime 90 Tab 2    dulaglutide (TRULICITY) 1.5 IQ/2.0 mL sub-q pen 0.5 mL by SubCUTAneous route every seven (7) days.  4 Pen 11    valsartan-hydroCHLOROthiazide (DIOVAN-HCT) 160-25 mg per tablet take 1 tablet by mouth once daily (Patient taking differently: take 1/2 pill daily) 30 Tab 11    empagliflozin-metFORMIN (SYNJARDY) 12.5-1,000 mg per tablet Take 1 Tab by mouth two (2) times daily (with meals). Indications: type 2 diabetes mellitus 60 Tab 5    butalbital-acetaminophen-caffeine (FIORICET, ESGIC) -40 mg per tablet Take 1 Tab by mouth every six (6) hours as needed for Headache. Max Daily Amount: 4 Tabs. Indications: MIGRAINE, TENSION-TYPE HEADACHE 30 Tab 1    sodium chloride (OCEAN) 0.65 % nasal spray 1 Spray as needed for Congestion.  solifenacin (VESICARE) 10 mg tablet Take 10 mg by mouth daily.  aspirin delayed-release 81 mg tablet Take 1 Tab by mouth daily. 90 Tab 3    valACYclovir (VALTREX) 500 mg tablet       estradiol (VAGIFEM) 10 mcg tab vaginal tablet Insert 10 mcg into vagina daily.  evening primrose oil (EVENING PRIMROSE) 500 mg cap Take 1 Cap by mouth two (2) times a day.  cyclobenzaprine (FLEXERIL) 10 mg tablet Take 10 mg by mouth as needed.  trazodone (DESYREL) 50 mg tablet Take 50 mg by mouth as needed. Takes at bedtime prn       tramadol (ULTRAM) 50 mg tablet Take 50 mg by mouth every six (6) hours as needed. PAST MEDICAL HISTORY:    Past Medical History:   Diagnosis Date    Allergic rhinitis     Dr. Nimesh Beasley 2015    Dr. Chet Mckeon.  Chest pain 01/15/09    Dr. Valorie James Chickenpox childhood    DDD (degenerative disc disease), cervical 10/2015    Dr. Kelvin Ga.  DDD (degenerative disc disease), lumbar 2014    Dr. Jillian Carter.  Diabetes ([facility] [address] 06/2007    Shanna Longoria, JUICE. Dr. Hayde Vargas, pod.  Endometriosis     Dr. Stu Guallpa    Endometriosis     Essential hypertension, benign     Dr. Selena Lezama, uterine     Dr. Rashi Serra 08/2009    Dr. Linda Villarreal. Dr. Hayde Vargas.  Hand paresthesia 2011    Dr. Nawaf Garcia. Dr. Demario Shah.  PRUSJIQO(293.1) 2012    Dr. Cara Cox. Dr. Linda Villarreal, ENT.     Hemoptysis 06/13/07    Dr. Lobito Garibayatal hernia 05/27/15    small. MD Danyelle Fields Internal hemorrhoids 12/2006, 05/27/15    Dr. Karen Brown. Dr. Anna Laura.  Intraocular pressure increase 07/27/05    bilaterally. Dr. Cal Blanco. Dr. Bertrand Jiménez.  Knee pain 04/04/13    Bilaterally. due to fall. Dr. Tessa Ge.  Left knee pain 11/26/14    Left. due to OA, torn medial and lateral meniscal tears. Dr. Yovani Garcia. Dr. Ammy Hudson.  Lumbar spondylosis 3/2008    Dr. Ken Keith. Dr. 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 Mckenzie Rhyme. due to Diabetic Neuropathy. Dr. Tosha Monroe.  Proteinuria 2009    Dr. Lisa Logan Right shoulder pain 01/30/06    Dr. Ayesha Rosas. impingement tendinitis    Right shoulder pain 07/2014    Dr. Zelda Terrell.  Schatzki's ring 05/27/15    with partial obstruction at GE junction. Dr. Anna Laura. Katheryn Fabry Dr. Adonica Grill. Dr. 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 Dr. Watson Holliday. [facility].  Thalassemia minor 1980s    Vitamin D deficiency 05/2011    Naresh Mayes NP       PAST SURGICAL HISTORY:    Past Surgical History:   Procedure Laterality Date    CYSTOSCOPY  07/2005    Dr. Kameron Bryan FOOT/TOES SURGERY PROC UNLISTED      Left Ornelas's Neuroma. [id number] HX BREAST BIOPSY Left     12 years ago neg    HX BREAST LUMPECTOMY  10/15/08    Left intraductal papilloma. Dr. Leslye Mcakay HX COLONOSCOPY  05/27/15    due to anemia, rectal bleeding. due q 5 yrs. Dr. Esha Ackerman  2001, 12/07/06, 2011    Dr. Paulie Vernon. due q 5 yrs.  HX ENDOSCOPY  05/27/15    due to anemia. Dr. Anna Laura.  HX HEART CATHETERIZATION  01/09/09    Dr. Liz Ryan ARTHROSCOPY Left 04/17/15    due to [address]. Dr. Ammy Hudson.     HX MYOMECTOMY  [id number]    x4    HX OOPHORECTOMY Bilateral 1994    HX MARTIN AND BSO  1994    endometriosis, fibroids. FAMILY HISTORY:    Family History   Problem Relation Age of Onset   [facility] Hypertension Mother    [facility] Glaucoma Mother     Heart Failure Mother      CHF    Hypertension Father     Cancer Father      colon    Heart Attack Sister     Stroke Sister     Diabetes Sister      x 2 sisters    Diabetes Brother     Breast Cancer Maternal Aunt 45       SOCIAL HISTORY:    Social History     Social History    Marital status: SINGLE     Spouse name: N/A    Number of children: N/A    Years of education: N/A     Social History Main Topics    Smoking status: Never Smoker    Smokeless tobacco: Never Used    Alcohol use 0.0 oz/week     0 Standard drinks or equivalent per week      Comment: Social beer, social drinks, social shots of liquor,and social wine.     Drug use: No    Sexual activity: Not Asked     Other Topics Concern    None     Social History Narrative       IMMUNIZATIONS:    Immunization History   Administered Date(s) Administered    H1N1 Influenza Virus Vaccine 03/09/2010    Hepatitis B Vaccine 06/09/2008, 08/20/2008, 01/27/2009    Influenza High Dose Vaccine PF 11/11/2015, 09/07/2016, 10/19/2017    Influenza Vaccine Split 10/08/2010, 11/08/2011, 10/24/2012    Influenza Vaccine Whole 01/27/2009    Pneumococcal Conjugate (PCV-13) 11/11/2015    Pneumococcal Polysaccharide (PPSV-23) 12/07/2016    Tdap 01/23/2018    ZZZ-RETIRED (DO NOT USE) Pneumococcal Vaccine (Unspecified Type) 12/21/2005    Zoster Vaccine, Live 01/20/2014    dT Vaccine 06/09/2008         PHYSICAL EXAMINATION    Vital Signs    Visit Vitals    /72 (BP 1 Location: Left arm, BP Patient Position: Sitting)    Pulse 81    Temp 97.9 °F (36.6 °C) (Oral)    Resp 18    Ht 5' 7\" (1.702 m)    Wt 180 lb 8 oz (81.9 kg)    SpO2 96%    BMI 28.27 kg/m2       Weight Metrics 5/25/2018 4/10/2018 1/23/2018 11/29/2017 11/13/2017 10/16/2017 8/16/2017   Weight 180 lb 8 oz 193 lb 3.2 oz 192 lb 11.2 oz 194 lb 1.6 oz 205 lb 9.6 oz 205 lb 14.4 oz 200 lb 3.2 oz   BMI 28.27 kg/m2 30.26 kg/m2 30.18 kg/m2 30.4 kg/m2 32.2 kg/m2 31.31 kg/m2 30.45 kg/m2       General appearance - Well nourished. Well appearing. Well developed. No acute distress. Overweight. Head - Normocephalic. Atraumatic. Eyes - pupils equal and reactive. Extraocular eye movements intact. Sclera anicteric. Mildly injected sclera. Ears - Hearing is grossly normal bilaterally. Nose - normal and patent. No polyps noted. No erythema. No discharge. Mouth - mucous membranes with adequate moisture. Posterior pharynx normal with cobblestone appearance. No erythema, white exudate or obstruction. Neck - supple. Midline trachea. No carotid bruits noted bilaterally. No thyromegaly noted. Chest - clear to auscultation bilaterally anteriorly and posteriorly. No wheezes. No rales or rhonchi. Breath sounds are symmetrical bilaterally. Unlabored respirations. Heart - normal rate. Regular rhythm. Normal S1, S2. No murmur noted. No rubs, clicks or gallops noted. Abdomen - soft and distended. No masses or organomegaly. No rebound, rigidity or guarding. Bowel sounds normal x 4 quadrants. No tenderness noted. Neurological - awake, alert and oriented to person, place, and time and event. Cranial nerves II through XII intact. Clear speech. Muscle strength is +5/5 x 4 extremities. Sensation is intact to light touch bilaterally. Steady gait. Heme/Lymph - peripheral pulses normal x 4 extremities. No peripheral edema is noted. Musculoskeletal - Intact x 4 extremities. Full ROM x 4 extremities. No pain with movement. Back exam - normal range of motion. No pain on palpation of the spinous processes in the cervical, thoracic, lumbar, sacral regions. No CVA tenderness. Skin - no rashes, erythema, ecchymosis, lacerations, abrasions, suspicious moles noted  Psychological -   normal behavior, dress and thought processes.   Good insight. Good eye contact. Normal affect. Appropriate mood. Normal speech. DATA REVIEWED    Lab Results   Component Value Date/Time    WBC 5.7 06/15/2017 08:57 AM    HGB 11.2 06/15/2017 08:57 AM    HCT 34.8 06/15/2017 08:57 AM    PLATELET 276 82/59/0782 08:57 AM    MCV 70 (L) 06/15/2017 08:57 AM     Lab Results   Component Value Date/Time    Sodium 138 05/18/2018 09:53 AM    Potassium 3.7 05/18/2018 09:53 AM    Chloride 97 05/18/2018 09:53 AM    CO2 24 05/18/2018 09:53 AM    Anion gap 11 09/03/2010 08:45 AM    Glucose 100 (H) 05/18/2018 09:53 AM    BUN 16 05/18/2018 09:53 AM    Creatinine 0.95 05/18/2018 09:53 AM    BUN/Creatinine ratio 17 05/18/2018 09:53 AM    GFR est AA 71 05/18/2018 09:53 AM    GFR est non-AA 62 05/18/2018 09:53 AM    Calcium 9.9 05/18/2018 09:53 AM    Bilirubin, total 0.3 05/18/2018 09:53 AM    AST (SGOT) 11 05/18/2018 09:53 AM    Alk.  phosphatase 58 05/18/2018 09:53 AM    Protein, total 6.9 05/18/2018 09:53 AM    Albumin 4.2 05/18/2018 09:53 AM    Globulin 3.5 09/03/2010 08:45 AM    A-G Ratio 1.6 05/18/2018 09:53 AM    ALT (SGPT) 6 05/18/2018 09:53 AM     Lab Results   Component Value Date/Time    Cholesterol, total 123 05/18/2018 09:53 AM    HDL Cholesterol 55 05/18/2018 09:53 AM    LDL, calculated 55 05/18/2018 09:53 AM    VLDL, calculated 13 05/18/2018 09:53 AM    Triglyceride 66 05/18/2018 09:53 AM    CHOL/HDL Ratio 1.9 04/26/2010 08:00 AM     Lab Results   Component Value Date/Time    Vitamin D 25-Hydroxy 25.1 (L) 10/20/2011 07:42 AM    VITAMIN D, 25-HYDROXY 30.9 05/18/2018 09:53 AM       Lab Results   Component Value Date/Time    Hemoglobin A1c 8.0 (H) 05/18/2018 09:53 AM    Hemoglobin A1c (POC) 8.4 03/14/2017 09:58 AM     Lab Results   Component Value Date/Time    TSH 3.690 10/09/2017 08:45 AM     Lab Results   Component Value Date/Time    Microalbumin/Creat ratio (mg/g creat) 5 09/03/2010 08:45 AM    Microalb/Creat ratio (ug/mg creat.) 9.4 05/18/2018 09:53 AM Microalbumin,urine random 1.21 09/03/2010 08:45 AM       ASSESSMENT and PLAN      ICD-10-CM ICD-9-CM    1. Essential hypertension I10 401.9     stable   2. High cholesterol E78.00 272.0     stable   3. Vitamin D deficiency E55.9 268.9 ergocalciferol (ERGOCALCIFEROL) 50,000 unit capsule   4. Uncontrolled type 2 diabetes mellitus with diabetic neuropathic arthropathy, without long-term current use of insulin ([facility]) E11.610 250.62     E11.65 713.5     improving with Trulicity x 1 month (started 4/2018)   5. Alopecia areata L63.9 704.01    6. Weight loss R63.4 783.21     12# since 01/2018, 25# since 85/7591 due to Trulicity vs diet changes vs other   7. Raised intraocular pressure of both eyes H40.053 365.00    8. ACE inhibitor intolerance Z78.9 995.27      E980.4    9. Neuropathic pain of foot, unspecified laterality G57.90 355.8    10. Oral bleeding K13.79 528.9     under bridge on R due to dental problems, stable   11. Thalassemia minor D56.3 282.46        Discussed the patient's BMI with her. The BMI follow up plan is as follows: I have counseled this patient on diet and exercise regimens. Decrease carbohydrates (white foods, sweet foods, sweet drinks and alcohol), increase green leafy vegetables and protein (lean meats and beans) with each meal.  Avoid fried foods. Eat 3-5 small meals daily. Do not skip meals. Increase water intake. Increase physical activity to 30 minutes daily for health benefit or 60 minutes daily to prevent weight regain, as tolerated. Get 7-8 hours uninterrupted sleep nightly. Chart reviewed and updated. Continue current medications and care. Take Rx Vit D weekly x3 months. Prescriptions written and sent to pharmacy; medication side effects discussed. Vit D 50,000IU. Most recent tests reviewed from 05/18/18. Recent office visit notes from Dr. Prosper Queen reviewed. Get recent office visit notes from Dr. Lila Nick and Dr. Manuel Meehan. Advised pt to sign release.     Counseled patient on health concerns:  DM, weight management, vit d deficiency, BP, and Shingrix. Immunizations noted. Encouraged Shingrix vaccine to be administered from her pharmacy since our office does not stock it. Offered empathy, support, legitimation, prayers, partnership to patient. Praised patient for progress. Follow-up Disposition:  Return in about 6 months (around 11/25/2018) for diabetes, weight, blood pressure. Patient was offered a choice/choices in the treatment plan today. Patient expresses understanding of the plan and agrees with recommendations. Patient declines any additional handouts. Patient is satisfied with previous handouts received from our office    Written by clem Russell, as dictated by Dr. Carmen Chavez DO. Documentation True and Accepted by Leana Wilcox.  Briana Jalloh.

## 2018-05-25 NOTE — PROGRESS NOTES
Levar Galvan is a 76 y.o. female    Chief Complaint   Patient presents with    Results     follow up    Referral Follow Up    Blood Pressure Check     follow up    Diabetes     follow up    Shingles     Patient wants to know about new injection. 1. Have you been to the ER, urgent care clinic since your last visit? Hospitalized since your last visit? No    2. Have you seen or consulted any other health care providers outside of the [address] [place] [address] since your last visit? Include any pap smears or colon screening. Yes Where: Dr. Yuliya Rossi.        Visit Vitals    /72 (BP 1 Location: Left arm, BP Patient Position: Sitting)    Pulse 81    Temp 97.9 °F (36.6 °C) (Oral)    Resp 18    Ht 5' 7\" (1.702 m)    Wt 180 lb 8 oz (81.9 kg)    SpO2 96%    BMI 28.27 kg/m2

## 2018-05-25 NOTE — MR AVS SNAPSHOT
[address] [place] [address] 
 
 
 [address] [facility] 
[address] [place] [address] 
[phone] Patient: Juancarlos Vivas MRN:  [facility]:7[birth year]9/4[birth year] Visit Information Date & Time Provider Department Dept. Phone Encounter #  
 5/25/2018 10:00 AM DO Aneudy JonesCarolynrenaldoalvaro [phone] [id number] Follow-up Instructions Return in about 6 months (around 11/25/2018) for diabetes, weight, blood pressure. Your Appointments 8/7/2018  9:10 AM  
ROUTINE CARE with Edil Hammer MD  
[facility] [address] [facility]) Appt Note: f/u diabetes cp0.00  
 [address] [facility] [address] [facility] [address] [facility]  
[phone] [address] [address] Upcoming Health Maintenance Date Due  
 EYE EXAM RETINAL OR DILATED Q1 7/25/2018 Influenza Age 5 to Adult 8/1/2018 HEMOGLOBIN A1C Q6M 11/18/2018 MEDICARE YEARLY EXAM 1/24/2019 FOOT EXAM Q1 4/10/2019 MICROALBUMIN Q1 5/18/2019 LIPID PANEL Q1 5/18/2019 GLAUCOMA SCREENING Q2Y 9/15/2019 BREAST CANCER SCRN MAMMOGRAM 2/7/2020 COLONOSCOPY 5/27/2020 DTaP/Tdap/Td series (2 - Td) 1/23/2028 Allergies as of 5/25/2018  Review Complete On: 5/25/2018 By: Zoë Donnelly DO Severity Noted Reaction Type Reactions Influenza Virus Vaccine, Specific High 10/13/2016    Swelling R ARM PAIN, NUMBNESS, SWELLING 
R HAND NUMBNESS AND TINGLING, WORSE IN 4TH AND FIFTH FINGERS Lisinopril Medium 10/28/2009    Cough Other reaction(s): Lisinopril Singulair [Montelukast]  02/28/2013    Other (comments)  
 headache Current Immunizations  Reviewed on 1/23/2018 Name Date H1N1 FLU VACCINE 3/9/2010 Hepatitis B Vaccine 1/27/2009, 8/20/2008, 6/9/2008 Influenza High Dose Vaccine PF 10/19/2017, 9/7/2016, 11/11/2015 Influenza Vaccine Split 10/24/2012, 11/8/2011, 10/8/2010 Influenza Vaccine Whole 1/27/2009 Pneumococcal Conjugate (PCV-13) 11/11/2015 Pneumococcal Polysaccharide (PPSV-23) 12/7/2016 Tdap 1/23/2018 ZZZ-RETIRED (DO NOT USE) Pneumococcal Vaccine (Unspecified Type) 12/21/2005 Zoster Vaccine, Live 1/20/2014  
 dT Vaccine 6/9/2008 Not reviewed this visit You Were Diagnosed With   
  
 Codes Comments Essential hypertension    -  Primary ICD-10-CM: I10 
ICD-9-CM: 401.9 stable High cholesterol     ICD-10-CM: E78.00 ICD-9-CM: 272.0 stable Vitamin D deficiency     ICD-10-CM: E55.9 ICD-9-CM: 268.9 Uncontrolled type 2 diabetes mellitus with diabetic neuropathic arthropathy, without long-term current use of insulin (HCC)     ICD-10-CM: E11.610, E11.65 ICD-9-CM: 250.62, 713.5 improving with Trulicity x 1 month (started 4/2018) Alopecia areata     ICD-10-CM: L63.9 ICD-9-CM: 704.01 Weight loss     ICD-10-CM: R63.4 ICD-9-CM: 783.21 12# since 01/2018, 25# since 40/1281 due to Trulicity vs diet changes vs other Raised intraocular pressure of both eyes     ICD-10-CM: H40.053 ICD-9-CM: 365.00   
 ACE inhibitor intolerance     ICD-10-CM: Z78.9 ICD-9-CM: 995.27, E980.4 Neuropathic pain of foot, unspecified laterality     ICD-10-CM: G57.90 ICD-9-CM: 355.8 Oral bleeding     ICD-10-CM: K13.79 ICD-9-CM: 528.9 under bridge on R due to dental problems, stable Thalassemia minor     ICD-10-CM: D56.3 ICD-9-CM: 282.46 Vitals BP Pulse Temp Resp Height(growth percentile) Weight(growth percentile) 122/72 (BP 1 Location: Left arm, BP Patient Position: Sitting) 81 97.9 °F (36.6 °C) (Oral) 18 5' 7\" (1.702 m) 180 lb 8 oz (81.9 kg) SpO2 BMI OB Status Smoking Status 96% 28.27 kg/m2 Hysterectomy Never Smoker Vitals History BMI and BSA Data Body Mass Index Body Surface Area  
 28.27 kg/m 2 1.97 m 2 Preferred Pharmacy Pharmacy Name Phone [facility], [address] [phone] Your Updated Medication List  
  
   
This list is accurate as of 5/25/18 11:48 AM.  Always use your most recent med list.  
  
  
  
  
 aspirin delayed-release 81 mg tablet Take 1 Tab by mouth daily. Blood-Glucose Meter monitoring kit One Touch Ultra 2 Use to test blood sugar 1-2 per day (blood sugar before breakfast or 2 hours after any meal or at bedtime) DX.  E11.65  
  
 butalbital-acetaminophen-caffeine -40 mg per tablet Commonly known as:  Brandon Kingfisher Take 1 Tab by mouth every six (6) hours as needed for Headache. Max Daily Amount: 4 Tabs. Indications: MIGRAINE, TENSION-TYPE HEADACHE  
  
 dulaglutide 1.5 mg/0.5 mL sub-q pen Commonly known as:  TRULICITY  
0.5 mL by SubCUTAneous route every seven (7) days. empagliflozin-metFORMIN 12.5-1,000 mg per tablet Commonly known as:  Omar Huber Take 1 Tab by mouth two (2) times daily (with meals). Indications: type 2 diabetes mellitus  
  
 ergocalciferol 50,000 unit capsule Commonly known as:  ERGOCALCIFEROL Take 1 Cap by mouth every seven (7) days. Indications: Vitamin D Deficiency EVENING PRIMROSE 500 mg Cap Generic drug:  evening primrose oil Take 1 Cap by mouth two (2) times a day. FLEXERIL 10 mg tablet Generic drug:  cyclobenzaprine Take 10 mg by mouth as needed. glucose blood VI test strips strip Commonly known as:  ASCENSIA AUTODISC VI, ONE TOUCH ULTRA TEST VI  
Use to test blood sugar 1-2 per day (blood sugar before breakfast or 2 hours after any meal or at bedtime) DX. E11.65  
  
 simvastatin 40 mg tablet Commonly known as:  ZOCOR  
take 1 tablet by mouth at bedtime  
  
 sodium chloride 0.65 % nasal squeeze bottle Commonly known as:  OCEAN  
1 Spray as needed for Congestion. traMADol 50 mg tablet Commonly known as:  ULTRAM  
Take 50 mg by mouth every six (6) hours as needed. traZODone 50 mg tablet Commonly known as:  Geoffrey Bhatt  
 Take 50 mg by mouth as needed. Takes at bedtime prn  
  
 VAGIFEM 10 mcg Tab vaginal tablet Generic drug:  estradiol Insert 10 mcg into vagina daily. valACYclovir 500 mg tablet Commonly known as:  VALTREX  
  
 valsartan-hydroCHLOROthiazide 160-25 mg per tablet Commonly known as:  DIOVAN-HCT  
take 1 tablet by mouth once daily VESIcare 10 mg tablet Generic drug:  solifenacin Take 10 mg by mouth daily. Prescriptions Sent to Pharmacy Refills  
 ergocalciferol (ERGOCALCIFEROL) 50,000 unit capsule 2 Sig: Take 1 Cap by mouth every seven (7) days. Indications: Vitamin D Deficiency Class: Normal  
 Pharmacy: [id number] [address], [address] [place] [address] #: [phone] Route: Oral  
  
Follow-up Instructions Return in about 6 months (around 11/25/2018) for diabetes, weight, blood pressure. Introducing [facility]! Dear Americo Ups: Thank you for requesting a [employer] account. Our records indicate that you already have an active [employer] account. You can access your account anytime at [email or web address] Did you know that you can access your hospital and ER discharge instructions at any time in [employer]? You can also review all of your test results from your hospital stay or ER visit. Additional Information If you have questions, please visit the Frequently Asked Questions section of the [employer] website at [email or web address] Remember, [employer] is NOT to be used for urgent needs. For medical emergencies, dial 911. Now available from your iPhone and Android! Please provide this summary of care documentation to your next provider. Your primary care clinician is listed as Thera Goodell. If you have any questions after today's visit, please call [phone].

## 2018-07-10 NOTE — TELEPHONE ENCOUNTER
PCP: Emilie Washington DO    Last appt: 5/25/2018  Future Appointments  Date Time Provider China Zoe   8/7/2018 9:10 AM Dave Card MD RDE [address] [facility]   11/12/2018 8:40 AM LAB BRFP BRFP [facility] SCHED   11/19/2018 11:30 AM Daly Buenrostro DO [facility] SCHED       Requested Prescriptions     Pending Prescriptions Disp Refills    SYNJARDY 12.5-1,000 mg per tablet [Pharmacy Med Name: Ramiro Mancini 12.5-1,000 MG TABLET] 60 Tab 5     Sig: take 1 tablet by mouth twice a day with meals       Prior labs and Blood pressures:  BP Readings from Last 3 Encounters:   05/25/18 122/72   04/10/18 125/71   01/23/18 94/62     Lab Results   Component Value Date/Time    Sodium 138 05/18/2018 09:53 AM    Potassium 3.7 05/18/2018 09:53 AM    Chloride 97 05/18/2018 09:53 AM    CO2 24 05/18/2018 09:53 AM    Anion gap 11 09/03/2010 08:45 AM    Glucose 100 (H) 05/18/2018 09:53 AM    BUN 16 05/18/2018 09:53 AM    Creatinine 0.95 05/18/2018 09:53 AM    BUN/Creatinine ratio 17 05/18/2018 09:53 AM    GFR est AA 71 05/18/2018 09:53 AM    GFR est non-AA 62 05/18/2018 09:53 AM    Calcium 9.9 05/18/2018 09:53 AM     Lab Results   Component Value Date/Time    Hemoglobin A1c 8.0 (H) 05/18/2018 09:53 AM    Hemoglobin A1c (POC) 8.4 03/14/2017 09:58 AM     Lab Results   Component Value Date/Time    Cholesterol, total 123 05/18/2018 09:53 AM    HDL Cholesterol 55 05/18/2018 09:53 AM    LDL, calculated 55 05/18/2018 09:53 AM    VLDL, calculated 13 05/18/2018 09:53 AM    Triglyceride 66 05/18/2018 09:53 AM    CHOL/HDL Ratio 1.9 04/26/2010 08:00 AM     Lab Results   Component Value Date/Time    Vitamin D 25-Hydroxy 25.1 (L) 10/20/2011 07:42 AM    VITAMIN D, 25-HYDROXY 30.9 05/18/2018 09:53 AM       Lab Results   Component Value Date/Time    TSH 3.690 10/09/2017 08:45 AM

## 2018-07-12 NOTE — TELEPHONE ENCOUNTER
PCP: Navi Ng DO    Last appt: 5/25/2018  Future Appointments  Date Time Provider China Enriquez   8/7/2018 9:10 AM Karla Ramirez MD RDE [address] [facility]   11/12/2018 8:40 AM LAB BRFP BRFP [facility] SCHED   11/19/2018 11:30 AM Daly Roberson DO [facility] SCHED       Requested Prescriptions     Pending Prescriptions Disp Refills    SYNJARDY 12.5-1,000 mg per tablet [Pharmacy Med Name: Tima Diegoy 12.5-1,000 MG TABLET] 60 Tab 5     Sig: take 1 tablet by mouth twice a day with meals       Prior labs and Blood pressures:  BP Readings from Last 3 Encounters:   05/25/18 122/72   04/10/18 125/71   01/23/18 94/62     Lab Results   Component Value Date/Time    Sodium 138 05/18/2018 09:53 AM    Potassium 3.7 05/18/2018 09:53 AM    Chloride 97 05/18/2018 09:53 AM    CO2 24 05/18/2018 09:53 AM    Anion gap 11 09/03/2010 08:45 AM    Glucose 100 (H) 05/18/2018 09:53 AM    BUN 16 05/18/2018 09:53 AM    Creatinine 0.95 05/18/2018 09:53 AM    BUN/Creatinine ratio 17 05/18/2018 09:53 AM    GFR est AA 71 05/18/2018 09:53 AM    GFR est non-AA 62 05/18/2018 09:53 AM    Calcium 9.9 05/18/2018 09:53 AM     Lab Results   Component Value Date/Time    Hemoglobin A1c 8.0 (H) 05/18/2018 09:53 AM    Hemoglobin A1c (POC) 8.4 03/14/2017 09:58 AM     Lab Results   Component Value Date/Time    Cholesterol, total 123 05/18/2018 09:53 AM    HDL Cholesterol 55 05/18/2018 09:53 AM    LDL, calculated 55 05/18/2018 09:53 AM    VLDL, calculated 13 05/18/2018 09:53 AM    Triglyceride 66 05/18/2018 09:53 AM    CHOL/HDL Ratio 1.9 04/26/2010 08:00 AM     Lab Results   Component Value Date/Time    Vitamin D 25-Hydroxy 25.1 (L) 10/20/2011 07:42 AM    VITAMIN D, 25-HYDROXY 30.9 05/18/2018 09:53 AM       Lab Results   Component Value Date/Time    TSH 3.690 10/09/2017 08:45 AM

## 2018-07-13 ENCOUNTER — TELEPHONE (OUTPATIENT)
Dept: FAMILY MEDICINE CLINIC | Age: 68
End: 2018-07-13

## 2018-07-13 RX ORDER — EMPAGLIFLOZIN AND METFORMIN HYDROCHLORIDE 12.5; 1 MG/1; MG/1
TABLET ORAL
Qty: 60 TAB | Refills: 5 | Status: SHIPPED | OUTPATIENT
Start: 2018-07-13 | End: 2018-11-14 | Stop reason: SDUPTHER

## 2018-07-13 RX ORDER — EMPAGLIFLOZIN AND METFORMIN HYDROCHLORIDE 12.5; 1 MG/1; MG/1
TABLET ORAL
Qty: 60 TAB | Refills: 5 | Status: SHIPPED | OUTPATIENT
Start: 2018-07-13 | End: 2018-08-07 | Stop reason: SDUPTHER

## 2018-07-13 NOTE — TELEPHONE ENCOUNTER
Patient is calling in reference to her medication not being filled, I told her that we are aware and the doctor will be getting to it when she can. Sending a message over as requested by patient.

## 2018-07-18 ENCOUNTER — TELEPHONE (OUTPATIENT)
Dept: FAMILY MEDICINE CLINIC | Age: 68
End: 2018-07-18

## 2018-07-18 NOTE — TELEPHONE ENCOUNTER
----- Message from Cody Bowers sent at 7/18/2018 10:48 AM EDT -----  Regarding: Dr. Alejandro Springer  Patient is having headaches and a knot in her right arm. Please call her at [phone].

## 2018-08-07 ENCOUNTER — OFFICE VISIT (OUTPATIENT)
Dept: ENDOCRINOLOGY | Age: 68
End: 2018-08-07

## 2018-08-07 VITALS
OXYGEN SATURATION: 100 % | RESPIRATION RATE: 16 BRPM | DIASTOLIC BLOOD PRESSURE: 68 MMHG | WEIGHT: 181 LBS | HEART RATE: 65 BPM | SYSTOLIC BLOOD PRESSURE: 131 MMHG | BODY MASS INDEX: 26.81 KG/M2 | HEIGHT: 69 IN

## 2018-08-07 DIAGNOSIS — E11.40 CONTROLLED TYPE 2 DIABETES MELLITUS WITH DIABETIC NEUROPATHY, WITHOUT LONG-TERM CURRENT USE OF INSULIN (HCC): Primary | ICD-10-CM

## 2018-08-07 PROBLEM — E66.9 OBESITY, CLASS I, BMI 30-34.9: Status: RESOLVED | Noted: 2017-03-14 | Resolved: 2018-08-07

## 2018-08-07 NOTE — PROGRESS NOTES
Lab Results   Component Value Date/Time    Hemoglobin A1c 8.0 (H) 05/18/2018 09:53 AM    Hemoglobin A1c (POC) 8.4 03/14/2017 09:58 AM     Lab Results   Component Value Date/Time    Microalbumin/Creat ratio (mg/g creat) 5 09/03/2010 08:45 AM    Microalb/Creat ratio (ug/mg creat.) 9.4 05/18/2018 09:53 AM    Microalbumin,urine random 1.21 09/03/2010 08:45 AM     Diabetic Foot and Eye Exam HM Status   Topic Date Due    Eye Exam  03/21/2019    Diabetic Foot Care  04/10/2019

## 2018-08-07 NOTE — MR AVS SNAPSHOT
[address] [facility] 
[phone] Patient: Luis Luna MRN:  AVD:[id number] Visit Information Date & Time Provider Department Dept. Phone Encounter #  
 8/7/2018  9:10 AM Precious Novak, [facility] [phone] Your Appointments 8/10/2018 11:30 AM  
ROUTINE CARE with DO Demetri Ruiz ([facility]) Appt Note: knot in right arm-headaches [address] [facility] [address]  
[address] [facility] [address]  
  
    
 11/12/2018  8:40 AM  
LAB with LAB BRFP [employer] ([facility]) Appt Note: fasting lab [address] [facility] [address]  
[phone]  
  
   
 [address]  
  
    
 11/19/2018 11:30 AM  
ROUTINE CARE with DO Demetri Ruiz ([facility]) Appt Note: Diabetes, weight, blood pressure check [address] [facility] [address]  
[phone] Upcoming Health Maintenance Date Due Influenza Age 5 to Adult 8/1/2018 HEMOGLOBIN A1C Q6M 11/18/2018 EYE EXAM RETINAL OR DILATED Q1 3/21/2019 FOOT EXAM Q1 4/10/2019 MICROALBUMIN Q1 5/18/2019 LIPID PANEL Q1 5/18/2019 BREAST CANCER SCRN MAMMOGRAM 2/7/2020 GLAUCOMA SCREENING Q2Y 3/21/2020 COLONOSCOPY 5/27/2020 DTaP/Tdap/Td series (2 - Td) 1/23/2028 Allergies as of 8/7/2018  Review Complete On: 5/25/2018 By: Gustavo Graham DO Severity Noted Reaction Type Reactions Influenza Virus Vaccine, Specific High 10/13/2016    Swelling R ARM PAIN, NUMBNESS, SWELLING 
R HAND NUMBNESS AND TINGLING, WORSE IN 4TH AND FIFTH FINGERS Lisinopril Medium 10/28/2009    Cough Other reaction(s): Lisinopril Singulair [Montelukast]  02/28/2013    Other (comments)  
 headache Current Immunizations  Reviewed on 1/23/2018 Name Date H1N1 FLU VACCINE 3/9/2010 Hepatitis B Vaccine 1/27/2009, 8/20/2008, 6/9/2008 Influenza High Dose Vaccine PF 10/19/2017, 9/7/2016, 11/11/2015 Influenza Vaccine Split 10/24/2012, 11/8/2011, 10/8/2010 Influenza Vaccine Whole 1/27/2009 Pneumococcal Conjugate (PCV-13) 11/11/2015 Pneumococcal Polysaccharide (PPSV-23) 12/7/2016 Tdap 1/23/2018 ZZZ-RETIRED (DO NOT USE) Pneumococcal Vaccine (Unspecified Type) 12/21/2005 Zoster Vaccine, Live 1/20/2014  
 dT Vaccine 6/9/2008 Not reviewed this visit You Were Diagnosed With   
  
 Codes Comments Controlled type 2 diabetes mellitus with diabetic neuropathy, without long-term current use of insulin ([facility].)    -  Primary ICD-10-CM: E11.40 ICD-9-CM: 250.60, 357.2 Vitals BP Pulse Resp Height(growth percentile) Weight(growth percentile) SpO2  
 131/68 65 16 5' 8.75\" (1.746 m) 181 lb (82.1 kg) 100% BMI OB Status Smoking Status 26.92 kg/m2 Hysterectomy Never Smoker Vitals History BMI and BSA Data Body Mass Index Body Surface Area  
 26.92 kg/m 2 2 m 2 Preferred Pharmacy Pharmacy Name Phone [facility], [address] [phone] Your Updated Medication List  
  
   
This list is accurate as of 8/7/18  9:53 AM.  Always use your most recent med list.  
  
  
  
  
 aspirin delayed-release 81 mg tablet Take 1 Tab by mouth daily. Blood-Glucose Meter monitoring kit One Touch Ultra 2 Use to test blood sugar 1-2 per day (blood sugar before breakfast or 2 hours after any meal or at bedtime) DX.  E11.65  
  
 butalbital-acetaminophen-caffeine -40 mg per tablet Commonly known as:  Meyer Mcburney Take 1 Tab by mouth every six (6) hours as needed for Headache.  Max Daily Amount: 4 Tabs. Indications: MIGRAINE, TENSION-TYPE HEADACHE  
  
 dulaglutide 1.5 mg/0.5 mL sub-q pen Commonly known as:  TRULICITY  
0.5 mL by SubCUTAneous route every seven (7) days. ergocalciferol 50,000 unit capsule Commonly known as:  ERGOCALCIFEROL Take 1 Cap by mouth every seven (7) days. Indications: Vitamin D Deficiency EVENING PRIMROSE 500 mg Cap Generic drug:  evening primrose oil Take 1 Cap by mouth two (2) times a day. FLEXERIL 10 mg tablet Generic drug:  cyclobenzaprine Take 10 mg by mouth as needed. glucose blood VI test strips strip Commonly known as:  ASCENSIA AUTODISC VI, ONE TOUCH ULTRA TEST VI  
Use to test blood sugar 1-2 per day (blood sugar before breakfast or 2 hours after any meal or at bedtime) DX. E11.65  
  
 simvastatin 40 mg tablet Commonly known as:  ZOCOR  
take 1 tablet by mouth at bedtime  
  
 sodium chloride 0.65 % nasal squeeze bottle Commonly known as:  OCEAN  
1 Spray as needed for Congestion. * SYNJARDY 12.5-1,000 mg per tablet Generic drug:  empagliflozin-metFORMIN  
take 1 tablet by mouth twice a day with meals * SYNJARDY 12.5-1,000 mg per tablet Generic drug:  empagliflozin-metFORMIN  
take 1 tablet by mouth twice a day with meals * SYNJARDY 12.5-1,000 mg per tablet Generic drug:  empagliflozin-metFORMIN  
take 1 tablet by mouth twice a day with meals  
  
 traMADol 50 mg tablet Commonly known as:  ULTRAM  
Take 50 mg by mouth every six (6) hours as needed. traZODone 50 mg tablet Commonly known as:  Jose Lipps Take 50 mg by mouth as needed. Takes at bedtime prn  
  
 VAGIFEM 10 mcg Tab vaginal tablet Generic drug:  estradiol Insert 10 mcg into vagina daily. valACYclovir 500 mg tablet Commonly known as:  VALTREX  
  
 valsartan-hydroCHLOROthiazide 160-25 mg per tablet Commonly known as:  DIOVAN-HCT  
take 1 tablet by mouth once daily VESIcare 10 mg tablet Generic drug:  solifenacin Take 10 mg by mouth daily. * Notice: This list has 3 medication(s) that are the same as other medications prescribed for you. Read the directions carefully, and ask your doctor or other care provider to review them with you. We Performed the Following HEMOGLOBIN A1C WITH EAG [71701 CPT(R)] TSH AND FREE T4 [61360 CPT(R)] Patient Instructions If you are having difficulty activating or accessing your [facility] account, please call the [address] [place] [address] at [phone]. Introducing [facility]! Dear Onel Frankel: Thank you for requesting a [facility] [employer] account. Our records indicate that you already have an active [facility] [employer] account. You can access your account anytime at [email or web address] Did you know that you can access your hospital and ER discharge instructions at any time in [facility]? You can also review all of your test results from your hospital stay or ER visit. Additional Information If you have questions, please visit the Frequently Asked Questions section of the [employer] [facility] website at [email or web address] Remember, [facility] is NOT to be used for urgent needs. For medical emergencies, dial 911. Now available from your iPhone and Android! Please provide this summary of care documentation to your next provider. Your primary care clinician is listed as Kellie Paz. If you have any questions after today's visit, please call [phone].

## 2018-08-07 NOTE — PROGRESS NOTES
Endocrinology Visit    Chief Complaint: Type 2 diabetes    History of Present Illness: Ventura Soria is a 76 y.o. female who returns for type 2 diabetes mellitus, previously uncontrolled with A1c 9.9% in January. I saw her in initial consultation in April at which time I added Trulicity to her regimen of metformin and Jardiance. In the interim, she has lost 12 lbs and says her blood sugars have improved, most values are in the 100s now. Denies side effects from her medications, other than reduction in appetite. Current glycemic medication regimen is Trulicity 9.0WY weekly and Synjardy 12.5-1000mg BID. Home blood glucose monitoring frequency: once a day  Glucose range at home: 113-200 per glucometer review - avg appears to be around 910-139    Known complications include neuropathy. Last eye exam was in March - no retinopathy. Weight is down 25 lbs in the past year. Eats 3 meals/day and does snack, but not as often since starting Trulicity. Trying to chose healthy snacks like nuts and fruit, but still has candy on occasion. Drinks water, coffee or tea with Splenda.      Review of Systems as above, otherwise a 7 pt review is negative    Problem List:  Patient Active Problem List   Diagnosis Code    Internal hemorrhoids K64.8    Lumbar spondylosis M47.816    Fibromyalgia M79.7    Headache(784.0) R51    Neck arthritis ([address] M46.92    Thalassemia minor D56.3    Vitamin D deficiency E55.9    Left knee pain M25.562    Right shoulder pain M25.511    Essential hypertension I10    Stenosis colon ([address] K56.699    Schatzki's ring K22.2    Hiatal hernia K44.9    Allergic rhinitis due to pollen J30.1    Iron deficiency anemia D50.9    Intraocular pressure increase H40.059    Neck pain M54.2    High cholesterol E78.00    Cataract H26.9    ACE inhibitor intolerance Z78.9    S/P MARTNI (total abdominal hysterectomy) Z90.710    Neuropathic pain of foot G57.90    Advance care planning Z71.89    Uncontrolled type 2 diabetes mellitus with diabetic neuropathic arthropathy, without long-term current use of insulin ([facility]) E11.610, E11.65    Obesity, Class I, BMI 30-34.9 E66.9    Ulnar neuropathy of left upper extremity G56.22    Acne rosacea L71.9    Type 2 diabetes mellitus with diabetic neuropathy ([facility] E11.40       Past Medical History:    Past Medical History:   Diagnosis Date    Allergic rhinitis     Dr. Tin Ann 2015    Dr. Digna Rush.  Chest pain 01/15/09    Dr. Jose Luis Kirby Chickenpox childhood    DDD (degenerative disc disease), cervical 10/2015    Dr. Aditi Poon.  DDD (degenerative disc disease), lumbar 2014    Dr. Cy Young.  Diabetes [facility] 06/2007    Denisha Renteria, OD. Dr. Cherelle Little, pod.  Endometriosis     Dr. Javier Mealing    Endometriosis     Essential hypertension, benign     Dr. Sherry Khan, uterine     Dr. Booker Canavan Dr. Lynnann Isles 08/2009    Dr. Evert Monte. Dr. Cherelle Little.  Hand paresthesia 2011    Dr. Sridevi Daley. Dr. Preston Mosqueda.  XKNQKAYA(005.2) 2012    Dr. Rosana Irby. Dr. Evert Monte, ENT.  Hemoptysis 06/13/07    Dr. Andres Section    Hiatal hernia 05/27/15    kannan. Cisco Sosa MD   [address] [place] [address] Internal hemorrhoids 12/2006, 05/27/15    Dr. Yin. Dr. Eduardo Sheppard.  Intraocular pressure increase 07/27/05    bilaterally. Dr. Estle Hodgkins. Dr. Denisha Renteria.  Knee pain 04/04/13    Bilaterally. due to fall. Dr. Jaylen Conway.  Left knee pain 11/26/14    Left. due to OA, torn medial and lateral meniscal tears. Dr. Suellen Stauffer. Dr. Sophie Schwartz.  Lumbar spondylosis 3/2008    Dr. Verner Pass. Dr. Desiree Wolff    Menopause 1994    Paresthesia of foot     Left  due to Ornelas's Neuromo Sxs. Alex Alt. due to Diabetic Neuropathy. Dr. Cherelle Little.  Proteinuria 2009    Dr. Miguel Masters Right shoulder pain 01/30/06    Dr. Tavarez Left.   impingement tendinitis    Right shoulder pain 07/2014    Dr. Fabi Barrera.  Schatzki's ring 05/27/15    with partial obstruction at GE junction. Dr. Caleb Del Real. Jaylen Stayaron Rosenberg. Dr. Moraima Call    Stenosis colon [facility]) 05/27/15    SIGMOID WITH TORTUOUS DESCENDING COLON. Dr. Daniel North. [facility].  Thalassemia minor 1980s    Vitamin D deficiency 05/2011    Ethan Cortez, SALEEM       Past Surgical History:  Past Surgical History:   Procedure Laterality Date    CYSTOSCOPY  07/2005    Dr. Olsen Crest FOOT/TOES SURGERY PROC UNLISTED      Left Ornelas's Neuroma. [address] [facility] HX BREAST BIOPSY Left     12 years ago neg    HX BREAST LUMPECTOMY  10/15/08    Left intraductal papilloma. Dr. Rafat Celis HX COLONOSCOPY  05/27/15    due to anemia, rectal bleeding. due q 5 yrs. Dr. Louis Dominique  2001, 12/07/06, 2011    Dr. Joseph Capps. due q 5 yrs.  HX ENDOSCOPY  05/27/15    due to anemia. Dr. Caleb Del Real.  HX HEART CATHETERIZATION  01/09/09    Dr. Fredy Dumont ARTHROSCOPY Left 04/17/15    due to [address] Dr. Lipscomb Law.  HX MYOMECTOMY  1974-1993    x4    HX OOPHORECTOMY Bilateral 1994    HX MARTIN AND BSO  1994    endometriosis, fibroids. Social History:  Social History     Social History    Marital status: SINGLE     Spouse name: N/A    Number of children: N/A    Years of education: N/A     Occupational History    Not on file. Social History Main Topics    Smoking status: Never Smoker    Smokeless tobacco: Never Used    Alcohol use 0.0 oz/week     0 Standard drinks or equivalent per week      Comment: Social beer, social drinks, social shots of liquor,and social wine.     Drug use: No    Sexual activity: Not on file     Other Topics Concern    Not on file     Social History Narrative       Family History:  Family History   Problem Relation Age of Onset    Hypertension Mother     Glaucoma Mother     Heart Failure Mother      CHF    Hypertension Father     Cancer Father      colon    Heart Attack Sister     Stroke Sister     Diabetes Sister      x 2 sisters    Diabetes Brother     Breast Cancer Maternal Aunt 38       Medications:     Current Outpatient Prescriptions:     SYNJARDY 12.5-1,000 mg per tablet, take 1 tablet by mouth twice a day with meals, Disp: 60 Tab, Rfl: 5    SYNJARDY 12.5-1,000 mg per tablet, take 1 tablet by mouth twice a day with meals, Disp: 60 Tab, Rfl: 5    SYNJARDY 12.5-1,000 mg per tablet, take 1 tablet by mouth twice a day with meals, Disp: 60 Tab, Rfl: 5    ergocalciferol (ERGOCALCIFEROL) 50,000 unit capsule, Take 1 Cap by mouth every seven (7) days. Indications: Vitamin D Deficiency, Disp: 5 Cap, Rfl: 2    Blood-Glucose Meter monitoring kit, One Touch Ultra 2 Use to test blood sugar 1-2 per day (blood sugar before breakfast or 2 hours after any meal or at bedtime) DX.  E11.65, Disp: 1 Kit, Rfl: 0    glucose blood VI test strips (ASCENSIA AUTODISC VI, ONE TOUCH ULTRA TEST VI) strip, Use to test blood sugar 1-2 per day (blood sugar before breakfast or 2 hours after any meal or at bedtime) DX. E11.65, Disp: 100 Strip, Rfl: 3    simvastatin (ZOCOR) 40 mg tablet, take 1 tablet by mouth at bedtime, Disp: 90 Tab, Rfl: 2    dulaglutide (TRULICITY) 1.5 XE/4.4 mL sub-q pen, 0.5 mL by SubCUTAneous route every seven (7) days. , Disp: 4 Pen, Rfl: 11    valsartan-hydroCHLOROthiazide (DIOVAN-HCT) 160-25 mg per tablet, take 1 tablet by mouth once daily (Patient taking differently: take 1/2 pill daily), Disp: 30 Tab, Rfl: 11    butalbital-acetaminophen-caffeine (FIORICET, ESGIC) -40 mg per tablet, Take 1 Tab by mouth every six (6) hours as needed for Headache. Max Daily Amount: 4 Tabs. Indications: MIGRAINE, TENSION-TYPE HEADACHE, Disp: 30 Tab, Rfl: 1    sodium chloride (OCEAN) 0.65 % nasal spray, 1 Spray as needed for Congestion. , Disp: , Rfl:     solifenacin (VESICARE) 10 mg tablet, Take 10 mg by mouth daily. , Disp: , Rfl:     aspirin delayed-release 81 mg tablet, Take 1 Tab by mouth daily. , Disp: 90 Tab, Rfl: 3    valACYclovir (VALTREX) 500 mg tablet, , Disp: , Rfl:     estradiol (VAGIFEM) 10 mcg tab vaginal tablet, Insert 10 mcg into vagina daily. , Disp: , Rfl:     evening primrose oil (EVENING PRIMROSE) 500 mg cap, Take 1 Cap by mouth two (2) times a day., Disp: , Rfl:     cyclobenzaprine (FLEXERIL) 10 mg tablet, Take 10 mg by mouth as needed. , Disp: , Rfl:     trazodone (DESYREL) 50 mg tablet, Take 50 mg by mouth as needed. Takes at bedtime prn , Disp: , Rfl:     tramadol (ULTRAM) 50 mg tablet, Take 50 mg by mouth every six (6) hours as needed. , Disp: , Rfl:     Allergies: Allergies   Allergen Reactions    Influenza Virus Vaccine, Specific Swelling     R ARM PAIN, NUMBNESS, SWELLING  R HAND NUMBNESS AND TINGLING, WORSE IN 4TH AND FIFTH FINGERS    Lisinopril Cough     Other reaction(s): Lisinopril    Singulair [Montelukast] Other (comments)     headache       Physical Examination:  Blood pressure 131/68, pulse 65, resp. rate 16, height 5' 8.75\" (1.746 m), weight 181 lb (82.1 kg), SpO2 100 %. Gen: no acute distress  HEENT: mucous membranes moist  CAD: normal rate, regular rhythm. No murmur rubs or gallops  PULM: clear to ausculation, no wheezes, rhonchis or rales.   EXT: no clubbing, cyanosis or edema  Neuro: grossly non focal  Psych: pleasant, good insight into medical hx  Skin: warm, dry    Clinical Data Review:  Lab Results   Component Value Date/Time    Hemoglobin A1c 8.0 (H) 05/18/2018 09:53 AM    Hemoglobin A1c (POC) 8.4 03/14/2017 09:58 AM     Lab Results   Component Value Date/Time    Sodium 138 05/18/2018 09:53 AM    Potassium 3.7 05/18/2018 09:53 AM    Chloride 97 05/18/2018 09:53 AM    CO2 24 05/18/2018 09:53 AM    Anion gap 11 09/03/2010 08:45 AM    Glucose 100 (H) 05/18/2018 09:53 AM    BUN 16 05/18/2018 09:53 AM    Creatinine 0.95 05/18/2018 09:53 AM BUN/Creatinine ratio 17 05/18/2018 09:53 AM    GFR est AA 71 05/18/2018 09:53 AM    GFR est non-AA 62 05/18/2018 09:53 AM    Calcium 9.9 05/18/2018 09:53 AM     Lab Results   Component Value Date/Time    Microalbumin/Creat ratio (mg/g creat) 5 09/03/2010 08:45 AM    Microalb/Creat ratio (ug/mg creat.) 9.4 05/18/2018 09:53 AM    Microalbumin,urine random 1.21 09/03/2010 08:45 AM     Lab Results   Component Value Date/Time    Cholesterol, total 123 05/18/2018 09:53 AM    HDL Cholesterol 55 05/18/2018 09:53 AM    LDL, calculated 55 05/18/2018 09:53 AM    VLDL, calculated 13 05/18/2018 09:53 AM    Triglyceride 66 05/18/2018 09:53 AM    CHOL/HDL Ratio 1.9 04/26/2010 08:00 AM       Assessment and Plan:  Jose F Webb is a 76 y.o. female here for type 2 diabetes, control of which is improving since adding GLP1a to regimen of metformin and SGLT2i. Check A1c today, since she has now been on GLP1a tx for at least 3 months. We reviewed the importance of reducing dietary carbohydrate intake further, so we can avoid starting insulin. Applauded her efforts thus far. Glycemic Medication Changes:  - continue Trulicity 1.0JW weekly  - continue Synjardy 12.5-1000mg twice a day  - check BG 1-2 times/day    DM Health Maintenance: pertinent items updated in HM tab  A1c: update today  Cv/Lipids: on simvastatin, lipids UTD  BP/Renal: BP at goal, on ARB, microalbumin UTD and nonelevated  Vaccines: per PCP  Podiatry: no active issues, encouraged well-fitting footwear and daily inspection  Neuro: + sx of neuropathy, foot exam UTD  Ophtho: yearly eye exam recommended  Diet and exercise: discussed healthy eating and exercise recommendations, particularly reduction in dietary carbohydrates    I spent 25 minutes with the patient today and > 50% of the time was spent counseling the patient about diabetes management including medication options and dietary modification.  Patient verbalized an understanding and will return to clinic in 5 months. Thank you for the opportunity to participate in this patient's care.     Erendira Muhammad MD  [facility]  [facility]

## 2018-08-07 NOTE — PATIENT INSTRUCTIONS
If you are having difficulty activating or accessing your [employer] account, please call the [address] [place] [address] at [phone].

## 2018-08-08 LAB
EST. AVERAGE GLUCOSE BLD GHB EST-MCNC: 143 MG/DL
HBA1C MFR BLD: 6.6 % (ref 4.8–5.6)
T4 FREE SERPL-MCNC: 1.31 NG/DL (ref 0.82–1.77)
TSH SERPL DL<=0.005 MIU/L-ACNC: 2.35 UIU/ML (ref 0.45–4.5)

## 2018-08-10 ENCOUNTER — OFFICE VISIT (OUTPATIENT)
Dept: FAMILY MEDICINE CLINIC | Age: 68
End: 2018-08-10

## 2018-08-10 VITALS
BODY MASS INDEX: 27.63 KG/M2 | HEART RATE: 82 BPM | DIASTOLIC BLOOD PRESSURE: 67 MMHG | RESPIRATION RATE: 15 BRPM | TEMPERATURE: 97.7 F | SYSTOLIC BLOOD PRESSURE: 100 MMHG | HEIGHT: 68 IN | OXYGEN SATURATION: 98 % | WEIGHT: 182.3 LBS

## 2018-08-10 DIAGNOSIS — R51.9 FRONTAL HEADACHE: Primary | ICD-10-CM

## 2018-08-10 DIAGNOSIS — R26.89 STUMBLING GAIT: ICD-10-CM

## 2018-08-10 DIAGNOSIS — E78.00 HIGH CHOLESTEROL: ICD-10-CM

## 2018-08-10 DIAGNOSIS — D56.3 THALASSEMIA MINOR: ICD-10-CM

## 2018-08-10 DIAGNOSIS — R20.0 RIGHT FACIAL NUMBNESS: ICD-10-CM

## 2018-08-10 DIAGNOSIS — I10 ESSENTIAL HYPERTENSION: ICD-10-CM

## 2018-08-10 DIAGNOSIS — E11.40 CONTROLLED TYPE 2 DIABETES MELLITUS WITH DIABETIC NEUROPATHY, WITHOUT LONG-TERM CURRENT USE OF INSULIN (HCC): ICD-10-CM

## 2018-08-10 DIAGNOSIS — H25.13 AGE-RELATED NUCLEAR CATARACT OF BOTH EYES: ICD-10-CM

## 2018-08-10 DIAGNOSIS — G56.22 ULNAR NEUROPATHY OF LEFT UPPER EXTREMITY: ICD-10-CM

## 2018-08-10 DIAGNOSIS — M79.7 FIBROMYALGIA: ICD-10-CM

## 2018-08-10 DIAGNOSIS — R22.32 ARM MASS, LEFT: ICD-10-CM

## 2018-08-10 DIAGNOSIS — R63.4 WEIGHT LOSS: ICD-10-CM

## 2018-08-10 DIAGNOSIS — M75.101 BILATERAL ROTATOR CUFF SYNDROME: ICD-10-CM

## 2018-08-10 DIAGNOSIS — M75.102 BILATERAL ROTATOR CUFF SYNDROME: ICD-10-CM

## 2018-08-10 NOTE — PATIENT INSTRUCTIONS
Lipoma: Care Instructions  Your Care Instructions  A lipoma is a growth of fat just below the skin. It may feel soft and rubbery. Lipomas can occur anywhere on the body. But they are most common on the torso, neck, upper thighs, upper arms, and armpits. A lipoma does not turn into cancer. Lipomas usually are not treated, because most of them don't hurt or cause problems. But your doctor may remove a lipoma if it is painful, gets infected, or bothers you. Follow-up care is a key part of your treatment and safety. Be sure to make and go to all appointments, and call your doctor if you are having problems. It's also a good idea to know your test results and keep a list of the medicines you take. How can you care for yourself at home? · A lipoma usually needs no care at home unless your doctor made a cut (incision) to remove it. · If your doctor told you how to care for your incision, follow your doctor's instructions. If you did not get instructions, follow this general advice:  ¨ Wash around the incision with clean water 2 times a day. Don't use hydrogen peroxide or alcohol. These can slow healing. ¨ You may cover the incision with a thin layer of petroleum jelly, such as Vaseline, and a nonstick bandage. ¨ Apply more petroleum jelly and replace the bandage as needed. When should you call for help? Call your doctor now or seek immediate medical care if:    · You have signs of infection, such as:  ¨ Increased pain, swelling, warmth, or redness. ¨ Red streaks leading from the lipoma. ¨ Pus draining from the lipoma. ¨ A fever.    Watch closely for changes in your health, and be sure to contact your doctor if:    · The lipoma is growing or changing.     · You do not get better as expected. Where can you learn more? Go to http://gregg-zunilda.info/. Enter Y517 in the search box to learn more about \"Lipoma: Care Instructions. \"  Current as of: October 5, 2017  Content Version: 11.7  © 2189-2606 Healthwise, Incorporated. Care instructions adapted under license by [employer] (which disclaims liability or warranty for this information). If you have questions about a medical condition or this instruction, always ask your healthcare professional. [address] any warranty or liability for your use of this information. Walking for Exercise: Care Instructions  Your Care Instructions    Walking is one of the easiest ways to get the exercise you need for good health. A brisk, 30-minute walk each day can help you feel better and have more energy. It can help you lower your risk of disease. Walking can help you keep your bones strong and your heart healthy. Check with your doctor before you start a walking plan if you have heart problems, other health issues, or you have not been active in a long time. Follow your doctor's instructions for safe levels of exercise. Follow-up care is a key part of your treatment and safety. Be sure to make and go to all appointments, and call your doctor if you are having problems. It's also a good idea to know your test results and keep a list of the medicines you take. How can you care for yourself at home? Getting started  · Start slowly and set a short-term goal. For example, walk for 5 or 10 minutes every day. · Bit by bit, increase the amount you walk every day. Try for at least 30 minutes on most days of the week. You also may want to swim, bike, or do other activities. · If finding enough time is a problem, it is fine to be active in blocks of 10 minutes or more throughout your day and week. · To get the heart-healthy benefits of walking, you need to walk briskly enough to increase your heart rate and breathing, but not so fast that you cannot talk comfortably. · Wear comfortable shoes that fit well and provide good support for your feet and ankles.   Staying with your plan  · After you've made walking a habit, set a longer-term goal. You may want to set a goal of walking briskly for longer or walking farther. Experts say to do 2½ hours of moderate activity a week. A faster heartbeat is what defines moderate-level activity. · To stay motivated, walk with friends, coworkers, or pets. · Use a phone salty or pedometer to track your steps each day. Set a goal to increase your steps. Once you get there, set a higher goal. Aim for 10,000 steps a day. · If the weather keeps you from walking outside, go for walks at the mall with a friend. Local schools and [facility] may have indoor gyms where you can walk. Fitting a walk into your workday  · Park several blocks away from work, or get off the bus a few stops early. · Use the stairs instead of the elevator, at least for a few floors. · Suggest holding meetings with colleagues during a walk inside or outside the building. · Use the restroom that is the farthest from your desk or workstation. · Use your morning and afternoon breaks to take quick 15-minute walks. Staying safe  · Know your surroundings. Walk in a well-lighted, safe place. If it is dark, walk with a partner. Wear light-colored clothing. If you can, buy a vest or jacket that reflects light. · Carry a cell phone for emergencies. · Drink plenty of water. Take a water bottle with you when you walk. This is very important if it is hot out. · Be careful not to slip on wet or icy ground. You can buy \"grippers\" for your shoes to help keep you from slipping. · Pay attention to your walking surface. Use sidewalks and paths. · If you have breathing problems like asthma or COPD, ask your doctor when it is safe for you to walk outdoors. Cold, dry air, smog, pollen, or other things in the air could cause breathing problems. Where can you learn more? Go to http://gregg-zunilda.info/. Enter R159 in the search box to learn more about \"Walking for Exercise: Care Instructions. \"  Current as of: December 7, 2017  Content Version: 11.7  © 3130-1377 [employer]. Care instructions adapted under license by [employer] (which disclaims liability or warranty for this information). If you have questions about a medical condition or this instruction, always ask your healthcare professional. [address] any warranty or liability for your use of this information.

## 2018-08-10 NOTE — PROGRESS NOTES
Myrl Kussmaul  Identified pt with two pt identifiers(name and ). Chief Complaint   Patient presents with    Mass     left forearm; denies discomfort or pain    Headache     dull, constant pain    Other     still randonly stumbling; even when the floor is flat. 1. Have you been to the ER, urgent care clinic since your last visit? Hospitalized since your last visit? No    2. Have you seen or consulted any other health care providers outside of the [address] [place] [address] since your last visit? Include any pap smears or colon screening. Dr. Bass Men    In the event something were to happen to you and you were unable to speak on your behalf, do you have an Advance Directive/ Living Will in place stating your wishes? NO    If yes, do we have a copy on file NO    If no, would you like information:          Medication reconciliation up to date and corrected with patient at this time. Today's provider has been notified of reason for visit, vitals and flowsheets obtained on patients. Reviewed record in preparation for visit, huddled with provider and have obtained necessary documentation. There are no preventive care reminders to display for this patient.     Wt Readings from Last 3 Encounters:   08/10/18 182 lb 4.8 oz (82.7 kg)   18 181 lb (82.1 kg)   18 180 lb 8 oz (81.9 kg)     Temp Readings from Last 3 Encounters:   18 97.9 °F (36.6 °C) (Oral)   18 98.6 °F (37 °C) (Oral)   17 98.2 °F (36.8 °C) (Oral)     BP Readings from Last 3 Encounters:   18 131/68   18 122/72   04/10/18 125/71     Pulse Readings from Last 3 Encounters:   18 65   18 81   04/10/18 85     Vitals:    08/10/18 1139   BP: 100/67   Pulse: 82   Resp: 15   Temp: 97.7 °F (36.5 °C)   TempSrc: Temporal   SpO2: 98%   Weight: 182 lb 4.8 oz (82.7 kg)   Height: 5' 8.19\" (1.732 m)   PainSc:   4   PainLoc: Head         Learning Assessment:  :     Learning Assessment 4/10/2018 1/20/2014   PRIMARY LEARNER Patient Patient   HIGHEST LEVEL OF EDUCATION - PRIMARY LEARNER  4 YEARS OF COLLEGE > 4 YEARS OF COLLEGE   BARRIERS PRIMARY LEARNER NONE NONE   CO-LEARNER CAREGIVER No No   CO-LEARNER NAME - n/a   PRIMARY LANGUAGE ENGLISH ENGLISH    NEED No -   LEARNER PREFERENCE PRIMARY LISTENING LISTENING     READING -     DEMONSTRATION -     PICTURES -   LEARNING SPECIAL TOPICS n0 no   ANSWERED BY patient Patient   RELATIONSHIP SELF SELF   ASSESSMENT COMMENT 4/10/18ah -       Depression Screening:  :     PHQ over the last two weeks 5/25/2018   Little interest or pleasure in doing things Not at all   Feeling down, depressed, irritable, or hopeless Not at all   Total Score PHQ 2 0       Fall Risk Assessment:  :     Fall Risk Assessment, last 12 mths 5/25/2018   Able to walk? Yes   Fall in past 12 months? No       Abuse Screening:  :     Abuse Screening Questionnaire 1/23/2018 12/7/2016   Do you ever feel afraid of your partner? N N   Are you in a relationship with someone who physically or mentally threatens you? N N   Is it safe for you to go home?  Y Y       ADL Screening:  :     ADL Assessment 1/23/2018   Feeding yourself No Help Needed   Getting from bed to chair No Help Needed   Getting dressed No Help Needed   Bathing or showering No Help Needed   Walk across the room (includes cane/walker) No Help Needed   Using the telphone No Help Needed   Taking your medications No Help Needed   Preparing meals No Help Needed   Managing money (expenses/bills) No Help Needed   Moderately strenuous housework (laundry) No Help Needed   Shopping for personal items (toiletries/medicines) No Help Needed   Shopping for groceries No Help Needed   Driving No Help Needed   Climbing a flight of stairs No Help Needed   Getting to places beyond walking distances No Help Needed

## 2018-08-10 NOTE — MR AVS SNAPSHOT
[address] [facility] 
 
 
 [address] 
[address] [place] [address] 
[phone] Patient: Kiko Meadows MRN:  [id number] Visit Information Date & Time Provider Department Dept. Phone Encounter #  
 8/10/2018 11:30 AM DO Demetri Waldrop [phone] [id number] Follow-up Instructions Return in about 3 months (around 11/10/2018) for referral follow up, headache, gait, arm mass. Your Appointments 11/12/2018  8:40 AM  
LAB with LAB [facility] ([facility]) Appt Note: fasting lab [facility] [address]  
[phone]  
  
   
 [address] [place] [address]  
  
    
 11/19/2018 11:30 AM  
ROUTINE CARE with Caesar Hummingbird, DO [facility] ([facility]) Appt Note: Diabetes, weight, blood pressure check [facility] [address]  
[phone]  
  
   
 [address] [place] [address]  
  
    
 1/23/2019  9:30 AM  
Follow Up with Yao Fields MD  
[facility]) Appt Note: f/u diabeets cp0.00  
 [address] [address]  
[phone]  
  
   
 [address] [facility] [address] Upcoming Health Maintenance Date Due Influenza Age 5 to Adult 9/28/2018* MEDICARE YEARLY EXAM 1/24/2019 HEMOGLOBIN A1C Q6M 2/7/2019 EYE EXAM RETINAL OR DILATED Q1 3/21/2019 FOOT EXAM Q1 4/10/2019 MICROALBUMIN Q1 5/18/2019 LIPID PANEL Q1 5/18/2019 BREAST CANCER SCRN MAMMOGRAM 2/7/2020 GLAUCOMA SCREENING Q2Y 3/21/2020 COLONOSCOPY 5/27/2020 DTaP/Tdap/Td series (2 - Td) 1/23/2028 *Topic was postponed. The date shown is not the original due date. Allergies as of 8/10/2018  Review Complete On: 8/10/2018 By: Pool Young Severity Noted Reaction Type Reactions Influenza Virus Vaccine, Specific High 10/13/2016    Swelling R ARM PAIN, NUMBNESS, SWELLING 
R HAND NUMBNESS AND TINGLING, WORSE IN 4TH AND FIFTH FINGERS Lisinopril Medium 10/28/2009    Cough Other reaction(s): Lisinopril Singulair [Montelukast]  02/28/2013    Other (comments)  
 headache Current Immunizations  Reviewed on 1/23/2018 Name Date H1N1 FLU VACCINE 3/9/2010 Hepatitis B Vaccine 1/27/2009, 8/20/2008, 6/9/2008 Influenza High Dose Vaccine PF 10/19/2017, 9/7/2016, 11/11/2015 Influenza Vaccine Split 10/24/2012, 11/8/2011, 10/8/2010 Influenza Vaccine Whole 1/27/2009 Pneumococcal Conjugate (PCV-13) 11/11/2015 Pneumococcal Polysaccharide (PPSV-23) 12/7/2016 Tdap 1/23/2018 ZZZ-RETIRED (DO NOT USE) Pneumococcal Vaccine (Unspecified Type) 12/21/2005 Zoster Vaccine, Live 1/20/2014  
 dT Vaccine 6/9/2008 Not reviewed this visit You Were Diagnosed With   
  
 Codes Comments Frontal headache    -  Primary ICD-10-CM: W67 ICD-9-CM: 784.0 chronic, due to dry eyes vs ?tenstion vs ?low blood sugar vs other, improving Controlled type 2 diabetes mellitus with diabetic neuropathy, without long-term current use of insulin (HCC)     ICD-10-CM: E11.40 ICD-9-CM: 250.60, 751.2 stable on Trulicity, [place] Essential hypertension     ICD-10-CM: I10 
ICD-9-CM: 401.9 Arm mass, left     ICD-10-CM: R22.32 
ICD-9-CM: 782.2 x months, improving since 05/2018 Stumbling gait     ICD-10-CM: R26.0 ICD-9-CM: 459. 2 1-2 times monthly, due to neuro vs ?low blood sugar vs other High cholesterol     ICD-10-CM: E78.00 ICD-9-CM: 272.0 stable on Zocor 40 mg Fibromyalgia     ICD-10-CM: M79.7 ICD-9-CM: 729.1 stable Right facial numbness     ICD-10-CM: R20.0 ICD-9-CM: 782.0 resolved with Fioricet use Age-related nuclear cataract of both eyes     ICD-10-CM: H25.13 ICD-9-CM: 366.16   
 Bilateral rotator cuff syndrome     ICD-10-CM: M75.101, M75.102 ICD-9-CM: 726.10 resolved after steroid injections Ulnar neuropathy of left upper extremity     ICD-10-CM: G56.22 
ICD-9-CM: 354.2 Thalassemia minor     ICD-10-CM: D56.3 ICD-9-CM: 282.46 Weight loss     ICD-10-CM: R63.4 ICD-9-CM: 783.21 23# since 72/2477 due to Trulicity vs other Vitals BP Pulse Temp Resp Height(growth percentile) 100/67 (BP 1 Location: Right arm, BP Patient Position: Sitting) 82 97.7 °F (36.5 °C) (Temporal) 15 5' 8.19\" (1.732 m) Weight(growth percentile) SpO2 BMI OB Status Smoking Status 182 lb 4.8 oz (82.7 kg) 98% 27.57 kg/m2 Hysterectomy Never Smoker Vitals History BMI and BSA Data Body Mass Index Body Surface Area  
 27.57 kg/m 2 1.99 m 2 Preferred Pharmacy Pharmacy Name Phone [facility], [address] [phone] Your Updated Medication List  
  
   
This list is accurate as of 8/10/18 12:43 PM.  Always use your most recent med list.  
  
  
  
  
 aspirin delayed-release 81 mg tablet Take 1 Tab by mouth daily. Blood-Glucose Meter monitoring kit One Touch Ultra 2 Use to test blood sugar 1-2 per day (blood sugar before breakfast or 2 hours after any meal or at bedtime) DX.  E11.65  
  
 butalbital-acetaminophen-caffeine -40 mg per tablet Commonly known as:  Tho [place] Take 1 Tab by mouth every six (6) hours as needed for Headache. Max Daily Amount: 4 Tabs. Indications: MIGRAINE, TENSION-TYPE HEADACHE  
  
 dulaglutide 1.5 mg/0.5 mL sub-q pen Commonly known as:  TRULICITY  
0.5 mL by SubCUTAneous route every seven (7) days. ergocalciferol 50,000 unit capsule Commonly known as:  ERGOCALCIFEROL Take 1 Cap by mouth every seven (7) days. Indications: Vitamin D Deficiency EVENING PRIMROSE 500 mg Cap Generic drug:  evening primrose oil Take 1 Cap by mouth two (2) times a day. FLEXERIL 10 mg tablet Generic drug:  cyclobenzaprine Take 10 mg by mouth as needed. glucose blood VI test strips strip Commonly known as:  ASCENSIA AUTODISC VI, ONE TOUCH ULTRA TEST VI  
Use to test blood sugar 1-2 per day (blood sugar before breakfast or 2 hours after any meal or at bedtime) DX. E11.65  
  
 simvastatin 40 mg tablet Commonly known as:  ZOCOR  
take 1 tablet by mouth at bedtime  
  
 sodium chloride 0.65 % nasal squeeze bottle Commonly known as:  OCEAN  
1 Spray as needed for Congestion. SYNJARDY 12.5-1,000 mg per tablet Generic drug:  empagliflozin-metFORMIN  
take 1 tablet by mouth twice a day with meals  
  
 traMADol 50 mg tablet Commonly known as:  ULTRAM  
Take 50 mg by mouth every six (6) hours as needed. traZODone 50 mg tablet Commonly known as:  Lemmie Felicitas Take 50 mg by mouth as needed. Takes at bedtime prn  
  
 VAGIFEM 10 mcg Tab vaginal tablet Generic drug:  estradiol Insert 10 mcg into vagina daily. valACYclovir 500 mg tablet Commonly known as:  VALTREX  
  
 valsartan-hydroCHLOROthiazide 160-25 mg per tablet Commonly known as:  DIOVAN-HCT  
take 1 tablet by mouth once daily VESIcare 10 mg tablet Generic drug:  solifenacin Take 10 mg by mouth daily. Follow-up Instructions Return in about 3 months (around 11/10/2018) for referral follow up, headache, gait, arm mass. Patient Instructions Lipoma: Care Instructions Your Care Instructions A lipoma is a growth of fat just below the skin. It may feel soft and rubbery. Lipomas can occur anywhere on the body. But they are most common on the torso, neck, upper thighs, upper arms, and armpits. A lipoma does not turn into cancer. Lipomas usually are not treated, because most of them don't hurt or cause problems. But your doctor may remove a lipoma if it is painful, gets infected, or bothers you. Follow-up care is a key part of your treatment and safety. Be sure to make and go to all appointments, and call your doctor if you are having problems. It's also a good idea to know your test results and keep a list of the medicines you take. How can you care for yourself at home? · A lipoma usually needs no care at home unless your doctor made a cut (incision) to remove it. · If your doctor told you how to care for your incision, follow your doctor's instructions. If you did not get instructions, follow this general advice: ¨ Wash around the incision with clean water 2 times a day. Don't use hydrogen peroxide or alcohol. These can slow healing. ¨ You may cover the incision with a thin layer of petroleum jelly, such as Vaseline, and a nonstick bandage. ¨ Apply more petroleum jelly and replace the bandage as needed. When should you call for help? Call your doctor now or seek immediate medical care if: 
  · You have signs of infection, such as: 
¨ Increased pain, swelling, warmth, or redness. ¨ Red streaks leading from the lipoma. ¨ Pus draining from the lipoma. ¨ A fever.  
 Watch closely for changes in your health, and be sure to contact your doctor if: 
  · The lipoma is growing or changing.  
  · You do not get better as expected. Where can you learn more? Go to http://gregg-zunilda.info/. Enter X923 in the search box to learn more about \"Lipoma: Care Instructions. \" Current as of: October 5, 2017 Content Version: 11.7 © 5647-5218 [employer]. Care instructions adapted under license by [employer] (which disclaims liability or warranty for this information). If you have questions about a medical condition or this instruction, always ask your healthcare professional. [address] any warranty or liability for your use of this information. Walking for Exercise: Care Instructions Your Care Instructions Walking is one of the easiest ways to get the exercise you need for good health. A brisk, 30-minute walk each day can help you feel better and have more energy. It can help you lower your risk of disease. Walking can help you keep your bones strong and your heart healthy. Check with your doctor before you start a walking plan if you have heart problems, other health issues, or you have not been active in a long time. Follow your doctor's instructions for safe levels of exercise. Follow-up care is a key part of your treatment and safety. Be sure to make and go to all appointments, and call your doctor if you are having problems. It's also a good idea to know your test results and keep a list of the medicines you take. How can you care for yourself at home? Getting started · Start slowly and set a short-term goal. For example, walk for 5 or 10 minutes every day. · Bit by bit, increase the amount you walk every day. Try for at least 30 minutes on most days of the week. You also may want to swim, bike, or do other activities. · If finding enough time is a problem, it is fine to be active in blocks of 10 minutes or more throughout your day and week. · To get the heart-healthy benefits of walking, you need to walk briskly enough to increase your heart rate and breathing, but not so fast that you cannot talk comfortably. · Wear comfortable shoes that fit well and provide good support for your feet and ankles. Staying with your plan · After you've made walking a habit, set a longer-term goal. You may want to set a goal of walking briskly for longer or walking farther. Experts say to do 2½ hours of moderate activity a week. A faster heartbeat is what defines moderate-level activity. · To stay motivated, walk with friends, coworkers, or pets. · Use a phone salty or pedometer to track your steps each day. Set a goal to increase your steps. Once you get there, set a higher goal. Aim for 10,000 steps a day. · If the weather keeps you from walking outside, go for walks at the mall with a friend. Local schools and churches may have indoor gyms where you can walk. Fitting a walk into your workday · Park several blocks away from work, or get off the bus a few stops early. · Use the stairs instead of the elevator, at least for a few floors. · Suggest holding meetings with colleagues during a walk inside or outside the building. · Use the restroom that is the farthest from your desk or workstation. · Use your morning and afternoon breaks to take quick 15-minute walks. Staying safe · Know your surroundings. Walk in a well-lighted, safe place. If it is dark, walk with a partner. Wear light-colored clothing. If you can, buy a vest or jacket that reflects light. · Carry a cell phone for emergencies. · Drink plenty of water. Take a water bottle with you when you walk. This is very important if it is hot out. · Be careful not to slip on wet or icy ground. You can buy \"grippers\" for your shoes to help keep you from slipping. · Pay attention to your walking surface. Use sidewalks and paths. · If you have breathing problems like asthma or COPD, ask your doctor when it is safe for you to walk outdoors. Cold, dry air, smog, pollen, or other things in the air could cause breathing problems. Where can you learn more? Go to http://gregg-zunilda.info/. Enter R159 in the search box to learn more about \"Walking for Exercise: Care Instructions. \" Current as of: December 7, 2017 Content Version: 11.7 © 7614-8494 [employer]. Care instructions adapted under license by [employer] (which disclaims liability or warranty for this information). If you have questions about a medical condition or this instruction, always ask your healthcare professional. [address] any warranty or liability for your use of this information. Introducing [facility]! Dear Luis Virgen: Thank you for requesting a [employer] account. Our records indicate that you already have an active [employer] account. You can access your account anytime at [email or web address] Did you know that you can access your hospital and ER discharge instructions at any time in [employer]? You can also review all of your test results from your hospital stay or ER visit. Additional Information If you have questions, please visit the Frequently Asked Questions section of the [employer] website at [email or web address] Remember, My [employer] is NOT to be used for urgent needs. For medical emergencies, dial 911. Now available from your iPhone and Android! Please provide this summary of care documentation to your next provider. Your primary care clinician is listed as Mary Kay Fall. If you have any questions after today's visit, please call [phone].

## 2018-08-10 NOTE — PROGRESS NOTES
HISTORY OF PRESENT ILLNESS  Suzie Hensley is a 76 y.o. female presents with Mass (left forearm; denies discomfort or pain); Headache (dull, constant pain); Neurologic Problem (still randonly stumbling; even when the floor is flat. ); Referral Follow Up; and Results    Agree with nurse note. Pt with hypertension, thalassemia minor, and high cholesterol presents to the office with a BP of 100/67. Patient denies vision changes, dizziness, chest pain, SOB, or swelling. She uses Aspirin 81 mg daily, tolerating well. For cholesterol, she uses Zocor 40 mg daily, tolerating well. Pt with type 2 DM and is followed by endocrinologist, Dr. Louie Cowart. Hgb A1c was 9.9 so Dr. Erica Enciso added Trulicity 1.5 TF/7.0 mL. She also uses Syndjardy 12.5-1000 mg daily. Recommended pt check blood sugar daily for DM. On 8/7/2018, Hgb A1c was 6.6, down from 8.0. Weight is 182 lbs, down 23 lbs since 4/2018. Pt attributes weight loss to using Trulicity sub-q pen since 4/2018. Pt complains of knot in L forearm. She had in at 5/2018 visit but forgot to mention it and it has since gone down. Denies pain, discomfort, or change in color. No known trauma to the area. She still experiences numbness in her L upper arm. Pt complains of dull frontal headache that has been constant since 5/2018. It eases up during the day to 3-4/10 but at night the pain increases to 7/10. It is not worsened by any certain movements. She has no recieved new glasses and does not spend extended time looking at computer screens. Denies trauma double or blurry vision, fever, gabriela, PND, itchy or weater eyes, ear pain, itchy or stuffy nose, or pressure behind the eyes. She has an appointment with neurologist, Dr. Ariadna Kumar for the headache in 10/2018. She last saw him on 11/2/2017 for HA and numbness. R sided HA started in 8/2017 with R facial numbness. Brain MRI showed no significant findings. There was some chronic small vessel white matter ischema.  She was using tylneonal and fioricet. Recommneded decrease use of tylenol and f/u 3-4 months. The R facial pain went away after using Fioricet regularly. She has also been stumbling off and on for years and happens 1-2 times a month. She will be walking on flat surfaces and has no triggers and is wearing regular shoes. Denies leg weakness or pain. She is not tripping over her feet. She has never fallen but has come close. Pt with fibromyalgia. She saw rheumatologist, Dr. Brice Dominguez on 10/26/2017. Injected R and L biceps with kenalog. Dx'd BL rotator cuff syndrome. Rx'd Flexeril 10 mg. Pt had questions about chest xr and something the results stated regarding her heart. Health Maintenance    Pt had eye exam on 3/27/2018 and saw Dr. Carol Nguyen. Cataracts without retinopathy and dry eye syndrome. F/U 6 months. Written by Claudene Harden, scribe, as dictated by Dr. Paula Saleh DO.      ROS    Review of Systems negative except as noted above in HPI. ALLERGIES:    Allergies   Allergen Reactions    Influenza Virus Vaccine, Specific Swelling     R ARM PAIN, NUMBNESS, SWELLING  R HAND NUMBNESS AND TINGLING, WORSE IN 4TH AND FIFTH FINGERS    Lisinopril Cough     Other reaction(s): Lisinopril    Singulair [Montelukast] Other (comments)     headache       CURRENT MEDICATIONS:    Outpatient Prescriptions Marked as Taking for the 8/10/18 encounter (Office Visit) with Aleksander Watters DO   Medication Sig Dispense Refill    SYNJARDY 12.5-1,000 mg per tablet take 1 tablet by mouth twice a day with meals 60 Tab 5    ergocalciferol (ERGOCALCIFEROL) 50,000 unit capsule Take 1 Cap by mouth every seven (7) days.  Indications: Vitamin D Deficiency 5 Cap 2    Blood-Glucose Meter monitoring kit One Touch Ultra 2 Use to test blood sugar 1-2 per day (blood sugar before breakfast or 2 hours after any meal or at bedtime) DX.   E11.65 1 Kit 0    glucose blood VI test strips (ASCENSIA AUTODISC VI, ONE TOUCH ULTRA TEST VI) strip Use to test blood sugar 1-2 per day (blood sugar before breakfast or 2 hours after any meal or at bedtime) DX. E11.65 100 Strip 3    simvastatin (ZOCOR) 40 mg tablet take 1 tablet by mouth at bedtime 90 Tab 2    dulaglutide (TRULICITY) 1.5 JA/5.6 mL sub-q pen 0.5 mL by SubCUTAneous route every seven (7) days. 4 Pen 11    valsartan-hydroCHLOROthiazide (DIOVAN-HCT) 160-25 mg per tablet take 1 tablet by mouth once daily (Patient taking differently: take 1/2 pill daily) 30 Tab 11    sodium chloride (OCEAN) 0.65 % nasal spray 1 Spray as needed for Congestion.  solifenacin (VESICARE) 10 mg tablet Take 10 mg by mouth daily.  aspirin delayed-release 81 mg tablet Take 1 Tab by mouth daily. 90 Tab 3    valACYclovir (VALTREX) 500 mg tablet       estradiol (VAGIFEM) 10 mcg tab vaginal tablet Insert 10 mcg into vagina daily.  evening primrose oil (EVENING PRIMROSE) 500 mg cap Take 1 Cap by mouth two (2) times a day.  cyclobenzaprine (FLEXERIL) 10 mg tablet Take 10 mg by mouth as needed.  trazodone (DESYREL) 50 mg tablet Take 50 mg by mouth as needed. Takes at bedtime prn       tramadol (ULTRAM) 50 mg tablet Take 50 mg by mouth every six (6) hours as needed. PAST MEDICAL HISTORY:    Past Medical History:   Diagnosis Date    Allergic rhinitis     Dr. Sapna Pelayo 2015    Dr. Carli Rutledge.  Chest pain 01/15/09    Dr. All Brennan Chickenpox childhood    DDD (degenerative disc disease), cervical 10/2015    Dr. Patito Gomez.  DDD (degenerative disc disease), lumbar 2014    Dr. [place] Rios.  [facility]2007    Jeffry Montemayor, OD. Dr. Wiliam Leal, pod.  Endometriosis     Dr. Maritza Maya    Endometriosis     Essential hypertension, benign     Dr. Rik Elliott, uterine     Dr. Yahaira Alfaro 08/2009    Dr. Jailyn Cordon.   Dr. Romilda Kayser Radhabimacrina Began paresthesia 2011    Dr. Yung Childs. Dr. Valentine Singleton.  [id number](156.1) 2012    Dr. Jaquan Young. Dr. Naeem Almazan, ENT.  Hemoptysis 06/13/07    Dr. Laurel Carter    Hiatal hernia 05/27/15    small. Avani Martinez MD   [address] [place] [address] Internal hemorrhoids 12/2006, 05/27/15    Dr. Rocío Coronel. Dr. Usman Nguyen.  Intraocular pressure increase 07/27/05    bilaterally. Dr. Theresa Mendenhall. Dr. Velma Harrell.  Knee pain 04/04/13    Bilaterally. due to fall. Dr. Jorge L Steele.  Left knee pain 11/26/14    Left. due to OA, torn medial and lateral meniscal tears. Dr. Rodolfo Hernández. Dr. Sage Solis.  Lumbar spondylosis 3/2008    Dr. Agustina Heath. Dr. Lexy Irving    Menopause 1994    Paresthesia of foot     Left  due to Ornelas's Neuromo Sxs. Edgar Fung. due to Diabetic Neuropathy. Dr. Beryle Gens.  Proteinuria 2009    Dr. Kavita Duval Right shoulder pain 01/30/06    Dr. Neal Freeman. impingement tendinitis    Right shoulder pain 07/2014    Dr. Craig Servin.  Schatzki's ring 05/27/15    with partial obstruction at GE junction. Dr. Usman Nguyen. Rekha Heath. Dr. 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 Dr. Juan Ramon Nielsen. [place].  Thalassemia minor 1980s    Vitamin D deficiency 05/2011    Robby Bailey NP       PAST SURGICAL HISTORY:    Past Surgical History:   Procedure Laterality Date    CYSTOSCOPY  07/2005    Dr. Francisco Rayo FOOT/TOES SURGERY PROC UNLISTED      Left Ornelas's Neuroma. [id number] [facility] HX BREAST BIOPSY Left     12 years ago neg    HX BREAST LUMPECTOMY  10/15/08    Left intraductal papilloma. Dr. Kristina Beckford HX COLONOSCOPY  05/27/15    due to anemia, rectal bleeding. due q 5 yrs. Dr. Jack Canada  2001, 12/07/06, 2011    Dr. Marina Briceno. due q 5 yrs.  HX ENDOSCOPY  05/27/15    due to anemia. Dr. Usman Nguyen.     HX HEART CATHETERIZATION  01/09/09    Dr. Guerrero Click ARTHROSCOPY Left 04/17/15    due to MEDIAL AND LATERAL TORN MENISCI. Dr. Jasmyn Olson.  HX MYOMECTOMY  1974-1993    x4    HX OOPHORECTOMY Bilateral 1994    HX MARTIN AND BSO  1994    endometriosis, fibroids. FAMILY HISTORY:    Family History   Problem Relation Age of Onset   [facility] Hypertension Mother    [facility] Glaucoma Mother     Heart Failure Mother      CHF    Hypertension Father     Cancer Father      colon    Heart Attack Sister     Stroke Sister     Diabetes Sister      x 2 sisters    Diabetes Brother     Breast Cancer Maternal Aunt 45       SOCIAL HISTORY:    Social History     Social History    Marital status: SINGLE     Spouse name: N/A    Number of children: N/A    Years of education: N/A     Social History Main Topics    Smoking status: Never Smoker    Smokeless tobacco: Never Used    Alcohol use 0.0 oz/week     0 Standard drinks or equivalent per week      Comment: Social beer, social drinks, social shots of liquor,and social wine.     Drug use: No    Sexual activity: No     Other Topics Concern    None     Social History Narrative       IMMUNIZATIONS:    Immunization History   Administered Date(s) Administered    H1N1 Influenza Virus Vaccine 03/09/2010    Hepatitis B Vaccine 06/09/2008, 08/20/2008, 01/27/2009    Influenza High Dose Vaccine PF 11/11/2015, 09/07/2016, 10/19/2017    Influenza Vaccine Split 10/08/2010, 11/08/2011, 10/24/2012    Influenza Vaccine Whole 01/27/2009    Pneumococcal Conjugate (PCV-13) 11/11/2015    Pneumococcal Polysaccharide (PPSV-23) 12/07/2016    Tdap 01/23/2018    ZZZ-RETIRED (DO NOT USE) Pneumococcal Vaccine (Unspecified Type) 12/21/2005    Zoster Vaccine, Live 01/20/2014    dT Vaccine 06/09/2008         PHYSICAL EXAMINATION    Vital Signs    Visit Vitals    /67 (BP 1 Location: Right arm, BP Patient Position: Sitting)    Pulse 82    Temp 97.7 °F (36.5 °C) (Temporal)    Resp 15    Ht 5' 8.19\" (1.732 m)    Wt 182 lb 4.8 oz (82.7 kg)  SpO2 98%    BMI 27.57 kg/m2       Weight Metrics 8/10/2018 8/7/2018 5/25/2018 4/10/2018 1/23/2018 11/29/2017 11/13/2017   Weight 182 lb 4.8 oz 181 lb 180 lb 8 oz 193 lb 3.2 oz 192 lb 11.2 oz 194 lb 1.6 oz 205 lb 9.6 oz   BMI 27.57 kg/m2 26.92 kg/m2 28.27 kg/m2 30.26 kg/m2 30.18 kg/m2 30.4 kg/m2 32.2 kg/m2       General appearance - Well nourished. Well appearing. Well developed. No acute distress. Overweight. Head - Normocephalic. Atraumatic. Eyes - pupils equal and reactive. Extraocular eye movements intact. Sclera anicteric. Mildly injected sclera. Ears - Hearing is grossly normal bilaterally. Nose - normal and patent. No polyps noted. No erythema. No discharge. Mouth - mucous membranes with adequate moisture. Posterior pharynx normal with cobblestone appearance. No erythema, white exudate or obstruction. Neck - supple. Midline trachea. No carotid bruits noted bilaterally. No thyromegaly noted. Chest - clear to auscultation bilaterally anteriorly and posteriorly. No wheezes. No rales or rhonchi. Breath sounds are symmetrical bilaterally. Unlabored respirations. Heart - normal rate. Regular rhythm. Normal S1, S2. No murmur noted. No rubs, clicks or gallops noted. Abdomen - soft and distended. No masses or organomegaly. No rebound, rigidity or guarding. Bowel sounds normal x 4 quadrants. No tenderness noted. Neurological - awake, alert and oriented to person, place, and time and event. Cranial nerves II through XII intact. Clear speech. Muscle strength is +5/5 x 4 extremities. Sensation is intact to light touch bilaterally. Steady gait. Heme/Lymph - peripheral pulses normal x 4 extremities. No peripheral edema is noted. Musculoskeletal - Intact x 4 extremities. Full ROM x 4 extremities. No pain with movement. Mild pain on palapton at R shoulder girde.  Round nickel sized, rubbery, nontnender, nonertyt , slightly rasise, flesh colored dule , just inferior t oL medial antecubital fossa. Back exam - normal range of motion. No pain on palpation of the spinous processes in the cervical, thoracic, lumbar, sacral regions. No CVA tenderness. Skin - no rashes, erythema, ecchymosis, lacerations, abrasions, suspicious moles noted  Psychological -   normal behavior, dress and thought processes. Good insight. Good eye contact. Normal affect. Appropriate mood. Normal speech. DATA REVIEWED    Lab Results   Component Value Date/Time    WBC 5.7 06/15/2017 08:57 AM    HGB 11.2 06/15/2017 08:57 AM    HCT 34.8 06/15/2017 08:57 AM    PLATELET 891 45/97/1146 08:57 AM    MCV 70 (L) 06/15/2017 08:57 AM     Lab Results   Component Value Date/Time    Sodium 138 05/18/2018 09:53 AM    Potassium 3.7 05/18/2018 09:53 AM    Chloride 97 05/18/2018 09:53 AM    CO2 24 05/18/2018 09:53 AM    Anion gap 11 09/03/2010 08:45 AM    Glucose 100 (H) 05/18/2018 09:53 AM    BUN 16 05/18/2018 09:53 AM    Creatinine 0.95 05/18/2018 09:53 AM    BUN/Creatinine ratio 17 05/18/2018 09:53 AM    GFR est AA 71 05/18/2018 09:53 AM    GFR est non-AA 62 05/18/2018 09:53 AM    Calcium 9.9 05/18/2018 09:53 AM    Bilirubin, total 0.3 05/18/2018 09:53 AM    AST (SGOT) 11 05/18/2018 09:53 AM    Alk.  phosphatase 58 05/18/2018 09:53 AM    Protein, total 6.9 05/18/2018 09:53 AM    Albumin 4.2 05/18/2018 09:53 AM    Globulin 3.5 09/03/2010 08:45 AM    A-G Ratio 1.6 05/18/2018 09:53 AM    ALT (SGPT) 6 05/18/2018 09:53 AM     Lab Results   Component Value Date/Time    Cholesterol, total 123 05/18/2018 09:53 AM    HDL Cholesterol 55 05/18/2018 09:53 AM    LDL, calculated 55 05/18/2018 09:53 AM    VLDL, calculated 13 05/18/2018 09:53 AM    Triglyceride 66 05/18/2018 09:53 AM    CHOL/HDL Ratio 1.9 04/26/2010 08:00 AM     Lab Results   Component Value Date/Time    Vitamin D 25-Hydroxy 25.1 (L) 10/20/2011 07:42 AM    VITAMIN D, 25-HYDROXY 30.9 05/18/2018 09:53 AM       Lab Results   Component Value Date/Time    Hemoglobin A1c 6.6 (H) 08/07/2018 10:19 AM    Hemoglobin A1c (POC) 8.4 03/14/2017 09:58 AM     Lab Results   Component Value Date/Time    TSH 2.350 08/07/2018 10:19 AM       Lab Results   Component Value Date/Time    Microalbumin/Creat ratio (mg/g creat) 5 09/03/2010 08:45 AM    Microalb/Creat ratio (ug/mg creat.) 9.4 05/18/2018 09:53 AM    Microalbumin,urine random 1.21 09/03/2010 08:45 AM         ASSESSMENT and PLAN      ICD-10-CM ICD-9-CM    1. Frontal headache R51 784.0     chronic, due to dry eyes vs ?tenstion vs ?low blood sugar vs other, improving   2. Controlled type 2 diabetes mellitus with diabetic neuropathy, without long-term current use of insulin (HCC) E11.40 250.60      357.2     stable on Trulicity, Synjardy   3. Essential hypertension I10 401.9    4. Arm mass, left R22.32 782.2 CBC W/O DIFF    x months, improving since 05/2018   5. Stumbling gait R26.0 781. 2     1-2 times monthly, due to neuro vs ?low blood sugar vs other   6. High cholesterol E78.00 272.0     stable on Zocor 40 mg   7. Fibromyalgia M79.7 729.1     stable   8. Right facial numbness R20.0 782.0     resolved with Fioricet use   9. Age-related nuclear cataract of both eyes H25.13 366.16    10. Bilateral rotator cuff syndrome M75.101 726.10     M75.102      resolved after steroid injections   11. Ulnar neuropathy of left upper extremity G56.22 354.2    12. Thalassemia minor D56.3 282.46    13. Weight loss R63.4 783.21     23# since 61/6670 due to Trulicity vs other       Discussed the patient's BMI with her. The BMI follow up plan is as follows: I have counseled this patient on diet and exercise regimens. Decrease carbohydrates (white foods, sweet foods, sweet drinks and alcohol), increase green leafy vegetables and protein (lean meats and beans) with each meal.  Avoid fried foods. Eat 3-5 small meals daily. Do not skip meals. Increase water intake.     Increase physical activity to 30 minutes daily for health benefit or 60 minutes daily to prevent weight regain, as tolerated. Get 7-8 hours uninterrupted sleep nightly. Chart reviewed and updated. Continue current medications and care. Recommended pt use Fioricet for headaches and eye drops for dry eye syndrome. Advised pt to keep track of when episodes of stumbling and headaches occur to see if she has waited too long to eat. Assured pt that chest XR was normal and aorta was mildly tortuous. Reassured pt that this is not of concern. Recheck pertinent labs today. Recent office visit notes from Dr. Abhay Guajardo, Dr. Lavelle Wheeler, Dr. Jose E Danielle  reviewed. Counseled patient on health concerns:  Headaches, numbness, rotator cuff, thalassemia minor, weight management, and stumbing  Relevant handouts given and discussed with patient. Immunizations noted. Offered empathy, support, legitimation, prayers, partnership to patient. Praised patient for progress. ACP handout given today. Follow-up Disposition:  Return in about 3 months (around 11/10/2018) for referral follow up, headache, gait, arm mass. Patient was offered a choice/choices in the treatment plan today. Patient expresses understanding of the plan and agrees with recommendations. Written by clem Cespedes, as dictated by Dr. Jared Echevarria DO. Documentation True and Accepted by Bridget Quiroga. Melany Yuan. Patient Instructions          Lipoma: Care Instructions  Your Care Instructions  A lipoma is a growth of fat just below the skin. It may feel soft and rubbery. Lipomas can occur anywhere on the body. But they are most common on the torso, neck, upper thighs, upper arms, and armpits. A lipoma does not turn into cancer. Lipomas usually are not treated, because most of them don't hurt or cause problems. But your doctor may remove a lipoma if it is painful, gets infected, or bothers you. Follow-up care is a key part of your treatment and safety.  Be sure to make and go to all appointments, and call your doctor if you are having problems. It's also a good idea to know your test results and keep a list of the medicines you take. How can you care for yourself at home? · A lipoma usually needs no care at home unless your doctor made a cut (incision) to remove it. · If your doctor told you how to care for your incision, follow your doctor's instructions. If you did not get instructions, follow this general advice:  ¨ Wash around the incision with clean water 2 times a day. Don't use hydrogen peroxide or alcohol. These can slow healing. ¨ You may cover the incision with a thin layer of petroleum jelly, such as Vaseline, and a nonstick bandage. ¨ Apply more petroleum jelly and replace the bandage as needed. When should you call for help? Call your doctor now or seek immediate medical care if:    · You have signs of infection, such as:  ¨ Increased pain, swelling, warmth, or redness. ¨ Red streaks leading from the lipoma. ¨ Pus draining from the lipoma. ¨ A fever.    Watch closely for changes in your health, and be sure to contact your doctor if:    · The lipoma is growing or changing.     · You do not get better as expected. Where can you learn more? Go to http://gregg-zunilda.info/. Enter D278 in the search box to learn more about \"Lipoma: Care Instructions. \"  Current as of: October 5, 2017  Content Version: 11.7  © 4377-2481 [employer]. Care instructions adapted under license by [employer] (which disclaims liability or warranty for this information). If you have questions about a medical condition or this instruction, always ask your healthcare professional. [address] any warranty or liability for your use of this information. Walking for Exercise: Care Instructions  Your Care Instructions    Walking is one of the easiest ways to get the exercise you need for good health.  A brisk, 30-minute walk each day can help you feel better and have more energy. It can help you lower your risk of disease. Walking can help you keep your bones strong and your heart healthy. Check with your doctor before you start a walking plan if you have heart problems, other health issues, or you have not been active in a long time. Follow your doctor's instructions for safe levels of exercise. Follow-up care is a key part of your treatment and safety. Be sure to make and go to all appointments, and call your doctor if you are having problems. It's also a good idea to know your test results and keep a list of the medicines you take. How can you care for yourself at home? Getting started  · Start slowly and set a short-term goal. For example, walk for 5 or 10 minutes every day. · Bit by bit, increase the amount you walk every day. Try for at least 30 minutes on most days of the week. You also may want to swim, bike, or do other activities. · If finding enough time is a problem, it is fine to be active in blocks of 10 minutes or more throughout your day and week. · To get the heart-healthy benefits of walking, you need to walk briskly enough to increase your heart rate and breathing, but not so fast that you cannot talk comfortably. · Wear comfortable shoes that fit well and provide good support for your feet and ankles. Staying with your plan  · After you've made walking a habit, set a longer-term goal. You may want to set a goal of walking briskly for longer or walking farther. Experts say to do 2½ hours of moderate activity a week. A faster heartbeat is what defines moderate-level activity. · To stay motivated, walk with friends, coworkers, or pets. · Use a phone salty or pedometer to track your steps each day. Set a goal to increase your steps. Once you get there, set a higher goal. Aim for 10,000 steps a day. · If the weather keeps you from walking outside, go for walks at the mall with a friend.  Local schools and churches may have indoor gyms where you can walk.  Fitting a walk into your workday  · Park several blocks away from work, or get off the bus a few stops early. · Use the stairs instead of the elevator, at least for a few floors. · Suggest holding meetings with colleagues during a walk inside or outside the building. · Use the restroom that is the farthest from your desk or workstation. · Use your morning and afternoon breaks to take quick 15-minute walks. Staying safe  · Know your surroundings. Walk in a well-lighted, safe place. If it is dark, walk with a partner. Wear light-colored clothing. If you can, buy a vest or jacket that reflects light. · Carry a cell phone for emergencies. · Drink plenty of water. Take a water bottle with you when you walk. This is very important if it is hot out. · Be careful not to slip on wet or icy ground. You can buy \"grippers\" for your shoes to help keep you from slipping. · Pay attention to your walking surface. Use sidewalks and paths. · If you have breathing problems like asthma or COPD, ask your doctor when it is safe for you to walk outdoors. Cold, dry air, smog, pollen, or other things in the air could cause breathing problems. Where can you learn more? Go to http://gregg-zunilda.info/. Enter R159 in the search box to learn more about \"Walking for Exercise: Care Instructions. \"  Current as of: December 7, 2017  Content Version: 11.7  © 9104-5038 [employer]. Care instructions adapted under license by [employer] (which disclaims liability or warranty for this information). If you have questions about a medical condition or this instruction, always ask your healthcare professional. [place] [address] any warranty or liability for your use of this information.

## 2018-09-02 DIAGNOSIS — E55.9 VITAMIN D DEFICIENCY: ICD-10-CM

## 2018-09-05 NOTE — TELEPHONE ENCOUNTER
PCP: Siri Joshi DO    Last appt: 8/10/2018  Future Appointments  Date Time Provider [place] Enriquez   11/12/2018 8:40 AM LAB [facility] SCH[facility]   11/19/2018 11:30 AM DO YONY Leon SCHED   1/23/2019 9:30 AM Darling Watts MD RDE [address]       Requested Prescriptions     Pending Prescriptions Disp Refills    VITAMIN D2 50,000 unit capsule [Pharmacy Med Name: VIT D2 1.25 MG (50,000 UNIT)] 5 Cap 2     Sig: take 1 capsule by mouth EVERY 7 DAYS       Prior labs and Blood pressures:  BP Readings from Last 3 Encounters:   08/10/18 100/67   08/07/18 131/68   05/25/18 122/72     Lab Results   Component Value Date/Time    Sodium 138 05/18/2018 09:53 AM    Potassium 3.7 05/18/2018 09:53 AM    Chloride 97 05/18/2018 09:53 AM    CO2 24 05/18/2018 09:53 AM    Anion gap 11 09/03/2010 08:45 AM    Glucose 100 (H) 05/18/2018 09:53 AM    BUN 16 05/18/2018 09:53 AM    Creatinine 0.95 05/18/2018 09:53 AM    BUN/Creatinine ratio 17 05/18/2018 09:53 AM    GFR est AA 71 05/18/2018 09:53 AM    GFR est non-AA 62 05/18/2018 09:53 AM    Calcium 9.9 05/18/2018 09:53 AM     Lab Results   Component Value Date/Time    Hemoglobin A1c 6.6 (H) 08/07/2018 10:19 AM    Hemoglobin A1c (POC) 8.4 03/14/2017 09:58 AM     Lab Results   Component Value Date/Time    Cholesterol, total 123 05/18/2018 09:53 AM    HDL Cholesterol 55 05/18/2018 09:53 AM    LDL, calculated 55 05/18/2018 09:53 AM    VLDL, calculated 13 05/18/2018 09:53 AM    Triglyceride 66 05/18/2018 09:53 AM    CHOL/HDL Ratio 1.9 04/26/2010 08:00 AM     Lab Results   Component Value Date/Time    Vitamin D 25-Hydroxy 25.1 (L) 10/20/2011 07:42 AM    VITAMIN D, 25-HYDROXY 30.9 05/18/2018 09:53 AM       Lab Results   Component Value Date/Time    TSH 2.350 08/07/2018 10:19 AM

## 2018-09-11 RX ORDER — ERGOCALCIFEROL 1.25 MG/1
CAPSULE ORAL
Qty: 5 CAP | Refills: 2 | Status: SHIPPED | OUTPATIENT
Start: 2018-09-11 | End: 2019-01-28 | Stop reason: ALTCHOICE

## 2018-10-03 DIAGNOSIS — G44.52 NEW DAILY PERSISTENT HEADACHE: ICD-10-CM

## 2018-10-03 NOTE — TELEPHONE ENCOUNTER
PCP: Marlon Mullen DO    Last appt: 8/10/2018  Future Appointments  Date Time Provider [place] Enriquez   11/12/2018 8:40 [facility]   [facility] 11[facility]30 [facility]   1/23/2019 9:30 AM Bonnie Jefferson MD RDE [address]       Requested Prescriptions     Pending Prescriptions Disp Refills    butalbital-acetaminophen-caffeine (FIORICET, ESGIC) -40 mg per tablet [Pharmacy Med Name: ODYNQQ-VMXCEHJP-QTPI -40] 30 Tab 1     Sig: take 1 tablet by mouth every 6 hours if needed for headache .  MAX DAILY AMOUNT 4 TABLETS       Prior labs and Blood pressures:  BP Readings from Last 3 Encounters:   08/10/18 100/67   08/07/18 131/68   05/25/18 122/72     Lab Results   Component Value Date/Time    Sodium 138 05/18/2018 09:53 AM    Potassium 3.7 05/18/2018 09:53 AM    Chloride 97 05/18/2018 09:53 AM    CO2 24 05/18/2018 09:53 AM    Anion gap 11 09/03/2010 08:45 AM    Glucose 100 (H) 05/18/2018 09:53 AM    BUN 16 05/18/2018 09:53 AM    Creatinine 0.95 05/18/2018 09:53 AM    BUN/Creatinine ratio 17 05/18/2018 09:53 AM    GFR est AA 71 05/18/2018 09:53 AM    GFR est non-AA 62 05/18/2018 09:53 AM    Calcium 9.9 05/18/2018 09:53 AM     Lab Results   Component Value Date/Time    Hemoglobin A1c 6.6 (H) 08/07/2018 10:19 AM    Hemoglobin A1c (POC) 8.4 03/14/2017 09:58 AM     Lab Results   Component Value Date/Time    Cholesterol, total 123 05/18/2018 09:53 AM    HDL Cholesterol 55 05/18/2018 09:53 AM    LDL, calculated 55 05/18/2018 09:53 AM    VLDL, calculated 13 05/18/2018 09:53 AM    Triglyceride 66 05/18/2018 09:53 AM    CHOL/HDL Ratio 1.9 04/26/2010 08:00 AM     Lab Results   Component Value Date/Time    Vitamin D 25-Hydroxy 25.1 (L) 10/20/2011 07:42 AM    VITAMIN D, 25-HYDROXY 30.9 05/18/2018 09:53 AM       Lab Results   Component Value Date/Time    TSH 2.350 08/07/2018 10:19 AM

## 2018-10-11 RX ORDER — BUTALBITAL, ACETAMINOPHEN AND CAFFEINE 50; 325; 40 MG/1; MG/1; MG/1
TABLET ORAL
Qty: 30 TAB | Refills: 1 | OUTPATIENT
Start: 2018-10-11

## 2018-11-19 ENCOUNTER — TELEPHONE (OUTPATIENT)
Dept: ENDOCRINOLOGY | Age: 68
End: 2018-11-19

## 2018-11-19 ENCOUNTER — DOCUMENTATION ONLY (OUTPATIENT)
Dept: ENDOCRINOLOGY | Age: 68
End: 2018-11-19

## 2018-11-19 NOTE — TELEPHONE ENCOUNTER
Phoenix Chou! Ms. Rito Steel would like a call in regards to a pre authorization for her medication [facility]) . Ms. Rito Steel would like a call when you have a moment. Thanks Darshana Lilly.

## 2018-11-20 ENCOUNTER — TELEPHONE (OUTPATIENT)
Dept: ENDOCRINOLOGY | Age: 68
End: 2018-11-20

## 2018-11-20 NOTE — TELEPHONE ENCOUNTER
Mrs. Yoli Holm called with concerns about getting her Synjardy medication approved through her insurance. I informed Mrs. Escoto that her PA for her Carole Belling was approved from 11/19/18 to 11/19/19. Mrs. Escoto then stated that she was told by her pharmacy that they were waiting for the PA. I called [facility] and the pharmacist informed me that they did receive the PA approval notification for her Carole Belling and was waiting for Mrs. Escoto to bring in a coupon to bring down the cost of her medication which is currently $230.82. I called Mrs. Escoto back and informed her of the information that I received from the pharmacist and informed her that we have a coupon for her to come in and . Mrs. Yoli Holm stated that she will have her daughter come in and  the coupon sometime today (11/20/18).

## 2019-01-06 DIAGNOSIS — I10 ESSENTIAL HYPERTENSION: ICD-10-CM

## 2019-01-07 NOTE — TELEPHONE ENCOUNTER
PCP: Jazmine Key, DO    Last appt: 8/10/2018  Future Appointments   Date Time Provider [place] Enriquez   1/28/2019  2:00 PM [place] Fill, DO [facility] SCH[facility]   3/5/2019  8:00 AM LAB [facility] SCHED   3/6/2019 11:50 AM Catrachita Orona MD [facility] SCH[facility]   3/12/2019  9:00 AM Law PATTERSON DO [facility] SCHED   4/1/2019 11:00 AM [place] Fill, DO [facility] SCHED       Requested Prescriptions     Pending Prescriptions Disp Refills    valsartan-hydroCHLOROthiazide (DIOVAN-HCT) 160-25 mg per tablet [Pharmacy Med Name: VALSARTAN-HCTZ 160-25 MG TAB] 30 Tab 11     Sig: take 1 tablet by mouth once daily       Prior labs and Blood pressures:  BP Readings from Last 3 Encounters:   08/10/18 100/67   08/07/18 131/68   05/25/18 122/72     Lab Results   Component Value Date/Time    Sodium 138 05/18/2018 09:53 AM    Potassium 3.7 05/18/2018 09:53 AM    Chloride 97 05/18/2018 09:53 AM    CO2 24 05/18/2018 09:53 AM    Anion gap 11 09/03/2010 08:45 AM    Glucose 100 (H) 05/18/2018 09:53 AM    BUN 16 05/18/2018 09:53 AM    Creatinine 0.95 05/18/2018 09:53 AM    BUN/Creatinine ratio 17 05/18/2018 09:53 AM    GFR est AA 71 05/18/2018 09:53 AM    GFR est non-AA 62 05/18/2018 09:53 AM    Calcium 9.9 05/18/2018 09:53 AM     Lab Results   Component Value Date/Time    Hemoglobin A1c 6.6 (H) 08/07/2018 10:19 AM    Hemoglobin A1c (POC) 8.4 03/14/2017 09:58 AM     Lab Results   Component Value Date/Time    Cholesterol, total 123 05/18/2018 09:53 AM    HDL Cholesterol 55 05/18/2018 09:53 AM    LDL, calculated 55 05/18/2018 09:53 AM    VLDL, calculated 13 05/18/2018 09:53 AM    Triglyceride 66 05/18/2018 09:53 AM    CHOL/HDL Ratio 1.9 04/26/2010 08:00 AM     Lab Results   Component Value Date/Time    Vitamin D 25-Hydroxy 25.1 (L) 10/20/2011 07:42 AM    VITAMIN D, 25-HYDROXY 30.9 05/18/2018 09:53 AM       Lab Results   Component Value Date/Time    TSH 2.350 08/07/2018 10:19 AM

## 2019-01-11 RX ORDER — VALSARTAN AND HYDROCHLOROTHIAZIDE 160; 25 MG/1; MG/1
TABLET ORAL
Qty: 30 TAB | Refills: 11 | Status: SHIPPED | OUTPATIENT
Start: 2019-01-11 | End: 2019-01-28 | Stop reason: DRUGHIGH

## 2019-01-28 ENCOUNTER — OFFICE VISIT (OUTPATIENT)
Dept: FAMILY MEDICINE CLINIC | Age: 69
End: 2019-01-28

## 2019-01-28 VITALS
TEMPERATURE: 98.6 F | OXYGEN SATURATION: 100 % | HEART RATE: 79 BPM | HEIGHT: 68 IN | BODY MASS INDEX: 27.43 KG/M2 | DIASTOLIC BLOOD PRESSURE: 70 MMHG | SYSTOLIC BLOOD PRESSURE: 110 MMHG | WEIGHT: 181 LBS | RESPIRATION RATE: 19 BRPM

## 2019-01-28 DIAGNOSIS — I10 ESSENTIAL HYPERTENSION: Primary | ICD-10-CM

## 2019-01-28 DIAGNOSIS — G89.29 CHRONIC RIGHT SHOULDER PAIN: ICD-10-CM

## 2019-01-28 DIAGNOSIS — E78.00 PURE HYPERCHOLESTEROLEMIA: ICD-10-CM

## 2019-01-28 DIAGNOSIS — M25.511 CHRONIC RIGHT SHOULDER PAIN: ICD-10-CM

## 2019-01-28 DIAGNOSIS — M79.7 FIBROMYALGIA: ICD-10-CM

## 2019-01-28 DIAGNOSIS — M54.9 UPPER BACK PAIN ON LEFT SIDE: ICD-10-CM

## 2019-01-28 DIAGNOSIS — Z98.890 S/P ARTHROSCOPY OF RIGHT SHOULDER: ICD-10-CM

## 2019-01-28 DIAGNOSIS — E55.9 VITAMIN D DEFICIENCY: ICD-10-CM

## 2019-01-28 RX ORDER — SIMVASTATIN 40 MG/1
TABLET, FILM COATED ORAL
Qty: 90 TAB | Refills: 1 | Status: SHIPPED | OUTPATIENT
Start: 2019-01-28 | End: 2019-06-07 | Stop reason: SDUPTHER

## 2019-01-28 RX ORDER — LANOLIN ALCOHOL/MO/W.PET/CERES
CREAM (GRAM) TOPICAL
COMMUNITY
Start: 2018-10-26 | End: 2019-04-01 | Stop reason: ALTCHOICE

## 2019-01-28 RX ORDER — VALSARTAN AND HYDROCHLOROTHIAZIDE 80; 12.5 MG/1; MG/1
1 TABLET, FILM COATED ORAL DAILY
Qty: 90 TAB | Refills: 3 | Status: SHIPPED | OUTPATIENT
Start: 2019-01-28 | End: 2020-01-31

## 2019-01-28 RX ORDER — ERGOCALCIFEROL 1.25 MG/1
50000 CAPSULE ORAL
Qty: 5 CAP | Refills: 1 | Status: SHIPPED | OUTPATIENT
Start: 2019-01-28 | End: 2019-03-29

## 2019-01-28 NOTE — PROGRESS NOTES
HISTORY OF PRESENT ILLNESS Dasha Mccarty is a 76 y.o. female presents with Back Pain (Rm 14) and Medication Refill Agree with nurse note. Pt with hypertension, vit d deficiency, and hypercholesterolemia presents to the office with a BP of 110/70. For BP, she uses 1/2 tab of Diovan--25 mg daily, tolerating well. She uses Aspirin 81 mg daily, tolerating well. For cholesterol, she uses Zocor 40 mg daily, tolerating well and she requests a refill today. To recall, Vit D 30.9 on 5/18/2018. R hand dominant pt with hx of lumbar and cervical DDD and fibromyalgia. She complains of L upper back/shoulder pain and spinal pain since 8/2018. Pain is a dull ache that can worsen in internesity. Her pain is usually a 3/10 but can get up to a 6/10 on occasion. She is not sure what causes the pain to worsen. Pt had R shoulder arthroscopy with subacromial decompression and open distal clavicle excision on 11/9/2018 with orthopedist, Dr. Inessa Trevino. She just finished PT for her R shoulder and the L shoulder/upper pack pain was bothering her off and on while completing PT. Denies rash, fever, chills. She saw rheumatologist, Dr. Lamont Horton in 8/2018 and he is aware of her L shoulder pain. He said the bone spur is not operable. She uses Tramadol, Flexeril, and Trazodone prn for fibromyalgia flare ups. She tried Lyrica, but it did not work. Written by clem Holloway, as dictated by DO. ROCHELLE Hill Review of Systems negative except as noted above in HPI. ALLERGIES:   
Allergies Allergen Reactions  Influenza Virus Vaccine, Specific Swelling R ARM PAIN, NUMBNESS, SWELLING 
R HAND NUMBNESS AND TINGLING, WORSE IN 4TH AND FIFTH FINGERS  
 Lisinopril Cough Other reaction(s): Lisinopril  Singulair [Montelukast] Other (comments)  
  headache CURRENT MEDICATIONS:   
Outpatient Medications Marked as Taking for the 1/28/19 encounter (Office Visit) with Yogi Diehl, DO Medication Sig Dispense Refill  magnesium oxide (MAG-OX) 400 mg tablet Take  by mouth.  simvastatin (ZOCOR) 40 mg tablet take 1 tablet by mouth at bedtime 90 Tab 1  valsartan-hydroCHLOROthiazide (DIOVAN-HCT) 80-12.5 mg per tablet Take 1 Tab by mouth daily. 90 Tab 3  
 ergocalciferol (ERGOCALCIFEROL) 50,000 unit capsule Take 1 Cap by mouth every seven (7) days for 60 days. For low vit D. Take for 2 months then start over the counter vit D 1000 units daily. 5 Cap 1  empagliflozin-metFORMIN (SYNJARDY) 12.5-1,000 mg per tablet Take 1 Tab by mouth two (2) times daily (with meals). 60 Tab 5  Blood-Glucose Meter monitoring kit One Touch Ultra 2 Use to test blood sugar 1-2 per day (blood sugar before breakfast or 2 hours after any meal or at bedtime) DX.  
E11.65 1 Kit 0  
 glucose blood VI test strips (ASCENSIA AUTODISC VI, ONE TOUCH ULTRA TEST VI) strip Use to test blood sugar 1-2 per day (blood sugar before breakfast or 2 hours after any meal or at bedtime) DX. E11.65 100 Strip 3  
 dulaglutide (TRULICITY) 1.5 LO/1.9 mL sub-q pen 0.5 mL by SubCUTAneous route every seven (7) days. 4 Pen 11  
 solifenacin (VESICARE) 10 mg tablet Take 10 mg by mouth daily.  aspirin delayed-release 81 mg tablet Take 1 Tab by mouth daily. 90 Tab 3  
 valACYclovir (VALTREX) 500 mg tablet  estradiol (VAGIFEM) 10 mcg tab vaginal tablet Insert 10 mcg into vagina daily.  evening primrose oil (EVENING PRIMROSE) 500 mg cap Take 1 Cap by mouth two (2) times a day.  cyclobenzaprine (FLEXERIL) 10 mg tablet Take 10 mg by mouth as needed.  trazodone (DESYREL) 50 mg tablet Take 50 mg by mouth as needed. Takes at bedtime prn  tramadol (ULTRAM) 50 mg tablet Take 50 mg by mouth every six (6) hours as needed. PAST MEDICAL HISTORY:   
Past Medical History:  
Diagnosis Date  Allergic rhinitis Dr. Rene Ricketts  Arthritis  Cataract 2015 Dr. Grisel Garcia.  Chest pain 01/15/09 Dr. Cate Oliva  Chickenpox childhood  DDD (degenerative disc disease), cervical 10/2015 Dr. Bryan Redd.  DDD (degenerative disc disease), lumbar 2014 Dr. Claudene Cooks.  Diabetes ([facility] [address] 06/2007 Kwame Castillo, OD. Dr. Leonardo Hart, pod. Dr. Juno Cruz.  Endometriosis Dr. Julien Rivera  Essential hypertension, benign Dr. Cate Oliva  Fibroid, uterine Dr. Julien Rivera  Fibromyalgia Dr. Yunior Nayakertoe 08/2009 Dr. Epifanio Waldrop. Dr. Leonardo Hart.  Hand paresthesia 2011 Dr. Kelli Norman. Dr. Bibi Desir.  EVYFNNOL(004.3) 2012 Dr. Audrey Lane. Dr. Epifanio Waldrop, ENT.  Hemoptysis 06/13/07 Dr. José Miguel Bedolla  Hiatal hernia 05/27/15  
 small. Danielle Crocker MD  
[facility] Internal hemorrhoids 12/2006, 05/27/15 Dr. [facility]. Dr. Dangelo Fink.  Intraocular pressure increase 07/27/05  
 bilaterally. Dr. Stephanie Castañeda. Dr. Kwame Castillo.  Knee pain 04/04/13 Bilaterally. due to fall. Dr. Hare.  Left knee pain 11/26/14 Left. due to OA, torn medial and lateral meniscal tears. Dr. Willis Olea. Dr. Mani Reyes.  Lumbar spondylosis 3/2008 Dr. Wei Marion. Dr. 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 Dionne Sell. due to Diabetic Neuropathy. Dr. Leonrado Hart.  Proteinuria 2009 Dr. Delmar Villarreal  Right shoulder pain 01/30/06 Dr. Lexus Velez. impingement tendinitis  Right shoulder pain 07/2014 Dr. Leticia Singleton.  Schatzki's ring 05/27/15  
 with partial obstruction at GE junction. Dr. Dangelo Fink.  Sciatica Dr. Wei Marion. Dr. Kaia Vega  [facility] 05/27/15 SIGMOID WITH TORTUOUS DESCENDING COLON. Dr. Gio Davis.  Thalassemia minor 1980s  Vitamin D deficiency 05/2011 Luis Daniel Gibson NP  
 
 
PAST SURGICAL HISTORY:   
Past Surgical History:  
Procedure Laterality Date  CYSTOSCOPY  2005 Dr. Rosy Rene  DELIVERY   12  
 FOOT/TOES SURGERY PROC UNLISTED Left Ornelas's Neuroma. [address] HX BREAST BIOPSY Left 12 years ago neg  HX BREAST LUMPECTOMY  10/15/08 Left intraductal papilloma. Dr. Wander Soni  HX COLONOSCOPY  05/27/15  
 due to anemia, rectal bleeding. due q 5 yrs. Dr. John Nielsen  HX COLONOSCOPY  , 06,  Dr. Stefani Gee. due q 5 yrs.  HX ENDOSCOPY  05/27/15  
 due to anemia. Dr. Sue Barboza.  HX HEART CATHETERIZATION  09 Dr. Benton Ortega  HX KNEE ARTHROSCOPY Left 04/17/15  
 due to MEDIAL AND LATERAL TORN MENISCI. Dr. Verner Ned.  HX MYOMECTOMY  [id number]  
 x4  
 HX OOPHORECTOMY Bilateral   HX SHOULDER ARTHROSCOPY Right 2018  
 with subacromial decompression and open distal clavicle excision, Dr. Blayne Viera  
 endometriosis, fibroids. FAMILY HISTORY:   
Family History Problem Relation Age of Onset  Hypertension Mother  Glaucoma Mother  Heart Failure Mother CHF  Hypertension Father  Cancer Father   
     colon  Heart Attack Sister  Stroke Sister  Diabetes Sister   
     x 2 sisters  Diabetes Brother  Breast Cancer Maternal Aunt 45 SOCIAL HISTORY:   
Social History Socioeconomic History  Marital status: SINGLE Spouse name: Not on file  Number of children: Not on file  Years of education: Not on file  Highest education level: Not on file Tobacco Use  Smoking status: Never Smoker  Smokeless tobacco: Never Used Substance and Sexual Activity  Alcohol use: Yes Alcohol/week: 0.0 oz  
  Comment: Social beer, social drinks, social shots of liquor,and social wine.  Drug use: No  
 Sexual activity: No  
 
 
IMMUNIZATIONS:   
Immunization History Administered Date(s) Administered  H1N1 Influenza Virus Vaccine 2010  Hepatitis B Vaccine 06/09/2008, 08/20/2008, 01/27/2009  Influenza High Dose Vaccine PF 11/11/2015, 09/07/2016, 10/19/2017  Influenza Vaccine Split 10/08/2010, 11/08/2011, 10/24/2012  Influenza Vaccine Whole 01/27/2009  Pneumococcal Conjugate (PCV-13) 11/11/2015  Pneumococcal Polysaccharide (PPSV-23) 12/07/2016  Tdap 01/23/2018  ZZZ-RETIRED (DO NOT USE) Pneumococcal Vaccine (Unspecified Type) 12/21/2005  Zoster Recombinant 05/25/2018, 08/04/2018  Zoster Vaccine, Live 01/20/2014  dT Vaccine 06/09/2008 PHYSICAL EXAMINATION Vital Signs Visit Vitals /70 (BP 1 Location: Left arm, BP Patient Position: Sitting) Pulse 79 Temp 98.6 °F (37 °C) (Oral) Resp 19 Ht 5' 8\" (1.727 m) Wt 181 lb (82.1 kg) SpO2 100% BMI 27.52 kg/m² Weight Metrics 1/28/2019 8/10/2018 8/7/2018 5/25/2018 4/10/2018 1/23/2018 11/29/2017 Weight 181 lb 182 lb 4.8 oz 181 lb 180 lb 8 oz 193 lb 3.2 oz 192 lb 11.2 oz 194 lb 1.6 oz  
BMI 27.52 kg/m2 27.57 kg/m2 26.92 kg/m2 28.27 kg/m2 30.26 kg/m2 30.18 kg/m2 30.4 kg/m2 General appearance - Well nourished. Well appearing. Well developed. No acute distress. Obese. Head - Normocephalic. Atraumatic. Eyes - pupils equal and reactive. Extraocular eye movements intact. Sclera anicteric. Mildly injected sclera. Ears - Hearing is grossly normal bilaterally. Nose - normal and patent. No polyps noted. No erythema. No discharge. Mouth - mucous membranes with adequate moisture. Posterior pharynx normal with cobblestone appearance. No erythema, white exudate or obstruction. Neck - supple. Midline trachea. No carotid bruits noted bilaterally. No thyromegaly noted. Chest - clear to auscultation bilaterally anteriorly and posteriorly. No wheezes. No rales or rhonchi. Breath sounds are symmetrical bilaterally. Unlabored respirations. Heart - normal rate. Regular rhythm. Normal S1, S2. No murmur noted. No rubs, clicks or gallops noted. Abdomen - soft and distended. No masses or organomegaly. No rebound, rigidity or guarding. Bowel sounds normal x 4 quadrants. No tenderness noted. Neurological - awake, alert and oriented to person, place, and time and event. Cranial nerves II through XII intact. Clear speech. Muscle strength is +5/5 x 4 extremities. Sensation is intact to light touch bilaterally. Steady gait. Heme/Lymph - peripheral pulses normal x 4 extremities. No peripheral edema is noted. Musculoskeletal - Intact x 4 extremities. Full ROM x 4 extremities. No pain with movement. No pain on palpation of the bilateral shoulders, elbows, wrists, hands. Slightly rotated to L. Back exam - normal range of motion. No pain on palpation of the spinous processes in the cervical, thoracic, lumbar, sacral regions. No CVA tenderness. Skin - no rashes, erythema, ecchymosis, lacerations, abrasions, suspicious moles noted. Barely visible linear scar at most superior aspect of R shoulder. Psychological -   normal behavior, dress and thought processes. Good insight. Good eye contact. Normal affect. Appropriate mood. Normal speech. DATA REVIEWED Lab Results Component Value Date/Time WBC 5.7 06/15/2017 08:57 AM  
 HGB 11.2 06/15/2017 08:57 AM  
 HCT 34.8 06/15/2017 08:57 AM  
 PLATELET 327 94/75/2874 08:57 AM  
 MCV 70 (L) 06/15/2017 08:57 AM  
 
Lab Results Component Value Date/Time  Sodium 138 05/18/2018 09:53 AM  
 Potassium 3.7 05/18/2018 09:53 AM  
 Chloride 97 05/18/2018 09:53 AM  
 CO2 24 05/18/2018 09:53 AM  
 Anion gap 11 09/03/2010 08:45 AM  
 Glucose 100 (H) 05/18/2018 09:53 AM  
 BUN 16 05/18/2018 09:53 AM  
 Creatinine 0.95 05/18/2018 09:53 AM  
 BUN/Creatinine ratio 17 05/18/2018 09:53 AM  
 GFR est AA 71 05/18/2018 09:53 AM  
 GFR est non-AA 62 05/18/2018 09:53 AM  
 Calcium 9.9 05/18/2018 09:53 AM  
 Bilirubin, total 0.3 05/18/2018 09:53 AM  
 AST (SGOT) 11 05/18/2018 09:53 AM  
 Alk. phosphatase 58 05/18/2018 09:53 AM  
 Protein, total 6.9 05/18/2018 09:53 AM  
 Albumin 4.2 05/18/2018 09:53 AM  
 Globulin 3.5 09/03/2010 08:45 AM  
 A-G Ratio 1.6 05/18/2018 09:53 AM  
 ALT (SGPT) 6 05/18/2018 09:53 AM  
 
Lab Results Component Value Date/Time Cholesterol, total 123 05/18/2018 09:53 AM  
 HDL Cholesterol 55 05/18/2018 09:53 AM  
 LDL, calculated 55 05/18/2018 09:53 AM  
 VLDL, calculated 13 05/18/2018 09:53 AM  
 Triglyceride 66 05/18/2018 09:53 AM  
 CHOL/HDL Ratio 1.9 04/26/2010 08:00 AM  
 
Lab Results Component Value Date/Time Vitamin D 25-Hydroxy 25.1 (L) 10/20/2011 07:42 AM  
 VITAMIN D, 25-HYDROXY 30.9 05/18/2018 09:53 AM  
   
Lab Results Component Value Date/Time Hemoglobin A1c 6.6 (H) 08/07/2018 10:19 AM  
 Hemoglobin A1c (POC) 8.4 03/14/2017 09:58 AM  
 
Lab Results Component Value Date/Time TSH 2.350 08/07/2018 10:19 AM  
 
 
Lab Results Component Value Date/Time Microalbumin/Creat ratio (mg/g creat) 5 09/03/2010 08:45 AM  
 Microalb/Creat ratio (ug/mg creat.) 9.4 05/18/2018 09:53 AM  
 Microalbumin,urine random 1.21 09/03/2010 08:45 AM  
 
 
 
ASSESSMENT and PLAN 
 
  ICD-10-CM ICD-9-CM 1. Essential hypertension I10 401.9 valsartan-hydroCHLOROthiazide (DIOVAN-HCT) 80-12.5 mg per tablet  
 stable on Diovan /25 mg 1/2 po q day 2. Pure hypercholesterolemia E78.00 272.0 simvastatin (ZOCOR) 40 mg tablet  
 stable on Zocor 3. Upper back pain on left side M54.9 724.5   
 constant dull with episodes of intensity due to fibromyalgia vs somatic dysfunction vs other 4. Fibromyalgia M79.7 729.1   
 causing L upper back pain vs other 5. Vitamin D deficiency E55.9 268.9 ergocalciferol (ERGOCALCIFEROL) 50,000 unit capsule 6. Chronic right shoulder pain M25.511 719.41   
 G89.29 338.29   
7.  S/P arthroscopy of right shoulder Z98.890 V45.89   
 with subacromial decompression and open distal clavicle excision due to shoulder impingement and AC jt arthritis Discussed the patient's BMI with her. The BMI follow up plan is as follows: I have counseled this patient on diet and exercise regimens. Decrease carbohydrates (white foods, sweet foods, sweet drinks and alcohol), increase green leafy vegetables and protein (lean meats and beans) with each meal.  Avoid fried foods. Eat 3-5 small meals daily. Do not skip meals. Increase water intake. Increase physical activity to 30 minutes daily for health benefit or 60 minutes daily to prevent weight regain, as tolerated. Get 7-8 hours uninterrupted sleep nightly. Chart reviewed and updated. Continue current medications and care. Start Rx Vitamin D [id number] weekly x3 months and take Vitamin D 5000IU daily when finished with rx. Pt prefers to continue with Valsartan HCT and will f/u with pharmacist if the batch was voluntarily recalled. Prescriptions written and sent to pharmacy; medication side effects discussed. Valsartan-HCT 80-12.5 mg. Zocor 40 mg. Vit D 50,000IU. Advised pt to discuss L shoulder pain with Dr. Philippe Lopez. Recommend trial of Cymbalta, Lyrica, or other medication to directly treat fibromyalgia. In the mean time, try OTC pain cream and rubs. Completed shoulder and neck exercises as able. Advised chiropractic realignment or OMT. Recheck pertinent fasting labs in 5/2019. Recent office visit notes from Dr. Monet Paul reviewed. Get recent office visit notes from Dr. Ana Benitez. Advised pt to sign release. Counseled patient on health concerns:  Back and shoulder care, fibromyalgia, vit d deficiency, hypertension, and cholesterol. Relevant handouts given and discussed with patient. Immunizations noted. Offered empathy, support, legitimation, prayers, partnership to patient. Praised patient for progress.  
Follow-up Disposition: 
Return in about 3 months (around 4/28/2019) for physical. 
 
 Patient was offered a choice/choices in the treatment plan today. Patient expresses understanding of the plan and agrees with recommendations. More than 30 mins spent face to face with patient and more than 50% of this time spent in counseling and coordinating care. Written by clem Herring, as dictated by Dr. Lavaughn Cheadle, DO. Documentation True and Accepted by [employer]. Nathan Cueva. Patient Instructions Healthy Upper Back: Exercises Your Care Instructions Here are some examples of exercises for your upper back. Start each exercise slowly. Ease off the exercise if you start to have pain. Your doctor or physical therapist will tell you when you can start these exercises and which ones will work best for you. How to do the exercises Lower neck and upper back stretch 1. Stretch your arms out in front of your body. Clasp one hand on top of your other hand. 2. Gently reach out so that you feel your shoulder blades stretching away from each other. 3. Gently bend your head forward. 4. Hold for 15 to 30 seconds. 5. Repeat 2 to 4 times. Midback stretch 1. Kneel on the floor, and sit back on your ankles. 2. Lean forward, place your hands on the floor, and stretch your arms out in front of you. Rest your head between your arms. 3. Gently push your chest toward the floor, reaching as far in front of you as possible. 4. Hold for 15 to 30 seconds. 5. Repeat 2 to 4 times. Shoulder rolls 1. Sit comfortably with your feet shoulder-width apart. You can also do this exercise while standing. 2. Roll your shoulders up, then back, and then down in a smooth, circular motion. 3. Repeat 2 to 4 times. Wall push-up 1. Stand against a wall with your feet about 12 to 24 inches back from the wall. If you feel any pain when you do this exercise, stand closer to the wall. 2. Place your hands on the wall slightly wider apart than your shoulders, and lean forward. 3. Gently lean your body toward the wall. Then push back to your starting position. Keep the motion smooth and controlled. 4. Repeat 8 to 12 times. Resisted shoulder blade squeeze 1. Sit or stand, holding the band in both hands in front of you. Keep your elbows close to your sides, bent at a 90-degree angle. Your palms should face up. 2. Squeeze your shoulder blades together, and move your arms to the outside, stretching the band. Be sure to keep your elbows at your sides while you do this. 3. Relax. 4. Repeat 8 to 12 times. Resisted rows 1. Put the band around a solid object, such as a bedpost, at about waist level. Hold one end of the band in each hand. 2. With your elbows at your sides and bent to 90 degrees, pull the band back to move your shoulder blades toward each other. Return to the starting position. 3. Repeat 8 to 12 times. Follow-up care is a key part of your treatment and safety. Be sure to make and go to all appointments, and call your doctor if you are having problems. It's also a good idea to know your test results and keep a list of the medicines you take. Where can you learn more? Go to http://gregg-zunilda.info/. Enter V136 in the search box to learn more about \"Healthy Upper Back: Exercises. \" Current as of: September 20, 2018 Content Version: 11.9 © 1192-5030 [employer]. Care instructions adapted under license by [employer] / [employer] (which disclaims liability or warranty for this information). If you have questions about a medical condition or this instruction, always ask your healthcare professional. [address] any warranty or liability for your use of this information. Neck Arthritis: Exercises Your Care Instructions Here are some examples of typical rehabilitation exercises for your condition. Start each exercise slowly. Ease off the exercise if you start to have pain. Your doctor or physical therapist will tell you when you can start these exercises and which ones will work best for you. How to do the exercises Neck stretches to the side 6. This stretch works best if you keep your shoulder down as you lean away from it. To help you remember to do this, start by relaxing your shoulders and lightly holding on to your thighs or your chair. 7. Tilt your head toward your shoulder and hold for 15 to 30 seconds. Let the weight of your head stretch your muscles. 8. Repeat 2 to 4 times toward each shoulder. Chin tuck 6. Lie on the floor with a rolled-up towel under your neck. Your head should be touching the floor. 7. Slowly bring your chin toward your chest. 
8. Hold for a count of 6, and then relax for up to 10 seconds. 9. Repeat 8 to 12 times. Active cervical rotation 4. Sit in a firm chair, or stand up straight. 5. Keeping your chin level, turn your head to the right, and hold for 15 to 30 seconds. 6. Turn your head to the left and hold for 15 to 30 seconds. 7. Repeat 2 to 4 times to each side. Shoulder blade squeeze 5. While standing, squeeze your shoulder blades together. 6. Do not raise your shoulders up as you are squeezing. 7. Hold for 6 seconds. 8. Repeat 8 to 12 times. Shoulder rolls 5. Sit comfortably with your feet shoulder-width apart. You can also do this exercise standing up. 6. Roll your shoulders up, then back, and then down in a smooth, circular motion. 7. Repeat 2 to 4 times. Follow-up care is a key part of your treatment and safety. Be sure to make and go to all appointments, and call your doctor if you are having problems. It's also a good idea to know your test results and keep a list of the medicines you take. Where can you learn more? Go to http://gregg-zunilda.info/. Enter M290 in the search box to learn more about \"Neck Arthritis: Exercises. \" Current as of: September 20, 2018 Content Version: 11.9 © 3673-6915 [employer]. Care instructions adapted under license by [employer] (which disclaims liability or warranty for this information). If you have questions about a medical condition or this instruction, always ask your healthcare professional. [address] any warranty or liability for your use of this information.

## 2019-01-28 NOTE — PATIENT INSTRUCTIONS
Healthy Upper Back: Exercises Your Care Instructions Here are some examples of exercises for your upper back. Start each exercise slowly. Ease off the exercise if you start to have pain. Your doctor or physical therapist will tell you when you can start these exercises and which ones will work best for you. How to do the exercises Lower neck and upper back stretch 1. Stretch your arms out in front of your body. Clasp one hand on top of your other hand. 2. Gently reach out so that you feel your shoulder blades stretching away from each other. 3. Gently bend your head forward. 4. Hold for 15 to 30 seconds. 5. Repeat 2 to 4 times. Midback stretch 1. Kneel on the floor, and sit back on your ankles. 2. Lean forward, place your hands on the floor, and stretch your arms out in front of you. Rest your head between your arms. 3. Gently push your chest toward the floor, reaching as far in front of you as possible. 4. Hold for 15 to 30 seconds. 5. Repeat 2 to 4 times. Shoulder rolls 1. Sit comfortably with your feet shoulder-width apart. You can also do this exercise while standing. 2. Roll your shoulders up, then back, and then down in a smooth, circular motion. 3. Repeat 2 to 4 times. Wall push-up 1. Stand against a wall with your feet about 12 to 24 inches back from the wall. If you feel any pain when you do this exercise, stand closer to the wall. 2. Place your hands on the wall slightly wider apart than your shoulders, and lean forward. 3. Gently lean your body toward the wall. Then push back to your starting position. Keep the motion smooth and controlled. 4. Repeat 8 to 12 times. Resisted shoulder blade squeeze 1. Sit or stand, holding the band in both hands in front of you. Keep your elbows close to your sides, bent at a 90-degree angle. Your palms should face up.  
2. Squeeze your shoulder blades together, and move your arms to the outside, stretching the band. Be sure to keep your elbows at your sides while you do this. 3. Relax. 4. Repeat 8 to 12 times. Resisted rows 1. Put the band around a solid object, such as a bedpost, at about waist level. Hold one end of the band in each hand. 2. With your elbows at your sides and bent to 90 degrees, pull the band back to move your shoulder blades toward each other. Return to the starting position. 3. Repeat 8 to 12 times. Follow-up care is a key part of your treatment and safety. Be sure to make and go to all appointments, and call your doctor if you are having problems. It's also a good idea to know your test results and keep a list of the medicines you take. Where can you learn more? Go to http://gregg-zunilda.info/. Enter J317 in the search box to learn more about \"Healthy Upper Back: Exercises. \" Current as of: September 20, 2018 Content Version: 11.9 © 7248-3266 [employer]. Care instructions adapted under license by [employer] (which disclaims liability or warranty for this information). If you have questions about a medical condition or this instruction, always ask your healthcare professional. [address] any warranty or liability for your use of this information. Neck Arthritis: Exercises Your Care Instructions Here are some examples of typical rehabilitation exercises for your condition. Start each exercise slowly. Ease off the exercise if you start to have pain. Your doctor or physical therapist will tell you when you can start these exercises and which ones will work best for you. How to do the exercises Neck stretches to the side 6. This stretch works best if you keep your shoulder down as you lean away from it. To help you remember to do this, start by relaxing your shoulders and lightly holding on to your thighs or your chair. 7. Tilt your head toward your shoulder and hold for 15 to 30 seconds. Let the weight of your head stretch your muscles. 8. Repeat 2 to 4 times toward each shoulder. Chin tuck 6. Lie on the floor with a rolled-up towel under your neck. Your head should be touching the floor. 7. Slowly bring your chin toward your chest. 
8. Hold for a count of 6, and then relax for up to 10 seconds. 9. Repeat 8 to 12 times. Active cervical rotation 4. Sit in a firm chair, or stand up straight. 5. Keeping your chin level, turn your head to the right, and hold for 15 to 30 seconds. 6. Turn your head to the left and hold for 15 to 30 seconds. 7. Repeat 2 to 4 times to each side. Shoulder blade squeeze 5. While standing, squeeze your shoulder blades together. 6. Do not raise your shoulders up as you are squeezing. 7. Hold for 6 seconds. 8. Repeat 8 to 12 times. Shoulder rolls 5. Sit comfortably with your feet shoulder-width apart. You can also do this exercise standing up. 6. Roll your shoulders up, then back, and then down in a smooth, circular motion. 7. Repeat 2 to 4 times. Follow-up care is a key part of your treatment and safety. Be sure to make and go to all appointments, and call your doctor if you are having problems. It's also a good idea to know your test results and keep a list of the medicines you take. Where can you learn more? Go to http://gregg-zunilda.info/. Enter G081 in the search box to learn more about \"Neck Arthritis: Exercises. \" Current as of: September 20, 2018 Content Version: 11.9 © 3268-2972 [employer]. Care instructions adapted under license by [employer] (which disclaims liability or warranty for this information). If you have questions about a medical condition or this instruction, always ask your healthcare professional. [address] any warranty or liability for your use of this information.

## 2019-01-28 NOTE — PROGRESS NOTES
[facility]  Identified pt with two pt identifiers(name and ). Chief Complaint Patient presents with  Back Pain Rm 14  
 
 
1. Have you been to the ER, urgent care clinic since your last visit?n   Hospitalized since your last visit? n 
 
2. Have you seen or consulted any other health care providers outside of the [address] [place] [address] since your last visit? Include any pap smears or colon screening. n 
 
 
Advance Care Planning In the event something were to happen to you and you were unable to speak on your behalf, do you have an Advance Directive/ Living Will in place stating your wishes? NO If yes, do we have a copy on file NO If no, would you like information NO Medication reconciliation up to date and corrected with patient at this time. Today's provider has been notified of reason for visit, vitals and flowsheets obtained on patients. Reviewed record in preparation for visit, huddled with provider and have obtained necessary documentation. Health Maintenance Due Topic  MEDICARE YEARLY EXAM   
 HEMOGLOBIN A1C Q6M Wt Readings from Last 3 Encounters:  
19 181 lb (82.1 kg) 08/10/18 182 lb 4.8 oz (82.7 kg) 18 181 lb (82.1 kg) Temp Readings from Last 3 Encounters:  
19 98.6 °F (37 °C) (Oral) 08/10/18 97.7 °F (36.5 °C) (Temporal) 18 97.9 °F (36.6 °C) (Oral) BP Readings from Last 3 Encounters:  
19 110/70  
08/10/18 100/67  
18 131/68 Pulse Readings from Last 3 Encounters:  
19 79  
08/10/18 82  
18 65 Vitals:  
 19 1432 BP: 110/70 Pulse: 79 Resp: 19 Temp: 98.6 °F (37 °C) TempSrc: Oral  
SpO2: 100% Weight: 181 lb (82.1 kg) Height: 5' 8\" (1.727 m) PainSc:   2 PainLoc: Back Learning Assessment: 
:  
 
Learning Assessment 4/10/2018 2014 PRIMARY LEARNER Patient Patient HIGHEST LEVEL OF EDUCATION - PRIMARY LEARNER  4 YEARS OF COLLEGE > 4 YEARS OF COLLEGE  
BARRIERS PRIMARY LEARNER NONE NONE  
CO-LEARNER CAREGIVER No No  
CO-LEARNER NAME - n/a PRIMARY LANGUAGE ENGLISH ENGLISH  NEED No -  
LEARNER PREFERENCE PRIMARY LISTENING LISTENING  
  READING -  
  DEMONSTRATION -  
  PICTURES -  
LEARNING SPECIAL TOPICS n0 no  
ANSWERED BY patient Patient RELATIONSHIP SELF SELF  
ASSESSMENT COMMENT 4/10/18ah -  
 
 
Depression Screening: 
:  
 
PHQ over the last two weeks 1/28/2019 Little interest or pleasure in doing things Not at all Feeling down, depressed, irritable, or hopeless Not at all Total Score PHQ 2 0 No flowsheet data found. Fall Risk Assessment: 
:  
 
Fall Risk Assessment, last 12 mths 1/28/2019 Able to walk? Yes Fall in past 12 months? No  
 
 
Abuse Screening: 
:  
 
Abuse Screening Questionnaire 1/23/2018 12/7/2016 Do you ever feel afraid of your partner? Kortney Bloodgood Are you in a relationship with someone who physically or mentally threatens you? Kortney Bloodgood Is it safe for you to go home? Y Y  
 
 
ADL Screening: 
:  
 
ADL Assessment 1/23/2018 Feeding yourself No Help Needed Getting from bed to chair No Help Needed Getting dressed No Help Needed Bathing or showering No Help Needed Walk across the room (includes cane/walker) No Help Needed Using the telphone No Help Needed Taking your medications No Help Needed Preparing meals No Help Needed Managing money (expenses/bills) No Help Needed Moderately strenuous housework (laundry) No Help Needed Shopping for personal items (toiletries/medicines) No Help Needed Shopping for groceries No Help Needed Driving No Help Needed Climbing a flight of stairs No Help Needed Getting to places beyond walking distances No Help Needed BMI: 
Weight Metrics 1/28/2019 8/10/2018 8/7/2018 5/25/2018 4/10/2018 1/23/2018 11/29/2017 Weight 181 lb 182 lb 4.8 oz 181 lb 180 lb 8 oz 193 lb 3.2 oz 192 lb 11.2 oz 194 lb 1.6 oz  
 BMI 27.52 kg/m2 27.57 kg/m2 26.92 kg/m2 28.27 kg/m2 30.26 kg/m2 30.18 kg/m2 30.4 kg/m2 Medication reconciliation up to date and corrected with patient at this time.

## 2019-03-06 ENCOUNTER — OFFICE VISIT (OUTPATIENT)
Dept: ENDOCRINOLOGY | Age: 69
End: 2019-03-06

## 2019-03-06 VITALS
BODY MASS INDEX: 27.2 KG/M2 | HEIGHT: 68 IN | WEIGHT: 179.5 LBS | OXYGEN SATURATION: 100 % | DIASTOLIC BLOOD PRESSURE: 74 MMHG | HEART RATE: 72 BPM | RESPIRATION RATE: 16 BRPM | SYSTOLIC BLOOD PRESSURE: 130 MMHG

## 2019-03-06 DIAGNOSIS — E11.40 TYPE 2 DIABETES MELLITUS WITH DIABETIC NEUROPATHY, WITHOUT LONG-TERM CURRENT USE OF INSULIN (HCC): Primary | ICD-10-CM

## 2019-03-06 LAB — HBA1C MFR BLD HPLC: 6.2 %

## 2019-03-06 NOTE — PROGRESS NOTES
Endocrinology Visit    Chief Complaint: Type 2 diabetes    History of Present Illness: Dasha Mccarty is a 76 y.o. female who returns for type 2 diabetes mellitus, previously uncontrolled with A1c 9.9% in January 2018. I saw her in initial consultation in April at which time I added Trulicity to her regimen of metformin and Jardiance. She subsequently lost weight and A1c improved to <7%. POC A1c today is 6.2%. Current glycemic medication regimen is Trulicity 1.8TE weekly and Synjardy 12.5-1000mg BID. Home blood glucose monitoring frequency: once a day  Glucose range at home: most below 150, highest was 165 after a meal     Known complications include neuropathy. Last eye exam was in March - no retinopathy. Weight is down 25 lbs in the past year. Eats 3 meals/day and does snack, but not as often since starting Trulicity. Trying to chose healthy snacks like nuts and fruit, but still has candy on occasion. Drinks water, coffee or tea with Splenda.      Review of Systems as above, otherwise a 7 pt review is negative    Problem List:  Patient Active Problem List   Diagnosis Code    Internal hemorrhoids K64.8    Lumbar spondylosis M47.816    Fibromyalgia M79.7    Headache(784.0) R51    Neck arthritis M47.812    Thalassemia minor D56.3    Vitamin D deficiency E55.9    Left knee pain M25.562    Right shoulder pain M25.511    Essential hypertension I10    Stenosis colon ([address] K56.699    Schatzki's ring K22.2    Hiatal hernia K44.9    Allergic rhinitis due to pollen J30.1    Iron deficiency anemia D50.9    Intraocular pressure increase H40.059    Neck pain M54.2    High cholesterol E78.00    Cataract H26.9    ACE inhibitor intolerance Z78.9    S/P MARTIN (total abdominal hysterectomy) Z90.710    Neuropathic pain of foot G57.90    Advance care planning Z71.89    Controlled type 2 diabetes mellitus with diabetic neuropathy, without long-term current use of insulin (HCC) E11.40    Ulnar neuropathy of left upper extremity G56.22    Acne rosacea L71.9    Type 2 diabetes mellitus with diabetic neuropathy (HCC) E11.40       Past Medical History:    Past Medical History:   Diagnosis Date    Allergic rhinitis     Dr. Nimesh Beasley 2015    Dr. Chet Mckeon.  Chest pain 01/15/09    Dr. Valorie James Chickenpox childhood    DDD (degenerative disc disease), cervical 10/2015    Dr. Kelvin Ga.  DDD (degenerative disc disease), lumbar 2014    Dr. Jillian Carter.  Diabetes ([address] 06/2007    Shanna Longoria, OD. Dr. Hayde Vargas, pod. Dr. Lena Gorman.  Endometriosis     Dr. Stu Guallpa    Essential hypertension, benign     Dr. Selena Lezama, uterine     Dr. Rashi Serra 08/2009    Dr. Linda Villarreal. Dr. Hayde Vargas.  Hand paresthesia 2011    Dr. Rashel Cornoado. Dr. Demario Shah.  IIPCXKBG(772.4) 2012    Dr. Cara Cox. Dr. Linda Villarreal, ENT.  Hemoptysis 06/13/07    Dr. Lobito Mai    Hiatal hernia 05/27/15    small. Abigail Vaughn MD   [facility] Internal hemorrhoids 12/2006, 05/27/15    Dr. Fanny Liang. Dr. Abdirahman Rutherford.  Intraocular pressure increase 07/27/05    bilaterally. Dr. Charles Ordoñez. Dr. Shanna Longoria.  Knee pain 04/04/13    Bilaterally. due to fall. Dr. Leyla Cárdenas.  Left knee pain 11/26/14    Left. due to OA, torn medial and lateral meniscal tears. Dr. Pavel Vidal. Dr. Umanzor Heading.  Lumbar spondylosis 3/2008    Dr. Jazmin Denson. Dr. Tati Willingham    Menopause 1994    Paresthesia of foot     Left  due to Ornelas's Neuromo Sxs. Elverna Ernandez. due to Diabetic Neuropathy. Dr. Hayde Vargas.  Proteinuria 2009    Dr. Nia Cheney Right shoulder pain 01/30/06    Dr. Elba Marquez. impingement tendinitis    Right shoulder pain 07/2014    Dr. Odessa Akbar.  Schatzki's ring 05/27/15    with partial obstruction at GE junction. Dr. Abdirahman Rutherford. Zak Bobby Denson.   Dr. Tati Willingham  Stenosis colon ([address] 05/27/15    SIGMOID WITH TORTUOUS DESCENDING COLON. Dr. Sharon Davis.  Thalassemia minor 1980s    Vitamin D deficiency 05/2011    Portia Miranda NP       Past Surgical History:  Past Surgical History:   Procedure Laterality Date    CYSTOSCOPY  07/2005    Dr. Thaddeus Lu FOOT/TOES SURGERY PROC UNLISTED      Left Ornelas's Neuroma. [id number] [facility] HX BREAST BIOPSY Left     12 years ago neg    HX BREAST LUMPECTOMY  10/15/08    Left intraductal papilloma. Dr. Tamica Venegas HX COLONOSCOPY  05/27/15    due to anemia, rectal bleeding. due q 5 yrs. Dr. Deepali Mendoza  2001, 12/07/06, 2011    Dr. Natalia Enrique. due q 5 yrs.  HX ENDOSCOPY  05/27/15    due to anemia. Dr. Jessica Pulido.  HX HEART CATHETERIZATION  01/09/09    Dr. Melendez Labor ARTHROSCOPY Left 04/17/15    due to [address] Dr. Brandon Bagley.  HX MYOMECTOMY  [id number]    x4    HX OOPHORECTOMY Bilateral 1994    HX SHOULDER ARTHROSCOPY Right 11/09/2018    with subacromial decompression and open distal clavicle excision, Dr. Meghna Kimball    endometriosis, fibroids. Social History:  Social History     Socioeconomic History    Marital status: SINGLE     Spouse name: Not on file    Number of children: Not on file    Years of education: Not on file    Highest education level: Not on file   Social Needs    Financial resource strain: Not on file    Food insecurity - worry: Not on file    Food insecurity - inability: Not on file   [employer] needs - medical: Not on file   [employer] needs - non-medical: Not on file   Occupational History    Not on file   Tobacco Use    Smoking status: Never Smoker    Smokeless tobacco: Never Used   Substance and Sexual Activity    Alcohol use: Yes     Alcohol/week: 0.0 oz     Comment: Social beer, social drinks, social shots of liquor,and social wine.     Drug use: No    Sexual activity: No   Other Topics Concern    Not on file   Social History Narrative    Not on file       Family History:  Family History   Problem Relation Age of Onset   Gaby Linares Hypertension Mother     Glaucoma Mother     Heart Failure Mother         CHF    Hypertension Father     Cancer Father         colon    Heart Attack Sister     Stroke Sister     Diabetes Sister         x 2 sisters    Diabetes Brother     Breast Cancer Maternal Aunt 38       Medications:     Current Outpatient Medications:     magnesium oxide (MAG-OX) 400 mg tablet, Take  by mouth., Disp: , Rfl:     simvastatin (ZOCOR) 40 mg tablet, take 1 tablet by mouth at bedtime, Disp: 90 Tab, Rfl: 1    valsartan-hydroCHLOROthiazide (DIOVAN-HCT) 80-12.5 mg per tablet, Take 1 Tab by mouth daily. , Disp: 90 Tab, Rfl: 3    ergocalciferol (ERGOCALCIFEROL) 50,000 unit capsule, Take 1 Cap by mouth every seven (7) days for 60 days. For low vit D. Take for 2 months then start over the counter vit D 1000 units daily. , Disp: 5 Cap, Rfl: 1    empagliflozin-metFORMIN (SYNJARDY) 12.5-1,000 mg per tablet, Take 1 Tab by mouth two (2) times daily (with meals). , Disp: 60 Tab, Rfl: 5    Blood-Glucose Meter monitoring kit, One Touch Ultra 2 Use to test blood sugar 1-2 per day (blood sugar before breakfast or 2 hours after any meal or at bedtime) DX.  E11.65, Disp: 1 Kit, Rfl: 0    glucose blood VI test strips (ASCENSIA AUTODISC VI, ONE TOUCH ULTRA TEST VI) strip, Use to test blood sugar 1-2 per day (blood sugar before breakfast or 2 hours after any meal or at bedtime) DX. E11.65, Disp: 100 Strip, Rfl: 3    dulaglutide (TRULICITY) 1.5 KW/6.2 mL sub-q pen, 0.5 mL by SubCUTAneous route every seven (7) days. , Disp: 4 Pen, Rfl: 11    sodium chloride (OCEAN) 0.65 % nasal spray, 1 Spray as needed for Congestion. , Disp: , Rfl:     solifenacin (VESICARE) 10 mg tablet, Take 10 mg by mouth daily. , Disp: , Rfl:     aspirin delayed-release 81 mg tablet, Take 1 Tab by mouth daily. , Disp: 90 Tab, Rfl: 3    valACYclovir (VALTREX) 500 mg tablet, , Disp: , Rfl:     estradiol (VAGIFEM) 10 mcg tab vaginal tablet, Insert 10 mcg into vagina every Tuesday and Friday., Disp: , Rfl:     cyclobenzaprine (FLEXERIL) 10 mg tablet, Take 10 mg by mouth as needed. , Disp: , Rfl:     tramadol (ULTRAM) 50 mg tablet, Take 50 mg by mouth every eight (8) hours as needed. , Disp: , Rfl:     evening primrose oil (EVENING PRIMROSE) 500 mg cap, Take 1 Cap by mouth two (2) times a day., Disp: , Rfl:     Allergies: Allergies   Allergen Reactions    Influenza Virus Vaccine, Specific Swelling     R ARM PAIN, NUMBNESS, SWELLING  R HAND NUMBNESS AND TINGLING, WORSE IN 4TH AND FIFTH FINGERS    Lisinopril Cough     Other reaction(s): Lisinopril  Other reaction(s): coughing, Other (see comments)  Coughing      Singulair [Montelukast] Other (comments)     headache       Physical Examination:  Blood pressure 130/74, pulse 72, resp. rate 16, height 5' 8\" (1.727 m), weight 179 lb 8 oz (81.4 kg), SpO2 100 %. Gen: no acute distress  HEENT: mucous membranes moist  CAD: normal rate, regular rhythm. No murmur rubs or gallops  PULM: clear to ausculation, no wheezes, rhonchis or rales.   EXT: no clubbing, cyanosis or edema  Neuro: grossly non focal  Psych: pleasant, good insight into medical hx  Skin: warm, dry    Clinical Data Review:  Lab Results   Component Value Date/Time    Hemoglobin A1c 6.6 (H) 08/07/2018 10:19 AM    Hemoglobin A1c (POC) 8.4 03/14/2017 09:58 AM     Lab Results   Component Value Date/Time    Sodium 138 05/18/2018 09:53 AM    Potassium 3.7 05/18/2018 09:53 AM    Chloride 97 05/18/2018 09:53 AM    CO2 24 05/18/2018 09:53 AM    Anion gap 11 09/03/2010 08:45 AM    Glucose 100 (H) 05/18/2018 09:53 AM    BUN 16 05/18/2018 09:53 AM    Creatinine 0.95 05/18/2018 09:53 AM    BUN/Creatinine ratio 17 05/18/2018 09:53 AM    GFR est AA 71 05/18/2018 09:53 AM    GFR est non-AA 62 05/18/2018 09:53 AM    Calcium 9.9 05/18/2018 09:53 AM     Lab Results   Component Value Date/Time    Microalbumin/Creat ratio (mg/g creat) 5 09/03/2010 08:45 AM    Microalb/Creat ratio (ug/mg creat.) 9.4 05/18/2018 09:53 AM    Microalbumin,urine random 1.21 09/03/2010 08:45 AM     Lab Results   Component Value Date/Time    Cholesterol, total 123 05/18/2018 09:53 AM    HDL Cholesterol 55 05/18/2018 09:53 AM    LDL, calculated 55 05/18/2018 09:53 AM    VLDL, calculated 13 05/18/2018 09:53 AM    Triglyceride 66 05/18/2018 09:53 AM    CHOL/HDL Ratio 1.9 04/26/2010 08:00 AM       Assessment and Plan:  Gabbi Ornelas is a 76 y.o. female here for type 2 diabetes, control of which is now at goal since adding GLP1a to regimen of metformin and SGLT2i. Applauded her efforts thus far. Since she is stable on this regimen, we will let her return to the care of her PCP. She is motivated to continued her lifestyle efforts in an attempt to wean off some of her diabetes medication. Glycemic Medication Changes:  - continue Trulicity 7.2CB weekly  - continue Synjardy 12.5-1000mg twice a day  - check BG 1-2 times/day    DM Health Maintenance: pertinent items updated in HM tab  A1c: updated today  Cv/Lipids: on simvastatin, lipids UTD  BP/Renal: BP at goal, on ARB, microalbumin UTD and nonelevated  Vaccines: per PCP  Podiatry: no active issues, encouraged well-fitting footwear and daily inspection  Neuro: + sx of neuropathy, foot exam UTD  Ophtho: yearly eye exam recommended  Diet and exercise: discussed healthy eating and exercise recommendations, particularly reduction in dietary carbohydrates    I spent 15 minutes with the patient today and > 50% of the time was spent counseling the patient about diabetes management including medication options and dietary modification. Patient verbalized an understanding and will return to clinic PRN. Thank you for the opportunity to participate in this patient's care.     Aniceto Verdugo MD  [facility] End[facility]  [facility]

## 2019-03-06 NOTE — PATIENT INSTRUCTIONS
A1c today is 6.2%. Excellent blood sugar control!     Lab Results   Component Value Date/Time    Hemoglobin A1c 6.6 (H) 08/07/2018 10:19 AM    Hemoglobin A1c 8.0 (H) 05/18/2018 09:53 AM    Hemoglobin A1c 9.9 (H) 01/16/2018 08:47 AM

## 2019-04-01 ENCOUNTER — OFFICE VISIT (OUTPATIENT)
Dept: FAMILY MEDICINE CLINIC | Age: 69
End: 2019-04-01

## 2019-04-01 VITALS
SYSTOLIC BLOOD PRESSURE: 109 MMHG | HEART RATE: 72 BPM | OXYGEN SATURATION: 98 % | WEIGHT: 179.7 LBS | RESPIRATION RATE: 18 BRPM | HEIGHT: 68 IN | BODY MASS INDEX: 27.23 KG/M2 | TEMPERATURE: 96.8 F | DIASTOLIC BLOOD PRESSURE: 70 MMHG

## 2019-04-01 DIAGNOSIS — Z82.3 FAMILY HISTORY OF STROKE: ICD-10-CM

## 2019-04-01 DIAGNOSIS — E78.00 PURE HYPERCHOLESTEROLEMIA: ICD-10-CM

## 2019-04-01 DIAGNOSIS — R63.4 WEIGHT LOSS: ICD-10-CM

## 2019-04-01 DIAGNOSIS — E55.9 VITAMIN D DEFICIENCY: ICD-10-CM

## 2019-04-01 DIAGNOSIS — Z80.3 FAMILY HISTORY OF BREAST CANCER: ICD-10-CM

## 2019-04-01 DIAGNOSIS — Z86.2 HISTORY OF ANEMIA: ICD-10-CM

## 2019-04-01 DIAGNOSIS — E66.3 OVERWEIGHT (BMI 25.0-29.9): ICD-10-CM

## 2019-04-01 DIAGNOSIS — E11.40 TYPE 2 DIABETES MELLITUS WITH DIABETIC NEUROPATHY, WITHOUT LONG-TERM CURRENT USE OF INSULIN (HCC): ICD-10-CM

## 2019-04-01 DIAGNOSIS — Z78.9 ACE INHIBITOR INTOLERANCE: ICD-10-CM

## 2019-04-01 DIAGNOSIS — H25.13 AGE-RELATED NUCLEAR CATARACT OF BOTH EYES: ICD-10-CM

## 2019-04-01 DIAGNOSIS — Z80.0 FAMILY HISTORY OF COLON CANCER IN FATHER: ICD-10-CM

## 2019-04-01 DIAGNOSIS — M79.7 FIBROMYALGIA: ICD-10-CM

## 2019-04-01 DIAGNOSIS — J00 ACUTE NASOPHARYNGITIS: ICD-10-CM

## 2019-04-01 DIAGNOSIS — Z83.3 FAMILY HISTORY OF DIABETES MELLITUS (DM): ICD-10-CM

## 2019-04-01 DIAGNOSIS — Z00.00 MEDICARE ANNUAL WELLNESS VISIT, SUBSEQUENT: Primary | ICD-10-CM

## 2019-04-01 DIAGNOSIS — Z82.49 FAMILY HISTORY OF HEART ATTACK: ICD-10-CM

## 2019-04-01 DIAGNOSIS — D56.3 THALASSEMIA MINOR: ICD-10-CM

## 2019-04-01 DIAGNOSIS — Z13.39 SCREENING FOR ALCOHOLISM: ICD-10-CM

## 2019-04-01 DIAGNOSIS — Z83.511 FAMILY HISTORY OF GLAUCOMA: ICD-10-CM

## 2019-04-01 DIAGNOSIS — Z13.31 SCREENING FOR DEPRESSION: ICD-10-CM

## 2019-04-01 DIAGNOSIS — R53.82 CHRONIC FATIGUE: ICD-10-CM

## 2019-04-01 DIAGNOSIS — M54.9 UPPER BACK PAIN: ICD-10-CM

## 2019-04-01 DIAGNOSIS — I10 ESSENTIAL HYPERTENSION: ICD-10-CM

## 2019-04-01 LAB
FLUAV+FLUBV AG NOSE QL IA.RAPID: NEGATIVE POS/NEG
FLUAV+FLUBV AG NOSE QL IA.RAPID: NEGATIVE POS/NEG
VALID INTERNAL CONTROL?: YES

## 2019-04-01 NOTE — PROGRESS NOTES
HISTORY OF PRESENT ILLNESS Ilene Bolton is a 76 y.o. female presents with Annual Wellness Visit; Referral Follow Up; Cold Symptoms; Labs; and Blood Pressure Check Agree with nurse note. Pt with hypertension, hx of anemia, vit d deficiency, thalassemia minor, fibromyalgia, ACE inhibitor intolerance, overweight, and family hx of DM, stroke, and heart attack presents to the office with a BP of 109/70. For BP, she uses 1/2 tab of Diovan--25 mg daily, tolerating well. She uses Aspirin 81 mg daily, tolerating well.  For cholesterol, she uses Zocor 40 mg daily, tolerating well Pt complains of cough x 2 weeks. She has some sputum. Denies itchy watery eyes or nose, nasal congestion, chest tightness, SOB, wheezing. No known exposure to illness. Her back has been aching. Denies fever, chills. She has some fatigue, but that is her baseline due to fibromyalgia. She saw endocrinologist, Dr. Damon Degree for f/u of type 2 DM. Current regimen is Trulicity 1.5 mg weekly and Synjardy 12.5 mg-1000 mg BID. POC Hgb A1c 6.2. Glucose at home mostly below 150, highest 165 after a meal. Weight is down 25 lbs in past year. Diabetic foot exam completed by podiatrist, Dr. Kami Trinh. Released back to PCP since DM is well controlled. Pt saw ENT, Dr. Adam Velez on 10/11/2018 for dull headache x months. Dx'd pharyngitis, frequent headaches, BL impacted cereum, and allergic rhinitis. Cerumen removal performed. Rx'd Amoxicillin 500 mg TID x10 days. Reffer to neuro for headaches if headaches do not resolve with treated pharyngitis and allergies. F/U 6 months. Health Maintenance Annual [address] completed today. Pt with family hx of colon ca reports she was scheduled for her colonoscopy this month but had an incomplete prep and was not able to have it performed. She plans to reschedule.    
 
Pt with BL cataracts and family hx of [place] had eye exam on 3/22/2019 and saw ophthalmologist, Dr. Germaine Buenrostro. Pt reports her most recent pap smear was on 3/18/2019 with Dr. Beatriz Kim. Pt with family hx of breast ca reports her most recent mammogram was on 3/21/2019, normal findings, F/U 1 year. DEXA on 2/12/2018 was normal.  
 
Written by clem Peres, as dictated by Dr. Hannah Alvarez DO. 
ROS Review of Systems negative except as noted above in HPI. ALLERGIES:   
Allergies Allergen Reactions  Influenza Virus Vaccine, Specific Swelling R ARM PAIN, NUMBNESS, SWELLING 
R HAND NUMBNESS AND TINGLING, WORSE IN 4TH AND FIFTH FINGERS  
 Lisinopril Cough Other reaction(s): Lisinopril Other reaction(s): coughing, Other (see comments) Coughing  Singulair [Montelukast] Other (comments)  
  headache CURRENT MEDICATIONS:   
Outpatient Medications Marked as Taking for the 4/1/19 encounter (Office Visit) with Eduard Dotson DO Medication Sig Dispense Refill  simvastatin (ZOCOR) 40 mg tablet take 1 tablet by mouth at bedtime 90 Tab 1  valsartan-hydroCHLOROthiazide (DIOVAN-HCT) 80-12.5 mg per tablet Take 1 Tab by mouth daily. 90 Tab 3  
 empagliflozin-metFORMIN (SYNJARDY) 12.5-1,000 mg per tablet Take 1 Tab by mouth two (2) times daily (with meals). 60 Tab 5  Blood-Glucose Meter monitoring kit One Touch Ultra 2 Use to test blood sugar 1-2 per day (blood sugar before breakfast or 2 hours after any meal or at bedtime) DX.  
E11.65 1 Kit 0  
 glucose blood VI test strips (ASCENSIA AUTODISC VI, ONE TOUCH ULTRA TEST VI) strip Use to test blood sugar 1-2 per day (blood sugar before breakfast or 2 hours after any meal or at bedtime) DX. E11.65 100 Strip 3  
 dulaglutide (TRULICITY) 1.5 BF/1.6 mL sub-q pen 0.5 mL by SubCUTAneous route every seven (7) days. 4 Pen 11  
 solifenacin (VESICARE) 10 mg tablet Take 10 mg by mouth daily.  aspirin delayed-release 81 mg tablet Take 1 Tab by mouth daily.  90 Tab 3  
  valACYclovir (VALTREX) 500 mg tablet  evening primrose oil (EVENING PRIMROSE) 500 mg cap Take 1 Cap by mouth two (2) times a day.  cyclobenzaprine (FLEXERIL) 10 mg tablet Take 10 mg by mouth as needed.  tramadol (ULTRAM) 50 mg tablet Take 50 mg by mouth every eight (8) hours as needed. PAST MEDICAL HISTORY:   
Past Medical History:  
Diagnosis Date  Allergic rhinitis Dr. Mariano Corcoran  Arthritis  Cataract 2015, 2019 Dr. German Aranda. Dr. Coby Hicks.  Chest pain 01/15/09 Dr. Otilia Wren  Chickenpox childhood  DDD (degenerative disc disease), cervical 10/2015 Dr. Sangeetha Dominguez.  DDD (degenerative disc disease), lumbar 2014 Dr. Randolph Oppenheim.  Diabetes ([address] 06/2007 Elmira Espinoza, OD. Dr. Nicole Roque, pod. Dr. Rehana Beth.  Endometriosis Dr. Chin Carney  Essential hypertension, benign Dr. Otilia Wren  Fibroid, uterine Dr. Chin Carney  Fibromyalgia Dr. Cesar Dominique  [facility] Geeta Best. Dr. Nicole Roque.  Hand paresthesia 2011 Dr. Rashel Coronado. Dr. Travon Ortega.  OVRRELJF(395.0) 2012 Dr. Marisol Montes De Oca. Dr. Geeta Best, ENT.  Hemoptysis 06/13/07 Dr. Hereida Free  Hiatal hernia 05/27/15  
 small. Sunshine Kennedy MD  
[address] [place] [address] Internal hemorrhoids 12/2006, 05/27/15 Dr. Carolina Pacheco. Dr. Kittie Buerger.  Intraocular pressure increase 07/27/05  
 bilaterally. Dr. Tiki York. Dr. Elmira Espinoza.  Knee pain 04/04/13 Bilaterally. due to fall. Dr. Fatou Marcial.  Left knee pain 11/26/14 Left. due to OA, torn medial and lateral meniscal tears. Dr. Anabela Silva. Dr. Olesya Ruelas.  Lumbar spondylosis 3/2008 Dr. Catalina Ashley. Dr. Shawna Christiansen [address] [place] [address],[address]  Paresthesia of foot Left  due to Ornelas's Neuromo Sxs. Abdirahman Sy. due to Diabetic Neuropathy.  [facility]  Proteinuria 2009 Dr. Hartmann Mask  Right shoulder pain 01/30/06 Dr. Marion Goode. impingement tendinitis  Right shoulder pain 2014 Dr. Heike Ibarra.  Schatzki's ring 05/27/15  
 with partial obstruction at GE junction. Dr. Tracie Sykes.  Sciatica Dr. Abby Irene. Dr. Julius Baugh  Stenosis colon ([facility] [address] 05/27/15 SIGMOID WITH TORTUOUS DESCENDING COLON. Dr. Alm Gottron. [facility].  Thalassemia minor 1980s  Vitamin D deficiency 2011 Blanka Lozano, [place]  
 
 
PAST SURGICAL HISTORY:   
Past Surgical History:  
Procedure Laterality Date  CYSTOSCOPY  2005 Dr. Lindo Councilman  DELIVERY   12  
 FOOT/TOES SURGERY PROC UNLISTED Left Ornelas's Neuroma. [address] HX BREAST BIOPSY Left 12 years ago neg  HX BREAST LUMPECTOMY  10/15/08 Left intraductal papilloma. Dr. Narinder Valenzuela  HX COLONOSCOPY  05/27/15  
 due to anemia, rectal bleeding. due q 5 yrs. Dr. Justin Paulino  HX COLONOSCOPY  , 06,  Dr. Mindy Giordano. due q 5 yrs.  HX ENDOSCOPY  05/27/15  
 due to anemia. Dr. Tracie Sykes.  HX HEART CATHETERIZATION  09 Dr. Braxton Gabriel  HX KNEE ARTHROSCOPY Left 04/17/15  
 due to MEDIAL AND LATERAL TORN MENISCI. Dr. Pretty Haji.  HX MYOMECTOMY  [id number]  
 x4  
 HX OOPHORECTOMY Bilateral   HX SHOULDER ARTHROSCOPY Right 2018  
 with subacromial decompression and open distal clavicle excision, Dr. Nya Villalpando  
 endometriosis, fibroids. FAMILY HISTORY:   
Family History Problem Relation Age of Onset  Hypertension Mother  Glaucoma Mother  Heart Failure Mother CHF  Hypertension Father  Colon Cancer Father   
     colon  Heart Attack Sister  Stroke Sister  Diabetes Sister   
     x 2 sisters  Diabetes Brother  Breast Cancer Maternal Aunt 45 SOCIAL HISTORY:   
Social History Socioeconomic History  Marital status: SINGLE Spouse name: Not on file  Number of children: Not on file  Years of education: Not on file  Highest education level: Not on file Tobacco Use  Smoking status: Never Smoker  Smokeless tobacco: Never Used Substance and Sexual Activity  Alcohol use: Yes Alcohol/week: 0.0 oz  
  Comment: Social beer, social drinks, social shots of liquor,and social wine.  Drug use: No  
 Sexual activity: Never IMMUNIZATIONS:   
Immunization History Administered Date(s) Administered  (RETIRED) Pneumococcal Vaccine (Unspecified Type) 12/21/2005  
 H1N1 Influenza Virus Vaccine 03/09/2010  Hepatitis B Vaccine 06/09/2008, 08/20/2008, 01/27/2009  Influenza High Dose Vaccine PF 11/11/2015, 09/07/2016, 10/19/2017, 08/01/2018  Influenza Vaccine Split 10/08/2010, 11/08/2011, 10/24/2012  Influenza Vaccine Whole 01/27/2009  Pneumococcal Conjugate (PCV-13) 11/11/2015  Pneumococcal Polysaccharide (PPSV-23) 12/07/2016  Tdap 01/23/2018  Zoster Recombinant 05/25/2018, 08/04/2018  Zoster Vaccine, Live 01/20/2014  dT Vaccine 06/09/2008 PHYSICAL EXAMINATION Vital Signs Visit Vitals /70 Pulse 72 Temp 96.8 °F (36 °C) (Oral) Resp 18 Ht 5' 8\" (1.727 m) Wt 179 lb 11.2 oz (81.5 kg) SpO2 98% BMI 27.32 kg/m² Weight Metrics 4/1/2019 3/6/2019 1/28/2019 8/10/2018 8/7/2018 5/25/2018 4/10/2018 Weight 179 lb 11.2 oz 179 lb 8 oz 181 lb 182 lb 4.8 oz 181 lb 180 lb 8 oz 193 lb 3.2 oz  
BMI 27.32 kg/m2 27.29 kg/m2 27.52 kg/m2 27.57 kg/m2 26.92 kg/m2 28.27 kg/m2 30.26 kg/m2 General appearance - Well nourished. Well appearing. Well developed. No acute distress. Overweight. Head - Normocephalic. Atraumatic. Eyes - pupils equal and reactive. Extraocular eye movements intact. Sclera anicteric. Mildly injected sclera. Ears - Hearing is grossly normal bilaterally. Nose - normal and patent. No polyps noted. No erythema. No discharge. Mouth - mucous membranes with adequate moisture. Posterior pharynx normal with cobblestone appearance. No erythema, white exudate or obstruction. Neck - supple. Midline trachea. No carotid bruits noted bilaterally. No thyromegaly noted. Chest - clear to auscultation bilaterally anteriorly and posteriorly. No wheezes. No rales or rhonchi. Breath sounds are symmetrical bilaterally. Unlabored respirations. Heart - normal rate. Regular rhythm. Normal S1, S2. No murmur noted. No rubs, clicks or gallops noted. Abdomen - soft and distended. No masses or organomegaly. No rebound, rigidity or guarding. Bowel sounds normal x 4 quadrants. No tenderness noted. Neurological - awake, alert and oriented to person, place, and time and event. Cranial nerves II through XII intact. Clear speech. Muscle strength is +5/5 x 4 extremities. Sensation is intact to light touch bilaterally. Steady gait. Heme/Lymph - peripheral pulses normal x 4 extremities. No peripheral edema is noted. Musculoskeletal - Intact x 4 extremities. Full ROM x 4 extremities. No pain with movement. Back exam - normal range of motion. No pain on palpation of the spinous processes in the cervical, thoracic, lumbar, sacral regions. No CVA tenderness. Increased muscle tension in upper thoracic. Skin - no rashes, erythema, ecchymosis, lacerations, abrasions, suspicious moles noted Psychological -   normal behavior, dress and thought processes. Good insight. Good eye contact. Normal affect. Appropriate mood. Normal speech. DATA REVIEWED Lab Results Component Value Date/Time WBC 5.7 06/15/2017 08:57 AM  
 HGB 11.2 06/15/2017 08:57 AM  
 HCT 34.8 06/15/2017 08:57 AM  
 PLATELET 323 26/40/7691 08:57 AM  
 MCV 70 (L) 06/15/2017 08:57 AM  
 
Lab Results Component Value Date/Time  Sodium 138 05/18/2018 09:53 AM  
 Potassium 3.7 05/18/2018 09:53 AM  
 Chloride 97 05/18/2018 09:53 AM  
 CO2 24 05/18/2018 09:53 AM  
 Anion gap 11 09/03/2010 08:45 AM  
 Glucose 100 (H) 05/18/2018 09:53 AM  
 BUN 16 05/18/2018 09:53 AM  
 Creatinine 0.95 05/18/2018 09:53 AM  
 BUN/Creatinine ratio 17 05/18/2018 09:53 AM  
 GFR est AA 71 05/18/2018 09:53 AM  
 GFR est non-AA 62 05/18/2018 09:53 AM  
 Calcium 9.9 05/18/2018 09:53 AM  
 Bilirubin, total 0.3 05/18/2018 09:53 AM  
 AST (SGOT) 11 05/18/2018 09:53 AM  
 Alk. phosphatase 58 05/18/2018 09:53 AM  
 Protein, total 6.9 05/18/2018 09:53 AM  
 Albumin 4.2 05/18/2018 09:53 AM  
 Globulin 3.5 09/03/2010 08:45 AM  
 A-G Ratio 1.6 05/18/2018 09:53 AM  
 ALT (SGPT) 6 05/18/2018 09:53 AM  
 
Lab Results Component Value Date/Time Cholesterol, total 123 05/18/2018 09:53 AM  
 HDL Cholesterol 55 05/18/2018 09:53 AM  
 LDL, calculated 55 05/18/2018 09:53 AM  
 VLDL, calculated 13 05/18/2018 09:53 AM  
 Triglyceride 66 05/18/2018 09:53 AM  
 CHOL/HDL Ratio 1.9 04/26/2010 08:00 AM  
 
Lab Results Component Value Date/Time Vitamin D 25-Hydroxy 25.1 (L) 10/20/2011 07:42 AM  
 VITAMIN D, 25-HYDROXY 30.9 05/18/2018 09:53 AM  
   
Lab Results Component Value Date/Time Hemoglobin A1c 6.6 (H) 08/07/2018 10:19 AM  
 Hemoglobin A1c (POC) 6.2 03/06/2019 11:58 AM  
 
Lab Results Component Value Date/Time TSH 2.350 08/07/2018 10:19 AM  
 
 
Lab Results Component Value Date/Time Microalbumin/Creat ratio (mg/g creat) 5 09/03/2010 08:45 AM  
 Microalb/Creat ratio (ug/mg creat.) 9.4 05/18/2018 09:53 AM  
 Microalbumin,urine random 1.21 09/03/2010 08:45 AM  
 
 
 
ASSESSMENT and PLAN 
 
  ICD-10-CM ICD-9-CM 1. Medicare annual wellness visit, subsequent Z00.00 V70.0 2. Essential hypertension I10 401.9 LIPID PANEL  
   METABOLIC PANEL, COMPREHENSIVE  
   TSH 3RD GENERATION  
   MICROALBUMIN, UR, RAND W/ MICROALB/CREAT RATIO  
   URINALYSIS W/ RFLX MICROSCOPIC  
 stable 3.  Type 2 diabetes mellitus with diabetic neuropathy, without long-term current use of insulin (HCC) E11.40 250.60 LIPID PANEL  
  808.4 METABOLIC PANEL, COMPREHENSIVE  
   TSH 3RD GENERATION  
   MICROALBUMIN, UR, RAND W/ MICROALB/CREAT RATIO  
   URINALYSIS W/ RFLX MICROSCOPIC  
 stable on Trulicity and [place] 4. Acute nasopharyngitis J00 460 5. Pure hypercholesterolemia E78.00 272.0 LIPID PANEL  
   METABOLIC PANEL, COMPREHENSIVE 6. Upper back pain M54.9 724.5 AMB POC LUIS INFLUENZA A/B TEST Left, due to cough vs other 7. Family history of diabetes mellitus (DM) Z83.3 V18.0 8. Vitamin D deficiency E55.9 268.9 VITAMIN D, 25 HYDROXY 9. Family history of stroke Z82.3 V17.1 10. Family history of heart attack Z82.49 V17.3 11. Family history of breast cancer Z80.3 V16.3 12. Family history of colon cancer in father Z80.0 V16.0 13. Family history of glaucoma Z83.511 V19.11   
14. Fibromyalgia M79.7 729.1 15. Age-related nuclear cataract of both eyes H25.13 366.16   
 stable 16. Thalassemia minor D56.3 282.46   
17. ACE inhibitor intolerance Z78.9 995.27 E980.4 18. Overweight (BMI 25.0-29. 9) E66.3 278.02   
19. Weight loss R63.4 783.21 CBC W/O DIFF 26# since 55/9699 due to Trulicity, Metformin, exericse vs other 20. History of anemia Z86.2 V12.3 CBC W/O DIFF 21. Chronic fatigue R53.82 780.79 AMB POC LUIS INFLUENZA A/B TEST  
22. Screening for alcoholism Z13.39 V79.1 MA ANNUAL ALCOHOL SCREEN 15 MIN  
23. Screening for depression Z13.31 V79.0 Marshal Shelton Discussed the patient's BMI with her. The BMI follow up plan is as follows: I have counseled this patient on diet and exercise regimens. Decrease carbohydrates (white foods, sweet foods, sweet drinks and alcohol), increase green leafy vegetables and protein (lean meats and beans) with each meal.  Avoid fried foods. Eat 3-5 small meals daily. Do not skip meals. Increase water intake.     Increase physical activity to 30 minutes daily for health benefit or 60 minutes daily to prevent weight regain, as tolerated. Get 7-8 hours uninterrupted sleep nightly. Chart reviewed and updated. Continue current medications and care. Advised pt to contact office if URI sxs do not resolve within 2 weeks or if develops fever or color mucus. Completed back and neck exercises as able. Recheck pertinent fasting labs in 5/2019. Recent office visit notes from Dr. Monisha Nunes, Dr. Wei Alberto reviewed. Get recent office visit notes from Dr. Janice Marquez, Dr. Gissel Vargas, Dr. Sj Howell. Advised pt to sign release. Counseled patient on health concerns:  BP, DM, cough, back care, fibromyalgia, anemia, fatigue, cataracts, and family hx, congestion. Relevant handouts given and discussed with patient. Immunizations noted; Pt reports she had flu vaccine in 8/2018. Offered empathy, support, legitimation, prayers, partnership to patient. Praised patient for progress. Declines additional ACP handout today. Follow-up and Dispositions · Return in about 5 months (around 9/1/2019) for diabetes, results. Patient was offered a choice/choices in the treatment plan today. Patient expresses understanding of the plan and agrees with recommendations. More than 40 mins spent face to face with patient and more than 50% of this time spent in counseling and coordinating care. Written by clem Herrera, as dictated by Dr. Diana Chin DO. Documentation True and Accepted by Jonathan Weller. [facility]. Patient Instructions Upper Respiratory Infection (Cold): Care Instructions Your Care Instructions An upper respiratory infection, or URI, is an infection of the nose, sinuses, or throat. URIs are spread by coughs, sneezes, and direct contact. The common cold is the most frequent kind of URI. The flu and sinus infections are other kinds of URIs. Almost all URIs are caused by viruses. Antibiotics won't cure them.  But you can treat most infections with home care. This may include drinking lots of fluids and taking over-the-counter pain medicine. You will probably feel better in 4 to 10 days. The doctor has checked you carefully, but problems can develop later. If you notice any problems or new symptoms, get medical treatment right away. Follow-up care is a key part of your treatment and safety. Be sure to make and go to all appointments, and call your doctor if you are having problems. It's also a good idea to know your test results and keep a list of the medicines you take. How can you care for yourself at home? · To prevent dehydration, drink plenty of fluids, enough so that your urine is light yellow or clear like water. Choose water and other caffeine-free clear liquids until you feel better. If you have kidney, heart, or liver disease and have to limit fluids, talk with your doctor before you increase the amount of fluids you drink. · Take an over-the-counter pain medicine, such as acetaminophen (Tylenol), ibuprofen (Advil, Motrin), or naproxen (Aleve). Read and follow all instructions on the label. · Before you use cough and cold medicines, check the label. These medicines may not be safe for young children or for people with certain health problems. · Be careful when taking over-the-counter cold or flu medicines and Tylenol at the same time. Many of these medicines have acetaminophen, which is Tylenol. Read the labels to make sure that you are not taking more than the recommended dose. Too much acetaminophen (Tylenol) can be harmful. · Get plenty of rest. 
· Do not smoke or allow others to smoke around you. If you need help quitting, talk to your doctor about stop-smoking programs and medicines. These can increase your chances of quitting for good. When should you call for help? Call 911 anytime you think you may need emergency care. For example, call if: 
  · You have severe trouble breathing.  Call your doctor now or seek immediate medical care if: 
  · You seem to be getting much sicker.  
  · You have new or worse trouble breathing.  
  · You have a new or higher fever.  
  · You have a new rash.  
 Watch closely for changes in your health, and be sure to contact your doctor if: 
  · You have a new symptom, such as a sore throat, an earache, or sinus pain.  
  · You cough more deeply or more often, especially if you notice more mucus or a change in the color of your mucus.  
  · You do not get better as expected. Where can you learn more? Go to http://gregg-zunilda.info/. Enter Y676 in the search box to learn more about \"Upper Respiratory Infection (Cold): Care Instructions. \" Current as of: September 5, 2018 Content Version: 11.9 © 5045-7142 [employer]. Care instructions adapted under license by [employer] (which disclaims liability or warranty for this information). If you have questions about a medical condition or this instruction, always ask your healthcare professional. [place] [address] any warranty or liability for your use of this information. Neck: Exercises Your Care Instructions Here are some examples of typical rehabilitation exercises for your condition. Start each exercise slowly. Ease off the exercise if you start to have pain. Your doctor or physical therapist will tell you when you can start these exercises and which ones will work best for you. How to do the exercises Neck stretch 1. This stretch works best if you keep your shoulder down as you lean away from it. To help you remember to do this, start by relaxing your shoulders and lightly holding on to your thighs or your chair. 2. Tilt your head toward your shoulder and hold for 15 to 30 seconds. Let the weight of your head stretch your muscles.  
3. If you would like a little added stretch, use your hand to gently and steadily pull your head toward your shoulder. For example, keeping your right shoulder down, lean your head to the left. 4. Repeat 2 to 4 times toward each shoulder. Diagonal neck stretch 1. Turn your head slightly toward the direction you will be stretching, and tilt your head diagonally toward your chest and hold for 15 to 30 seconds. 2. If you would like a little added stretch, use your hand to gently and steadily pull your head forward on the diagonal. 
3. Repeat 2 to 4 times toward each side. Dorsal glide stretch 1. Sit or stand tall and look straight ahead. 2. Slowly tuck your chin as you glide your head backward over your body 3. Hold for a count of 6, and then relax for up to 10 seconds. 4. Repeat 8 to 12 times. Chest and shoulder stretch 1. Sit or stand tall and glide your head backward as in the dorsal glide stretch. 2. Raise both arms so that your hands are next to your ears. 3. Take a deep breath, and as you breathe out, lower your elbows down and behind your back. You will feel your shoulder blades slide down and together, and at the same time you will feel a stretch across your chest and the front of your shoulders. 4. Hold for about 6 seconds, and then relax for up to 10 seconds. 5. Repeat 8 to 12 times. Strengthening: Hands on head 1. Move your head backward, forward, and side to side against gentle pressure from your hands, holding each position for about 6 seconds. 2. Repeat 8 to 12 times. Follow-up care is a key part of your treatment and safety. Be sure to make and go to all appointments, and call your doctor if you are having problems. It's also a good idea to know your test results and keep a list of the medicines you take. Where can you learn more? Go to http://gregg-zunilda.info/. Enter P975 in the search box to learn more about \"Neck: Exercises. \" Current as of: September 20, 2018 Content Version: 11.9 © 4577-8038 [employer]. Care instructions adapted under license by [employer] (which disclaims liability or warranty for this information). If you have questions about a medical condition or this instruction, always ask your healthcare professional. [address] any warranty or liability for your use of this information. Healthy Upper Back: Exercises Your Care Instructions Here are some examples of exercises for your upper back. Start each exercise slowly. Ease off the exercise if you start to have pain. Your doctor or physical therapist will tell you when you can start these exercises and which ones will work best for you. How to do the exercises Lower neck and upper back stretch 5. Stretch your arms out in front of your body. Clasp one hand on top of your other hand. 6. Gently reach out so that you feel your shoulder blades stretching away from each other. 7. Gently bend your head forward. 8. Hold for 15 to 30 seconds. 9. Repeat 2 to 4 times. Midback stretch 4. Kneel on the floor, and sit back on your ankles. 5. Lean forward, place your hands on the floor, and stretch your arms out in front of you. Rest your head between your arms. 6. Gently push your chest toward the floor, reaching as far in front of you as possible. 7. Hold for 15 to 30 seconds. 8. Repeat 2 to 4 times. Shoulder rolls 5. Sit comfortably with your feet shoulder-width apart. You can also do this exercise while standing. 6. Roll your shoulders up, then back, and then down in a smooth, circular motion. 7. Repeat 2 to 4 times. Wall push-up 6. Stand against a wall with your feet about 12 to 24 inches back from the wall. If you feel any pain when you do this exercise, stand closer to the wall. 7. Place your hands on the wall slightly wider apart than your shoulders, and lean forward. 8. Gently lean your body toward the wall.  Then push back to your starting position. Keep the motion smooth and controlled. 9. Repeat 8 to 12 times. Resisted shoulder blade squeeze 3. Sit or stand, holding the band in both hands in front of you. Keep your elbows close to your sides, bent at a 90-degree angle. Your palms should face up. 4. Squeeze your shoulder blades together, and move your arms to the outside, stretching the band. Be sure to keep your elbows at your sides while you do this. 5. Relax. 6. Repeat 8 to 12 times. Resisted rows 1. Put the band around a solid object, such as a bedpost, at about waist level. Hold one end of the band in each hand. 2. With your elbows at your sides and bent to 90 degrees, pull the band back to move your shoulder blades toward each other. Return to the starting position. 3. Repeat 8 to 12 times. Follow-up care is a key part of your treatment and safety. Be sure to make and go to all appointments, and call your doctor if you are having problems. It's also a good idea to know your test results and keep a list of the medicines you take. Where can you learn more? Go to http://gregg-zunilda.info/. Enter C273 in the search box to learn more about \"Healthy Upper Back: Exercises. \" Current as of: September 20, 2018 Content Version: 11.9 © 1586-0861 [employer]. Care instructions adapted under license by [employer] (which disclaims liability or warranty for this information). If you have questions about a medical condition or this instruction, always ask your healthcare professional. [place] [address] any warranty or liability for your use of this information. Medicare Wellness Visit, Female The best way to live healthy is to have a lifestyle where you eat a well-balanced diet, exercise regularly, limit alcohol use, and quit all forms of tobacco/nicotine, if applicable. Regular preventive services are another way to keep healthy.  Preventive services (vaccines, screening tests, monitoring & exams) can help personalize your care plan, which helps you manage your own care. Screening tests can find health problems at the earliest stages, when they are easiest to treat. [facility] follows the current, evidence-based guidelines published by the [facility] Valenzuela ([facility]) when recommending preventive services for our patients. Because we follow these guidelines, sometimes recommendations change over time as research supports it. (For example, mammograms used to be recommended annually. Even though Medicare will still pay for an annual mammogram, the newer guidelines recommend a mammogram every two years for women of average risk.) Of course, you and your doctor may decide to screen more often for some diseases, based on your risk and your health status. Preventive services for you include: - Medicare offers their members a free annual wellness visit, which is time for you and your primary care provider to discuss and plan for your preventive service needs. Take advantage of this benefit every year! 
-All adults over the age of 72 should receive the recommended pneumonia vaccines. Current USPSTF guidelines recommend a series of two vaccines for the best pneumonia protection.  
-All adults should have a flu vaccine yearly and a tetanus vaccine every 10 years. All adults age 61 and older should receive a shingles vaccine once in their lifetime.   
-A bone mass density test is recommended when a woman turns 65 to screen for osteoporosis. This test is only recommended one time, as a screening. Some providers will use this same test as a disease monitoring tool if you already have osteoporosis.  
-All adults age 38-68 who are overweight should have a diabetes screening test once every three years.  
-Other screening tests and preventive services for persons with diabetes include: an eye exam to screen for diabetic retinopathy, a kidney function test, a foot exam, and stricter control over your cholesterol.  
-Cardiovascular screening for adults with routine risk involves an electrocardiogram (ECG) at intervals determined by your doctor.  
-Colorectal cancer screenings should be done for adults age 54-65 with no increased risk factors for colorectal cancer. There are a number of acceptable methods of screening for this type of cancer. Each test has its own benefits and drawbacks. Discuss with your doctor what is most appropriate for you during your annual wellness visit. The different tests include: colonoscopy (considered the best screening method), a fecal occult blood test, a fecal DNA test, and sigmoidoscopy. -Breast cancer screenings are recommended every other year for women of normal risk, age 54-69. 
-Cervical cancer screenings for women over age 72 are only recommended with certain risk factors.  
-All adults born between [facility] should be screened once for Hepatitis C. Here is a list of your current Health Maintenance items (your personalized list of preventive services) with a due date: 
Health Maintenance Due Topic Date Due  
 Annual Well Visit  01/24/2019 [facility] Diabetic Foot Care  04/10/2019 Advised protocol for clearing congestion:  Increase fluid intake, especially water to thin mucous and boost the immune system. Avoid sugar and dairy while congested since they thicken mucous. Get plenty of rest!  Gargle 3 times daily and as needed in Listerine or warm salt water vinegar solutions (1 tsp salt, 1 tsp vinegar in 1 cup lukewarm water.)  Use OTC nasal saline spray up each nostril twice daily. Use humidifier at bedtime. Use OTC Mucinex 600 mg twice daily to loosen mucous. Use OTC Tylenol Arthritis or Ibuprofen up to 800 mg up to 3 times daily as needed for pain, fever or headaches.   Avoid decongestants and Ibuprofen if you have high blood pressure! If mucous is consistently discolored yellow or green throughout the day for more than a week, call the doctor for an evaluation.

## 2019-04-01 NOTE — PROGRESS NOTES
This is the Subsequent Medicare Annual Wellness Exam, performed 12 months or more after the Initial AWV or the last Subsequent AWV I have reviewed the patient's medical history in detail and updated the computerized patient record. History Past Medical History:  
Diagnosis Date  Allergic rhinitis Dr. Brigitte Sen  Arthritis  Cataract 2015, 2019 Dr. Allyssa Rhodes. Dr. Hakan Reyes.  Chest pain 01/15/09 Dr. Dee Arredondo  Chickenpox childhood  DDD (degenerative disc disease), cervical 10/2015 Dr. No Montilla.  DDD (degenerative disc disease), lumbar 2014 Dr. Devika Bailey.  Diabetes ([facility] [address] 06/2007 Mary Kraft, OD. Dr. Kami Trinh, pod. Dr. Damon Degree.  Endometriosis Dr. Cee Puri  Essential hypertension, benign Dr. Dee Arredondo  Fibroid, uterine Dr. Cee Puri  Fibromyalgia Dr. Nicolas Bledsoe 08/2009 Dr. Ira Velásquez. Dr. Kami Trinh.  Hand paresthesia 2011 Dr. Mariano Gillette. Dr. Bonny Magana.  NXSIFSTF(274.4) 2012 Dr. Denise Magana. Dr. Ira Velásquez, ENT.  Hemoptysis 06/13/07 Dr. Liz Merritt  Hiatal hernia 05/27/15  
 small. Benson Knapp MD  
[address] [place] [address] Internal hemorrhoids 12/2006, 05/27/15 Dr. Iqra Noel. Dr. Enid Sanchez.  Intraocular pressure increase 07/27/05  
 bilaterally. Dr. Mauricio De Dios. Dr. Mary Kraft.  Knee pain 04/04/13 Bilaterally. due to fall. Dr. Silvio Day.  Left knee pain 11/26/14 Left. due to OA, torn medial and lateral meniscal tears. Dr. Keerthi Brewster. Dr. Alpa Ivey.  Lumbar spondylosis 3/2008 Dr. Regina Grimes. Dr. Adelina West [address] [place] [address],[address]  Paresthesia of foot Left  due to Ornelas's Neuromo Sxs. [place]. due to Diabetic Neuropathy. Dr. Kami Trinh.  Proteinuria 2009 Dr. Radha Pierce  Right shoulder pain 01/30/06 Dr. Avani Esquivel impingement tendinitis  Right shoulder pain 07/2014 Dr. Zoe Messina.  Schatzki's ring 05/27/15  
 with partial obstruction at GE junction. Dr. Maria De Jesus Saldana.  Sciatica Dr. Tawana Westbrook. Dr. 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 Dr. Felisha Bailey. [facility].  Thalassemia minor 1980s  Vitamin D deficiency 2011 Maribel Keys, [place] Past Surgical History:  
Procedure Laterality Date  CYSTOSCOPY  2005 Dr. Julio Colmenares  DELIVERY   12  
 FOOT/TOES SURGERY PROC UNLISTED Left Ornelas's Neuroma. [address] [facility] HX BREAST BIOPSY Left 12 years ago neg  HX BREAST LUMPECTOMY  10/15/08 Left intraductal papilloma. Dr. Cande Menon  HX COLONOSCOPY  05/27/15  
 due to anemia, rectal bleeding. due q 5 yrs. Dr. Bina Jansen  HX COLONOSCOPY  , 06,  Dr. Jen Peres. due q 5 yrs.  HX ENDOSCOPY  05/27/15  
 due to anemia. Dr. Maria De Jesus Saldana.  HX HEART CATHETERIZATION  09 Dr. Preet Li  HX KNEE ARTHROSCOPY Left 04/17/15  
 due to MEDIAL AND LATERAL TORN MENISCI. Dr. Jairo Arredondo.  HX MYOMECTOMY  [id number]  
 x4  
 HX OOPHORECTOMY Bilateral   HX SHOULDER ARTHROSCOPY Right 2018  
 with subacromial decompression and open distal clavicle excision, Dr. Greer Hui  
 endometriosis, fibroids. Current Outpatient Medications Medication Sig Dispense Refill  simvastatin (ZOCOR) 40 mg tablet take 1 tablet by mouth at bedtime 90 Tab 1  valsartan-hydroCHLOROthiazide (DIOVAN-HCT) 80-12.5 mg per tablet Take 1 Tab by mouth daily. 90 Tab 3  
 empagliflozin-metFORMIN (SYNJARDY) 12.5-1,000 mg per tablet Take 1 Tab by mouth two (2) times daily (with meals). 60 Tab 5  Blood-Glucose Meter monitoring kit One Touch Ultra 2 Use to test blood sugar 1-2 per day (blood sugar before breakfast or 2 hours after any meal or at bedtime) DX.  
E11.65 1 Kit 0  
  glucose blood VI test strips (ASCENSIA AUTODISC VI, ONE TOUCH ULTRA TEST VI) strip Use to test blood sugar 1-2 per day (blood sugar before breakfast or 2 hours after any meal or at bedtime) DX. E11.65 100 Strip 3  
 dulaglutide (TRULICITY) 1.5 VU/4.1 mL sub-q pen 0.5 mL by SubCUTAneous route every seven (7) days. 4 Pen 11  
 solifenacin (VESICARE) 10 mg tablet Take 10 mg by mouth daily.  aspirin delayed-release 81 mg tablet Take 1 Tab by mouth daily. 90 Tab 3  
 valACYclovir (VALTREX) 500 mg tablet  evening primrose oil (EVENING PRIMROSE) 500 mg cap Take 1 Cap by mouth two (2) times a day.  cyclobenzaprine (FLEXERIL) 10 mg tablet Take 10 mg by mouth as needed.  tramadol (ULTRAM) 50 mg tablet Take 50 mg by mouth every eight (8) hours as needed.  sodium chloride (OCEAN) 0.65 % nasal spray 1 Spray as needed for Congestion. Allergies Allergen Reactions  Influenza Virus Vaccine, Specific Swelling R ARM PAIN, NUMBNESS, SWELLING 
R HAND NUMBNESS AND TINGLING, WORSE IN 4TH AND FIFTH FINGERS  
 Lisinopril Cough Other reaction(s): Lisinopril Other reaction(s): coughing, Other (see comments) Coughing  Singulair [Montelukast] Other (comments)  
  headache Family History Problem Relation Age of Onset  Hypertension Mother  Glaucoma Mother  Heart Failure Mother CHF  Hypertension Father  Colon Cancer Father   
     colon  Heart Attack Sister  Stroke Sister  Diabetes Sister   
     x 2 sisters  Diabetes Brother  Breast Cancer Maternal Aunt 45 Social History Tobacco Use  Smoking status: Never Smoker  Smokeless tobacco: Never Used Substance Use Topics  Alcohol use: Yes Alcohol/week: 0.0 oz  
  Comment: Social beer, social drinks, social shots of liquor,and social wine. Patient Active Problem List  
Diagnosis Code  Internal hemorrhoids K64.8  Lumbar spondylosis M47.816  Fibromyalgia M79.7  Headache(784.0) R51  
 Neck arthritis M47.812  Thalassemia minor D56.3  Vitamin D deficiency E55.9  Left knee pain M25.562  Right shoulder pain M25.511  
 Essential hypertension I10  Stenosis colon ([address] K56.699  
 Schatzki's ring K22.2  Hiatal hernia K44.9  Allergic rhinitis due to pollen J30.1  Iron deficiency anemia D50.9  Intraocular pressure increase H40.059  
 Neck pain M54.2  High cholesterol E78.00  Cataract H26.9  ACE inhibitor intolerance Z78.9  S/P MARTIN (total abdominal hysterectomy) Z90.710  Neuropathic pain of foot M79.2  Advance care planning Z71.89  
 Controlled type 2 diabetes mellitus with diabetic neuropathy, without long-term current use of insulin (HCC) E11.40  Ulnar neuropathy of left upper extremity G56.22  
 Acne rosacea L71.9  Type 2 diabetes mellitus with diabetic neuropathy (HCC) E11.40 Depression Risk Factor Screening:  
 
3 most recent PHQ Screens 4/1/2019 Little interest or pleasure in doing things Not at all Feeling down, depressed, irritable, or hopeless Not at all Total Score PHQ 2 0 Trouble falling or staying asleep, or sleeping too much Not at all Feeling tired or having little energy Not at all Poor appetite, weight loss, or overeating Not at all Feeling bad about yourself - or that you are a failure or have let yourself or your family down Not at all Trouble concentrating on things such as school, work, reading, or watching TV Not at all Moving or speaking so slowly that other people could have noticed; or the opposite being so fidgety that others notice Not at all Thoughts of being better off dead, or hurting yourself in some way Not at all PHQ 9 Score 0 Alcohol Risk Factor Screening: On any occasion in the past three months you have had more than 3 drinks containing alcohol. Functional Ability and Level of Safety:  
Pt ambulates safely without assistance. Hearing Loss Hearing is good. Activities of Daily Living The home contains: handrails Patient does total self care Fall Risk Fall Risk Assessment, last 12 mths 4/1/2019 Able to walk? Yes Fall in past 12 months? No  
 
 
Abuse Screen Patient is not abused Cognitive Screening Evaluation of Cognitive Function: 
Has your family/caregiver stated any concerns about your memory: no 
Normal 
 
Patient Care Team  
Patient Care Team: 
Aleja Lr DO as PCP - General 
Lizandro Bill MD (Gastroenterology) Alla Arellano MD (Obstetrics & Gynecology) Kee Bhat MD (Otolaryngology) Gia Pritchett MD (Orthopedic Surgery) Yudelka Claire MD (Orthopedic Surgery) Tonny Wise MD (Rheumatology) Fiorella Bhandari MD (Podiatry) Viviana Ramsey MD (Cardiology) Shaka Parra MD (Urology) Tyrel Pacheco MD (Orthopedic Surgery) Jatin Ramirez MD (Neurology) Wu Parada MD (Neurology) Sean Albrecht DO (Dermatology) Fiorella Bhandari MD (Podiatry) Brigida Tinajero MD as Consulting Provider (Endocrinology) Jayro Mendoza MD (Dermatology) Yahaira Whitaker MD (Ophthalmology) Delores Mas MD (Orthopedic Surgery) Mary Parsons MD (Ophthalmology) Assessment/Plan Education and counseling provided: 
Are appropriate based on today's review and evaluation End-of-Life planning (with patient's consent) Pneumococcal Vaccine Influenza Vaccine Screening Mammography Screening Pap and pelvic (covered once every 2 years) Colorectal cancer screening tests Cardiovascular screening blood test 
Bone mass measurement (DEXA) Screening for glaucoma Diabetes screening test 
 
Diagnoses and all orders for this visit: 
 
1. Medicare annual wellness visit, subsequent 2. Essential hypertension Comments: 
stable Orders: 
-     LIPID PANEL 
-     METABOLIC PANEL, COMPREHENSIVE 
-     TSH 3RD GENERATION 
 -     MICROALBUMIN, UR, RAND W/ MICROALB/CREAT RATIO 
-     URINALYSIS W/ RFLX MICROSCOPIC 3. Type 2 diabetes mellitus with diabetic neuropathy, without long-term current use of insulin ([facility] [address] Comments: 
stable on Trulicity and [place] Orders: 
-     LIPID PANEL 
-     METABOLIC PANEL, COMPREHENSIVE 
-     TSH 3RD GENERATION 
-     MICROALBUMIN, UR, RAND W/ MICROALB/CREAT RATIO 
-     URINALYSIS W/ RFLX MICROSCOPIC 4. Acute nasopharyngitis 5. Pure hypercholesterolemia -     LIPID PANEL 
-     METABOLIC PANEL, COMPREHENSIVE 6. Upper back pain Comments: 
Left, due to cough vs other Orders: -     AMB POC LUIS INFLUENZA A/B TEST 7. Family history of diabetes mellitus (DM) 
 
8. Vitamin D deficiency 
-     VITAMIN D, 25 HYDROXY 9. Family history of stroke 10. Family history of heart attack 11. Family history of breast cancer 12. Family history of colon cancer in father 13. Family history of glaucoma 14. Fibromyalgia 15. Age-related nuclear cataract of both eyes Comments: 
stable 16. Thalassemia minor 17. ACE inhibitor intolerance 18. Overweight (BMI 25.0-29.9) 19. Weight loss Comments: 
26# since 31/1546 due to Trulicity, Metformin, exericse vs other Orders: 
-     CBC W/O DIFF 20. History of anemia 
-     CBC W/O DIFF 21. Chronic fatigue -     AMB POC LUIS INFLUENZA A/B TEST 
 
22. Screening for alcoholism -     [place] ANNUAL ALCOHOL SCREEN 15 MIN 
 
23. Screening for depression 
-     Marshal Shelton Other orders -     CVD REPORT 
-     CKD REPORT 
-     DIABETES PATIENT EDUCATION Health Maintenance Due Topic Date Due  MEDICARE YEARLY EXAM  01/24/2019  
 FOOT EXAM Q1  04/10/2019

## 2019-04-01 NOTE — PROGRESS NOTES
Kady Monzon  Identified pt with two pt identifiers(name and ). Chief Complaint Patient presents with  Physical  
  Rm 14 [place] Annual Wellness Visit 1. Have you been to the ER, urgent care clinic since your last visit? Hospitalized since your last visit? NO 
 
2. Have you seen or consulted any other health care providers outside of the [address] [place] [address] since your last visit? Include any pap smears or colon screening. YES Advance Care Planning In the event something were to happen to you and you were unable to speak on your behalf, do you have an Advance Directive/ Living Will in place stating your wishes? NO If yes, do we have a copy on file NO If no, would you like information YES 
 
My Chart My chart gives you direct online access to portions of the electronic medical record (EMR) where your doctor stores your health information (ie, lab results, appointment information, medications, immunizations, and more. It is free. Would you like to set up your my chart? YES 
 
[unfilled] Weight Metrics 2019 3/6/2019 2019 8/10/2018 2018 2018 4/10/2018 Weight 179 lb 11.2 oz 179 lb 8 oz 181 lb 182 lb 4.8 oz 181 lb 180 lb 8 oz 193 lb 3.2 oz  
BMI 27.32 kg/m2 27.29 kg/m2 27.52 kg/m2 27.57 kg/m2 26.92 kg/m2 28.27 kg/m2 30.26 kg/m2 Medication reconciliation up to date and corrected with patient at this time. Today's provider has been notified of reason for visit, vitals and flowsheets obtained on patients. Reviewed record in preparation for visit, huddled with provider and have obtained necessary documentation. Health Maintenance Due Topic  MEDICARE YEARLY EXAM   
 FOOT EXAM Q1 Wt Readings from Last 3 Encounters:  
19 179 lb 11.2 oz (81.5 kg) 19 179 lb 8 oz (81.4 kg) 19 181 lb (82.1 kg) Temp Readings from Last 3 Encounters:  
19 96.8 °F (36 °C) (Oral) 19 98.6 °F (37 °C) (Oral) 08/10/18 97.7 °F (36.5 °C) (Temporal) BP Readings from Last 3 Encounters:  
04/01/19 109/70  
03/06/19 130/74  
01/28/19 110/70 Pulse Readings from Last 3 Encounters:  
04/01/19 72  
03/06/19 72  
01/28/19 79 Vitals:  
 04/01/19 1141 BP: 109/70 Pulse: 72 Resp: 18 Temp: 96.8 °F (36 °C) TempSrc: Oral  
SpO2: 98% Weight: 179 lb 11.2 oz (81.5 kg) Height: 5' 8\" (1.727 m) PainSc:   0 - No pain Learning Assessment: 
:  
 
Learning Assessment 4/10/2018 1/20/2014 PRIMARY LEARNER Patient Patient HIGHEST LEVEL OF EDUCATION - PRIMARY LEARNER  4 YEARS OF COLLEGE > 4 YEARS OF COLLEGE  
BARRIERS PRIMARY LEARNER NONE NONE  
CO-LEARNER CAREGIVER No No  
CO-LEARNER NAME - n/a PRIMARY LANGUAGE ENGLISH ENGLISH  NEED No -  
LEARNER PREFERENCE PRIMARY LISTENING LISTENING  
  READING -  
  DEMONSTRATION -  
  PICTURES -  
LEARNING SPECIAL TOPICS n0 no  
ANSWERED BY patient Patient RELATIONSHIP SELF SELF  
ASSESSMENT COMMENT 4/10/18ah -  
 
 
Depression Screening: 
:  
 
3 most recent PHQ Screens 4/1/2019 Little interest or pleasure in doing things Not at all Feeling down, depressed, irritable, or hopeless Not at all Total Score PHQ 2 0 Trouble falling or staying asleep, or sleeping too much Not at all Feeling tired or having little energy Not at all Poor appetite, weight loss, or overeating Not at all Feeling bad about yourself - or that you are a failure or have let yourself or your family down Not at all Trouble concentrating on things such as school, work, reading, or watching TV Not at all Moving or speaking so slowly that other people could have noticed; or the opposite being so fidgety that others notice Not at all Thoughts of being better off dead, or hurting yourself in some way Not at all PHQ 9 Score 0 Fall Risk Assessment: 
:  
 
Fall Risk Assessment, last 12 mths 4/1/2019 Able to walk? Yes Fall in past 12 months? No  
 
 
Abuse Screening: :  
 
Abuse Screening Questionnaire 4/1/2019 1/23/2018 12/7/2016 Do you ever feel afraid of your partner? N N N Are you in a relationship with someone who physically or mentally threatens you? N N N Is it safe for you to go home? Rocío Castellanos  
 
 
ADL Screening: 
:  
 

## 2019-04-01 NOTE — PATIENT INSTRUCTIONS
Upper Respiratory Infection (Cold): Care Instructions Your Care Instructions An upper respiratory infection, or URI, is an infection of the nose, sinuses, or throat. URIs are spread by coughs, sneezes, and direct contact. The common cold is the most frequent kind of URI. The flu and sinus infections are other kinds of URIs. Almost all URIs are caused by viruses. Antibiotics won't cure them. But you can treat most infections with home care. This may include drinking lots of fluids and taking over-the-counter pain medicine. You will probably feel better in 4 to 10 days. The doctor has checked you carefully, but problems can develop later. If you notice any problems or new symptoms, get medical treatment right away. Follow-up care is a key part of your treatment and safety. Be sure to make and go to all appointments, and call your doctor if you are having problems. It's also a good idea to know your test results and keep a list of the medicines you take. How can you care for yourself at home? · To prevent dehydration, drink plenty of fluids, enough so that your urine is light yellow or clear like water. Choose water and other caffeine-free clear liquids until you feel better. If you have kidney, heart, or liver disease and have to limit fluids, talk with your doctor before you increase the amount of fluids you drink. · Take an over-the-counter pain medicine, such as acetaminophen (Tylenol), ibuprofen (Advil, Motrin), or naproxen (Aleve). Read and follow all instructions on the label. · Before you use cough and cold medicines, check the label. These medicines may not be safe for young children or for people with certain health problems. · Be careful when taking over-the-counter cold or flu medicines and Tylenol at the same time. Many of these medicines have acetaminophen, which is Tylenol.  Read the labels to make sure that you are not taking more than the recommended dose. Too much acetaminophen (Tylenol) can be harmful. · Get plenty of rest. 
· Do not smoke or allow others to smoke around you. If you need help quitting, talk to your doctor about stop-smoking programs and medicines. These can increase your chances of quitting for good. When should you call for help? Call 911 anytime you think you may need emergency care. For example, call if: 
  · You have severe trouble breathing.  
 Call your doctor now or seek immediate medical care if: 
  · You seem to be getting much sicker.  
  · You have new or worse trouble breathing.  
  · You have a new or higher fever.  
  · You have a new rash.  
 Watch closely for changes in your health, and be sure to contact your doctor if: 
  · You have a new symptom, such as a sore throat, an earache, or sinus pain.  
  · You cough more deeply or more often, especially if you notice more mucus or a change in the color of your mucus.  
  · You do not get better as expected. Where can you learn more? Go to http://gregg-zunilda.info/. Enter J864 in the search box to learn more about \"Upper Respiratory Infection (Cold): Care Instructions. \" Current as of: September 5, 2018 Content Version: 11.9 © 3376-2748 [employer]. Care instructions adapted under license by [employer] (which disclaims liability or warranty for this information). If you have questions about a medical condition or this instruction, always ask your healthcare professional. [place] [address] any warranty or liability for your use of this information. Neck: Exercises Your Care Instructions Here are some examples of typical rehabilitation exercises for your condition. Start each exercise slowly. Ease off the exercise if you start to have pain. Your doctor or physical therapist will tell you when you can start these exercises and which ones will work best for you. How to do the exercises Neck stretch 1. This stretch works best if you keep your shoulder down as you lean away from it. To help you remember to do this, start by relaxing your shoulders and lightly holding on to your thighs or your chair. 2. Tilt your head toward your shoulder and hold for 15 to 30 seconds. Let the weight of your head stretch your muscles. 3. If you would like a little added stretch, use your hand to gently and steadily pull your head toward your shoulder. For example, keeping your right shoulder down, lean your head to the left. 4. Repeat 2 to 4 times toward each shoulder. Diagonal neck stretch 1. Turn your head slightly toward the direction you will be stretching, and tilt your head diagonally toward your chest and hold for 15 to 30 seconds. 2. If you would like a little added stretch, use your hand to gently and steadily pull your head forward on the diagonal. 
3. Repeat 2 to 4 times toward each side. Dorsal glide stretch 1. Sit or stand tall and look straight ahead. 2. Slowly tuck your chin as you glide your head backward over your body 3. Hold for a count of 6, and then relax for up to 10 seconds. 4. Repeat 8 to 12 times. Chest and shoulder stretch 1. Sit or stand tall and glide your head backward as in the dorsal glide stretch. 2. Raise both arms so that your hands are next to your ears. 3. Take a deep breath, and as you breathe out, lower your elbows down and behind your back. You will feel your shoulder blades slide down and together, and at the same time you will feel a stretch across your chest and the front of your shoulders. 4. Hold for about 6 seconds, and then relax for up to 10 seconds. 5. Repeat 8 to 12 times. Strengthening: Hands on head 1. Move your head backward, forward, and side to side against gentle pressure from your hands, holding each position for about 6 seconds. 2. Repeat 8 to 12 times. Follow-up care is a key part of your treatment and safety. Be sure to make and go to all appointments, and call your doctor if you are having problems. It's also a good idea to know your test results and keep a list of the medicines you take. Where can you learn more? Go to http://gregg-zunilda.info/. Enter P975 in the search box to learn more about \"Neck: Exercises. \" Current as of: September 20, 2018 Content Version: 11.9 © 7108-2523 [employer]. Care instructions adapted under license by [employer] (which disclaims liability or warranty for this information). If you have questions about a medical condition or this instruction, always ask your healthcare professional. [address] any warranty or liability for your use of this information. Healthy Upper Back: Exercises Your Care Instructions Here are some examples of exercises for your upper back. Start each exercise slowly. Ease off the exercise if you start to have pain. Your doctor or physical therapist will tell you when you can start these exercises and which ones will work best for you. How to do the exercises Lower neck and upper back stretch 5. Stretch your arms out in front of your body. Clasp one hand on top of your other hand. 6. Gently reach out so that you feel your shoulder blades stretching away from each other. 7. Gently bend your head forward. 8. Hold for 15 to 30 seconds. 9. Repeat 2 to 4 times. Midback stretch 4. Kneel on the floor, and sit back on your ankles. 5. Lean forward, place your hands on the floor, and stretch your arms out in front of you. Rest your head between your arms. 6. Gently push your chest toward the floor, reaching as far in front of you as possible. 7. Hold for 15 to 30 seconds. 8. Repeat 2 to 4 times. Shoulder rolls 5. Sit comfortably with your feet shoulder-width apart. You can also do this exercise while standing. 6. Roll your shoulders up, then back, and then down in a smooth, circular motion. 7. Repeat 2 to 4 times. Wall push-up 6. Stand against a wall with your feet about 12 to 24 inches back from the wall. If you feel any pain when you do this exercise, stand closer to the wall. 7. Place your hands on the wall slightly wider apart than your shoulders, and lean forward. 8. Gently lean your body toward the wall. Then push back to your starting position. Keep the motion smooth and controlled. 9. Repeat 8 to 12 times. Resisted shoulder blade squeeze 3. Sit or stand, holding the band in both hands in front of you. Keep your elbows close to your sides, bent at a 90-degree angle. Your palms should face up. 4. Squeeze your shoulder blades together, and move your arms to the outside, stretching the band. Be sure to keep your elbows at your sides while you do this. 5. Relax. 6. Repeat 8 to 12 times. Resisted rows 1. Put the band around a solid object, such as a bedpost, at about waist level. Hold one end of the band in each hand. 2. With your elbows at your sides and bent to 90 degrees, pull the band back to move your shoulder blades toward each other. Return to the starting position. 3. Repeat 8 to 12 times. Follow-up care is a key part of your treatment and safety. Be sure to make and go to all appointments, and call your doctor if you are having problems. It's also a good idea to know your test results and keep a list of the medicines you take. Where can you learn more? Go to http://gregg-zunilda.info/. Enter W698 in the search box to learn more about \"Healthy Upper Back: Exercises. \" Current as of: September 20, 2018 Content Version: 11.9 © 3868-4505 [employer]. Care instructions adapted under license by [employer] (which disclaims liability or warranty for this information).  If you have questions about a medical condition or this instruction, always ask your healthcare professional. [place] [address] any warranty or liability for your use of this information. Medicare Wellness Visit, Female The best way to live healthy is to have a lifestyle where you eat a well-balanced diet, exercise regularly, limit alcohol use, and quit all forms of tobacco/nicotine, if applicable. Regular preventive services are another way to keep healthy. Preventive services (vaccines, screening tests, monitoring & exams) can help personalize your care plan, which helps you manage your own care. Screening tests can find health problems at the earliest stages, when they are easiest to treat. [facility] follows the current, evidence-based guidelines published by the [facility]as Nicolas ([facility]) when recommending preventive services for our patients. Because we follow these guidelines, sometimes recommendations change over time as research supports it. (For example, mammograms used to be recommended annually. Even though Medicare will still pay for an annual mammogram, the newer guidelines recommend a mammogram every two years for women of average risk.) Of course, you and your doctor may decide to screen more often for some diseases, based on your risk and your health status. Preventive services for you include: - Medicare offers their members a free annual wellness visit, which is time for you and your primary care provider to discuss and plan for your preventive service needs. Take advantage of this benefit every year! 
-All adults over the age of 72 should receive the recommended pneumonia vaccines. Current USPSTF guidelines recommend a series of two vaccines for the best pneumonia protection.  
-All adults should have a flu vaccine yearly and a tetanus vaccine every 10 years. All adults age 61 and older should receive a shingles vaccine once in their lifetime. -A bone mass density test is recommended when a woman turns 65 to screen for osteoporosis. This test is only recommended one time, as a screening. Some providers will use this same test as a disease monitoring tool if you already have osteoporosis. -All adults age 38-68 who are overweight should have a diabetes screening test once every three years.  
-Other screening tests and preventive services for persons with diabetes include: an eye exam to screen for diabetic retinopathy, a kidney function test, a foot exam, and stricter control over your cholesterol.  
-Cardiovascular screening for adults with routine risk involves an electrocardiogram (ECG) at intervals determined by your doctor.  
-Colorectal cancer screenings should be done for adults age 54-65 with no increased risk factors for colorectal cancer. There are a number of acceptable methods of screening for this type of cancer. Each test has its own benefits and drawbacks. Discuss with your doctor what is most appropriate for you during your annual wellness visit. The different tests include: colonoscopy (considered the best screening method), a fecal occult blood test, a fecal DNA test, and sigmoidoscopy. -Breast cancer screenings are recommended every other year for women of normal risk, age 54-69. 
-Cervical cancer screenings for women over age 72 are only recommended with certain risk factors.  
-All adults born between [facility] should be screened once for Hepatitis C. Here is a list of your current Health Maintenance items (your personalized list of preventive services) with a due date: 
Health Maintenance Due Topic Date Due  
 Annual Well Visit  01/24/2019 Agnieszka Liang Diabetic Foot Care  04/10/2019 Advised protocol for clearing congestion:  Increase fluid intake, especially water to thin mucous and boost the immune system. Avoid sugar and dairy while congested since they thicken mucous.   Get plenty of rest! Gargle 3 times daily and as needed in Listerine or warm salt water vinegar solutions (1 tsp salt, 1 tsp vinegar in 1 cup lukewarm water.)  Use OTC nasal saline spray up each nostril twice daily. Use humidifier at bedtime. Use OTC Mucinex 600 mg twice daily to loosen mucous. Use OTC Tylenol Arthritis or Ibuprofen up to 800 mg up to 3 times daily as needed for pain, fever or headaches. Avoid decongestants and Ibuprofen if you have high blood pressure! If mucous is consistently discolored yellow or green throughout the day for more than a week, call the doctor for an evaluation.

## 2019-04-01 NOTE — ACP (ADVANCE CARE PLANNING)
Re-discussed ACP with patient. Patient already has an Advanced Directive at home. No changes to the documentation or further support from the [facility] Choices team requested at this time.

## 2019-04-02 LAB
ALBUMIN/CREAT UR: <3.2 MG/G CREAT (ref 0–30)
APPEARANCE UR: CLEAR
BILIRUB UR QL STRIP: NEGATIVE
COLOR UR: YELLOW
CREAT UR-MCNC: 92.5 MG/DL
GLUCOSE UR QL: ABNORMAL
HGB UR QL STRIP: NEGATIVE
KETONES UR QL STRIP: NEGATIVE
LEUKOCYTE ESTERASE UR QL STRIP: NEGATIVE
MICRO URNS: ABNORMAL
MICROALBUMIN UR-MCNC: <3 UG/ML
NITRITE UR QL STRIP: NEGATIVE
PH UR STRIP: 5 [PH] (ref 5–7.5)
PROT UR QL STRIP: NEGATIVE
SP GR UR: 1.02 (ref 1–1.03)
UROBILINOGEN UR STRIP-MCNC: 0.2 MG/DL (ref 0.2–1)

## 2019-04-20 LAB
25(OH)D3+25(OH)D2 SERPL-MCNC: 74.8 NG/ML (ref 30–100)
ALBUMIN SERPL-MCNC: 4.4 G/DL (ref 3.6–4.8)
ALBUMIN/GLOB SERPL: 1.5 {RATIO} (ref 1.2–2.2)
ALP SERPL-CCNC: 69 IU/L (ref 39–117)
ALT SERPL-CCNC: 6 IU/L (ref 0–32)
AST SERPL-CCNC: 16 IU/L (ref 0–40)
BILIRUB SERPL-MCNC: 0.3 MG/DL (ref 0–1.2)
BUN SERPL-MCNC: 10 MG/DL (ref 8–27)
BUN/CREAT SERPL: 10 (ref 12–28)
CALCIUM SERPL-MCNC: 10.3 MG/DL (ref 8.7–10.3)
CHLORIDE SERPL-SCNC: 99 MMOL/L (ref 96–106)
CHOLEST SERPL-MCNC: 151 MG/DL (ref 100–199)
CO2 SERPL-SCNC: 25 MMOL/L (ref 20–29)
CREAT SERPL-MCNC: 0.99 MG/DL (ref 0.57–1)
ERYTHROCYTE [DISTWIDTH] IN BLOOD BY AUTOMATED COUNT: 16.9 % (ref 12.3–15.4)
GLOBULIN SER CALC-MCNC: 2.9 G/DL (ref 1.5–4.5)
GLUCOSE SERPL-MCNC: 89 MG/DL (ref 65–99)
HCT VFR BLD AUTO: 34.5 % (ref 34–46.6)
HDLC SERPL-MCNC: 69 MG/DL
HGB BLD-MCNC: 11.2 G/DL (ref 11.1–15.9)
INTERPRETATION, 910389: NORMAL
INTERPRETATION: NORMAL
LDLC SERPL CALC-MCNC: 73 MG/DL (ref 0–99)
Lab: NORMAL
MCH RBC QN AUTO: 22.5 PG (ref 26.6–33)
MCHC RBC AUTO-ENTMCNC: 32.5 G/DL (ref 31.5–35.7)
MCV RBC AUTO: 69 FL (ref 79–97)
PDF IMAGE, 910387: NORMAL
PLATELET # BLD AUTO: 314 X10E3/UL (ref 150–379)
POTASSIUM SERPL-SCNC: 3.9 MMOL/L (ref 3.5–5.2)
PROT SERPL-MCNC: 7.3 G/DL (ref 6–8.5)
RBC # BLD AUTO: 4.97 X10E6/UL (ref 3.77–5.28)
SODIUM SERPL-SCNC: 141 MMOL/L (ref 134–144)
TRIGL SERPL-MCNC: 47 MG/DL (ref 0–149)
TSH SERPL DL<=0.005 MIU/L-ACNC: 2.2 UIU/ML (ref 0.45–4.5)
VLDLC SERPL CALC-MCNC: 9 MG/DL (ref 5–40)
WBC # BLD AUTO: 4.9 X10E3/UL (ref 3.4–10.8)

## 2019-04-22 DIAGNOSIS — E55.9 VITAMIN D DEFICIENCY: ICD-10-CM

## 2019-04-22 RX ORDER — ERGOCALCIFEROL 1.25 MG/1
CAPSULE ORAL
Qty: 5 CAP | Refills: 1 | OUTPATIENT
Start: 2019-04-22

## 2019-05-01 ENCOUNTER — TELEPHONE (OUTPATIENT)
Dept: FAMILY MEDICINE CLINIC | Age: 69
End: 2019-05-01

## 2019-05-02 ENCOUNTER — TELEPHONE (OUTPATIENT)
Dept: ENDOCRINOLOGY | Age: 69
End: 2019-05-02

## 2019-05-02 NOTE — TELEPHONE ENCOUNTER
Spoke with Marquise Ernandez (pharmacist) from [employer] and gave a verbal prescription order for Mrs. Escoto's Trulicity prescribed by Dr. Radhika Carias. Called Mrs. Escoto to inform her that her prescription was called in. Patient understood with no further questions.

## 2019-05-02 NOTE — TELEPHONE ENCOUNTER
Pt called wanting to check the status of her refill on her Trulicity.     I return her call and left her voice message to make her aware that we received her refill request and it is now waiting on an approval.

## 2019-05-02 NOTE — TELEPHONE ENCOUNTER
Spoke to patient. Verified with two patient identifiers. Informed the patient that Dr. Ebony Burkitt has released the patient back into Dr. Espinosa [facility] and Dr. Steven Sharif dispense a printed script to the patient that had not been given to the patient. Patient was made aware that the script is at Dr. Yokasta Barrett office and waiting for . Patient will call to make a f/u diabetes appointment with Dr. Maryjane Pike.  Pt states that she understands and has no further questions

## 2019-05-03 ENCOUNTER — TELEPHONE (OUTPATIENT)
Dept: FAMILY MEDICINE CLINIC | Age: 69
End: 2019-05-03

## 2019-05-03 NOTE — TELEPHONE ENCOUNTER
Writer called [employer], message left with patients name, , and message Dr. Isabela Pace is not the provider for patient for medication of Trulicity. Phone number left for any questions or concerns.

## 2019-05-03 NOTE — TELEPHONE ENCOUNTER
Would like a call back. . I set her an appointment to come in June 7th but she wants to ask if she needs to set a lab appointment to check her A1C

## 2019-05-03 NOTE — TELEPHONE ENCOUNTER
Writer called patient, 2 identifiers received, pt stated she has her appt scheduled in June and requesting labs done prior. Explained provider would schedule labs during office visit but to come to appointment fasting in order to be able to do then while she is here. Patient verbalized understanding.

## 2019-05-29 ENCOUNTER — ANESTHESIA (OUTPATIENT)
Dept: ENDOSCOPY | Age: 69
End: 2019-05-29
Payer: MEDICARE

## 2019-05-29 ENCOUNTER — HOSPITAL ENCOUNTER (OUTPATIENT)
Age: 69
Setting detail: OUTPATIENT SURGERY
Discharge: HOME | End: 2019-05-29
Attending: INTERNAL MEDICINE | Admitting: INTERNAL MEDICINE
Payer: MEDICARE

## 2019-05-29 ENCOUNTER — ANESTHESIA EVENT (OUTPATIENT)
Dept: ENDOSCOPY | Age: 69
End: 2019-05-29
Payer: MEDICARE

## 2019-05-29 VITALS
TEMPERATURE: 98 F | WEIGHT: 176 LBS | DIASTOLIC BLOOD PRESSURE: 86 MMHG | HEART RATE: 68 BPM | OXYGEN SATURATION: 100 % | RESPIRATION RATE: 12 BRPM | HEIGHT: 69 IN | SYSTOLIC BLOOD PRESSURE: 143 MMHG | BODY MASS INDEX: 26.07 KG/M2

## 2019-05-29 LAB
GLUCOSE BLD STRIP.AUTO-MCNC: 79 MG/DL (ref 65–100)
GLUCOSE BLD STRIP.AUTO-MCNC: 81 MG/DL (ref 65–100)
SERVICE CMNT-IMP: NORMAL
SERVICE CMNT-IMP: NORMAL

## 2019-05-29 PROCEDURE — 76060000031 HC ANESTHESIA FIRST 0.5 HR: Performed by: INTERNAL MEDICINE

## 2019-05-29 PROCEDURE — 77030027957 HC TBNG IRR ENDOGTR BUSS -B: Performed by: INTERNAL MEDICINE

## 2019-05-29 PROCEDURE — 74011250636 HC RX REV CODE- 250/636

## 2019-05-29 PROCEDURE — 82962 GLUCOSE BLOOD TEST: CPT

## 2019-05-29 PROCEDURE — 76040000019: Performed by: INTERNAL MEDICINE

## 2019-05-29 PROCEDURE — 74011000250 HC RX REV CODE- 250: Performed by: ANESTHESIOLOGY

## 2019-05-29 RX ORDER — ATROPINE SULFATE 0.1 MG/ML
0.5 INJECTION INTRAVENOUS
Status: DISCONTINUED | OUTPATIENT
Start: 2019-05-29 | End: 2019-05-29 | Stop reason: HOSPADM

## 2019-05-29 RX ORDER — EPINEPHRINE 0.1 MG/ML
1 INJECTION INTRACARDIAC; INTRAVENOUS
Status: DISCONTINUED | OUTPATIENT
Start: 2019-05-29 | End: 2019-05-29 | Stop reason: HOSPADM

## 2019-05-29 RX ORDER — LIDOCAINE HYDROCHLORIDE 20 MG/ML
INJECTION, SOLUTION EPIDURAL; INFILTRATION; INTRACAUDAL; PERINEURAL AS NEEDED
Status: DISCONTINUED | OUTPATIENT
Start: 2019-05-29 | End: 2019-05-29 | Stop reason: HOSPADM

## 2019-05-29 RX ORDER — DEXTROMETHORPHAN/PSEUDOEPHED 2.5-7.5/.8
1.2 DROPS ORAL
Status: DISCONTINUED | OUTPATIENT
Start: 2019-05-29 | End: 2019-05-29 | Stop reason: HOSPADM

## 2019-05-29 RX ORDER — SODIUM CHLORIDE 9 MG/ML
50 INJECTION, SOLUTION INTRAVENOUS CONTINUOUS
Status: DISCONTINUED | OUTPATIENT
Start: 2019-05-29 | End: 2019-05-29 | Stop reason: HOSPADM

## 2019-05-29 RX ORDER — SODIUM CHLORIDE 0.9 % (FLUSH) 0.9 %
5-40 SYRINGE (ML) INJECTION EVERY 8 HOURS
Status: DISCONTINUED | OUTPATIENT
Start: 2019-05-29 | End: 2019-05-29 | Stop reason: HOSPADM

## 2019-05-29 RX ORDER — NALOXONE HYDROCHLORIDE 0.4 MG/ML
0.4 INJECTION, SOLUTION INTRAMUSCULAR; INTRAVENOUS; SUBCUTANEOUS
Status: DISCONTINUED | OUTPATIENT
Start: 2019-05-29 | End: 2019-05-29 | Stop reason: HOSPADM

## 2019-05-29 RX ORDER — SODIUM CHLORIDE 9 MG/ML
INJECTION, SOLUTION INTRAVENOUS
Status: DISCONTINUED | OUTPATIENT
Start: 2019-05-29 | End: 2019-05-29 | Stop reason: HOSPADM

## 2019-05-29 RX ORDER — FLUMAZENIL 0.1 MG/ML
0.2 INJECTION INTRAVENOUS
Status: DISCONTINUED | OUTPATIENT
Start: 2019-05-29 | End: 2019-05-29 | Stop reason: HOSPADM

## 2019-05-29 RX ORDER — DEXTROSE 50 % IN WATER (D50W) INTRAVENOUS SYRINGE
Status: DISCONTINUED
Start: 2019-05-29 | End: 2019-05-29 | Stop reason: HOSPADM

## 2019-05-29 RX ORDER — PROPOFOL 10 MG/ML
INJECTION, EMULSION INTRAVENOUS AS NEEDED
Status: DISCONTINUED | OUTPATIENT
Start: 2019-05-29 | End: 2019-05-29 | Stop reason: HOSPADM

## 2019-05-29 RX ORDER — DEXTROSE 50 % IN WATER (D50W) INTRAVENOUS SYRINGE
6.25
Status: COMPLETED | OUTPATIENT
Start: 2019-05-29 | End: 2019-05-29

## 2019-05-29 RX ORDER — TRAZODONE HYDROCHLORIDE 100 MG/1
100 TABLET ORAL
COMMUNITY
End: 2021-12-06

## 2019-05-29 RX ORDER — SODIUM CHLORIDE 0.9 % (FLUSH) 0.9 %
5-40 SYRINGE (ML) INJECTION AS NEEDED
Status: DISCONTINUED | OUTPATIENT
Start: 2019-05-29 | End: 2019-05-29 | Stop reason: HOSPADM

## 2019-05-29 RX ADMIN — PROPOFOL 80 MG: 10 INJECTION, EMULSION INTRAVENOUS at 15:38

## 2019-05-29 RX ADMIN — LIDOCAINE HYDROCHLORIDE 60 MG: 20 INJECTION, SOLUTION EPIDURAL; INFILTRATION; INTRACAUDAL; PERINEURAL at 15:38

## 2019-05-29 RX ADMIN — SODIUM CHLORIDE: 9 INJECTION, SOLUTION INTRAVENOUS at 15:28

## 2019-05-29 RX ADMIN — DEXTROSE MONOHYDRATE 6.25 G: 25 INJECTION, SOLUTION INTRAVENOUS at 15:25

## 2019-05-29 NOTE — ANESTHESIA PREPROCEDURE EVALUATION
Relevant Problems   No relevant active problems       Anesthetic History   No history of anesthetic complications            Review of Systems / Medical History  Patient summary reviewed, nursing notes reviewed and pertinent labs reviewed    Pulmonary  Within defined limits                 Neuro/Psych   Within defined limits           Cardiovascular    Hypertension                   GI/Hepatic/Renal  Within defined limits              Endo/Other    Diabetes    Arthritis     Other Findings              Physical Exam    Airway  Mallampati: II  TM Distance: > 6 cm  Neck ROM: normal range of motion   Mouth opening: Normal     Cardiovascular  Regular rate and rhythm,  S1 and S2 normal,  no murmur, click, rub, or gallop             Dental  No notable dental hx       Pulmonary  Breath sounds clear to auscultation               Abdominal  GI exam deferred       Other Findings            Anesthetic Plan    ASA: 2  Anesthesia type: MAC          Induction: Intravenous  Anesthetic plan and risks discussed with: Patient

## 2019-05-29 NOTE — PROGRESS NOTES

## 2019-05-29 NOTE — PROCEDURES
[address] [facility].  [address] [place] [address] [address], [address]   [phone]                              Colonoscopy Procedure Note      Indications:    Family history of coloretal cancer (screening only)     :  Yosvany Canchola MD    Surgical Assistant: None    Implants: none    Referring Provider: Juni Ugarte DO    Sedation:  MAC anesthesia    Procedure Details:  After informed consent was obtained with all risks and benefits of procedure explained and preoperative exam completed, the patient was taken to the endoscopy suite and placed in the left lateral decubitus position. Upon sequential sedation as per above, a digital rectal exam was performed  And was normal.  The Olympus videocolonoscope  was inserted in the rectum and carefully advanced to the sigmoid colon. The quality of preparation was good. The colonoscope was slowly withdrawn with careful evaluation between folds. Retroflexion in the rectum was performed and was normal..     Findings:   Rectum: no mucosal lesion appreciated  Grade 1 internal hemorrhoid(s); Solid stool present  Sigmoid: stool present;;  Descending Colon: not intubated  Transverse Colon: not intubated  Ascending Colon: not intubated  Cecum: not intubated  Terminal Ileum: not intubated    Interventions:  none    Specimen Removed:  * No specimens in log *    Complications: None. EBL:  None. Recommendations:   -High fiber diet.     -Resume normal medication(s). -Repeat Colonoscopy next available with double prep    Discharge Disposition:  Home in the company of a  when able to ambulate.     Yosvany Canchola MD  5/29/2019  3:46 PM

## 2019-05-29 NOTE — DISCHARGE INSTRUCTIONS
[address] [facility].  [address] [facility] [address], [address] [facility]                                  Noris Factor  [id number]  1950    COLON DISCHARGE INSTRUCTIONS    DISCOMFORT:  Redness at IV site- apply warm compress to area; if redness or soreness persist- contact your physician  There may be a slight amount of blood passed from the rectum  Gaseous discomfort- walking, belching will help relieve any discomfort  You may not operate a vehicle for 12 hours  You may not  engage in an occupation involving machinery or appliances for rest of today  You may not  drink alcoholic beverages for at least 12 hours  Avoid making any critical decisions for at least 24 hour    DIET:   High fiber diet. - however -  remember your colon is empty and a heavy meal will produce gas. Avoid these foods:  vegetables, fried / greasy foods, carbonated drinks for today     ACTIVITY:  It is recommended that you spend the remainder of the day resting -  avoid any strenuous activity. CALL M.D. ANY SIGN OF:   Increasing pain, nausea, vomiting  Abdominal distension (swelling)  New increased bleeding (oral or rectal)  Fever (chills)  Pain in chest area  Bloody discharge from nose or mouth  Shortness of breath    You may not  take any Advil, Aspirin, Ibuprofen, Motrin, Aleve, or Goodys for 10 days, ONLY  Tylenol as needed for pain. Post procedure diagnosis: 1. Inadequate Prep      Follow-up Instructions:    Call Dr. Annita Lozano for any questions or problems. If we took a biopsy please call the office within 2 weeks to discuss your  pathology results.  Telephone # [phone]

## 2019-05-29 NOTE — ANESTHESIA POSTPROCEDURE EVALUATION
Post-Anesthesia Evaluation and Assessment    Patient: Anabel Whitaker MRN: [id number]  SSN: [id number]    YOB: 1950  Age: 71 y.o. Sex: female      I have evaluated the patient and they are stable and ready for discharge from the PACU. Cardiovascular Function/Vital Signs  Visit Vitals  /72   Pulse 68   Temp 36.7 °C (98 °F)   Resp 18   Ht 5' 8.5\" (1.74 m)   Wt 79.8 kg (176 lb)   SpO2 98%   BMI 26.37 kg/m²       Patient is status post MAC anesthesia for Procedure(s):  SIGMOIDOSCOPY FLEXIBLE. Nausea/Vomiting: None    Postoperative hydration reviewed and adequate. Pain:  Pain Scale 1: Numeric (0 - 10) (05/29/19 1503)  Pain Intensity 1: 0 (05/29/19 1503)   Managed    Neurological Status: At baseline    Mental Status, Level of Consciousness: Alert and  oriented to person, place, and time    Pulmonary Status:   O2 Device: Nasal cannula (05/29/19 1544)   Adequate oxygenation and airway patent    Complications related to anesthesia: None    Post-anesthesia assessment completed. No concerns    Signed By: Henrry Argueta MD     May 29, 2019              Procedure(s):  SIGMOIDOSCOPY FLEXIBLE. MAC    <BSHSIANPOST>    Vitals Value Taken Time   /74 5/29/2019  3:58 PM   Temp     Pulse 69 5/29/2019  3:59 PM   Resp 13 5/29/2019  3:59 PM   SpO2 100 % 5/29/2019  3:59 PM   Vitals shown include unvalidated device data.

## 2019-05-30 ENCOUNTER — TELEPHONE (OUTPATIENT)
Dept: FAMILY MEDICINE CLINIC | Age: 69
End: 2019-05-30

## 2019-05-30 NOTE — TELEPHONE ENCOUNTER
Requested Prescriptions     Pending Prescriptions Disp Refills    empagliflozin-metFORMIN (SYNJARDY) 12.5-1,000 mg per tablet 60 Tab 5     Sig: Take 1 Tab by mouth two (2) times daily (with meals).

## 2019-05-30 NOTE — TELEPHONE ENCOUNTER
----- Message from Ismael Holguin sent at 5/30/2019 11:59 AM EDT -----  Regarding: Dr. Heather Chino  Patient is requesting a refill of Synjardy sent to the [address] on file.

## 2019-06-03 NOTE — H&P
Jayda Bhatti [address]  [address] [address] [address], [address]  [phone]                                History and Physical     NAME: Merline Garre   :  1950   MRN:  [id number]     HPI:  The patient was seen and examined. Past Surgical History:   Procedure Laterality Date    CYSTOSCOPY  2005    Dr. Edilma Allen N/A 2019    SIGMOIDOSCOPY FLEXIBLE performed by Navya Menard MD at [facility] ENDOSCOPY    FOOT/TOES SURGERY [address] UNLISTED      Left Ornelas's Neuroma. [address] [facility] HX BREAST BIOPSY Left     12 years ago neg    HX BREAST LUMPECTOMY  10/15/08    Left intraductal papilloma. Dr. Joo Arrieta HX COLONOSCOPY  05/27/15    due to anemia, rectal bleeding. due q 5 yrs. Dr. Amarilis Mederos  , 06,     Dr. Sherly Sullivan. due q 5 yrs.  HX ENDOSCOPY  05/27/15    due to anemia. Dr. Asael Souza.  HX HEART CATHETERIZATION  09    Dr. Monzno [facility] ARTHROSCOPY Left 04/17/15    due to [place] [address]. Dr. Tani Abbasi.  HX MYOMECTOMY  [id number]    x4    HX OOPHORECTOMY Bilateral     HX SHOULDER ARTHROSCOPY Right 2018    with subacromial decompression and open distal clavicle excision, Dr. Shantanu Ramos    endometriosis, fibroids. Past Medical History:   Diagnosis Date    Allergic rhinitis     Dr. Bettye Hurst ,     Dr. Rashid Valdes. Dr. Alis Craft.  Chest pain 01/15/09    Dr. Álvaro Garcia Chickenpox childhood    DDD (degenerative disc disease), cervical 10/2015    Dr. Derek Wallace.  DDD (degenerative disc disease), lumbar     Dr. Bo Abdi.  [facility]    Tom Aviles, OD. Dr. Johana Gupta, pod. Dr. Aileen Noel.     Endometriosis     Dr. Boni Davis Essential hypertension, benign     Dr. Álvaro Garcia Fibroid, uterine     Dr. Manpreet Bledsoe 08/2009    Dr. Doretha Perez. Dr. Anu Mcelroy.  Hand paresthesia 2011    Dr. Henna Prajapati. Dr. Barbara Romero.  GDPLADIL(454.0) 2012    Dr. Ariana Gillette. Dr. Doretha Perez, ENT.  Hemoptysis 06/13/07    Dr. Kavita Naranjo    Hiatal hernia 05/27/15    small. MD Rubia Dickson Internal hemorrhoids 12/2006, 05/27/15    Dr. Charlie Silva. Dr. Gardenia Polk.  Intraocular pressure increase 07/27/05    bilaterally. Dr. Harry Tony. Dr. Aryan Stephens.  Knee pain 04/04/13    Bilaterally. due to fall. Dr. Sabrina Loeng.  Left knee pain 11/26/14    Left. due to OA, torn medial and lateral meniscal tears. Dr. Harry Hollins. Dr. Yamel Morales.  Lumbar spondylosis 3/2008    Dr. Rogelio Sprague. Dr. Wiley Khanna    Menopause 1994    Paresthesia of foot     Left  due to Ornelas's Neuromo Sxs. Woody Nones. due to Diabetic Neuropathy. Dr. Anu Mcelroy.  Proteinuria 2009    Dr. Valeria Bryan Right shoulder pain 01/30/06    Dr. Astrid Abbott. impingement tendinitis    Right shoulder pain 07/2014    Dr. Sriram Stokes.  Schatzki's ring 05/27/15    with partial obstruction at GE junction. Dr. Gardenia Polk. Nicky Bumpers     Dr. Rogelio Sprague. Dr. Wiley Khanna    Stenosis colon [facility]) 05/27/15    SIGMOID WITH TORTUOUS DESCENDING COLON. Dr. yMra Garcia. [facility].  Thalassemia minor 1980s    Vitamin D deficiency 05/2011    Franky Sifuentes, NP     Social History     Tobacco Use    Smoking status: Never Smoker    Smokeless tobacco: Never Used   Substance Use Topics    Alcohol use: Yes     Alcohol/week: 0.0 oz     Comment: Social beer, social drinks, social shots of liquor,and social wine.     Drug use: No     Allergies   Allergen Reactions    Influenza Virus Vaccine, Specific Swelling     R ARM PAIN, NUMBNESS, SWELLING  R HAND NUMBNESS AND TINGLING, WORSE IN 4TH AND FIFTH FINGERS    Lisinopril Cough     Other reaction(s): Lisinopril  Other reaction(s): coughing, Other (see comments)  Coughing      Singulair [Montelukast] Other (comments)     headache     Family History   Problem Relation Age of Onset    Hypertension Mother    Markus Gabriel Glaucoma Mother     Heart Failure Mother         CHF    Hypertension Father     Colon Cancer Father         colon    Heart Attack Sister     Stroke Sister     Diabetes Sister         x 2 sisters    Diabetes Brother     Breast Cancer Maternal Aunt 38     No current facility-administered medications for this encounter. Current Outpatient Medications   Medication Sig    traZODone (DESYREL) 100 mg tablet Take 100 mg by mouth nightly.  dulaglutide (TRULICITY) 1.5 YL/6.1 mL sub-q pen 0.5 mL by SubCUTAneous route every seven (7) days.  simvastatin (ZOCOR) 40 mg tablet take 1 tablet by mouth at bedtime    valsartan-hydroCHLOROthiazide (DIOVAN-HCT) 80-12.5 mg per tablet Take 1 Tab by mouth daily.  empagliflozin-metFORMIN (SYNJARDY) 12.5-1,000 mg per tablet Take 1 Tab by mouth two (2) times daily (with meals).  solifenacin (VESICARE) 10 mg tablet Take 10 mg by mouth daily.  valACYclovir (VALTREX) 500 mg tablet     evening primrose oil (EVENING PRIMROSE) 500 mg cap Take 1 Cap by mouth two (2) times a day.  Blood-Glucose Meter monitoring kit One Touch Ultra 2 Use to test blood sugar 1-2 per day (blood sugar before breakfast or 2 hours after any meal or at bedtime) DX.   E11.65    glucose blood VI test strips (ASCENSIA AUTODISC VI, ONE TOUCH ULTRA TEST VI) strip Use to test blood sugar 1-2 per day (blood sugar before breakfast or 2 hours after any meal or at bedtime) DX. E11.65    sodium chloride (OCEAN) 0.65 % nasal spray 1 Spray as needed for Congestion.  aspirin delayed-release 81 mg tablet Take 1 Tab by mouth daily.  cyclobenzaprine (FLEXERIL) 10 mg tablet Take 10 mg by mouth as needed.  tramadol (ULTRAM) 50 mg tablet Take 50 mg by mouth every eight (8) hours as needed.          PHYSICAL EXAM:  General: WD, WN. Alert, cooperative, no acute distress    HEENT: NC, Atraumatic. PERRLA, EOMI. Anicteric sclerae. Lungs:  CTA Bilaterally. No Wheezing/Rhonchi/Rales. Heart:  Regular  rhythm,  No murmur, No Rubs, No Gallops  Abdomen: Soft, Non distended, Non tender.  +Bowel sounds, no HSM  Extremities: No c/c/e  Neurologic:  CN 2-12 gi, Alert and oriented X 3. No acute neurological distress   Psych:   Good insight. Not anxious nor agitated. The heart, lungs and mental status were satisfactory for the administration of MAC sedation and for the procedure.       Mallampati score: 3       Assessment:   · FH colon cancer  · Screening for colon cancer    Plan:   · Endoscopic procedure  · MAC sedation   ·

## 2019-06-05 NOTE — TELEPHONE ENCOUNTER
PCP: Valeria Lyons DO    Last appt: 4/19/2019  Future Appointments   Date Time Provider China Enriquez   6/7/2019 10:30 AM Valeria Lyons DO [facility] SCHED       Requested Prescriptions      No prescriptions requested or ordered in this encounter       Prior labs and Blood pressures:  BP Readings from Last 3 Encounters:   05/29/19 143/86   04/01/19 109/70   03/06/19 130/74     Lab Results   Component Value Date/Time    Sodium 141 04/19/2019 10:13 AM    Potassium 3.9 04/19/2019 10:13 AM    Chloride 99 04/19/2019 10:13 AM    CO2 25 04/19/2019 10:13 AM    Anion gap 11 09/03/2010 08:45 AM    Glucose 89 04/19/2019 10:13 AM    BUN 10 04/19/2019 10:13 AM    Creatinine 0.99 04/19/2019 10:13 AM    BUN/Creatinine ratio 10 (L) 04/19/2019 10:13 AM    GFR est AA 67 04/19/2019 10:13 AM    GFR est non-AA 58 (L) 04/19/2019 10:13 AM    Calcium 10.3 04/19/2019 10:13 AM     Lab Results   Component Value Date/Time    Hemoglobin A1c 6.6 (H) 08/07/2018 10:19 AM    Hemoglobin A1c (POC) 6.2 03/06/2019 11:58 AM     Lab Results   Component Value Date/Time    Cholesterol, total 151 04/19/2019 10:13 AM    HDL Cholesterol 69 04/19/2019 10:13 AM    LDL, calculated 73 04/19/2019 10:13 AM    VLDL, calculated 9 04/19/2019 10:13 AM    Triglyceride 47 04/19/2019 10:13 AM    CHOL/HDL Ratio 1.9 04/26/2010 08:00 AM     Lab Results   Component Value Date/Time    Vitamin D 25-Hydroxy 25.1 (L) 10/20/2011 07:42 AM    VITAMIN D, 25-HYDROXY 74.8 04/19/2019 10:13 AM       Lab Results   Component Value Date/Time    TSH 2.200 04/19/2019 10:13 AM

## 2019-06-07 ENCOUNTER — OFFICE VISIT (OUTPATIENT)
Dept: FAMILY MEDICINE CLINIC | Age: 69
End: 2019-06-07

## 2019-06-07 VITALS
DIASTOLIC BLOOD PRESSURE: 83 MMHG | SYSTOLIC BLOOD PRESSURE: 132 MMHG | TEMPERATURE: 97.9 F | OXYGEN SATURATION: 99 % | HEART RATE: 67 BPM | HEIGHT: 69 IN | WEIGHT: 181.9 LBS | RESPIRATION RATE: 18 BRPM | BODY MASS INDEX: 26.94 KG/M2

## 2019-06-07 DIAGNOSIS — N39.498 OTHER URINARY INCONTINENCE: ICD-10-CM

## 2019-06-07 DIAGNOSIS — N32.81 OAB (OVERACTIVE BLADDER): ICD-10-CM

## 2019-06-07 DIAGNOSIS — E11.40 CONTROLLED TYPE 2 DIABETES MELLITUS WITH DIABETIC NEUROPATHY, WITHOUT LONG-TERM CURRENT USE OF INSULIN (HCC): Primary | ICD-10-CM

## 2019-06-07 DIAGNOSIS — E78.00 PURE HYPERCHOLESTEROLEMIA: ICD-10-CM

## 2019-06-07 DIAGNOSIS — I10 ESSENTIAL HYPERTENSION: ICD-10-CM

## 2019-06-07 PROBLEM — E11.21 TYPE 2 DIABETES WITH NEPHROPATHY (HCC): Status: ACTIVE | Noted: 2019-06-07

## 2019-06-07 RX ORDER — CHLORHEXIDINE GLUCONATE 1.2 MG/ML
RINSE ORAL
Refills: 0 | COMMUNITY
Start: 2019-04-17

## 2019-06-07 RX ORDER — SIMVASTATIN 40 MG/1
TABLET, FILM COATED ORAL
Qty: 90 TAB | Refills: 2 | Status: SHIPPED | OUTPATIENT
Start: 2019-06-07 | End: 2019-07-30 | Stop reason: SDUPTHER

## 2019-06-07 NOTE — PROGRESS NOTES
Diabetic foot exam:     Left Foot:   Visual Exam: callous - soft callous on dorsal side of pad   Pulse DP: 2+ (normal)   Filament test: reduced sensation    Vibratory sensation: diminished      Right Foot:   Visual Exam: callous - soft callous on the pad   Pulse DP: 2+ (normal)   Filament test: normal sensation    Vibratory sensation: normal

## 2019-06-07 NOTE — PROGRESS NOTES
[facility]  Identified pt with two pt identifiers(name and ). Chief Complaint   Patient presents with    Medication Refill     Rm 13         1. Have you been to the ER, urgent care clinic since your last visit? Hospitalized since your last visit? NO    2. Have you seen or consulted any other health care providers outside of the [address] [place] [address] since your last visit? Include any pap smears or colon screeningyes      My Chart     My chart gives you direct online access to portions of the electronic medical record (EMR) where your doctor stores your health information (ie, lab results, appointment information, medications, immunizations, and more. It is free. Would you like to set up your my chart? NO    [unfilled]    Weight Metrics 2019 2019 2019 3/6/2019 2019 8/10/2018 2018   Weight 181 lb 14.4 oz 176 lb 179 lb 11.2 oz 179 lb 8 oz 181 lb 182 lb 4.8 oz 181 lb   BMI 27.26 kg/m2 26.37 kg/m2 27.32 kg/m2 27.29 kg/m2 27.52 kg/m2 27.57 kg/m2 26.92 kg/m2       Medication reconciliation up to date and corrected with patient at this time. Advance Care Planning    In the event something were to happen to you and you were unable to speak on your behalf, do you have an Advance Directive/ Living Will in place stating your wishes? NO    If yes, do we have a copy on file NO    If no, would you like information YES      ====Behzad Coronel Invitation====    Patient was invited to [facility] on this date and given the information folder for review. Recommended appointment with Behzad Coronel facilitator for ACP conversation regarding advance directives. [] Yes  [x] No  Referral sent to [facility] Berna team member or Coordinator for follow-up    [] Yes  [x] No  Patient scheduled an appointment. Site of Referral:       Today's provider has been notified of reason for visit, vitals and flowsheets obtained on patients.      Reviewed record in preparation for visit, huddled with provider and have obtained necessary documentation.       Health Maintenance Due   Topic    FOOT EXAM Q1        Wt Readings from Last 3 Encounters:   06/07/19 181 lb 14.4 oz (82.5 kg)   05/29/19 176 lb (79.8 kg)   04/01/19 179 lb 11.2 oz (81.5 kg)     Temp Readings from Last 3 Encounters:   06/07/19 97.9 °F (36.6 °C) (Oral)   05/29/19 98 °F (36.7 °C)   04/01/19 96.8 °F (36 °C) (Oral)     BP Readings from Last 3 Encounters:   06/07/19 132/83   05/29/19 143/86   04/01/19 109/70     Pulse Readings from Last 3 Encounters:   06/07/19 67   05/29/19 68   04/01/19 72     Vitals:    06/07/19 1112   BP: 132/83   Pulse: 67   Resp: 18   Temp: 97.9 °F (36.6 °C)   TempSrc: Oral   SpO2: 99%   Weight: 181 lb 14.4 oz (82.5 kg)   Height: 5' 8.5\" (1.74 m)   PainSc:   0 - No pain         Learning Assessment:  :     Learning Assessment 4/10/2018 1/20/2014   PRIMARY LEARNER Patient Patient   HIGHEST LEVEL OF EDUCATION - PRIMARY LEARNER  4 YEARS OF COLLEGE > 4 YEARS OF COLLEGE   BARRIERS PRIMARY LEARNER NONE NONE   CO-LEARNER CAREGIVER No No   CO-LEARNER NAME - n/a   PRIMARY LANGUAGE ENGLISH ENGLISH    NEED No -   LEARNER PREFERENCE PRIMARY LISTENING LISTENING     READING -     DEMONSTRATION -     PICTURES -   LEARNING SPECIAL TOPICS n0 no   ANSWERED BY patient Patient   RELATIONSHIP SELF SELF   ASSESSMENT COMMENT 4/10/18ah -       Depression Screening:  :     3 most recent PHQ Screens 4/1/2019   Little interest or pleasure in doing things Not at all   Feeling down, depressed, irritable, or hopeless Not at all   Total Score PHQ 2 0   Trouble falling or staying asleep, or sleeping too much Not at all   Feeling tired or having little energy Not at all   Poor appetite, weight loss, or overeating Not at all   Feeling bad about yourself - or that you are a failure or have let yourself or your family down Not at all   Trouble concentrating on things such as school, work, reading, or watching TV Not at all   Moving or speaking so slowly that other people could have noticed; or the opposite being so fidgety that others notice Not at all   Thoughts of being better off dead, or hurting yourself in some way Not at all   PHQ 9 Score 0       Fall Risk Assessment:  :     Fall Risk Assessment, last 12 mths 4/1/2019   Able to walk? Yes   Fall in past 12 months? No       Abuse Screening:  :     Abuse Screening Questionnaire 4/1/2019 1/23/2018 12/7/2016   Do you ever feel afraid of your partner? N N N   Are you in a relationship with someone who physically or mentally threatens you? N N N   Is it safe for you to go home?  Y Y Y       ADL Screening:  :     ADL Assessment 4/1/2019   Feeding yourself No Help Needed   Getting from bed to chair No Help Needed   Getting dressed No Help Needed   Bathing or showering No Help Needed   Walk across the room (includes cane/walker) No Help Needed   Using the telphone No Help Needed   Taking your medications No Help Needed   Preparing meals No Help Needed   Managing money (expenses/bills) No Help Needed   Moderately strenuous housework (laundry) No Help Needed   Shopping for personal items (toiletries/medicines) No Help Needed   Shopping for groceries No Help Needed   Driving No Help Needed   Climbing a flight of stairs No Help Needed   Getting to places beyond walking distances No Help Needed

## 2019-06-07 NOTE — TELEPHONE ENCOUNTER
Per d/w pt at her ov today and per chart review, Deliajoan Sandoval has already kindly been electronically sent with 6 months of refills to her pharmacy by the endocrinologists covering for Dr. Vicki Peck.

## 2019-06-07 NOTE — PROGRESS NOTES
HISTORY OF PRESENT ILLNESS  Chapo Bradley is a 71 y.o. female presents with Medication Refill (Rm 13); Results; Diabetes; and Referral Follow Up    Agree with nurse note. Pt with hypercholesterolemia and hypertension presents to the office with a BP of 132/83. For BP, she uses Diovan-HCT 80-12.5 mg daily, tolerating well. She uses Aspirin 81 mg daily, tolerating well.  For cholesterol, she uses Zocor 40 mg daily, tolerating well and requests a refill today. Pt requested to review results from 4/19/2019. LDL 73, Trig 47, Bun/Creat ratio 10, Cr 0.99, TSH 2.2, Vit D 74.8, Urinalysis showed 3+ glucose, Microalbumin <3. She previously saw endocrinologist, Dr. Erika Barlow for uncontrolled DM with Hgb A1c of 9.9 in 1/2018. Her most recent A1c was 6.2 so she was released from Dr. Jaycob lobo. She brought her glucometer in today to share her glucose readings with values between 106-172 with an average of 130-140s. She had one episode of hypoglycemia with value of 78 prior to colonoscopy. Current regimen is Trulicity 1.5 mg weekly and Synjardy 12.5 mg-1000 mg BID. Diabetic foot exam completed by podiatrist, Dr. Asia Roca. She requests a refill of Synjardy today, but per chart review, a 6 month supply was sent by Dr. Felicity Mays on 6/4/2019. Pt with OAB and urinary incontinence, stable on Vesicare 10 mg daily rx'd by urologist, Dr. Elizabeth Franz    Pt tried to have colonoscopy on 2/26/2019 and 5/29/2019 with Dr. Camilo Holguin but was unable to complete due to incomplete prep. Dr. Malcolm Almeida did complete sigmoidoscopy on 5/29/2019 and noted internal hemorrhoids. Repeat colonoscopy next available with double prep. Pt clarifies today she did not tolerate the prep and vomited it. She plans to follow a liquid diet x1 week prior to the next attempt.      Written by clem Neil, as dictated by DO. ROCHELLE Segovia    Review of Systems negative except as noted above in HPI.    ALLERGIES:    Allergies   Allergen Reactions    Influenza Virus Vaccine, Specific Swelling     R ARM PAIN, NUMBNESS, SWELLING  R HAND NUMBNESS AND TINGLING, WORSE IN 4TH AND FIFTH FINGERS    Lisinopril Cough     Other reaction(s): Lisinopril  Other reaction(s): coughing, Other (see comments)  Coughing      Singulair [Montelukast] Other (comments)     headache       CURRENT MEDICATIONS:    Outpatient Medications Marked as Taking for the 6/7/19 encounter (Office Visit) with Svetlana Navarroniki, DO   Medication Sig Dispense Refill    chlorhexidine (PERIDEX) 0.12 % solution   0    simvastatin (ZOCOR) 40 mg tablet take 1 tablet by mouth at bedtime  Indications: high cholesterol 90 Tab 2    empagliflozin-metFORMIN (SYNJARDY) 12.5-1,000 mg per tablet Take 1 Tab by mouth two (2) times daily (with meals). 60 Tab 5    traZODone (DESYREL) 100 mg tablet Take 100 mg by mouth nightly.  dulaglutide (TRULICITY) 1.5 QT/3.0 mL sub-q pen 0.5 mL by SubCUTAneous route every seven (7) days. 4 Pen 6    valsartan-hydroCHLOROthiazide (DIOVAN-HCT) 80-12.5 mg per tablet Take 1 Tab by mouth daily. 90 Tab 3    Blood-Glucose Meter monitoring kit One Touch Ultra 2 Use to test blood sugar 1-2 per day (blood sugar before breakfast or 2 hours after any meal or at bedtime) DX.   E11.65 1 Kit 0    glucose blood VI test strips (ASCENSIA AUTODISC VI, ONE TOUCH ULTRA TEST VI) strip Use to test blood sugar 1-2 per day (blood sugar before breakfast or 2 hours after any meal or at bedtime) DX. E11.65 100 Strip 3    sodium chloride (OCEAN) 0.65 % nasal spray 1 Spray as needed for Congestion.  solifenacin (VESICARE) 10 mg tablet Take 10 mg by mouth daily.  aspirin delayed-release 81 mg tablet Take 1 Tab by mouth daily. 90 Tab 3    evening primrose oil (EVENING PRIMROSE) 500 mg cap Take 1 Cap by mouth two (2) times a day.  cyclobenzaprine (FLEXERIL) 10 mg tablet Take 10 mg by mouth as needed.       tramadol (ULTRAM) 50 mg tablet Take 50 mg by mouth every eight (8) hours as needed. PAST MEDICAL HISTORY:    Past Medical History:   Diagnosis Date    Allergic rhinitis     Dr. Minoo Amador 2015, 2019    Dr. German Aranda. Dr. Coby Hicks.  Chest pain 01/15/09    Dr. Jocy Oliveira Chickenpox childhood    DDD (degenerative disc disease), cervical 10/2015    Dr. Sangeetha Dominguez.  DDD (degenerative disc disease), lumbar 2014    Dr. Randolph Oppenheim.  Diabetes ([address] 06/2007    Elmira Espinoza, OD. Dr. Nicole Roque, pod. Dr. Rehana Beth.  Endometriosis     Dr. Chin Carney    Essential hypertension, benign     Dr. Denis Pepe, uterine     Dr. Juwan Hernandez 08/2009    Dr. Geeta Best. Dr. Nicole Roque.  Hand paresthesia 2011    Dr. Greg Acevedo. Dr. Travon Ortega.  AITVCADX(539.5) 2012    Dr. Marisol Montes De Oca. Dr. Geeta Best, ENT.  Hemoptysis 06/13/07    Dr. Heredia Free    Hiatal hernia 05/27/15    small. Wesley Bashir MD   [facility] Internal hemorrhoids 12/2006, 05/27/15    Dr. [facility]. Dr. Kittie Buerger.  Intraocular pressure increase 07/27/05    bilaterally. Dr. Tiki York. Dr. Elmira Espinoza.  Knee pain 04/04/13    Bilaterally. due to fall. Dr. Fatou Marcial.  Left knee pain 11/26/14    Left. due to OA, torn medial and lateral meniscal tears. Dr. Anabela Silva. Dr. Olesya Ruelas.  Lumbar spondylosis 3/2008    Dr. Catalina Ashley. Dr. Lam Bull (overactive bladder)     Dr. Sarah Oquendo. Dr. Unique Grier. 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 Abdirahman Sy. due to Diabetic Neuropathy. Dr. Nicole Roque.  Proteinuria 2009    Dr. Marta Moss Right shoulder pain 01/30/06    Dr. Aria Francois. impingement tendinitis    Right shoulder pain 07/2014    Dr. Swapna Hood.  Schatzki's ring 05/27/15    with partial obstruction at GE junction. Dr. Kittie Buerger.     Alexandrea Valdez. Dr. 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 Dr. Marianne Tadeo. [facility].  Thalassemia minor 1980s    Urinary incontinence     Dr. Ana Niño. Dr. Elenita Driver D deficiency 05/2011    Lilly Garcia, [place]       PAST SURGICAL HISTORY:    Past Surgical History:   Procedure Laterality Date    CYSTOSCOPY  07/2005    Dr. Nikolay Ayala N/A 5/29/2019    INCOMPLETE COLONOSCOPY (second attempt) due to incomplete prep/full colon. Dr. Jesika Boyce. due prn.  FOOT/TOES SURGERY PROC UNLISTED      Left Ornelas's Neuroma. [id number] [facility] HX BREAST BIOPSY Left     12 years ago neg    HX BREAST LUMPECTOMY  10/15/08    Left intraductal papilloma. Dr. Mily Arvizu HX COLONOSCOPY  05/27/15    due to anemia, rectal bleeding. due q 5 yrs. Dr. Katarzyna Mccabe  2001, 12/07/06, 2011    Dr. Emory Sands. due q 5 yrs.  HX ENDOSCOPY  05/27/15    due to anemia. Dr. Mackenzie Salgado.  HX HEART CATHETERIZATION  01/09/09    Dr. Chrissy Moore ARTHROSCOPY Left 04/17/15    due to [address]. Dr. Sekou Odom.  HX MYOMECTOMY  [id number]    x4    HX OOPHORECTOMY Bilateral 1994    HX SHOULDER ARTHROSCOPY Right 11/09/2018    with subacromial decompression and open distal clavicle excision, Dr. Armando Stephenson    endometriosis, fibroids.        FAMILY HISTORY:    Family History   Problem Relation Age of Onset    Hypertension Mother    [facility] Glaucoma Mother     Heart Failure Mother         CHF    Hypertension Father     Colon Cancer Father         colon    Heart Attack Sister     Stroke Sister     Diabetes Sister         x 2 sisters    Diabetes Brother     Breast Cancer Maternal Aunt 45       SOCIAL HISTORY:    Social History     Socioeconomic History    Marital status: SINGLE     Spouse name: Not on file    Number of children: Not on file  Years of education: Not on file    Highest education level: Not on file   Tobacco Use    Smoking status: Never Smoker    Smokeless tobacco: Never Used   Substance and Sexual Activity    Alcohol use: Yes     Alcohol/week: 0.0 oz     Comment: Social beer, social drinks, social shots of liquor,and social wine.  Drug use: No    Sexual activity: Never       IMMUNIZATIONS:    Immunization History   Administered Date(s) Administered    (RETIRED) Pneumococcal Vaccine (Unspecified Type) 12/21/2005    H1N1 Influenza Virus Vaccine 03/09/2010    Hepatitis B Vaccine 06/09/2008, 08/20/2008, 01/27/2009    Influenza High Dose Vaccine PF 11/11/2015, 09/07/2016, 10/19/2017, 08/01/2018    Influenza Vaccine Split 10/08/2010, 11/08/2011, 10/24/2012    Influenza Vaccine Whole 01/27/2009    Pneumococcal Conjugate (PCV-13) 11/11/2015    Pneumococcal Polysaccharide (PPSV-23) 12/07/2016    Tdap 01/23/2018    Zoster Recombinant 05/25/2018, 08/04/2018    Zoster Vaccine, Live 01/20/2014    dT Vaccine 06/09/2008         PHYSICAL EXAMINATION    Vital Signs    Visit Vitals  /83   Pulse 67   Temp 97.9 °F (36.6 °C) (Oral)   Resp 18   Ht 5' 8.5\" (1.74 m)   Wt 181 lb 14.4 oz (82.5 kg)   SpO2 99%   BMI 27.26 kg/m²       Weight Metrics 6/7/2019 5/29/2019 4/1/2019 3/6/2019 1/28/2019 8/10/2018 8/7/2018   Weight 181 lb 14.4 oz 176 lb 179 lb 11.2 oz 179 lb 8 oz 181 lb 182 lb 4.8 oz 181 lb   BMI 27.26 kg/m2 26.37 kg/m2 27.32 kg/m2 27.29 kg/m2 27.52 kg/m2 27.57 kg/m2 26.92 kg/m2       General appearance - Well nourished. Well appearing. Well developed. No acute distress. Overweight. Head - Normocephalic. Atraumatic. Eyes - pupils equal and reactive. Extraocular eye movements intact. Sclera anicteric. Mildly injected sclera. Ears - Hearing is grossly normal bilaterally. Nose - normal and patent. No polyps noted. No erythema. No discharge. Mouth - mucous membranes with slightly decreased moisture.   Posterior pharynx normal with cobblestone appearance. No erythema, white exudate or obstruction. Neck - supple. Midline trachea. No carotid bruits noted bilaterally. No thyromegaly noted. Chest - clear to auscultation bilaterally anteriorly and posteriorly. No wheezes. No rales or rhonchi. Breath sounds are symmetrical bilaterally. Unlabored respirations. Heart - normal rate. Regular rhythm. Normal S1, S2. No murmur noted. No rubs, clicks or gallops noted. Abdomen - soft and distended. No masses or organomegaly. No rebound, rigidity or guarding. Bowel sounds normal x 4 quadrants. No tenderness noted. Neurological - awake, alert and oriented to person, place, and time and event. Cranial nerves II through XII intact. Clear speech. Muscle strength is +5/5 x 4 extremities. Sensation is intact to light touch bilaterally. Steady gait. Heme/Lymph - peripheral pulses normal x 4 extremities. No peripheral edema is noted. Musculoskeletal - Intact x 4 extremities. Full ROM x 4 extremities. No pain with movement. Back exam - normal range of motion. No pain on palpation of the spinous processes in the cervical, thoracic, lumbar, sacral regions. No CVA tenderness. Skin - no rashes, erythema, ecchymosis, lacerations, abrasions, suspicious moles noted  Psychological -   normal behavior, dress and thought processes. Good insight. Good eye contact. Normal affect. Appropriate mood. Normal speech.       DATA REVIEWED    Lab Results   Component Value Date/Time    WBC 4.9 04/19/2019 10:13 AM    HGB 11.2 04/19/2019 10:13 AM    HCT 34.5 04/19/2019 10:13 AM    PLATELET 909 03/44/0007 10:13 AM    MCV 69 (L) 04/19/2019 10:13 AM     Lab Results   Component Value Date/Time    Sodium 141 04/19/2019 10:13 AM    Potassium 3.9 04/19/2019 10:13 AM    Chloride 99 04/19/2019 10:13 AM    CO2 25 04/19/2019 10:13 AM    Anion gap 11 09/03/2010 08:45 AM    Glucose 89 04/19/2019 10:13 AM    BUN 10 04/19/2019 10:13 AM Creatinine 0.99 04/19/2019 10:13 AM    BUN/Creatinine ratio 10 (L) 04/19/2019 10:13 AM    GFR est AA 67 04/19/2019 10:13 AM    GFR est non-AA 58 (L) 04/19/2019 10:13 AM    Calcium 10.3 04/19/2019 10:13 AM    Bilirubin, total 0.3 04/19/2019 10:13 AM    AST (SGOT) 16 04/19/2019 10:13 AM    Alk. phosphatase 69 04/19/2019 10:13 AM    Protein, total 7.3 04/19/2019 10:13 AM    Albumin 4.4 04/19/2019 10:13 AM    Globulin 3.5 09/03/2010 08:45 AM    A-G Ratio 1.5 04/19/2019 10:13 AM    ALT (SGPT) 6 04/19/2019 10:13 AM     Lab Results   Component Value Date/Time    Cholesterol, total 151 04/19/2019 10:13 AM    HDL Cholesterol 69 04/19/2019 10:13 AM    LDL, calculated 73 04/19/2019 10:13 AM    VLDL, calculated 9 04/19/2019 10:13 AM    Triglyceride 47 04/19/2019 10:13 AM    CHOL/HDL Ratio 1.9 04/26/2010 08:00 AM     Lab Results   Component Value Date/Time    Vitamin D 25-Hydroxy 25.1 (L) 10/20/2011 07:42 AM    VITAMIN D, 25-HYDROXY 74.8 04/19/2019 10:13 AM       Lab Results   Component Value Date/Time    Hemoglobin A1c 6.6 (H) 08/07/2018 10:19 AM    Hemoglobin A1c (POC) 6.2 03/06/2019 11:58 AM     Lab Results   Component Value Date/Time    TSH 2.200 04/19/2019 10:13 AM       Lab Results   Component Value Date/Time    Microalbumin/Creat ratio (mg/g creat) 5 09/03/2010 08:45 AM    Microalb/Creat ratio (ug/mg creat.) <3.2 04/01/2019 02:01 PM    Microalbumin,urine random 1.21 09/03/2010 08:45 AM         ASSESSMENT and PLAN      ICD-10-CM ICD-9-CM    1. Controlled type 2 diabetes mellitus with diabetic neuropathy, without long-term current use of insulin (HCC) E11.40 250.60 HM DIABETES FOOT EXAM     357.2    2. OAB (overactive bladder) N32.81 596.51     stable on vesicare    3. Other urinary incontinence N39.498 788.39    4. Pure hypercholesterolemia E78.00 272.0 simvastatin (ZOCOR) 40 mg tablet    stable on Zocor   5. Essential hypertension I10 401.9     stable on meds      Chart reviewed and updated.       Continue current medications and care. Stop OTC Vit D due to value of 78 and restart Vit D 2,000IU daily in the fall. Check blood sugars regularly if planning to follow liquid diet prior to colonoscopy. Prescriptions written and sent to pharmacy; medication side effects discussed. Zocor 40 mg. During ov, pt contacted her pharmacy and it was confirmed they have both Synjardy 84.3-86,590 mg and Trulicity 1.5 mg available with 5 refills. Most recent tests reviewed from 4/19/2019. Reviewed glucometer log. Recent office visit notes from Dr. Gage Michele reviewed. Counseled patient on health concerns:  DM, BP, OAB, cholesterol, vit d, OAB, colonoscopies. Immunizations noted. Offered empathy, support, legitimation, prayers, partnership to patient. Praised patient for progress. Follow-up and Dispositions    · Return in about 3 months (around 9/7/2019) for diabetes, referral follow up. Patient was offered a choice/choices in the treatment plan today. Patient expresses understanding of the plan and agrees with recommendations. Patient declines any additional handouts. Patient is satisfied with previous handouts received from our office. More than 30 mins spent face to face with patient and more than 50% of this time spent in counseling and coordinating care. Written by clem Aguirre, as dictated by Dr. Joanne Harris DO. Documentation True and Accepted by Joselyn Monique. Marylin Chowdary. Patient Instructions   Stop over the counter Vit D due to value of 78 and restart Vit D 2,000IU daily in the fall.

## 2019-07-22 ENCOUNTER — ANESTHESIA EVENT (OUTPATIENT)
Dept: ENDOSCOPY | Age: 69
End: 2019-07-22
Payer: MEDICARE

## 2019-07-22 ENCOUNTER — HOSPITAL ENCOUNTER (OUTPATIENT)
Age: 69
Setting detail: OUTPATIENT SURGERY
Discharge: HOME | End: 2019-07-22
Attending: INTERNAL MEDICINE | Admitting: INTERNAL MEDICINE
Payer: MEDICARE

## 2019-07-22 ENCOUNTER — ANESTHESIA (OUTPATIENT)
Dept: ENDOSCOPY | Age: 69
End: 2019-07-22
Payer: MEDICARE

## 2019-07-22 VITALS
RESPIRATION RATE: 14 BRPM | HEIGHT: 68 IN | DIASTOLIC BLOOD PRESSURE: 80 MMHG | BODY MASS INDEX: 27.43 KG/M2 | TEMPERATURE: 98 F | WEIGHT: 181 LBS | OXYGEN SATURATION: 100 % | SYSTOLIC BLOOD PRESSURE: 140 MMHG | HEART RATE: 71 BPM

## 2019-07-22 PROCEDURE — 74011000258 HC RX REV CODE- 258

## 2019-07-22 PROCEDURE — 76060000031 HC ANESTHESIA FIRST 0.5 HR: Performed by: INTERNAL MEDICINE

## 2019-07-22 PROCEDURE — 76040000019: Performed by: INTERNAL MEDICINE

## 2019-07-22 PROCEDURE — 74011250636 HC RX REV CODE- 250/636

## 2019-07-22 RX ORDER — EPINEPHRINE 0.1 MG/ML
1 INJECTION INTRACARDIAC; INTRAVENOUS
Status: DISCONTINUED | OUTPATIENT
Start: 2019-07-22 | End: 2019-07-22 | Stop reason: HOSPADM

## 2019-07-22 RX ORDER — FLUMAZENIL 0.1 MG/ML
0.2 INJECTION INTRAVENOUS
Status: DISCONTINUED | OUTPATIENT
Start: 2019-07-22 | End: 2019-07-22 | Stop reason: HOSPADM

## 2019-07-22 RX ORDER — PROPOFOL 10 MG/ML
INJECTION, EMULSION INTRAVENOUS AS NEEDED
Status: DISCONTINUED | OUTPATIENT
Start: 2019-07-22 | End: 2019-07-22 | Stop reason: HOSPADM

## 2019-07-22 RX ORDER — SODIUM CHLORIDE 9 MG/ML
INJECTION, SOLUTION INTRAVENOUS
Status: DISCONTINUED | OUTPATIENT
Start: 2019-07-22 | End: 2019-07-22 | Stop reason: HOSPADM

## 2019-07-22 RX ORDER — SODIUM CHLORIDE 0.9 % (FLUSH) 0.9 %
5-40 SYRINGE (ML) INJECTION EVERY 8 HOURS
Status: DISCONTINUED | OUTPATIENT
Start: 2019-07-22 | End: 2019-07-22 | Stop reason: HOSPADM

## 2019-07-22 RX ORDER — LIDOCAINE HYDROCHLORIDE 20 MG/ML
INJECTION, SOLUTION EPIDURAL; INFILTRATION; INTRACAUDAL; PERINEURAL AS NEEDED
Status: DISCONTINUED | OUTPATIENT
Start: 2019-07-22 | End: 2019-07-22 | Stop reason: HOSPADM

## 2019-07-22 RX ORDER — NALOXONE HYDROCHLORIDE 0.4 MG/ML
0.4 INJECTION, SOLUTION INTRAMUSCULAR; INTRAVENOUS; SUBCUTANEOUS
Status: DISCONTINUED | OUTPATIENT
Start: 2019-07-22 | End: 2019-07-22 | Stop reason: HOSPADM

## 2019-07-22 RX ORDER — SODIUM CHLORIDE 9 MG/ML
50 INJECTION, SOLUTION INTRAVENOUS CONTINUOUS
Status: DISCONTINUED | OUTPATIENT
Start: 2019-07-22 | End: 2019-07-22 | Stop reason: HOSPADM

## 2019-07-22 RX ORDER — SODIUM CHLORIDE 0.9 % (FLUSH) 0.9 %
5-40 SYRINGE (ML) INJECTION AS NEEDED
Status: DISCONTINUED | OUTPATIENT
Start: 2019-07-22 | End: 2019-07-22 | Stop reason: HOSPADM

## 2019-07-22 RX ORDER — ATROPINE SULFATE 0.1 MG/ML
0.5 INJECTION INTRAVENOUS
Status: DISCONTINUED | OUTPATIENT
Start: 2019-07-22 | End: 2019-07-22 | Stop reason: HOSPADM

## 2019-07-22 RX ORDER — DEXTROMETHORPHAN/PSEUDOEPHED 2.5-7.5/.8
1.2 DROPS ORAL
Status: DISCONTINUED | OUTPATIENT
Start: 2019-07-22 | End: 2019-07-22 | Stop reason: HOSPADM

## 2019-07-22 RX ADMIN — LIDOCAINE HYDROCHLORIDE 80 MG: 20 INJECTION, SOLUTION EPIDURAL; INFILTRATION; INTRACAUDAL; PERINEURAL at 12:29

## 2019-07-22 RX ADMIN — SODIUM CHLORIDE: 9 INJECTION, SOLUTION INTRAVENOUS at 12:22

## 2019-07-22 RX ADMIN — PROPOFOL 30 MG: 10 INJECTION, EMULSION INTRAVENOUS at 12:34

## 2019-07-22 RX ADMIN — PROPOFOL 40 MG: 10 INJECTION, EMULSION INTRAVENOUS at 12:31

## 2019-07-22 RX ADMIN — PROPOFOL 90 MG: 10 INJECTION, EMULSION INTRAVENOUS at 12:29

## 2019-07-22 NOTE — ANESTHESIA POSTPROCEDURE EVALUATION
Procedure(s):  COLONOSCOPY. MAC    <BSHSIANPOST>    Vitals Value Taken Time   BP     Temp     Pulse 67 7/22/2019 12:43 PM   Resp 0 7/22/2019 12:43 PM   SpO2 95 % 7/22/2019 12:43 PM   Vitals shown include unvalidated device data.

## 2019-07-22 NOTE — ROUTINE PROCESS
Alisa Yann  1950  [id number]    Situation:  Verbal report received from: Latha Tabor RN  Procedure: Procedure(s):  COLONOSCOPY    Background:    Preoperative diagnosis: FAMILY HISTORY COLON CANCER, IRON DEFICIENCY ANEMIA, RECTAL HEMORRHAGE, TYPE II DIABETES  Postoperative diagnosis: 1.incomplete prep    :  Dr. Joey Cadet  Assistant(s): Endoscopy Technician-1: Nuha BELLE  Endoscopy RN-1: Chele Hood    Specimens: * No specimens in log *  H. Pylori  no    Assessment:  Intra-procedure medications   Anesthesia gave intra-procedure sedation and medications, see anesthesia flow sheet yes    Intravenous fluids: NS@ KVO     Vital signs stable     Abdominal assessment: round and soft     Recommendation:  Discharge patient per MD order.     Family or Friend   Permission to share finding with family or friend yes

## 2019-07-22 NOTE — ANESTHESIA PREPROCEDURE EVALUATION
Relevant Problems   No relevant active problems       Anesthetic History   No history of anesthetic complications            Review of Systems / Medical History  Patient summary reviewed, nursing notes reviewed and pertinent labs reviewed    Pulmonary  Within defined limits                 Neuro/Psych   Within defined limits           Cardiovascular  Within defined limits  Hypertension                   GI/Hepatic/Renal  Within defined limits              Endo/Other  Within defined limits  Diabetes    Arthritis     Other Findings              Physical Exam    Airway  Mallampati: II  TM Distance: > 6 cm  Neck ROM: normal range of motion   Mouth opening: Normal     Cardiovascular  Regular rate and rhythm,  S1 and S2 normal,  no murmur, click, rub, or gallop             Dental  No notable dental hx       Pulmonary  Breath sounds clear to auscultation               Abdominal  GI exam deferred       Other Findings            Anesthetic Plan    ASA: 2  Anesthesia type: MAC            Anesthetic plan and risks discussed with: Patient

## 2019-07-22 NOTE — PERIOP NOTES

## 2019-07-22 NOTE — H&P
[address]  [address] [address] [address], [address]  [phone]                                History and Physical     NAME: Jose Alberto Ty   :  1950   MRN:  [id number]     HPI:  The patient was seen and examined. Past Surgical History:   Procedure Laterality Date    CYSTOSCOPY  2005    Dr. Roman Cornelius N/A 2019    INCOMPLETE COLONOSCOPY (second attempt) due to incomplete prep/full colon. Dr. Sherren Bills. due prn.  FOOT/TOES SURGERY PROC UNLISTED      Left Ornelas's Neuroma. [address] [facility] HX BREAST BIOPSY Left     12 years ago neg    HX BREAST LUMPECTOMY  10/15/08    Left intraductal papilloma. Dr. Pepito Garland HX COLONOSCOPY  05/27/15    due to anemia, rectal bleeding. due q 5 yrs. Dr. Briseida Larios  , 06,     Dr. Chino Armenta. due q 5 yrs.  HX ENDOSCOPY  05/27/15    due to anemia. Dr. Kelvin Quevedo.  HX HEART CATHETERIZATION  09    Dr. Vieira Back ARTHROSCOPY Left 04/17/15    due to [address]. Dr. Turner Dec.  HX MYOMECTOMY  [id number]    x4    HX OOPHORECTOMY Bilateral     HX SHOULDER ARTHROSCOPY Right 2018    with subacromial decompression and open distal clavicle excision, Dr. Patricia López    endometriosis, fibroids. Past Medical History:   Diagnosis Date    Allergic rhinitis     Dr. Sundar Wooten ,     Dr. Julien Willoughby. Dr. Juan Antonio Ring.  Chest pain 01/15/09    Dr. Nova Sandoval Chickenpox childhood    DDD (degenerative disc disease), cervical 10/2015    Dr. Akiko Joshi.  DDD (degenerative disc disease), lumbar     Dr. Miriam Tate.  Diabetes [facility] 2007    Gio Maldonado, OD. Dr. Yuki Huff, pod. Dr. Derrick Bruce.     Endometriosis     Dr. Rashida Page Essential hypertension, benign     Dr. Hammad Mosqueda, uterine     Dr. Eber Campos [place] 08/2009    Dr. Yesenia Lozano. Dr. Chris Fernnadez.  Hand paresthesia 2011    Dr. Lisa Hart. Dr. Nirali Garcia.  ALDSFDQL(921.2) 2012    Dr. Deysi Lira. Dr. Yesenia Lozano, ENT.  Hemoptysis 06/13/07    Dr. Zoey Mcdonnell    Hiatal hernia 05/27/15    small. MD Bambi Laura Internal hemorrhoids 12/2006, 05/27/15    Dr. Raul Perla. Dr. Valeria Galicia.  Intraocular pressure increase 07/27/05    bilaterally. Dr. Domenica Saez. Dr. Jose D Spence.  Knee pain 04/04/13    Bilaterally. due to fall. Dr. Aracely Cabrales.  Left knee pain 11/26/14    Left. due to OA, torn medial and lateral meniscal tears. Dr. Angy Desouza. Dr. Meliton Kuhn.  Lumbar spondylosis 3/2008    Dr. Josue Montalvo. Dr. Tesha Shin (overactive bladder)     Dr. Julien Brambila. Dr. Zenaida Waddell. 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 George Moat. due to Diabetic Neuropathy. Dr. Chris Fernandez.  Proteinuria 2009    Dr. Elkin Hall Right shoulder pain 01/30/06    Dr. Zach Szymanski. impingement tendinitis    Right shoulder pain 07/2014    Dr. Zoe Pena.  Schatzki's ring 05/27/15    with partial obstruction at GE junction. Dr. Valeria Galicia. Salud Plump     Dr. Josue Montalvo. Dr. Farshad Barrow    Stenosis colon [facility]) 05/27/15    SIGMOID WITH TORTUOUS DESCENDING COLON. Dr. Ashley Azul. [place].  Thalassemia minor 1980s    Urinary incontinence     Dr. Julien Brmabila. Dr. Billy Bailey Vitamin D deficiency 05/2011    Therman Plate, NP     Social History     Tobacco Use    Smoking status: Never Smoker    Smokeless tobacco: Never Used   Substance Use Topics    Alcohol use: Yes     Alcohol/week: 0.0 standard drinks     Comment: Social beer, social drinks, social shots of liquor,and social wine.     Drug use: No     Allergies   Allergen Reactions    Influenza Virus Vaccine, Specific Swelling     R ARM PAIN, NUMBNESS, SWELLING  R HAND NUMBNESS AND TINGLING, WORSE IN 4TH AND FIFTH FINGERS    Lisinopril Cough     Other reaction(s): Lisinopril  Other reaction(s): coughing, Other (see comments)  Coughing      Singulair [Montelukast] Other (comments)     headache     Family History   Problem Relation Age of Onset    Hypertension Mother     Glaucoma Mother     Heart Failure Mother         CHF    Hypertension Father     Colon Cancer Father         colon    Heart Attack Sister     Stroke Sister     Diabetes Sister         x 2 sisters    Diabetes Brother     Breast Cancer Maternal Aunt 38     Current Facility-Administered Medications   Medication Dose Route Frequency    0.9% sodium chloride infusion  50 mL/hr IntraVENous CONTINUOUS    sodium chloride (NS) flush 5-40 mL  5-40 mL IntraVENous Q8H    sodium chloride (NS) flush 5-40 mL  5-40 mL IntraVENous PRN    naloxone (NARCAN) injection 0.4 mg  0.4 mg IntraVENous Multiple    flumazenil (ROMAZICON) 0.1 mg/mL injection 0.2 mg  0.2 mg IntraVENous Multiple    simethicone (MYLICON) 70WK/4.0DF oral drops 80 mg  1.2 mL Oral Multiple    atropine injection 0.5 mg  0.5 mg IntraVENous ONCE PRN    EPINEPHrine (ADRENALIN) 0.1 mg/mL syringe 1 mg  1 mg Endoscopically ONCE PRN         PHYSICAL EXAM:  General: WD, WN. Alert, cooperative, no acute distress    HEENT: NC, Atraumatic. PERRLA, EOMI. Anicteric sclerae. Lungs:  CTA Bilaterally. No Wheezing/Rhonchi/Rales. Heart:  Regular  rhythm,  No murmur, No Rubs, No Gallops  Abdomen: Soft, Non distended, Non tender.  +Bowel sounds, no HSM  Extremities: No c/c/e  Neurologic:  CN 2-12 gi, Alert and oriented X 3. No acute neurological distress   Psych:   Good insight. Not anxious nor agitated. The heart, lungs and mental status were satisfactory for the administration of MAC sedation and for the procedure.       Mallampati score: 3       Assessment:   · FH colon cancer    Plan:   · Endoscopic procedure  · MAC sedation   ·

## 2019-07-22 NOTE — PROCEDURES
[facility].  [address] [place] [address] E Syed Gallardo, [address]  [phone]                              Colonoscopy Procedure Note      Indications:    Screening colonoscopy     :  Franco Flores MD    Surgical Assistant: None    Implants: none    Referring Provider: Cris Tavarez DO    Sedation:  MAC anesthesia    Procedure Details:  After informed consent was obtained with all risks and benefits of procedure explained and preoperative exam completed, the patient was taken to the endoscopy suite and placed in the left lateral decubitus position. Upon sequential sedation as per above, a digital rectal exam was performed  And was normal.  The Olympus videocolonoscope  was inserted in the rectum and carefully advanced to the hepatic flexure. The quality of preparation was inadequate. The colonoscope was slowly withdrawn with careful evaluation between folds. Retroflexion in the rectum was performed and was normal..     Findings:   Rectum: no mucosal lesion appreciated  stool present;;  Sigmoid: no mucosal lesion appreciated  stool present;;  Descending Colon: no mucosal lesion appreciated  stool present;;  Transverse Colon: no mucosal lesion appreciated  stool present;;  Ascending Colon: not intubated  Cecum: not intubated  Terminal Ileum: not intubated    Interventions:  none    Specimen Removed:  * No specimens in log *    Complications: None. EBL:  None. Recommendations:   -High fiber diet. -Repeat colonoscopy with double prep  -Resume normal medication(s). Discharge Disposition:  Home in the company of a  when able to ambulate.     Franco Flores MD  7/22/2019  12:47 PM

## 2019-10-07 NOTE — TELEPHONE ENCOUNTER
PCP: Danis Glover DO    Last appt: 6/7/2019  Future Appointments   Date Time Provider [place] Enriquez   10/15/2019 10:00 AM Danis Glover DO [facility]       Requested Prescriptions     Pending Prescriptions Disp Refills    glucose blood VI test strips (ASCENSIA AUTODISC VI, ONE TOUCH ULTRA TEST VI) strip 100 Strip 3     Sig: Use to test blood sugar 1-2 per day (blood sugar before breakfast or 2 hours after any meal or at bedtime) DX. E11.65       Prior labs and Blood pressures:  BP Readings from Last 3 Encounters:   07/22/19 140/80   06/07/19 132/83   05/29/19 143/86     Lab Results   Component Value Date/Time    Sodium 141 04/19/2019 10:13 AM    Potassium 3.9 04/19/2019 10:13 AM    Chloride 99 04/19/2019 10:13 AM    CO2 25 04/19/2019 10:13 AM    Anion gap 11 09/03/2010 08:45 AM    Glucose 89 04/19/2019 10:13 AM    BUN 10 04/19/2019 10:13 AM    Creatinine 0.99 04/19/2019 10:13 AM    BUN/Creatinine ratio 10 (L) 04/19/2019 10:13 AM    GFR est AA 67 04/19/2019 10:13 AM    GFR est non-AA 58 (L) 04/19/2019 10:13 AM    Calcium 10.3 04/19/2019 10:13 AM     Lab Results   Component Value Date/Time    Hemoglobin A1c 6.6 (H) 08/07/2018 10:19 AM    Hemoglobin A1c (POC) 6.2 03/06/2019 11:58 AM     Lab Results   Component Value Date/Time    Cholesterol, total 151 04/19/2019 10:13 AM    HDL Cholesterol 69 04/19/2019 10:13 AM    LDL, calculated 73 04/19/2019 10:13 AM    VLDL, calculated 9 04/19/2019 10:13 AM    Triglyceride 47 04/19/2019 10:13 AM    CHOL/HDL Ratio 1.9 04/26/2010 08:00 AM     Lab Results   Component Value Date/Time    Vitamin D 25-Hydroxy 25.1 (L) 10/20/2011 07:42 AM    VITAMIN D, 25-HYDROXY 74.8 04/19/2019 10:13 AM       Lab Results   Component Value Date/Time    TSH 2.200 04/19/2019 10:13 AM

## 2019-10-07 NOTE — TELEPHONE ENCOUNTER
Requested Prescriptions     Pending Prescriptions Disp Refills    glucose blood VI test strips (ASCENSIA AUTODISC VI, ONE TOUCH ULTRA TEST VI) strip 100 Strip 3     Sig: Use to test blood sugar 1-2 per day (blood sugar before breakfast or 2 hours after any meal or at bedtime) DX. E11.65

## 2019-10-15 ENCOUNTER — OFFICE VISIT (OUTPATIENT)
Dept: FAMILY MEDICINE CLINIC | Age: 69
End: 2019-10-15

## 2019-10-15 VITALS
BODY MASS INDEX: 27.69 KG/M2 | HEART RATE: 66 BPM | WEIGHT: 182.7 LBS | TEMPERATURE: 98 F | RESPIRATION RATE: 18 BRPM | HEIGHT: 68 IN | SYSTOLIC BLOOD PRESSURE: 140 MMHG | OXYGEN SATURATION: 98 % | DIASTOLIC BLOOD PRESSURE: 82 MMHG

## 2019-10-15 DIAGNOSIS — I10 ESSENTIAL HYPERTENSION: ICD-10-CM

## 2019-10-15 DIAGNOSIS — E66.3 OVERWEIGHT (BMI 25.0-29.9): ICD-10-CM

## 2019-10-15 DIAGNOSIS — E11.40 CONTROLLED TYPE 2 DIABETES MELLITUS WITH DIABETIC NEUROPATHY, WITHOUT LONG-TERM CURRENT USE OF INSULIN (HCC): Primary | ICD-10-CM

## 2019-10-15 DIAGNOSIS — Z23 ENCOUNTER FOR IMMUNIZATION: ICD-10-CM

## 2019-10-15 DIAGNOSIS — E78.00 PURE HYPERCHOLESTEROLEMIA: ICD-10-CM

## 2019-10-15 DIAGNOSIS — D56.3 THALASSEMIA MINOR: ICD-10-CM

## 2019-10-15 DIAGNOSIS — Z78.9 ACE INHIBITOR INTOLERANCE: ICD-10-CM

## 2019-10-15 DIAGNOSIS — H25.13 AGE-RELATED NUCLEAR CATARACT OF BOTH EYES: ICD-10-CM

## 2019-10-15 DIAGNOSIS — Z83.511 FAMILY HISTORY OF GLAUCOMA: ICD-10-CM

## 2019-10-15 DIAGNOSIS — E55.9 VITAMIN D DEFICIENCY: ICD-10-CM

## 2019-10-15 DIAGNOSIS — M79.7 FIBROMYALGIA: ICD-10-CM

## 2019-10-15 LAB — HBA1C MFR BLD HPLC: 5.8 %

## 2019-10-15 NOTE — PROGRESS NOTES
Leela Nicholas  Identified pt with two pt identifiers(name and ). Chief Complaint   Patient presents with    Diabetes     Rm 14         1. Have you been to the ER, urgent care clinic since your last visit? Hospitalized since your last visit? NO    2. Have you seen or consulted any other health care providers outside of the [address] [place] [address] since your last visit? Include any pap smears or colon screening. NO        Weight Metrics 2019 2019 2019 2019 3/6/2019 2019 8/10/2018   Weight 181 lb 181 lb 14.4 oz 176 lb 179 lb 11.2 oz 179 lb 8 oz 181 lb 182 lb 4.8 oz   BMI 27.52 kg/m2 27.26 kg/m2 26.37 kg/m2 27.32 kg/m2 27.29 kg/m2 27.52 kg/m2 27.57 kg/m2         Medication reconciliation up to date and correct with patient at this time. My Chart     My chart gives you direct online access to portions of the electronic medical record (EMR) where your doctor stores your health information (ie, lab results, appointment information, medications, immunizations, and more. It is free. Would you like to set up your my chart?yes      Advance Care Planning    In the event something were to happen to you and you were unable to speak on your behalf, do you have an Advance Directive/ Living Will in place stating your wishes? NO    If yes, do we have a copy on file NO    If no, would you like information yes      ====Advance Care Planning Invitation====    Patient was invited to begin or continue Advance Care Planning on this date and was provided an ACP information folder for review. Recommended appointment with a [facility]  facilitator for ACP conversation regarding advance directives. [x] Yes  [] No  Referral sent to [facility] ACP team member or Coordinator for follow-up    [] Yes  [x] No  Patient scheduled an appointment. Site of Referral: [facility]      Today's provider has been notified of reason for visit, vitals and flowsheets obtained on patients.      Reviewed record in preparation for visit, huddled with provider and have obtained necessary documentation. Health Maintenance Due   Topic    Influenza Age 5 to Adult     HEMOGLOBIN A1C Q6M        Wt Readings from Last 3 Encounters:   07/22/19 181 lb (82.1 kg)   06/07/19 181 lb 14.4 oz (82.5 kg)   05/29/19 176 lb (79.8 kg)     Temp Readings from Last 3 Encounters:   07/22/19 98 °F (36.7 °C)   06/07/19 97.9 °F (36.6 °C) (Oral)   05/29/19 98 °F (36.7 °C)     BP Readings from Last 3 Encounters:   07/22/19 140/80   06/07/19 132/83   05/29/19 143/86     Pulse Readings from Last 3 Encounters:   07/22/19 71   06/07/19 67   05/29/19 68     There were no vitals filed for this visit.       Learning Assessment:  :     Learning Assessment 4/10/2018 1/20/2014   PRIMARY LEARNER Patient Patient   HIGHEST LEVEL OF EDUCATION - PRIMARY LEARNER  4 YEARS OF COLLEGE > 4 YEARS OF COLLEGE   BARRIERS PRIMARY LEARNER NONE NONE   CO-LEARNER CAREGIVER No No   CO-LEARNER NAME - n/a   PRIMARY LANGUAGE ENGLISH ENGLISH    NEED No -   LEARNER PREFERENCE PRIMARY LISTENING LISTENING     READING -     DEMONSTRATION -     PICTURES -   LEARNING SPECIAL TOPICS n0 no   ANSWERED BY patient Patient   RELATIONSHIP SELF SELF   ASSESSMENT COMMENT 4/10/18ah -       Depression Screening:  :     3 most recent PHQ Screens 4/1/2019   Little interest or pleasure in doing things Not at all   Feeling down, depressed, irritable, or hopeless Not at all   Total Score PHQ 2 0   Trouble falling or staying asleep, or sleeping too much Not at all   Feeling tired or having little energy Not at all   Poor appetite, weight loss, or overeating Not at all   Feeling bad about yourself - or that you are a failure or have let yourself or your family down Not at all   Trouble concentrating on things such as school, work, reading, or watching TV Not at all   Moving or speaking so slowly that other people could have noticed; or the opposite being so fidgety that others notice Not at all Thoughts of being better off dead, or hurting yourself in some way Not at all   PHQ 9 Score 0       Fall Risk Assessment:  :     Fall Risk Assessment, last 12 mths 4/1/2019   Able to walk? Yes   Fall in past 12 months? No       Abuse Screening:  :     Abuse Screening Questionnaire 4/1/2019 1/23/2018 12/7/2016   Do you ever feel afraid of your partner? N N N   Are you in a relationship with someone who physically or mentally threatens you? N N N   Is it safe for you to go home?  Y Y Y       ADL Screening:  :     ADL Assessment 4/1/2019   Feeding yourself No Help Needed   Getting from bed to chair No Help Needed   Getting dressed No Help Needed   Bathing or showering No Help Needed   Walk across the room (includes cane/walker) No Help Needed   Using the telphone No Help Needed   Taking your medications No Help Needed   Preparing meals No Help Needed   Managing money (expenses/bills) No Help Needed   Moderately strenuous housework (laundry) No Help Needed   Shopping for personal items (toiletries/medicines) No Help Needed   Shopping for groceries No Help Needed   Driving No Help Needed   Climbing a flight of stairs No Help Needed   Getting to places beyond walking distances No Help Needed

## 2019-10-15 NOTE — PROGRESS NOTES
HISTORY OF PRESENT ILLNESS  Marijane Harada is a 71 y.o. female presents with Diabetes (Rm 14); Referral Follow Up; Blood Pressure Check; and Immunization/Injection    Agree with nurse note. Pt with hypertension, hypercholesterolemia, vit d deficiency, ACE inhibitor intolerance, thalassemia minor, BL cataracts, fibromyalgia, overweight, and family hx of glaucoma presents to the office with a BP of 140/82. She takes Diovan HCT 80-12.5 mg for BP; tolerating well. Patient denies vision changes, headaches, dizziness, chest pain, SOB, or swelling. She takes aspirin 81 mg daily. Pt with controlled type 2 DM. A1C was 6.2 on 3/6/2019. Pt was released from Dr. Layo lobo when her A1C came down in 3/2019. She takes Trulicity 1.5 mg weekly and Synjardy 12.5 mg-1000 mg BID. She had an eye exam with Dr. Bo Chavira in 3/2019. Pt has an appointment with her podiatrist Dr. Shantanu Scherer in 11/2019 for her diabetic foot exam.     Pt had a colonoscopy scheduled on 7/22/2019 with Dr. Jadine Gowers but was unable to complete it due to inadequate prep. He request she return after performing double prep. She is scheduled to have her colonoscopy next Tuesday. Written by clem Miramontes, as dictated by Dr. Vonnie Reyna DO.    ROS    Review of Systems negative except as noted above in HPI.     ALLERGIES:    Allergies   Allergen Reactions    Influenza Virus Vaccine, Specific Swelling     R ARM PAIN, NUMBNESS, SWELLING  R HAND NUMBNESS AND TINGLING, WORSE IN 4TH AND FIFTH FINGERS    Lisinopril Cough     Other reaction(s): Lisinopril  Other reaction(s): coughing, Other (see comments)  Coughing      Singulair [Montelukast] Other (comments)     headache       CURRENT MEDICATIONS:    Outpatient Medications Marked as Taking for the 10/15/19 encounter (Office Visit) with Melody Camarena DO   Medication Sig Dispense Refill    glucose blood VI test strips (ASCENSIA AUTODISC VI, ONE TOUCH ULTRA TEST VI) strip Use to test blood sugar 1-2 per day (blood sugar before breakfast or 2 hours after any meal or at bedtime) DX. E11.65 100 Strip 3    simvastatin (ZOCOR) 40 mg tablet TAKE 1 TABLET BY MOUTH AT BEDTIME 90 Tab 2    chlorhexidine (PERIDEX) 0.12 % solution   0    empagliflozin-metFORMIN (SYNJARDY) 12.5-1,000 mg per tablet Take 1 Tab by mouth two (2) times daily (with meals). 60 Tab 5    traZODone (DESYREL) 100 mg tablet Take 100 mg by mouth nightly.  [DISCONTINUED] dulaglutide (TRULICITY) 1.5 FK/4.9 mL sub-q pen 0.5 mL by SubCUTAneous route every seven (7) days. 4 Pen 6    valsartan-hydroCHLOROthiazide (DIOVAN-HCT) 80-12.5 mg per tablet Take 1 Tab by mouth daily. 90 Tab 3    Blood-Glucose Meter monitoring kit One Touch Ultra 2 Use to test blood sugar 1-2 per day (blood sugar before breakfast or 2 hours after any meal or at bedtime) DX.   E11.65 1 Kit 0    sodium chloride (OCEAN) 0.65 % nasal spray 1 Spray as needed for Congestion.  solifenacin (VESICARE) 10 mg tablet Take 10 mg by mouth daily.  aspirin delayed-release 81 mg tablet Take 1 Tab by mouth daily. 90 Tab 3    valACYclovir (VALTREX) 500 mg tablet       evening primrose oil (EVENING PRIMROSE) 500 mg cap Take 1 Cap by mouth two (2) times a day.  cyclobenzaprine (FLEXERIL) 10 mg tablet Take 10 mg by mouth as needed.  tramadol (ULTRAM) 50 mg tablet Take 50 mg by mouth every eight (8) hours as needed. PAST MEDICAL HISTORY:    Past Medical History:   Diagnosis Date    Allergic rhinitis     Dr. Jone Shipley 2015, 2019    Dr. Orville Hilliard. Dr. Falguni Carlson.  Chest pain 01/15/09    Dr. Case Hand Chickenpox childhood    DDD (degenerative disc disease), cervical 10/2015    Dr. Christiane Escobedo.  DDD (degenerative disc disease), lumbar 2014    Dr. Attila Clement.  Diabetes ([address] 06/2007    Crystal Moy, OD. Dr. Moisés Odell, pod. Dr. Lulu Taveras.     Endometriosis     Dr. Liz Whitmore Luda Bunch Essential hypertension, benign     Dr. Abdirahman Logan, uterine     Dr. Kirk Medina Dr. [place] 08/2009    Dr. Vanita Olmstead. Dr. Indiana Beyer.  Hand paresthesia 2011    Dr. Amy Koehler. Dr. Rogelio Herrera.  EKHYUKFP(709.0) 2012    Dr. Archana Diego. Dr. Vanita Olmstead, ENT.  Hemoptysis 06/13/07    Dr. Guera Rahman    Hiatal hernia 05/27/15    small. Jyl Chard, MD Karie Peabody Internal hemorrhoids 12/2006, 05/27/15    Dr. Samreen Durbin. Dr. Ana Renteria.  Intraocular pressure increase 07/27/05    bilaterally. Dr. Santana Cox. Dr. Norah Cardoso.  Knee pain 04/04/13    Bilaterally. due to fall. Dr. Abhijit Spivey.  Left knee pain 11/26/14    Left. due to OA, torn medial and lateral meniscal tears. Dr. Ashley Nuñez. Dr. Stella Gomez.  Lumbar spondylosis 3/2008    Dr. Ashley Steel. Dr. Daniele Ty (overactive bladder)     Dr. Maura Sheppard. Dr. Markus Yoder. 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 Ace Limber. due to Diabetic Neuropathy. Dr. Indiana Beyer.  Proteinuria 2009    Dr. Virginie Cook Right shoulder pain 01/30/06    Dr. Deronda Sandhoff. impingement tendinitis    Right shoulder pain 07/2014    Dr. Jessenia Stoll.  Schatzki's ring 05/27/15    with partial obstruction at GE junction. Dr. Ana Renteria. [facility]     Dr. Ashley Steel. Dr. 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 Dr. Khadijah Tam. Ryan.  Thalassemia minor 1980s    Urinary incontinence     Dr. Maura Sheppard.   Dr. Jorge garcia 05/2011    Jimmie Rivas NP       PAST SURGICAL HISTORY:    Past Surgical History:   Procedure Laterality Date    COLONOSCOPY Left 7/22/2019    COLONOSCOPY performed by Arvin Scott MD at [place] [address]  07/2005    Dr. Ravindra Quintero N/A 5/29/2019    INCOMPLETE COLONOSCOPY (second attempt) due to incomplete prep/full colon. Dr. Umu Rehman. due prn.  FOOT/TOES SURGERY PROC UNLISTED      Left Ornelas's Neuroma. [id number] [facility] HX BREAST BIOPSY Left     12 years ago neg    HX BREAST LUMPECTOMY  10/15/08    Left intraductal papilloma. Dr. Harpreet Schwartz HX COLONOSCOPY  05/27/15    due to anemia, rectal bleeding. due q 5 yrs. Dr. Laura Galeana  2001, 12/07/06, 2011    Dr. Zoie Osorio. due q 5 yrs.  HX ENDOSCOPY  05/27/15    due to anemia. Dr. Pastor Field.  HX HEART CATHETERIZATION  01/09/09    Dr. Talon Magaña ARTHROSCOPY Left 04/17/15    due to [address]. Dr. Doreen Gill.  HX MYOMECTOMY  [id number]    x4    HX OOPHORECTOMY Bilateral 1994    HX SHOULDER ARTHROSCOPY Right 11/09/2018    with subacromial decompression and open distal clavicle excision, Dr. Leona Landon    endometriosis, fibroids. FAMILY HISTORY:    Family History   Problem Relation Age of Onset   [facility] Hypertension Mother    [facility] Glaucoma Mother     Heart Failure Mother         CHF    Hypertension Father     Colon Cancer Father         colon    Heart Attack Sister     Stroke Sister     Diabetes Sister         x 2 sisters    Diabetes Brother     Breast Cancer Maternal Aunt 45       SOCIAL HISTORY:    Social History     Socioeconomic History    Marital status: SINGLE     Spouse name: Not on file    Number of children: Not on file    Years of education: Not on file    Highest education level: Not on file   Tobacco Use    Smoking status: Never Smoker    Smokeless tobacco: Never Used   Substance and Sexual Activity    Alcohol use: Yes     Alcohol/week: 0.0 standard drinks     Comment: Social beer, social drinks, social shots of liquor,and social wine.     Drug use: No    Sexual activity: Never       IMMUNIZATIONS:    Immunization History   Administered Date(s) Administered    (RETIRED) Pneumococcal Vaccine (Unspecified Type) 12/21/2005    H1N1 Influenza Virus Vaccine 03/09/2010    Hepatitis B Vaccine 06/09/2008, 08/20/2008, 01/27/2009    Influenza High Dose Vaccine PF 11/11/2015, 09/07/2016, 10/19/2017, 08/01/2018, 10/15/2019    Influenza Vaccine 10/26/2018    Influenza Vaccine Split 10/08/2010, 11/08/2011, 10/24/2012    Influenza Vaccine Whole 01/27/2009    Pneumococcal Conjugate (PCV-13) 11/11/2015    Pneumococcal Polysaccharide (PPSV-23) 12/07/2016    Tdap 01/23/2018    Zoster Recombinant 05/25/2018, 08/04/2018    Zoster Vaccine, Live 01/20/2014    dT Vaccine 06/09/2008         PHYSICAL EXAMINATION    Vital Signs    Visit Vitals  /82   Pulse 66   Temp 98 °F (36.7 °C)   Resp 18   Ht 5' 8\" (1.727 m)   Wt 182 lb 11.2 oz (82.9 kg)   SpO2 98%   BMI 27.78 kg/m²       Weight Metrics 10/15/2019 7/22/2019 6/7/2019 5/29/2019 4/1/2019 3/6/2019 1/28/2019   Weight 182 lb 11.2 oz 181 lb 181 lb 14.4 oz 176 lb 179 lb 11.2 oz 179 lb 8 oz 181 lb   BMI 27.78 kg/m2 27.52 kg/m2 27.26 kg/m2 26.37 kg/m2 27.32 kg/m2 27.29 kg/m2 27.52 kg/m2       General appearance - Well nourished. Well appearing. Well developed. No acute distress. Overweight. Head - Normocephalic. Atraumatic. Eyes - pupils equal and reactive. Extraocular eye movements intact. Sclera anicteric. Mildly injected sclera. Ears - Hearing is grossly normal bilaterally. Nose - normal and patent. No polyps noted. No erythema. No discharge. Mouth - mucous membranes with adequate moisture. Posterior pharynx normal with cobblestone appearance. No erythema, white exudate or obstruction. Neck - supple. Midline trachea. No carotid bruits noted bilaterally. No thyromegaly noted. Chest - clear to auscultation bilaterally anteriorly and posteriorly. No wheezes. No rales or rhonchi. Breath sounds are symmetrical bilaterally. Unlabored respirations. Heart - normal rate. Regular rhythm. Normal S1, S2. No murmur noted. No rubs, clicks or gallops noted. Abdomen - soft and distended. No masses or organomegaly. No rebound, rigidity or guarding. Bowel sounds normal x 4 quadrants. No tenderness noted. Neurological - awake, alert and oriented to person, place, and time and event. Cranial nerves II through XII intact. Clear speech. Muscle strength is +5/5 x 4 extremities. Sensation is intact to light touch bilaterally. Steady gait. Heme/Lymph - peripheral pulses normal x 4 extremities. No peripheral edema is noted. Musculoskeletal - Intact x 4 extremities. Full ROM x 4 extremities. No pain with movement. Back exam - normal range of motion. No pain on palpation of the spinous processes in the cervical, thoracic, lumbar, sacral regions. No CVA tenderness. Skin - no rashes, erythema, ecchymosis, lacerations, abrasions, suspicious moles noted  Psychological -   normal behavior, dress and thought processes. Good insight. Good eye contact. Normal affect. Appropriate mood. Normal speech. DATA REVIEWED    Lab Results   Component Value Date/Time    WBC 4.9 04/19/2019 10:13 AM    HGB 11.2 04/19/2019 10:13 AM    HCT 34.5 04/19/2019 10:13 AM    PLATELET 637 51/64/4433 10:13 AM    MCV 69 (L) 04/19/2019 10:13 AM     Lab Results   Component Value Date/Time    Sodium 141 04/19/2019 10:13 AM    Potassium 3.9 04/19/2019 10:13 AM    Chloride 99 04/19/2019 10:13 AM    CO2 25 04/19/2019 10:13 AM    Anion gap 11 09/03/2010 08:45 AM    Glucose 89 04/19/2019 10:13 AM    BUN 10 04/19/2019 10:13 AM    Creatinine 0.99 04/19/2019 10:13 AM    BUN/Creatinine ratio 10 (L) 04/19/2019 10:13 AM    GFR est AA 67 04/19/2019 10:13 AM    GFR est non-AA 58 (L) 04/19/2019 10:13 AM    Calcium 10.3 04/19/2019 10:13 AM    Bilirubin, total 0.3 04/19/2019 10:13 AM    AST (SGOT) 16 04/19/2019 10:13 AM    Alk.  phosphatase 69 04/19/2019 10:13 AM    Protein, total 7.3 04/19/2019 10:13 AM    Albumin 4.4 04/19/2019 10:13 AM    Globulin 3.5 09/03/2010 08:45 AM    A-G Ratio 1.5 04/19/2019 10:13 AM    ALT (SGPT) 6 04/19/2019 10:13 AM     Lab Results   Component Value Date/Time    Cholesterol, total 151 04/19/2019 10:13 AM    HDL Cholesterol 69 04/19/2019 10:13 AM    LDL, calculated 73 04/19/2019 10:13 AM    VLDL, calculated 9 04/19/2019 10:13 AM    Triglyceride 47 04/19/2019 10:13 AM    CHOL/HDL Ratio 1.9 04/26/2010 08:00 AM     Lab Results   Component Value Date/Time    Vitamin D 25-Hydroxy 25.1 (L) 10/20/2011 07:42 AM    VITAMIN D, 25-HYDROXY 74.8 04/19/2019 10:13 AM       Lab Results   Component Value Date/Time    Hemoglobin A1c 6.6 (H) 08/07/2018 10:19 AM    Hemoglobin A1c (POC) 5.8 10/15/2019 12:28 PM     Lab Results   Component Value Date/Time    TSH 2.200 04/19/2019 10:13 AM       Lab Results   Component Value Date/Time    Microalbumin/Creat ratio (mg/g creat) 5 09/03/2010 08:45 AM    Microalb/Creat ratio (ug/mg creat.) <3.2 04/01/2019 02:01 PM    Microalbumin,urine random 1.21 09/03/2010 08:45 AM         ASSESSMENT and PLAN      ICD-10-CM ICD-9-CM    1. Controlled type 2 diabetes mellitus with diabetic neuropathy, without long-term current use of insulin (HCC) E11.40 250.60 AMB POC HEMOGLOBIN A1C     357.2 LIPID PANEL      METABOLIC PANEL, COMPREHENSIVE      TSH 3RD GENERATION      MICROALBUMIN, UR, RAND W/ MICROALB/CREAT RATIO      URINALYSIS W/ RFLX MICROSCOPIC      HEMOGLOBIN A1C WITH EAG   2. Pure hypercholesterolemia E78.00 272.0 LIPID PANEL      METABOLIC PANEL, COMPREHENSIVE      TSH 3RD GENERATION   3. Essential hypertension I10 401.9 LIPID PANEL      METABOLIC PANEL, COMPREHENSIVE      TSH 3RD GENERATION      MICROALBUMIN, UR, RAND W/ MICROALB/CREAT RATIO      URINALYSIS W/ RFLX MICROSCOPIC   4. Vitamin D deficiency E55.9 268.9 VITAMIN D, 25 HYDROXY   5. ACE inhibitor intolerance Z78.9 995.27      E980.4    6. Thalassemia minor D56.3 282.46    7. Age-related nuclear cataract of both eyes H25.13 366.16    8.  Family history of glaucoma Z83.511 V19.11 9. Fibromyalgia M79.7 729.1    10. Overweight (BMI 25.0-29. 9) E66.3 278.02    11. Encounter for immunization Z23 V03.89 INFLUENZA VIRUS VACCINE, HIGH DOSE SEASONAL, PRESERVATIVE FREE      OH IMMUNIZ ADMIN,1 SINGLE/COMB VAC/TOXOID       Discussed the patient's BMI with her. The BMI follow up plan is as follows: I have counseled this patient on diet and exercise regimens. Decrease carbohydrates (white foods, sweet foods, sweet drinks and alcohol), increase green leafy vegetables and protein (lean meats and beans) with each meal.  Avoid fried foods. Eat 3-5 small meals daily. Do not skip meals. Increase water intake. Increase physical activity to 30 minutes daily for health benefit or 60 minutes daily to prevent weight regain, as tolerated. Get 7-8 hours uninterrupted sleep nightly. Chart reviewed and updated. Continue current medications and care. Recheck pertinent labs today. Counseled patient on health concerns:  Diabetes, blood pressure, cholesterol, vit d. Relevant handouts given and discussed with patient. Immunizations noted; Per chart review, pt had a reaction to the flu vaccine in 2016. She reports she has had them since and tolerated them well. Flu shot administered today. Offered empathy, support, legitimation, prayers, partnership to patient. Praised patient for progress. Follow-up and Dispositions    · Return in about 6 months (around 4/15/2020) for diabetes, results, referral follow up. Patient was offered a choice/choices in the treatment plan today. Patient expresses understanding of the plan and agrees with recommendations. Written by clem Dial, as dictated by Dr. Linda Black DO. Documentation True and Accepted by Rosibel Schilling. Agnieszka Ngo. There are no Patient Instructions on file for this visit.

## 2019-10-18 NOTE — TELEPHONE ENCOUNTER
----- Message from Melanie Hancock sent at 10/18/2019 11:22 AM EDT -----  Regarding: Dr. Nawaf Garcia Message/Vendor Calls    Caller's first and last name: Patient       Reason for call: Pt would like a prescription for Trulicity to be called into [facility].  Phone: [phone] required yes/no and why: yes       Best contact number(s): [phone], please call patient back       Details to clarify the request:      Melanie Hancock

## 2019-10-21 NOTE — TELEPHONE ENCOUNTER
PCP: Mikey Meckel, DO    Last appt: 10/15/2019  Future Appointments   Date Time Provider China Enriquez   3/30/2020  9:00 AM Mikey Meckel, DO [facility]       Requested Prescriptions     Pending Prescriptions Disp Refills    dulaglutide (TRULICITY) 1.5 QU/3.1 mL sub-q pen 4 Pen 6     Si.5 mL by SubCUTAneous route every seven (7) days.        Prior labs and Blood pressures:  BP Readings from Last 3 Encounters:   10/15/19 140/82   19 140/80   19 132/83     Lab Results   Component Value Date/Time    Sodium 141 2019 10:13 AM    Potassium 3.9 2019 10:13 AM    Chloride 99 2019 10:13 AM    CO2 25 2019 10:13 AM    Anion gap 11 2010 08:45 AM    Glucose 89 2019 10:13 AM    BUN 10 2019 10:13 AM    Creatinine 0.99 2019 10:13 AM    BUN/Creatinine ratio 10 (L) 2019 10:13 AM    GFR est AA 67 2019 10:13 AM    GFR est non-AA 58 (L) 2019 10:13 AM    Calcium 10.3 2019 10:13 AM     Lab Results   Component Value Date/Time    Hemoglobin A1c 6.6 (H) 2018 10:19 AM    Hemoglobin A1c (POC) 5.8 10/15/2019 12:28 PM     Lab Results   Component Value Date/Time    Cholesterol, total 151 2019 10:13 AM    HDL Cholesterol 69 2019 10:13 AM    LDL, calculated 73 2019 10:13 AM    VLDL, calculated 9 2019 10:13 AM    Triglyceride 47 2019 10:13 AM    CHOL/HDL Ratio 1.9 2010 08:00 AM     Lab Results   Component Value Date/Time    Vitamin D 25-Hydroxy 25.1 (L) 10/20/2011 07:42 AM    VITAMIN D, 25-HYDROXY 74.8 2019 10:13 AM       Lab Results   Component Value Date/Time    TSH 2.200 2019 10:13 AM

## 2019-10-24 NOTE — TELEPHONE ENCOUNTER
----- Message from Tannerluis Smith sent at 10/23/2019  9:34 AM EDT -----  Regarding: DO Daly Vilchis/refill    Pt stated she is out of her medication \"Trulicity\" and said that she called in last week to have a refill sent to the pharmacy but it has not been sent in. Please send it to the [facility] and their phoen number is [phone] and that is for a 3 month supply.  Best contact number is [phone]

## 2019-12-26 RX ORDER — EMPAGLIFLOZIN AND METFORMIN HYDROCHLORIDE 12.5; 1 MG/1; MG/1
TABLET ORAL
Qty: 60 TAB | Refills: 11 | Status: SHIPPED | OUTPATIENT
Start: 2019-12-26 | End: 2020-01-17 | Stop reason: SDUPTHER

## 2020-01-03 NOTE — TELEPHONE ENCOUNTER
SYNJARDY 12.5-1,000 mg per tablet  11 ordered         Summary: TAKE 1 TABLET BY MOUTH TWICE DAILY WITH MEALS, Normal, Disp-60 Tab, R-11   Start: 12/26/2019  Ord/Sold: 12/26/2019 (O)  Report  Adh:   Taking:   Long-term:   Pharmacy: [place] [id number] #[id number] - [address] AT [address] [facility] Dose History  Change       Patient Sig: TAKE 1 TABLET BY MOUTH TWICE DAILY WITH MEALS       Ordered on: 12/26/2019       Authorized by: Dinah PATTERSON       Dispense: 60 Tab       Admin Instructions: Hold dose if CrCl is below 30 mL/min. Hold dose prior to contrast and for 48 hours after receiving contrast if any of the following apply:   - CrCl is below 60 mL/min,   - History of liver disease,   - Alcoholism,   - Heart failure,       Message:  Plan does not cover this medication.  Please call plan at [phone] to iniate prior authorization or call/fax pharmacy to change medication Patient ID is [id number]

## 2020-01-06 ENCOUNTER — TELEPHONE (OUTPATIENT)
Dept: FAMILY MEDICINE CLINIC | Age: 70
End: 2020-01-06

## 2020-01-06 NOTE — TELEPHONE ENCOUNTER
Requested Prescriptions     Pending Prescriptions Disp Refills    empagliflozin-metFORMIN (SYNJARDY) 12.5-1,000 mg per tablet 60 Tab 11   Message:  Plan does not cover this medication. Please call plan at [phone] to initiate prior authorization or call/fax pharmacy to change medication.  Patient ID [id number]

## 2020-01-17 NOTE — TELEPHONE ENCOUNTER
PCP: Toshia Del Rio DO    Last appt: 10/15/2019  Future Appointments   Date Time Provider China Zoe   3/30/2020  9:00 AM Toshia Del Rio DO [facility]       Requested Prescriptions     Pending Prescriptions Disp Refills    empagliflozin-metFORMIN [facility]) 12.5-1,000 mg per tablet 60 Tab 11       Pharmacy [facility]    Patient has ? days' supply of medication available.     Prior labs and Blood pressures:  BP Readings from Last 3 Encounters:   10/15/19 140/82   07/22/19 140/80   06/07/19 132/83     Lab Results   Component Value Date/Time    Sodium 141 04/19/2019 10:13 AM    Potassium 3.9 04/19/2019 10:13 AM    Chloride 99 04/19/2019 10:13 AM    CO2 25 04/19/2019 10:13 AM    Anion gap 11 09/03/2010 08:45 AM    Glucose 89 04/19/2019 10:13 AM    BUN 10 04/19/2019 10:13 AM    Creatinine 0.99 04/19/2019 10:13 AM    BUN/Creatinine ratio 10 (L) 04/19/2019 10:13 AM    GFR est AA 67 04/19/2019 10:13 AM    GFR est non-AA 58 (L) 04/19/2019 10:13 AM    Calcium 10.3 04/19/2019 10:13 AM     Lab Results   Component Value Date/Time    Hemoglobin A1c 6.6 (H) 08/07/2018 10:19 AM    Hemoglobin A1c (POC) 5.8 10/15/2019 12:28 PM     Lab Results   Component Value Date/Time    Cholesterol, total 151 04/19/2019 10:13 AM    HDL Cholesterol 69 04/19/2019 10:13 AM    LDL, calculated 73 04/19/2019 10:13 AM    VLDL, calculated 9 04/19/2019 10:13 AM    Triglyceride 47 04/19/2019 10:13 AM    CHOL/HDL Ratio 1.9 04/26/2010 08:00 AM     Lab Results   Component Value Date/Time    Vitamin D 25-Hydroxy 25.1 (L) 10/20/2011 07:42 AM    VITAMIN D, 25-HYDROXY 74.8 04/19/2019 10:13 AM       Lab Results   Component Value Date/Time    TSH 2.200 04/19/2019 10:13 AM

## 2020-01-28 DIAGNOSIS — I10 ESSENTIAL HYPERTENSION: ICD-10-CM

## 2020-01-28 NOTE — TELEPHONE ENCOUNTER
----- Message from Wander Gonzalez sent at 1/28/2020  3:59 PM EST -----  Regarding: Dr. Douglas Sutton first and last name: Janusz Mariscal from [employer]  Reason for call:  f/up on Rx  Callback required yes/no and why: yes  Best contact number(s): [phone]  Details to clarify the request: f/up on refill valsartan hctz 80-12.5

## 2020-01-31 RX ORDER — VALSARTAN AND HYDROCHLOROTHIAZIDE 80; 12.5 MG/1; MG/1
TABLET, FILM COATED ORAL
Qty: 90 TAB | Refills: 3 | Status: SHIPPED | OUTPATIENT
Start: 2020-01-31 | End: 2020-09-15 | Stop reason: SDUPTHER

## 2020-03-18 ENCOUNTER — TELEPHONE (OUTPATIENT)
Dept: FAMILY MEDICINE CLINIC | Age: 70
End: 2020-03-18

## 2020-03-18 NOTE — TELEPHONE ENCOUNTER
Called and verified pt with name and . Pt stated she would like 90 day refills for her mail order pharmacy. Informed pt that I would enter and send a new Rx request to Dr. Kajal Alfaro for approval. Pt verbalized understanding with no further questions at this time.

## 2020-03-18 NOTE — TELEPHONE ENCOUNTER
----- Message from Aranza Garcia sent at 3/18/2020  1:10 PM EDT -----  Regarding: / Telephone  Contact: [phone]  Caller's first and last name and relationship (if not the patient): n/a   Best contact number(s): [phone]  Details to clarify the request: Pt would like to know if her \"Sinjardi'  medication be updated for  90 days instead of monthly.

## 2020-03-30 ENCOUNTER — OFFICE VISIT (OUTPATIENT)
Dept: FAMILY MEDICINE CLINIC | Age: 70
End: 2020-03-30

## 2020-03-30 VITALS
OXYGEN SATURATION: 97 % | HEIGHT: 68 IN | RESPIRATION RATE: 18 BRPM | HEART RATE: 67 BPM | TEMPERATURE: 97.8 F | WEIGHT: 187.7 LBS | SYSTOLIC BLOOD PRESSURE: 109 MMHG | DIASTOLIC BLOOD PRESSURE: 70 MMHG | BODY MASS INDEX: 28.45 KG/M2

## 2020-03-30 DIAGNOSIS — M79.7 FIBROMYALGIA: ICD-10-CM

## 2020-03-30 DIAGNOSIS — D17.22 LIPOMA OF LEFT UPPER EXTREMITY: ICD-10-CM

## 2020-03-30 DIAGNOSIS — E11.40 CONTROLLED TYPE 2 DIABETES MELLITUS WITH DIABETIC NEUROPATHY, WITHOUT LONG-TERM CURRENT USE OF INSULIN (HCC): Primary | ICD-10-CM

## 2020-03-30 DIAGNOSIS — H25.13 AGE-RELATED NUCLEAR CATARACT OF BOTH EYES: ICD-10-CM

## 2020-03-30 DIAGNOSIS — E78.00 PURE HYPERCHOLESTEROLEMIA: ICD-10-CM

## 2020-03-30 DIAGNOSIS — Z78.9 ACE INHIBITOR INTOLERANCE: ICD-10-CM

## 2020-03-30 DIAGNOSIS — E55.9 VITAMIN D DEFICIENCY: ICD-10-CM

## 2020-03-30 DIAGNOSIS — Z90.710 S/P TAH (TOTAL ABDOMINAL HYSTERECTOMY): ICD-10-CM

## 2020-03-30 DIAGNOSIS — Z83.511 FAMILY HISTORY OF GLAUCOMA: ICD-10-CM

## 2020-03-30 DIAGNOSIS — D56.3 THALASSEMIA MINOR: ICD-10-CM

## 2020-03-30 DIAGNOSIS — R63.5 WEIGHT GAIN: ICD-10-CM

## 2020-03-30 DIAGNOSIS — I10 ESSENTIAL HYPERTENSION: ICD-10-CM

## 2020-03-30 DIAGNOSIS — R51.9 FRONTAL HEADACHE: ICD-10-CM

## 2020-03-30 RX ORDER — PRASTERONE 6.5 MG/1
INSERT VAGINAL
COMMUNITY
Start: 2020-03-19 | End: 2021-08-10 | Stop reason: ALTCHOICE

## 2020-03-30 RX ORDER — SIMVASTATIN 40 MG/1
TABLET, FILM COATED ORAL
Qty: 90 TAB | Refills: 3 | Status: SHIPPED | OUTPATIENT
Start: 2020-03-30

## 2020-03-30 RX ORDER — MELATONIN
1000
COMMUNITY
End: 2021-06-22 | Stop reason: DRUGHIGH

## 2020-03-30 RX ORDER — AMITRIPTYLINE HYDROCHLORIDE 25 MG/1
TABLET, FILM COATED ORAL
COMMUNITY
Start: 2020-03-24 | End: 2021-12-06

## 2020-03-30 NOTE — PROGRESS NOTES
HISTORY OF PRESENT ILLNESS  Angela Patel is a 71 y.o. female presents with Diabetes (6 month f/u); Blood Pressure Check; Referral Follow Up; and Labs    Agree with nurse note. Pt with hypertension, hypercholesterolemia, fibromyalgia, vit d deficiency, ACE inhibitor intolerance, thalassemia minor, s/p MARTIN, BL cataracts, and family hx of glaucoma presents to the office with a BP of 109/70. She takes Diovan HCT 80-12.5 mg for BP; tolerating well. Patient denies vision changes, headaches, dizziness, chest pain, SOB, or swelling. She takes Zocor 40 mg qhs for cholesterol; tolerating well. She takes aspirin 81 mg daily. Pt with type 2 DM. A1C today is 6.4, up from 5.8 on 10/15/2019. Pt was released from Dr. Rahul lobo when her A1C came down in 3/2019. She takes Trulicity 1.5 mg SC weekly and Synjardy 12.5 mg-1000 mg PO BID. She had an eye exam with Dr. Jad Do in 3/2019 and plans to return once the Covid restrictions are lifted. Pt had her diabetic foot exam in 11/2019 with Dr. MERCY MAGALLANES [facility], no problems. She has an appointment with him in 5/2020. She prefers 90 day scripts for her meds because they cost the same as a 30 day script. Pt with headaches. She takes amitriptyline 25 mg daily with relief, rx'd by neurologist Dr. Kika Johnson. She has gained 5 lbs since 10/2019 but she thinks this may be due to stress eating from the [facility] quarantine. Pt complains of a knot on the L forearm x 1 year. She denies it changing in size or being painful or red. Pt declines a refill of Trazodone as she has multiple unused bottles that are full. She rarely has needed it for sleep. Health Maintenance    Pt's most recent colonoscopy was on 10/22/2019 with Dr. Young Barnett. Noted internal and external hemorrhoids. F/U 5 years. Pt's most recent mammogram was on 3/21/2019 and it was incomplete. Pt had two markers placed in the left breast by [facility] in the last year. She is scheduled for her yearly mammogram in 4/2020. Written by clem Barraza, as dictated by Dr. Sidney Borrero DO.    ROS    Review of Systems negative except as noted above in HPI. ALLERGIES:    Allergies   Allergen Reactions    Influenza Virus Vaccine, Specific Swelling     R ARM PAIN, NUMBNESS, SWELLING  R HAND NUMBNESS AND TINGLING, WORSE IN 4TH AND FIFTH FINGERS    Lisinopril Cough     Other reaction(s): Lisinopril  Other reaction(s): coughing, Other (see comments)  Coughing      Singulair [Montelukast] Other (comments)     headache       CURRENT MEDICATIONS:    Outpatient Medications Marked as Taking for the 3/30/20 encounter (Office Visit) with Juni Ugarte DO   Medication Sig Dispense Refill    amitriptyline (ELAVIL) 25 mg tablet       cholecalciferol (VITAMIN D3) (1000 Units /25 mcg) tablet 1,000 Int'l Units.  Intrarosa 6.5 mg inst       simvastatin (ZOCOR) 40 mg tablet TAKE 1 TABLET BY MOUTH AT BEDTIME 90 Tab 3    empagliflozin-metFORMIN (Synjardy) 12.5-1,000 mg per tablet Take 1 Tab by mouth two (2) times daily (with meals). TAKE 1 TABLET BY MOUTH TWICE DAILY WITH MEALS 180 Tab 1    valsartan-hydroCHLOROthiazide (DIOVAN-HCT) 80-12.5 mg per tablet TAKE 1 TABLET BY MOUTH ONCE DAILY 90 Tab 3    dulaglutide (TRULICITY) 1.5 GP/9.5 mL sub-q pen 0.5 mL by SubCUTAneous route every seven (7) days. 4 Pen 11    glucose blood VI test strips (ASCENSIA AUTODISC VI, ONE TOUCH ULTRA TEST VI) strip Use to test blood sugar 1-2 per day (blood sugar before breakfast or 2 hours after any meal or at bedtime) DX. E11.65 100 Strip 3    traZODone (DESYREL) 100 mg tablet Take 100 mg by mouth nightly.  Blood-Glucose Meter monitoring kit One Touch Ultra 2 Use to test blood sugar 1-2 per day (blood sugar before breakfast or 2 hours after any meal or at bedtime) DX.   E11.65 1 Kit 0    sodium chloride (OCEAN) 0.65 % nasal spray 1 Spray as needed for Congestion.       solifenacin (VESICARE) 10 mg tablet Take 10 mg by mouth daily.  aspirin delayed-release 81 mg tablet Take 1 Tab by mouth daily. 90 Tab 3    valACYclovir (VALTREX) 500 mg tablet       evening primrose oil (EVENING PRIMROSE) 500 mg cap Take 1 Cap by mouth two (2) times a day.  cyclobenzaprine (FLEXERIL) 10 mg tablet Take 10 mg by mouth as needed.  tramadol (ULTRAM) 50 mg tablet Take 50 mg by mouth every eight (8) hours as needed. PAST MEDICAL HISTORY:    Past Medical History:   Diagnosis Date    Allergic rhinitis     Dr. Didi Johns 2015, 2019    Dr. Karolyn Youngblood. Dr. Di Hernandes.  Chest pain 01/15/09    Dr. Prosper Lo Chickenpox childhood    DDD (degenerative disc disease), cervical 10/2015    Dr. Kenyetta Benitez.  DDD (degenerative disc disease), lumbar 2014    Dr. Minna Jones.  [facility]    Geri Marshall, OD. Dr. Faheem Mas, pod. Dr. Deidra Zapata.  Endometriosis     Dr. Guzman Able    Essential hypertension, benign     Dr. Hernandez Chan, uterine     Dr. Aisha Waldrop 08/2009    Dr. Valentine Sheth. Dr. Faheem Mas.  Hand paresthesia 2011    Dr. Merlin Sandy. Dr. Husam Garza.  TVJDVHAW(076.7) 2012    Dr. Boby De Dios. Dr. Valentine Sheth, ENT.  Hemoptysis 06/13/07    Dr. Julius Farnsworth    Hiatal hernia 05/27/15    small. Zafar Padilla MD   [address] [place] [address] Internal hemorrhoids 12/2006, 05/27/15    Dr. Cayetano Reed. Dr. Aman Wagner.  Intraocular pressure increase 07/27/05    bilaterally. Dr. Shannan Art. Dr. Geri Marshall.  Knee pain 04/04/13    Bilaterally. due to fall. Dr. Pierre Magana.  Left knee pain 11/26/14    Left. due to OA, torn medial and lateral meniscal tears. Dr. Odilon Mayes. Dr. Arlette Samuel.  Lumbar spondylosis 3/2008    Dr. Ardelia Gowers. Dr. Bev Hooker (overactive bladder)     Dr. Brian Esparza. Dr. Jodee Alfred.    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 Herschell Bis. due to Diabetic Neuropathy. Dr. Ghislaine Irizarry.  Proteinuria 2009    Dr. Greta Presley Right shoulder pain 01/30/06    Dr. Cleve Delacruz. impingement tendinitis    Right shoulder pain 07/2014    Dr. Keegan Sweeney.  Schatzki's ring 05/27/15    with partial obstruction at GE junction. Dr. eRna Singh. Javy Hartley. Dr. Rito Pastrana    Stenosis colon [facility]) 05/27/15    SIGMOID WITH TORTUOUS DESCENDING COLON. Dr. Bairon Bueno. Ryan.  Thalassemia minor 1980s    Urinary incontinence     Dr. Emanuel Perez. Dr. Eliza Alarcon D deficiency 05/2011    Blair Davis NP       PAST SURGICAL HISTORY:    Past Surgical History:   Procedure Laterality Date    COLONOSCOPY Left 7/22/2019    COLONOSCOPY performed by Tisha Garrison MD at [place] [address]  07/2005    Dr. Theresa Chang N/A 5/29/2019    INCOMPLETE COLONOSCOPY (second attempt) due to incomplete prep/full colon. Dr. Jose G Valencia. due prn.  FOOT/TOES SURGERY PROC UNLISTED      Left Ornelas's Neuroma. [id number] [facility] HX BREAST BIOPSY Left     12 years ago neg    HX BREAST LUMPECTOMY  10/15/08    Left intraductal papilloma. Dr. Iban Arias HX COLONOSCOPY  05/27/15    due to anemia, rectal bleeding. due q 5 yrs. Dr. Buren Bamberger  2001, 12/07/06, 2011    Dr. David Edmonds. due q 5 yrs.  HX ENDOSCOPY  05/27/15    due to anemia. Dr. Rena Singh.  HX HEART CATHETERIZATION  01/09/09    Dr. Richardson Must ARTHROSCOPY Left 04/17/15    due to [address] [address]. Dr. Cipriano Cuello.  HX MYOMECTOMY  [id number]    x4    HX OOPHORECTOMY Bilateral 1994    HX SHOULDER ARTHROSCOPY Right 11/09/2018    with subacromial decompression and open distal clavicle excision, Dr. Claus Marcus    endometriosis, fibroids.        FAMILY HISTORY: Family History   Problem Relation Age of Onset   Pham Hypertension Mother    Pham Glaucoma Mother     Heart Failure Mother         CHF    Hypertension Father     Colon Cancer Father         colon    Heart Attack Sister     Stroke Sister     Diabetes Sister         x 2 sisters    Diabetes Brother     Breast Cancer Maternal Aunt 45       SOCIAL HISTORY:    Social History     Socioeconomic History    Marital status: SINGLE     Spouse name: Not on file    Number of children: Not on file    Years of education: Not on file    Highest education level: Not on file   Tobacco Use    Smoking status: Never Smoker    Smokeless tobacco: Never Used   Substance and Sexual Activity    Alcohol use: Yes     Alcohol/week: 0.0 standard drinks     Comment: Social beer, social drinks, social shots of liquor,and social wine.     Drug use: No    Sexual activity: Never       IMMUNIZATIONS:    Immunization History   Administered Date(s) Administered    (RETIRED) Pneumococcal Vaccine (Unspecified Type) 12/21/2005    H1N1 Influenza Virus Vaccine 03/09/2010    Hepatitis B Vaccine 06/09/2008, 08/20/2008, 01/27/2009    Influenza High Dose Vaccine PF 11/11/2015, 09/07/2016, 10/19/2017, 08/01/2018, 10/15/2019    Influenza Vaccine 10/26/2018    Influenza Vaccine Split 10/08/2010, 11/08/2011, 10/24/2012    Influenza Vaccine Whole 01/27/2009    Pneumococcal Conjugate (PCV-13) 11/11/2015    Pneumococcal Polysaccharide (PPSV-23) 12/07/2016    Tdap 01/23/2018    Zoster Recombinant 05/25/2018, 08/04/2018    Zoster Vaccine, Live 01/20/2014    dT Vaccine 06/09/2008         PHYSICAL EXAMINATION    Vital Signs    Visit Vitals  /70   Pulse 67   Temp 97.8 °F (36.6 °C) (Oral)   Resp 18   Ht 5' 8\" (1.727 m)   Wt 187 lb 11.2 oz (85.1 kg)   SpO2 97%   BMI 28.54 kg/m²       Weight Metrics 3/30/2020 10/15/2019 7/22/2019 6/7/2019 5/29/2019 4/1/2019 3/6/2019   Weight 187 lb 11.2 oz 182 lb 11.2 oz 181 lb 181 lb 14.4 oz 176 lb 179 lb 11.2 oz 179 lb 8 oz   BMI 28.54 kg/m2 27.78 kg/m2 27.52 kg/m2 27.26 kg/m2 26.37 kg/m2 27.32 kg/m2 27.29 kg/m2       General appearance - Well nourished. Well appearing. Well developed. No acute distress. Overweight. Head - Normocephalic. Atraumatic. Eyes - pupils equal and reactive. Extraocular eye movements intact. Sclera anicteric. Mildly injected sclera. Ears - Hearing is grossly normal bilaterally. Nose - normal and patent. No polyps noted. No erythema. No discharge. Mouth - mucous membranes with adequate moisture. Posterior pharynx normal with cobblestone appearance. No erythema, white exudate or obstruction. Neck - supple. Midline trachea. No carotid bruits noted bilaterally. No thyromegaly noted. Chest - clear to auscultation bilaterally anteriorly and posteriorly. No wheezes. No rales or rhonchi. Breath sounds are symmetrical bilaterally. Unlabored respirations. Heart - normal rate. Regular rhythm. Normal S1, S2. No murmur noted. No rubs, clicks or gallops noted. Abdomen - soft and distended. No masses or organomegaly. No rebound, rigidity or guarding. Bowel sounds normal x 4 quadrants. No tenderness noted. Neurological - awake, alert and oriented to person, place, and time and event. Cranial nerves II through XII intact. Clear speech. Muscle strength is +5/5 x 4 extremities. Sensation is intact to light touch bilaterally. Steady gait. Heme/Lymph - peripheral pulses normal x 4 extremities. No peripheral edema is noted. Musculoskeletal - Intact x 4 extremities. Full ROM x 4 extremities. No pain with movement. Skin - no rashes, erythema, ecchymosis, lacerations, abrasions, suspicious moles noted. Quarter sized round rubbery, flesh colored, mobile slightly raised nodule at the medial aspect of her R proximal forearm. Psychological -   normal behavior, dress and thought processes. Good insight. Good eye contact. Normal affect. Appropriate mood.   Normal speech. DATA REVIEWED    Lab Results   Component Value Date/Time    WBC 4.9 04/19/2019 10:13 AM    HGB 11.2 04/19/2019 10:13 AM    HCT 34.5 04/19/2019 10:13 AM    PLATELET 219 16/73/4753 10:13 AM    MCV 69 (L) 04/19/2019 10:13 AM     Lab Results   Component Value Date/Time    Sodium 141 04/19/2019 10:13 AM    Potassium 3.9 04/19/2019 10:13 AM    Chloride 99 04/19/2019 10:13 AM    CO2 25 04/19/2019 10:13 AM    Anion gap 11 09/03/2010 08:45 AM    Glucose 89 04/19/2019 10:13 AM    BUN 10 04/19/2019 10:13 AM    Creatinine 0.99 04/19/2019 10:13 AM    BUN/Creatinine ratio 10 (L) 04/19/2019 10:13 AM    GFR est AA 67 04/19/2019 10:13 AM    GFR est non-AA 58 (L) 04/19/2019 10:13 AM    Calcium 10.3 04/19/2019 10:13 AM    Bilirubin, total 0.3 04/19/2019 10:13 AM    AST (SGOT) 16 04/19/2019 10:13 AM    Alk.  phosphatase 69 04/19/2019 10:13 AM    Protein, total 7.3 04/19/2019 10:13 AM    Albumin 4.4 04/19/2019 10:13 AM    Globulin 3.5 09/03/2010 08:45 AM    A-G Ratio 1.5 04/19/2019 10:13 AM    ALT (SGPT) 6 04/19/2019 10:13 AM     Lab Results   Component Value Date/Time    Cholesterol, total 151 04/19/2019 10:13 AM    HDL Cholesterol 69 04/19/2019 10:13 AM    LDL, calculated 73 04/19/2019 10:13 AM    VLDL, calculated 9 04/19/2019 10:13 AM    Triglyceride 47 04/19/2019 10:13 AM    CHOL/HDL Ratio 1.9 04/26/2010 08:00 AM     Lab Results   Component Value Date/Time    Vitamin D 25-Hydroxy 25.1 (L) 10/20/2011 07:42 AM    VITAMIN D, 25-HYDROXY 74.8 04/19/2019 10:13 AM       Lab Results   Component Value Date/Time    Hemoglobin A1c 6.6 (H) 08/07/2018 10:19 AM    Hemoglobin A1c (POC) 5.8 10/15/2019 12:28 PM     Lab Results   Component Value Date/Time    TSH 2.200 04/19/2019 10:13 AM       Lab Results   Component Value Date/Time    Microalbumin/Creat ratio (mg/g creat) 5 09/03/2010 08:45 AM    Microalb/Creat ratio (ug/mg creat.) <3.2 04/01/2019 02:01 PM    Microalbumin,urine random 1.21 09/03/2010 08:45 AM         ASSESSMENT and PLAN      ICD-10-CM ICD-9-CM    1. Controlled type 2 diabetes mellitus with diabetic neuropathy, without long-term current use of insulin (HCC) E11.40 250.60 AMB POC HEMOGLOBIN A1C     357.2    2. Essential hypertension I10 401.9     stable on bp meds   3. Pure hypercholesterolemia E78.00 272.0 simvastatin (ZOCOR) 40 mg tablet    stable on Zocor   4. Lipoma of left upper extremity D17.22 214.8     unchanged x 1 year   5. Fibromyalgia M79.7 729.1     stable on Elavil   6. Frontal headache R51 784.0     due to sinus congestion vs other, improved after Elavil   7. Vitamin D deficiency E55.9 268.9    8. ACE inhibitor intolerance Z78.9 995.27      E980.4    9. Thalassemia minor D56.3 282.46    10. Age-related nuclear cataract of both eyes H25.13 366.16    11. Family history of glaucoma Z83.511 V19.11    12. S/P MARTIN (total abdominal hysterectomy) Z90.710 V88.01    13. Weight gain R63.5 783.1     5# since 10/2019 due to Elavil vs. Covid snacking       Discussed the patient's BMI with her. The BMI follow up plan is as follows: I have counseled this patient on diet and exercise regimens. Decrease carbohydrates (white foods, sweet foods, sweet drinks and alcohol), increase green leafy vegetables and protein (lean meats and beans) with each meal.  Avoid fried foods. Eat 3-5 small meals daily. Do not skip meals. Increase water intake. Increase physical activity to 30 minutes daily for health benefit or 60 minutes daily to prevent weight regain, as tolerated. Get 7-8 hours uninterrupted sleep nightly. Chart reviewed and updated. Continue current medications and care. Prescriptions written and sent to pharmacy; medication side effects discussed. Zocor 40 mg  Recheck pertinent labs today. Recent office visit notes from Dr. Yip Sport reviewed. Counseled patient on health concerns:  Diabetes, blood pressure, cholesterol, headaches, weight. Lipoma. Will refer her pt surgeon if the lipoma becomes larger or painful. Pt will notify us. Relevant handouts given and discussed with patient. Immunizations noted. Offered empathy, support, legitimation, prayers, partnership to patient. Praised patient for progress. Follow-up and Dispositions    · Return in about 6 months (around 9/30/2020) for diabetes, results, referral follow up, blood pressure. Encouraged the pt to sign up for MyChart to be able to view results and send me any questions or concerns prior to the next visit where we will go over results in detail. Patient was offered a choice/choices in the treatment plan today. Patient expresses understanding of the plan and agrees with recommendations. Written by clem Hill, as dictated by Dr. Gianni Franklin DO. Documentation True and Accepted by Alex Guillen. Get [facility]. There are no Patient Instructions on file for this visit.

## 2020-03-30 NOTE — PROGRESS NOTES
Jose F Webb is a 71 y.o. female  Chief Complaint   Patient presents with    Diabetes     6 month f/u     Health Maintenance Due   Topic Date Due    A1C test (Diabetic or Prediabetic)  08/07/2019    Medicare Yearly Exam  04/01/2020    MICROALBUMIN Q1  04/01/2020     Visit Vitals  /70   Pulse 67   Temp 97.8 °F (36.6 °C) (Oral)   Resp 18   Ht 5' 8\" (1.727 m)   Wt 187 lb 11.2 oz (85.1 kg)   SpO2 97%   BMI 28.54 kg/m²     1. Have you been to the ER, urgent care clinic since your last visit? Hospitalized since your last visit? No     2. Have you seen or consulted any other health care providers outside of the [address] [place] [address] since your last visit? Include any pap smears or colon screening.   Yes, Dentist

## 2020-09-14 DIAGNOSIS — I10 ESSENTIAL HYPERTENSION: ICD-10-CM

## 2020-09-14 RX ORDER — VALSARTAN AND HYDROCHLOROTHIAZIDE 80; 12.5 MG/1; MG/1
TABLET, FILM COATED ORAL
Qty: 90 TAB | Refills: 3 | Status: CANCELLED | OUTPATIENT
Start: 2020-09-14

## 2020-09-15 DIAGNOSIS — I10 ESSENTIAL HYPERTENSION: ICD-10-CM

## 2020-09-20 RX ORDER — VALSARTAN AND HYDROCHLOROTHIAZIDE 80; 12.5 MG/1; MG/1
TABLET, FILM COATED ORAL
Qty: 90 TAB | Refills: 1 | Status: SHIPPED | OUTPATIENT
Start: 2020-09-20 | End: 2020-12-23 | Stop reason: SDUPTHER

## 2020-09-30 ENCOUNTER — VIRTUAL VISIT (OUTPATIENT)
Dept: FAMILY MEDICINE CLINIC | Age: 70
End: 2020-09-30
Payer: MEDICARE

## 2020-09-30 DIAGNOSIS — Z90.710 S/P TAH (TOTAL ABDOMINAL HYSTERECTOMY): ICD-10-CM

## 2020-09-30 DIAGNOSIS — H25.13 AGE-RELATED NUCLEAR CATARACT OF BOTH EYES: ICD-10-CM

## 2020-09-30 DIAGNOSIS — M79.7 FIBROMYALGIA: ICD-10-CM

## 2020-09-30 DIAGNOSIS — Z00.00 MEDICARE ANNUAL WELLNESS VISIT, SUBSEQUENT: Primary | ICD-10-CM

## 2020-09-30 DIAGNOSIS — R07.89 OTHER CHEST PAIN: ICD-10-CM

## 2020-09-30 DIAGNOSIS — Z83.511 FAMILY HISTORY OF GLAUCOMA: ICD-10-CM

## 2020-09-30 DIAGNOSIS — E11.40 CONTROLLED TYPE 2 DIABETES MELLITUS WITH DIABETIC NEUROPATHY, WITHOUT LONG-TERM CURRENT USE OF INSULIN (HCC): ICD-10-CM

## 2020-09-30 DIAGNOSIS — E78.00 PURE HYPERCHOLESTEROLEMIA: ICD-10-CM

## 2020-09-30 DIAGNOSIS — E55.9 VITAMIN D DEFICIENCY: ICD-10-CM

## 2020-09-30 DIAGNOSIS — Z13.39 SCREENING FOR ALCOHOLISM: ICD-10-CM

## 2020-09-30 DIAGNOSIS — I10 ESSENTIAL HYPERTENSION: ICD-10-CM

## 2020-09-30 DIAGNOSIS — Z78.9 ACE INHIBITOR INTOLERANCE: ICD-10-CM

## 2020-09-30 DIAGNOSIS — D56.3 THALASSEMIA MINOR: ICD-10-CM

## 2020-09-30 DIAGNOSIS — Z13.31 SCREENING FOR DEPRESSION: ICD-10-CM

## 2020-09-30 PROCEDURE — G0444 DEPRESSION SCREEN ANNUAL: HCPCS | Performed by: FAMILY MEDICINE

## 2020-09-30 PROCEDURE — 2022F DILAT RTA XM EVC RTNOPTHY: CPT | Performed by: FAMILY MEDICINE

## 2020-09-30 PROCEDURE — G9899 SCRN MAM PERF RSLTS DOC: HCPCS | Performed by: FAMILY MEDICINE

## 2020-09-30 PROCEDURE — G8427 DOCREV CUR MEDS BY ELIG CLIN: HCPCS | Performed by: FAMILY MEDICINE

## 2020-09-30 PROCEDURE — G8510 SCR DEP NEG, NO PLAN REQD: HCPCS | Performed by: FAMILY MEDICINE

## 2020-09-30 PROCEDURE — 3046F HEMOGLOBIN A1C LEVEL >9.0%: CPT | Performed by: FAMILY MEDICINE

## 2020-09-30 PROCEDURE — G8399 PT W/DXA RESULTS DOCUMENT: HCPCS | Performed by: FAMILY MEDICINE

## 2020-09-30 PROCEDURE — 3017F COLORECTAL CA SCREEN DOC REV: CPT | Performed by: FAMILY MEDICINE

## 2020-09-30 PROCEDURE — G0439 PPPS, SUBSEQ VISIT: HCPCS | Performed by: FAMILY MEDICINE

## 2020-09-30 PROCEDURE — 99443 PR PHYS/QHP TELEPHONE EVALUATION 21-30 MIN: CPT | Performed by: FAMILY MEDICINE

## 2020-09-30 PROCEDURE — 1101F PT FALLS ASSESS-DOCD LE1/YR: CPT | Performed by: FAMILY MEDICINE

## 2020-09-30 PROCEDURE — G8756 NO BP MEASURE DOC: HCPCS | Performed by: FAMILY MEDICINE

## 2020-09-30 RX ORDER — EMPAGLIFLOZIN AND METFORMIN HYDROCHLORIDE 12.5; 1 MG/1; MG/1
1 TABLET ORAL 2 TIMES DAILY WITH MEALS
Qty: 180 TAB | Refills: 1 | Status: SHIPPED | OUTPATIENT
Start: 2020-09-30 | End: 2020-11-30

## 2020-09-30 RX ORDER — ACETAMINOPHEN 500 MG
1 TABLET ORAL DAILY
Qty: 1 KIT | Refills: 0 | Status: SHIPPED | OUTPATIENT
Start: 2020-09-30 | End: 2022-07-11

## 2020-09-30 NOTE — PROGRESS NOTES
HISTORY OF PRESENT ILLNESS Destiny Sales is a 79 y.o. female presents with Hypertension; Diabetes; Labs; Referral Follow Up; Medication Refill; and Annual Wellness Visit Agree with nurse note. ROS Review of Systems negative except as noted above in HPI. ALLERGIES:   
Allergies Allergen Reactions  Influenza Virus Vaccine, Specific Swelling R ARM PAIN, NUMBNESS, SWELLING 
R HAND NUMBNESS AND TINGLING, WORSE IN 4TH AND FIFTH FINGERS  
 Lisinopril Cough Other reaction(s): Lisinopril Other reaction(s): coughing, Other (see comments) Coughing  Singulair [Montelukast] Other (comments)  
  headache CURRENT MEDICATIONS:   
Outpatient Medications Marked as Taking for the 9/30/20 encounter (Virtual Visit) with Sidney Linares, DO Medication Sig Dispense Refill  Blood Pressure Monitor kit 1 Device by Does Not Apply route daily. 1 Kit 0  
 empagliflozin-metFORMIN (Synjardy) 12.5-1,000 mg per tablet Take 1 Tab by mouth two (2) times daily (with meals). TAKE 1 TABLET BY MOUTH TWICE DAILY WITH MEALS 180 Tab 1  Trulicity 1.5 BM/4.8 mL sub-q pen INJECT 0.5ML (=1.5 MG)     SUBCUTANEOUSLY EVERY 7 DAYS 6 mL 11  
 valsartan-hydroCHLOROthiazide (DIOVAN-HCT) 80-12.5 mg per tablet TAKE 1 TABLET BY MOUTH ONCE DAILY 90 Tab 1  
 OneTouch Ultra Blue Test Strip strip USE TO TEST 1 TO 2 TIMES A DAY BEFORE BREAKFAST OR 2 HOURS AFTER ANY MEAL OR AT BEDTIME 100 Strip 3  cholecalciferol (VITAMIN D3) (1000 Units /25 mcg) tablet 1,000 Int'l Units.  simvastatin (ZOCOR) 40 mg tablet TAKE 1 TABLET BY MOUTH AT BEDTIME 90 Tab 3  
 traZODone (DESYREL) 100 mg tablet Take 100 mg by mouth nightly.  Blood-Glucose Meter monitoring kit One Touch Ultra 2 Use to test blood sugar 1-2 per day (blood sugar before breakfast or 2 hours after any meal or at bedtime) DX.  
E11.65 1 Kit 0  
 sodium chloride (OCEAN) 0.65 % nasal spray 1 Spray as needed for Congestion.  solifenacin (VESICARE) 10 mg tablet Take 10 mg by mouth daily.  aspirin delayed-release 81 mg tablet Take 1 Tab by mouth daily. 90 Tab 3  
 valACYclovir (VALTREX) 500 mg tablet daily.  evening primrose oil (EVENING PRIMROSE) 500 mg cap Take 1 Cap by mouth two (2) times a day.  cyclobenzaprine (FLEXERIL) 10 mg tablet Take 10 mg by mouth as needed.  tramadol (ULTRAM) 50 mg tablet Take 50 mg by mouth every eight (8) hours as needed. PAST MEDICAL HISTORY:   
Past Medical History:  
Diagnosis Date  Allergic rhinitis Dr. Sean Mcdaniel  Arthritis  Cataract 2015, 2019 Dr. Gerardo Murray. Dr. Guerda Worthington.  Chest pain 01/15/09 Dr. Lilo Whalen  Chickenpox childhood  DDD (degenerative disc disease), cervical 10/2015 Dr. Antoni Ernst.  DDD (degenerative disc disease), lumbar 2014 Dr. Karen Muñoz.  Diabetes ([address] 06/2007 Terrell Rosas, OD. Dr. Cecelia Perla, pod. Dr. Janessa Solis.  Endometriosis Dr. Min Chen  Essential hypertension, benign Dr. Lilo Whalen  Fibroid, uterine Dr. Min Chen  Fibromyalgia Dr. Jamshid Bledsoe 08/2009 Dr. Eder Peter. Dr. Cecelia Perla.  Hand paresthesia 2011 Dr. Jodi Deshpande. Dr. Benjy Sargent.  PPCOQLKE(509.3) 2012 Dr. Wilda Garrett. Dr. Eder Peter, ENT.  Hemoptysis 06/13/07 Dr. Natalie Mancia  Hiatal hernia 05/27/15  
 small. MD Siobhan Hong Internal hemorrhoids 12/2006, 05/27/15 Dr. Liliana Mcdonough. Dr. Riana Adame.  Intraocular pressure increase 07/27/05  
 bilaterally. Dr. Rebeca Dial. Dr. Terrell Rosas.  Knee pain 04/04/13 Bilaterally. due to fall. Dr. Edgardo Nichols.  Left knee pain 11/26/14 Left. due to OA, torn medial and lateral meniscal tears. Dr. Patrick Hills. Dr. Luisito Kennedy.  Lumbar spondylosis 3/2008 Dr. Amanda Lowery. Dr. Jeri Armendariz  Menopause 1994  
 OAB (overactive bladder) Dr. Luis Manuel Chávez. Dr. Ashlyn Jacobson.  Paresthesia of foot Left  due to Ornelas's Neuromo Sxs. Louiskelly Coleman. due to Diabetic Neuropathy. Dr. Cecelia Perla.  Proteinuria 2009 Dr. Esther Parsons  Right shoulder pain 01/30/06 Dr. Marcelle Boothe. impingement tendinitis  Right shoulder pain 07/2014 Dr. Germaine Jameson.  Schatzki's ring 05/27/15  
 with partial obstruction at GE junction. Dr. Riana Adame.  Sciatica Dr. Amanda Lowery. Dr. Jeri Armendariz  Stenosis colon ([facility] [address] 05/27/15 SIGMOID WITH TORTUOUS DESCENDING COLON. Dr. Douglas Ellis. Davis.  Thalassemia minor 1980s  Urinary incontinence Dr. Luis Manuel Chávez. Dr. La Nena Coleman  Vitamin D deficiency 05/2011 Duran Clubs, NP  
 
 
PAST SURGICAL HISTORY:   
Past Surgical History:  
Procedure Laterality Date  COLONOSCOPY Left 7/22/2019 COLONOSCOPY performed by Zack Russell MD at [facility] ENDOSCOPY  CYSTOSCOPY  07/2005 Dr. Park Boykin 49 2021 Milly Deng N/A 5/29/2019 INCOMPLETE COLONOSCOPY (second attempt) due to incomplete prep/full colon. Dr. Sandy Valerio. due prn.  FOOT/TOES SURGERY PROC UNLISTED Left Ornelas's Neuroma. [address] HX BREAST BIOPSY Left 12 years ago neg  HX BREAST LUMPECTOMY  10/15/08 Left intraductal papilloma. Dr. Piotr Kiran  HX COLONOSCOPY  05/27/15  
 due to anemia, rectal bleeding. due q 5 yrs. Dr. Sandy Valerio  HX COLONOSCOPY  2001, 12/07/06, 2011 Dr. Archana Thompson. due q 5 yrs.  HX ENDOSCOPY  05/27/15  
 due to anemia. Dr. Riana Adame.  HX HEART CATHETERIZATION  01/09/09 Dr. Nagy [facility] [address]  HX KNEE ARTHROSCOPY Left 04/17/15  
 due to MEDIAL AND LATERAL TORN MENISCI. Dr. Luisito Kennedy.  HX MYOMECTOMY  [id number]  
 x4  
 HX OOPHORECTOMY Bilateral 1994  HX SHOULDER ARTHROSCOPY Right 11/09/2018  
 with subacromial decompression and open distal clavicle excision, Dr. Anupama Rodas endometriosis, fibroids. FAMILY HISTORY:   
Family History Problem Relation Age of Onset  Hypertension Mother  Glaucoma Mother  Heart Failure Mother CHF  Hypertension Father  Colon Cancer Father   
     colon  Heart Attack Sister  Stroke Sister  Diabetes Sister   
     x 2 sisters  Diabetes Brother  Breast Cancer Maternal Aunt 45 SOCIAL HISTORY:   
Social History Socioeconomic History  Marital status: SINGLE Spouse name: Not on file  Number of children: Not on file  Years of education: Not on file  Highest education level: Not on file Tobacco Use  Smoking status: Never Smoker  Smokeless tobacco: Never Used Substance and Sexual Activity  Alcohol use: Yes Alcohol/week: 0.0 standard drinks Comment: Social beer, social drinks, social shots of liquor,and social wine.  Drug use: No  
 Sexual activity: Never IMMUNIZATIONS:   
Immunization History Administered Date(s) Administered  (RETIRED) Pneumococcal Vaccine (Unspecified Type) 12/21/2005  
 H1N1 Influenza Virus Vaccine 03/09/2010  Hepatitis B Vaccine 06/09/2008, 08/20/2008, 01/27/2009  Influenza High Dose Vaccine PF 11/11/2015, 09/07/2016, 10/19/2017, 08/01/2018, 10/15/2019  Influenza Vaccine 10/26/2018  Influenza Vaccine Split 10/08/2010, 11/08/2011, 10/24/2012  Influenza Vaccine Whole 01/27/2009  Pneumococcal Conjugate (PCV-13) 11/11/2015  Pneumococcal Polysaccharide (PPSV-23) 12/07/2016  Tdap 01/23/2018  Zoster Recombinant 05/25/2018, 08/04/2018  Zoster Vaccine, Live 01/20/2014  dT Vaccine 06/09/2008 PHYSICAL EXAMINATION Vital Signs  There were no vitals taken for this visit. Weight Metrics 3/30/2020 10/15/2019 7/22/2019 6/7/2019 5/29/2019 4/1/2019 3/6/2019 Weight 187 lb 11.2 oz 182 lb 11.2 oz 181 lb 181 lb 14.4 oz 176 lb 179 lb 11.2 oz 179 lb 8 oz BMI 28.54 kg/m2 27.78 kg/m2 27.52 kg/m2 27.26 kg/m2 26.37 kg/m2 27.32 kg/m2 27.29 kg/m2 General appearance - Well nourished. Well appearing. Well developed. No acute distress. Overweight. Head - Normocephalic. Atraumatic. Eyes - pupils equal and reactive. Extraocular eye movements intact. Sclera anicteric. Mildly injected sclera. Ears - Hearing is grossly normal bilaterally. Nose - normal and patent. No polyps noted. No erythema. No discharge. Mouth - mucous membranes with adequate moisture. Posterior pharynx normal with cobblestone appearance. No erythema, white exudate or obstruction. Neck - supple. Midline trachea. No carotid bruits noted bilaterally. No thyromegaly noted. Chest - clear to auscultation bilaterally anterriorly and posteriorly. No wheezes. No rales or rhonchi. Breath sounds are symmetrical bilaterally. Unlabored respirations. Heart - normal rate. Regular rhythm. Normal S1, S2. No murmur noted. No rubs, clicks or gallops noted. Abdomen - soft and nondistended. No masses or organomegaly. No rebound, rigidity or guarding. Bowel sounds normal x 4 quadrants. No tenderness noted. Neurological - awake, alert and oriented to person, place, and time and event. Cranial nerves II through XII intact. Clear speech. Muscle strength is +5/5 x 4 extremities. Sensation is intact to light touch bilaterally. Steady gait. Heme/Lymph - peripheral pulses normal x 4 extremities. No peripheral edema is noted. Musculoskeletal - Intact x 4 extremities. Full ROM x 4 extremities. No pain with movement. Back exam - normal range of motion. No pain on palpation of the spinous processes in the cervical, thoracic, lumbar, sacral regions. No CVA tenderness. Skin - no rashes, erythema, ecchymosis, lacerations, abrasions, suspicious moles noted Psychological -   normal behavior, dress and thought processes.   Good insight. Good eye contact. Normal affect. Appropriate mood. Normal speech. DATA REVIEWED Results for orders placed or performed in visit on 10/15/19 AMB POC HEMOGLOBIN A1C Result Value Ref Range Hemoglobin A1c (POC) 5.8 % ASSESSMENT and PLAN 
 
{ASSESSMENT/PLAN:49957} Discussed the patient's BMI with her. The BMI follow up plan is as follows: {Document your plan here:17926}. Addressed weight, diet and exercise with patient. Decrease carbohydrates (white foods, sweet foods, sweet drinks and alcohol), increase green leafy vegetables and protein (lean meats and beans) with each meal.  Avoid fried foods. Eat 3-5 small meals daily. Do not skip meals. Increase water intake. Increase physical activity to 30 minutes daily for health benefit or 60 minutes daily to prevent weight regain, as tolerated. Get 7-8 hours uninterrupted sleep nightly. Chart reviewed and updated. Advised pt to sign release to get ov notes from ***. Correspondence with *** regarding ***. {ASSESS/PLAN:42059} Patient was offered a choice/choices in the treatment plan today. Patient expresses understanding of the plan and agrees with recommendations. More than *** mins spent face to face with patient and more than 50% of this time spent in counseling and coordinating care. Documentation True and Accepted by Bill Lawrence. Jared Alexander. Patient Instructions Medicare Wellness Visit, Female The best way to live healthy is to have a lifestyle where you eat a well-balanced diet, exercise regularly, limit alcohol use, and quit all forms of tobacco/nicotine, if applicable. Regular preventive services are another way to keep healthy. Preventive services (vaccines, screening tests, monitoring & exams) can help personalize your care plan, which helps you manage your own care. Screening tests can find health problems at the earliest stages, when they are easiest to treat. Jan follows the current, evidence-based guidelines published by the [facility] Jose Elias Valenzuela ([facility]) when recommending preventive services for our patients. Because we follow these guidelines, sometimes recommendations change over time as research supports it. (For example, mammograms used to be recommended annually. Even though Medicare will still pay for an annual mammogram, the newer guidelines recommend a mammogram every two years for women of average risk). Of course, you and your doctor may decide to screen more often for some diseases, based on your risk and your co-morbidities (chronic disease you are already diagnosed with). Preventive services for you include: - Medicare offers their members a free annual wellness visit, which is time for you and your primary care provider to discuss and plan for your preventive service needs. Take advantage of this benefit every year! 
-All adults over the age of 72 should receive the recommended pneumonia vaccines. Current USPSTF guidelines recommend a series of two vaccines for the best pneumonia protection.  
-All adults should have a flu vaccine yearly and a tetanus vaccine every 10 years.  
-All adults age 48 and older should receive the shingles vaccines (series of two vaccines).      
-All adults age 38-68 who are overweight should have a diabetes screening test once every three years.  
-All adults born between [birth year] and 1965 should be screened once for Hepatitis C. 
-Other screening tests and preventive services for persons with diabetes include: an eye exam to screen for diabetic retinopathy, a kidney function test, a foot exam, and stricter control over your cholesterol.  
-Cardiovascular screening for adults with routine risk involves an electrocardiogram (ECG) at intervals determined by your doctor.  
-Colorectal cancer screenings should be done for adults age 54-65 with no increased risk factors for colorectal cancer. There are a number of acceptable methods of screening for this type of cancer. Each test has its own benefits and drawbacks. Discuss with your doctor what is most appropriate for you during your annual wellness visit. The different tests include: colonoscopy (considered the best screening method), a fecal occult blood test, a fecal DNA test, and sigmoidoscopy. 
 
-A bone mass density test is recommended when a woman turns 65 to screen for osteoporosis. This test is only recommended one time, as a screening. Some providers will use this same test as a disease monitoring tool if you already have osteoporosis. -Breast cancer screenings are recommended every other year for women of normal risk, age 54-69. 
-Cervical cancer screenings for women over age 72 are only recommended with certain risk factors. Here is a list of your current Health Maintenance items (your personalized list of preventive services) with a due date: 
Health Maintenance Due Topic Date Due  
 Hemoglobin A1C    08/07/2019 Edgar Blunt Annual Well Visit  04/01/2020  Albumin Urine Test  04/01/2020  Cholesterol Test   04/19/2020 Edgar Blunt Diabetic Foot Care  06/07/2020 Miguel Torres [address]21 What is a living will? A living will is a legal form you use to write down the kind of care you want at the end of your life. It is used by the health professionals who will treat you if you aren't able to decide for yourself. If you put your wishes in writing, your loved ones and others will know what kind of care you want. They won't need to guess. This can ease your mind and be helpful to others. A living will is not the same as an estate or property will. An estate will explains what you want to happen with your money and property after you die. Is a living will a legal document? A living will is a legal document.  Each state has its own laws about living draper. If you move to another state, make sure that your living will is legal in the state where you now live. Or you might use a universal form that has been approved by many states. This kind of form can sometimes be completed and stored online. Your electronic copy will then be available wherever you have a connection to the Internet. In most cases, doctors will respect your wishes even if you have a form from a different state. · You don't need an  to complete a living will. But legal advice can be helpful if your state's laws are unclear, your health history is complicated, or your family can't agree on what should be in your living will. · You can change your living will at any time. Some people find that their wishes about end-of-life care change as their health changes. · In addition to making a living will, think about completing a medical power of  form. This form lets you name the person you want to make end-of-life treatment decisions for you (your \"health care agent\") if you're not able to. Many hospitals and nursing homes will give you the forms you need to complete a living will and a medical power of . · Your living will is used only if you can't make or communicate decisions for yourself anymore. If you become able to make decisions again, you can accept or refuse any treatment, no matter what you wrote in your living will. · Your state may offer an online registry. This is a place where you can store your living will online so the doctors and nurses who need to treat you can find it right away. What should you think about when creating a living will? Talk about your end-of-life wishes with your family members and your doctor. Let them know what you want. That way the people making decisions for you won't be surprised by your choices. Think about these questions as you make your living will: · Do you know enough about life support methods that might be used? If not, talk to your doctor so you know what might be done if you can't breathe on your own, your heart stops, or you're unable to swallow. · What things would you still want to be able to do after you receive life-support methods? Would you want to be able to walk? To speak? To eat on your own? To live without the help of machines? · If you have a choice, where do you want to be cared for? In your home? At a hospital or nursing home? · Do you want certain Rastafarian practices performed if you become very ill? · If you have a choice at the end of your life, where would you prefer to die? At home? In a hospital or nursing home? Somewhere else? · Would you prefer to be buried or cremated? · Do you want your organs to be donated after you die? What should you do with your living will? · Make sure that your family members and your health care agent have copies of your living will. · Give your doctor a copy of your living will to keep in your medical record. If you have more than one doctor, make sure that each one has a copy. · You may want to put a copy of your living will where it can be easily found. Where can you learn more? Go to http://gregg-zunilda.info/. Enter H682 in the search box to learn more about \"Learning About Living Tc. \" Current as of: August 8, 2016 Content Version: 11.3 © 2442-1879 [employer]. Care instructions adapted under license by [employer] (which disclaims liability or warranty for this information). If you have questions about a medical condition or this instruction, always ask your healthcare professional. [address] any warranty or liability for your use of this information.

## 2020-09-30 NOTE — ACP (ADVANCE CARE PLANNING)
Re-discussed ACP with patient. Patient already has booklet and document at home. She will give us a copy when she completes it. No further support from the [facility] team requested at this time.

## 2020-09-30 NOTE — PROGRESS NOTES
This is the Subsequent Medicare Annual Wellness Exam, performed 12 months or more after the Initial AWV or the last Subsequent AWV    I have reviewed the patient's medical history in detail and updated the computerized patient record. History     Patient Active Problem List   Diagnosis Code    Internal hemorrhoids K64.8    Lumbar spondylosis M47.816    Fibromyalgia M79.7    Headache(784.0) R51    Neck arthritis M47.812    Thalassemia minor D56.3    Vitamin D deficiency E55.9    Left knee pain M25.562    Right shoulder pain M25.511    Essential hypertension I10    Stenosis colon ([facility] [address] K56.699    Schatzki's ring K22.2    Hiatal hernia K44.9    Allergic rhinitis due to pollen J30.1    Iron deficiency anemia D50.9    Intraocular pressure increase H40.059    Neck pain M54.2    High cholesterol E78.00    Cataract H26.9    ACE inhibitor intolerance Z78.9    S/P MARTIN (total abdominal hysterectomy) Z90.710    Neuropathic pain of foot M79.2    Advance care planning Z71.89    Controlled type 2 diabetes mellitus with diabetic neuropathy, without long-term current use of insulin ([facility] E11.40    Ulnar neuropathy of left upper extremity G56.22    Acne rosacea L71.9    Type 2 diabetes mellitus with diabetic neuropathy ([facility]) E11.40    OAB (overactive bladder) N32.81    Urinary incontinence R32    Type 2 diabetes with nephropathy ([facility]) E11.21     Past Medical History:   Diagnosis Date    Allergic rhinitis     Dr. Clay Garcia 2015, 2019    Dr. German Rizo. Dr. Cristopher Schwartz.  Chest pain 01/15/09    Dr. Lorie De Jesus Chickenpox childhood    DDD (degenerative disc disease), cervical 10/2015    Dr. Sukhwinder Bryant.  DDD (degenerative disc disease), lumbar 2014    Dr. Bandar Whittaker.  Diabetes ([facility] [address] 06/2007    Vera Sims, OD. Dr. Horacio Masters, pod. Dr. Shanice Cai.     Endometriosis     Dr. Valeria Dasilva hypertension, benign      Nilam Bermudez, uterine     Dr. Barroso Query     Dr. Davidson Player    Hammertoe 08/2009    Dr. Gifty Murillo. Dr. Shalonda Nava.  Hand paresthesia 2011    Dr. Dhara Aranda. Dr. Caron Garcia.  XBSQRVWN(481.3) 2012    Dr. Kya Gamino. Dr. Gifty Murillo, ENT.  Hemoptysis 06/13/07    Dr. Alonzo Labor    Hiatal hernia 05/27/15    small. Radha Carrington MD   Pham Internal hemorrhoids 12/2006, 05/27/15    Dr. Genny Lynn. Dr. Jeanette Hopkins.  Intraocular pressure increase 07/27/05    bilaterally. Dr. Compa Gupta. Dr. Svitlana Guzman.  Knee pain 04/04/13    Bilaterally. due to fall. Dr. Tiffany Magana.  Left knee pain 11/26/14    Left. due to OA, torn medial and lateral meniscal tears. Dr. Chris Severino. Dr. Christy Alegria.  Lumbar spondylosis 3/2008    Dr. Sree Keene. Dr. Honorio Hood (overactive bladder)     Dr. Elliott. Dr. Checo Willis. 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 Laramie Neither. due to Diabetic Neuropathy. Dr. Shalonda Nava.  Proteinuria 2009    Dr. Frances Marcial Right shoulder pain 01/30/06    Dr. Juliette Enciso. impingement tendinitis    Right shoulder pain 07/2014    Dr. Tram Paz.  Schatzki's ring 05/27/15    with partial obstruction at GE junction. Dr. Jeanette Hopkins. Caleen Corner     Dr. Sree Keene. Dr. Fernandez Cancer    Stenosis colon [facility]) 05/27/15    SIGMOID WITH TORTUOUS DESCENDING COLON. Dr. Neo Doyle. Davis.  Thalassemia minor 1980s    Urinary incontinence     Dr. Elliott. Dr. Bhargav garcia 05/2011    Iam Sadler NP      Past Surgical History:   Procedure Laterality Date    COLONOSCOPY Left 7/22/2019    COLONOSCOPY performed by Alexandria Bell MD at [place] [address]  07/2005    Dr. Sharmila Gamez N/A 5/29/2019    INCOMPLETE COLONOSCOPY (second attempt) due to incomplete prep/full colon. Dr. Radha Carrington. due prn.     FOOT/TOES SURGERY PROC UNLISTED      Left Ornelas's Neuroma. [address] [facility] HX BREAST BIOPSY Left     12 years ago neg    HX BREAST LUMPECTOMY  10/15/08    Left intraductal papilloma. Dr. Sabine Sweet HX COLONOSCOPY  05/27/15    due to anemia, rectal bleeding. due q 5 yrs. Dr. Tolu Saleh  2001, 12/07/06, 2011    Dr. Leora Thompson. due q 5 yrs.  HX ENDOSCOPY  05/27/15    due to anemia. Dr. Parag Foster.  HX HEART CATHETERIZATION  01/09/09    Dr. Case Holes ARTHROSCOPY Left 04/17/15    due to [address]. Dr. Paula Boo.  HX MYOMECTOMY  [id number]    x4    HX OOPHORECTOMY Bilateral 1994    HX SHOULDER ARTHROSCOPY Right 11/09/2018    with subacromial decompression and open distal clavicle excision, Dr. Jose Quinn    endometriosis, fibroids. Current Outpatient Medications   Medication Sig Dispense Refill    Blood Pressure Monitor kit 1 Device by Does Not Apply route daily. 1 Kit 0    Trulicity 1.5 AB/8.2 mL sub-q pen INJECT 0.5ML (=1.5 MG)     SUBCUTANEOUSLY EVERY 7 DAYS 6 mL 11    valsartan-hydroCHLOROthiazide (DIOVAN-HCT) 80-12.5 mg per tablet TAKE 1 TABLET BY MOUTH ONCE DAILY 90 Tab 1    OneTouch Ultra Blue Test Strip strip USE TO TEST 1 TO 2 TIMES A DAY BEFORE BREAKFAST OR 2 HOURS AFTER ANY MEAL OR AT BEDTIME 100 Strip 3    cholecalciferol (VITAMIN D3) (1000 Units /25 mcg) tablet 1,000 Int'l Units.  simvastatin (ZOCOR) 40 mg tablet TAKE 1 TABLET BY MOUTH AT BEDTIME 90 Tab 3    empagliflozin-metFORMIN (Synjardy) 12.5-1,000 mg per tablet Take 1 Tab by mouth two (2) times daily (with meals). TAKE 1 TABLET BY MOUTH TWICE DAILY WITH MEALS 180 Tab 1    traZODone (DESYREL) 100 mg tablet Take 100 mg by mouth nightly.  Blood-Glucose Meter monitoring kit One Touch Ultra 2 Use to test blood sugar 1-2 per day (blood sugar before breakfast or 2 hours after any meal or at bedtime) DX. E11.65 1 Kit 0    sodium chloride (OCEAN) 0.65 % nasal spray 1 Spray as needed for Congestion.  solifenacin (VESICARE) 10 mg tablet Take 10 mg by mouth daily.  aspirin delayed-release 81 mg tablet Take 1 Tab by mouth daily. 90 Tab 3    valACYclovir (VALTREX) 500 mg tablet daily.  evening primrose oil (EVENING PRIMROSE) 500 mg cap Take 1 Cap by mouth two (2) times a day.  cyclobenzaprine (FLEXERIL) 10 mg tablet Take 10 mg by mouth as needed.  tramadol (ULTRAM) 50 mg tablet Take 50 mg by mouth every eight (8) hours as needed.  amitriptyline (ELAVIL) 25 mg tablet       Intrarosa 6.5 mg inst       chlorhexidine (PERIDEX) 0.12 % solution   0     Allergies   Allergen Reactions    Influenza Virus Vaccine, Specific Swelling     R ARM PAIN, NUMBNESS, SWELLING  R HAND NUMBNESS AND TINGLING, WORSE IN 4TH AND FIFTH FINGERS    Lisinopril Cough     Other reaction(s): Lisinopril  Other reaction(s): coughing, Other (see comments)  Coughing      Singulair [Montelukast] Other (comments)     headache       Family History   Problem Relation Age of Onset    Hypertension Mother    Salima Grossman Glaucoma Mother     Heart Failure Mother         CHF    Hypertension Father     Colon Cancer Father         colon    Heart Attack Sister     Stroke Sister     Diabetes Sister         x 2 sisters    Diabetes Brother     Breast Cancer Maternal Aunt 45     Social History     Tobacco Use    Smoking status: Never Smoker    Smokeless tobacco: Never Used   Substance Use Topics    Alcohol use: Yes     Alcohol/week: 0.0 standard drinks     Comment: Social beer, social drinks, social shots of liquor,and social wine.        Depression Risk Factor Screening:     3 most recent PHQ Screens 9/30/2020   Little interest or pleasure in doing things Not at all   Feeling down, depressed, irritable, or hopeless Not at all   Total Score PHQ 2 0   Trouble falling or staying asleep, or sleeping too much -   Feeling tired or having little energy -   Poor appetite, weight loss, or overeating -   Feeling bad about yourself - or that you are a failure or have let yourself or your family down -   Trouble concentrating on things such as school, work, reading, or watching TV -   Moving or speaking so slowly that other people could have noticed; or the opposite being so fidgety that others notice -   Thoughts of being better off dead, or hurting yourself in some way -   PHQ 9 Score -       Alcohol Risk Screen   Do you average more than 1 drink per night or more than 7 drinks a week:  No    On any one occasion in the past three months have you have had more than 3 drinks containing alcohol:  Yes        Functional Ability and Level of Safety:   Hearing: Hearing is good. Activities of Daily Living: The home contains: handrails  Patient does total self care     Ambulation: with no difficulty and she has some difficulty walking when fibromyalgia is flared     Fall Risk:  Fall Risk Assessment, last 12 mths 3/30/2020   Able to walk? Yes   Fall in past 12 months?  No     Abuse Screen:  Patient is not abused       Cognitive Screening   Has your family/caregiver stated any concerns about your memory: no    Cognitive Screening: AAOx4    Patient Care Team   Patient Care Team:  Deven Velazquez DO as PCP - General  Margrhiannon Velazquez DO as PCP - [facility] Empaneled Provider  Alessandra Anthony MD (Gastroenterology)  Alisa Murphy MD (Obstetrics & Gynecology)  Alida Washington MD (Orthopedic Surgery)  Manjit Esparza MD (Rheumatology)  Toni Agudelo MD (Cardiology)  Everardo Grimaldo MD (Neurology)  Polly Hutson DO (Dermatology)  Annamarie Diamond MD (Podiatry)  Wyatt Watters MD (Dermatology)  Raven Vale MD (Ophthalmology)  Debbie Newman MD (Orthopedic Surgery)  Marquise Monte MD (Ophthalmology)  Kirk Vidal MD (Otolaryngology)  Romero Leyva MD (Internal Medicine)  Yoko Schreiber MD (Urology)    Assessment/Plan   Education and counseling provided:  Are appropriate based on today's review and evaluation  End-of-Life planning (with patient's consent)  Pneumococcal Vaccine  Influenza Vaccine  Screening Mammography  Screening Pap and pelvic (covered once every 2 years)  Colorectal cancer screening tests  Cardiovascular screening blood test  Bone mass measurement (DEXA)  Screening for glaucoma  Diabetes screening test    Diagnoses and all orders for this visit:    1. Medicare annual wellness visit, subsequent    2. Controlled type 2 diabetes mellitus with diabetic neuropathy, without long-term current use of insulin ([facility] [address]  Comments:  stable on Trjenna Synjardy  Orders:  -     METABOLIC PANEL, COMPREHENSIVE; Future  -     MICROALBUMIN, UR, RAND W/ MICROALB/CREAT RATIO; Future  -     URINALYSIS W/ RFLX MICROSCOPIC; Future  -     HEMOGLOBIN A1C WITH EAG; Future  -     empagliflozin-metFORMIN (Synjardy) 12.5-1,000 mg per tablet; Take 1 Tab by mouth two (2) times daily (with meals). TAKE 1 TABLET BY MOUTH TWICE DAILY WITH MEALS    3. Pure hypercholesterolemia  -     LIPID PANEL; Future  -     METABOLIC PANEL, COMPREHENSIVE; Future    4. Essential hypertension  -     Blood Pressure Monitor kit; 1 Device by Does Not Apply route daily.  -     LIPID PANEL; Future  -     METABOLIC PANEL, COMPREHENSIVE; Future  -     TSH 3RD GENERATION; Future  -     MICROALBUMIN, UR, RAND W/ MICROALB/CREAT RATIO; Future  -     URINALYSIS W/ RFLX MICROSCOPIC; Future    5. Fibromyalgia    6. Vitamin D deficiency  -     VITAMIN D, 25 HYDROXY; Future    7. ACE inhibitor intolerance    8. Thalassemia minor    9. Age-related nuclear cataract of both eyes    10. Family history of glaucoma    11. S/P MARTIN (total abdominal hysterectomy)    12. Screening for alcoholism    13.  Screening for depression  -     [place] Maintenance Due   Topic Date Due    A1C test (Diabetic or Prediabetic)  08/07/2019    Medicare Yearly Exam  04/01/2020    MICROALBUMIN Q1  04/01/2020    Lipid Screen  04/19/2020    Foot Exam Q1  06/07/2020       Chhaya Saleh, who was evaluated through a synchronous (real-time) audio-video encounter, and/or her healthcare decision maker, is aware that it is a billable service, with coverage as determined by her insurance carrier. She provided verbal consent to proceed: Yes, and patient identification was verified. It was conducted pursuant to the emergency declaration under the [address] [place] [address], [address] [place] [address] authority and the [employer] and [employer] Act. A caregiver was present when appropriate. Ability to conduct physical exam was limited. I was in the office. The patient was at home.     Rogerio Pinedo, DO

## 2020-09-30 NOTE — PATIENT INSTRUCTIONS
Medicare Wellness Visit, Female The best way to live healthy is to have a lifestyle where you eat a well-balanced diet, exercise regularly, limit alcohol use, and quit all forms of tobacco/nicotine, if applicable. Regular preventive services are another way to keep healthy. Preventive services (vaccines, screening tests, monitoring & exams) can help personalize your care plan, which helps you manage your own care. Screening tests can find health problems at the earliest stages, when they are easiest to treat. [facility] follows the current, evidence-based guidelines published by the [facility] ([facility]) when recommending preventive services for our patients. Because we follow these guidelines, sometimes recommendations change over time as research supports it. (For example, mammograms used to be recommended annually. Even though Medicare will still pay for an annual mammogram, the newer guidelines recommend a mammogram every two years for women of average risk). Of course, you and your doctor may decide to screen more often for some diseases, based on your risk and your co-morbidities (chronic disease you are already diagnosed with). Preventive services for you include: - Medicare offers their members a free annual wellness visit, which is time for you and your primary care provider to discuss and plan for your preventive service needs. Take advantage of this benefit every year! 
-All adults over the age of 72 should receive the recommended pneumonia vaccines. Current USPSTF guidelines recommend a series of two vaccines for the best pneumonia protection.  
-All adults should have a flu vaccine yearly and a tetanus vaccine every 10 years.  
-All adults age 48 and older should receive the shingles vaccines (series of two vaccines). -All adults age 38-68 who are overweight should have a diabetes screening test once every three years. -All adults born between [birth year] and 1965 should be screened once for Hepatitis C. 
-Other screening tests and preventive services for persons with diabetes include: an eye exam to screen for diabetic retinopathy, a kidney function test, a foot exam, and stricter control over your cholesterol.  
-Cardiovascular screening for adults with routine risk involves an electrocardiogram (ECG) at intervals determined by your doctor.  
-Colorectal cancer screenings should be done for adults age 54-65 with no increased risk factors for colorectal cancer. There are a number of acceptable methods of screening for this type of cancer. Each test has its own benefits and drawbacks. Discuss with your doctor what is most appropriate for you during your annual wellness visit. The different tests include: colonoscopy (considered the best screening method), a fecal occult blood test, a fecal DNA test, and sigmoidoscopy. 
 
-A bone mass density test is recommended when a woman turns 65 to screen for osteoporosis. This test is only recommended one time, as a screening. Some providers will use this same test as a disease monitoring tool if you already have osteoporosis. -Breast cancer screenings are recommended every other year for women of normal risk, age 54-69. 
-Cervical cancer screenings for women over age 72 are only recommended with certain risk factors. Here is a list of your current Health Maintenance items (your personalized list of preventive services) with a due date: 
Health Maintenance Due Topic Date Due  
 Hemoglobin A1C    08/07/2019 [facility] Annual Well Visit  04/01/2020  Albumin Urine Test  04/01/2020  Cholesterol Test   04/19/2020 [facility] Diabetic Foot Care  06/07/2020 [address] What is a living will? A living will is a legal form you use to write down the kind of care you want at the end of your life.  It is used by the health professionals who will treat you if you aren't able to decide for yourself. If you put your wishes in writing, your loved ones and others will know what kind of care you want. They won't need to guess. This can ease your mind and be helpful to others. A living will is not the same as an estate or property will. An estate will explains what you want to happen with your money and property after you die. Is a living will a legal document? A living will is a legal document. Each state has its own laws about living draper. If you move to another state, make sure that your living will is legal in the state where you now live. Or you might use a universal form that has been approved by many states. This kind of form can sometimes be completed and stored online. Your electronic copy will then be available wherever you have a connection to the Internet. In most cases, doctors will respect your wishes even if you have a form from a different state. · You don't need an  to complete a living will. But legal advice can be helpful if your state's laws are unclear, your health history is complicated, or your family can't agree on what should be in your living will. · You can change your living will at any time. Some people find that their wishes about end-of-life care change as their health changes. · In addition to making a living will, think about completing a medical power of  form. This form lets you name the person you want to make end-of-life treatment decisions for you (your \"health care agent\") if you're not able to. Many hospitals and nursing homes will give you the forms you need to complete a living will and a medical power of . · Your living will is used only if you can't make or communicate decisions for yourself anymore. If you become able to make decisions again, you can accept or refuse any treatment, no matter what you wrote in your living will. · Your state may offer an online registry. This is a place where you can store your living will online so the doctors and nurses who need to treat you can find it right away. What should you think about when creating a living will? Talk about your end-of-life wishes with your family members and your doctor. Let them know what you want. That way the people making decisions for you won't be surprised by your choices. Think about these questions as you make your living will: · Do you know enough about life support methods that might be used? If not, talk to your doctor so you know what might be done if you can't breathe on your own, your heart stops, or you're unable to swallow. · What things would you still want to be able to do after you receive life-support methods? Would you want to be able to walk? To speak? To eat on your own? To live without the help of machines? · If you have a choice, where do you want to be cared for? In your home? At a hospital or nursing home? · Do you want certain [employer] practices performed if you become very ill? · If you have a choice at the end of your life, where would you prefer to die? At home? In a hospital or nursing home? Somewhere else? · Would you prefer to be buried or cremated? · Do you want your organs to be donated after you die? What should you do with your living will? · Make sure that your family members and your health care agent have copies of your living will. · Give your doctor a copy of your living will to keep in your medical record. If you have more than one doctor, make sure that each one has a copy. · You may want to put a copy of your living will where it can be easily found. Where can you learn more? Go to http://gregg-zunilda.info/. Enter Q777 in the search box to learn more about \"Learning About Living [place]. \" Current as of: August 8, 2016 Content Version: 11.3 © 6815-3548 [employer]. Care instructions adapted under license by [employer] (which disclaims liability or warranty for this information). If you have questions about a medical condition or this instruction, always ask your healthcare professional. [address] any warranty or liability for your use of this information.

## 2020-09-30 NOTE — PROGRESS NOTES
Juana Cosme is a 79 y.o. female     Chief Complaint   Patient presents with    Hypertension    Diabetes     1. Have you been to the ER, urgent care clinic since your last visit? Hospitalized since your last visit? No    2. Have you seen or consulted any other health care providers outside of the [address] [place] [address] since your last visit? Include any pap smears or colon screening.  No     Patient did not obtain vital signs today    Pain Scale: 0/10  Pain Location: Patient did not verbalize pain at this time, rated 0/10         Send link to [place] number listed

## 2020-09-30 NOTE — PROGRESS NOTES
PHONE VISIT    Consent:  He and/or health care decision maker is aware that that he may receive a bill for this telephone service, depending on his insurance coverage, and has provided verbal consent to proceed: Yes      HISTORY OF PRESENT ILLNESS  Hilary Virk is a 79 y.o. female presents with Hypertension; Diabetes; Labs; Referral Follow Up; Medication Refill; and Annual Wellness Visit    Agree with nurse note. Hypertensive, hyperlipidemic patient with fibromyalgia presents for routine diabetic exam.  She previously saw Dr. Leon Buenrostro, endocrinologist, who relocated to [facility]. Pt saw me 03/30/20 with A1c 6.4 that day in office (up from 5.8). Tolerating Synjardy and Trulicity well. Needs refill of Synjardy today. She has an appt with Dr. Alicia Gleason 10/27/20 for her diabetic eye exam.  She will see Dr. Jairo Macdonald 11/16/20 for her diabetic foot exam.  She is tolerating Zocor 40 mg daily well for cholesterol. She had labwork performed and wants to discuss results. She wonders whom her cardiologist is. She has had chest pain off and on. Last episode x 1 month ago. Recurrent. She describes it as \"sharp. \"  Resolved since she increased her water intake. No known triggers. Patient denies fatigue, diaphoresis, heart racing or skipping beats, nausea, burping, abdominal pain, fever, chills, cough, SOB or other associated symptoms. She will follow up with cardiologist.  No referral needed. Health Maintenance    Her last medicare wellness exam was 04/01/19. She is agreeable to have it performed today. Pt's most recent colonoscopy was on 10/22/2019 with Dr. Umu Rehman. Noted internal and external hemorrhoids. F/U 5 years.       Pt's most recent mammogram was on 3/21/2019 and it was incomplete. Pt had two markers placed in the left breast by [facility] in the last year. She was scheduled for her yearly mammogram in 4/2020 when Covid pandemic began.   She will have it rescheduled. ROS    Review of Systems negative except as noted above in HPI. ALLERGIES:    Allergies   Allergen Reactions    Influenza Virus Vaccine, Specific Swelling     R ARM PAIN, NUMBNESS, SWELLING  R HAND NUMBNESS AND TINGLING, WORSE IN 4TH AND FIFTH FINGERS    Lisinopril Cough     Other reaction(s): Lisinopril  Other reaction(s): coughing, Other (see comments)  Coughing      Singulair [Montelukast] Other (comments)     headache       CURRENT MEDICATIONS:    Outpatient Medications Marked as Taking for the 9/30/20 encounter (Virtual Visit) with Danis Glover, DO   Medication Sig Dispense Refill    Blood Pressure Monitor kit 1 Device by Does Not Apply route daily. 1 Kit 0    empagliflozin-metFORMIN (Synjardy) 12.5-1,000 mg per tablet Take 1 Tab by mouth two (2) times daily (with meals). TAKE 1 TABLET BY MOUTH TWICE DAILY WITH MEALS 376 Tab 1    Trulicity 1.5 FA/0.0 mL sub-q pen INJECT 0.5ML (=1.5 MG)     SUBCUTANEOUSLY EVERY 7 DAYS 6 mL 11    valsartan-hydroCHLOROthiazide (DIOVAN-HCT) 80-12.5 mg per tablet TAKE 1 TABLET BY MOUTH ONCE DAILY 90 Tab 1    OneTouch Ultra Blue Test Strip strip USE TO TEST 1 TO 2 TIMES A DAY BEFORE BREAKFAST OR 2 HOURS AFTER ANY MEAL OR AT BEDTIME 100 Strip 3    cholecalciferol (VITAMIN D3) (1000 Units /25 mcg) tablet 1,000 Int'l Units.  simvastatin (ZOCOR) 40 mg tablet TAKE 1 TABLET BY MOUTH AT BEDTIME 90 Tab 3    traZODone (DESYREL) 100 mg tablet Take 100 mg by mouth nightly.  Blood-Glucose Meter monitoring kit One Touch Ultra 2 Use to test blood sugar 1-2 per day (blood sugar before breakfast or 2 hours after any meal or at bedtime) DX.   E11.65 1 Kit 0    sodium chloride (OCEAN) 0.65 % nasal spray 1 Spray as needed for Congestion.  solifenacin (VESICARE) 10 mg tablet Take 10 mg by mouth daily.  aspirin delayed-release 81 mg tablet Take 1 Tab by mouth daily. 90 Tab 3    valACYclovir (VALTREX) 500 mg tablet daily.       evening primrose oil (EVENING PRIMROSE) 500 mg cap Take 1 Cap by mouth two (2) times a day.  cyclobenzaprine (FLEXERIL) 10 mg tablet Take 10 mg by mouth as needed.  tramadol (ULTRAM) 50 mg tablet Take 50 mg by mouth every eight (8) hours as needed. PAST MEDICAL HISTORY:    Past Medical History:   Diagnosis Date    Allergic rhinitis     Dr. Ekaterina Anderson 2015, 2019    Dr. Anabel Scott. Dr. Angelo Stevenson.  Chest pain 01/15/09    Dr. Marge Pearson Chickenpox childhood    DDD (degenerative disc disease), cervical 10/2015    Dr. Kimi De La Cruz.  DDD (degenerative disc disease), lumbar 2014    Dr. Nancy Pierce.  Diabetes ([facility] [address] 06/2007    Dawn Mayers, OD. Dr. Brianna Barboza, pod. Dr. Eduardo Jimenez.  Endometriosis     Dr. Geneva Hinojosa    Essential hypertension, benign     Dr. Riki Gaines, uterine     Dr. Caitlin Nielsen Dr. Delories People 08/2009    Dr. Rossy Liang. Dr. Brianna Barboza.  Hand paresthesia 2011    Dr. Tricia Fernandez. Dr. Temitope Reid.  [facility](840.6) 2012    Dr. Kemi Valverde. Dr. Rossy Liang, ENT.  Hemoptysis 06/13/07    Dr. Britney Farnsworth    Hiatal hernia 05/27/15    small. MD Janet Rizvi Internal hemorrhoids 12/2006, 05/27/15    Dr. Jonathan Hamm. Dr. Mat James.  Intraocular pressure increase 07/27/05    bilaterally. Dr. Lyentte Perez. Dr. Dawn Mayers.  Knee pain 04/04/13    Bilaterally. due to fall. Dr. Leonor Cheng.  Left knee pain 11/26/14    Left. due to OA, torn medial and lateral meniscal tears. Dr. Maikel Toribio. Dr. Isrrael Sullivan.  Lumbar spondylosis 3/2008    Dr. Dianna Tyson. Dr. Jimena Boateng (overactive bladder)     Dr. Johana Cadet. Dr. Jodee Mckinney. 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 Marissa White. due to Diabetic Neuropathy. Dr. Brianna Barboza.     Proteinuria 2009    Dr. Ryan Warren Right shoulder pain 01/30/06     Reshma Mcgowan. impingement tendinitis    Right shoulder pain 07/2014    Dr. Yue Garza.  Schatzki's ring 05/27/15    with partial obstruction at GE junction. Dr. Patrick Freire. [facility]     Dr. Corrina Sullivan. Dr. 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 Dr. Venice Herrera [facility].  Thalassemia minor 1980s    Urinary incontinence     Dr. Kady Gomez. Dr. Carley Lira D deficiency 05/2011    Richardson Fabian NP       PAST SURGICAL HISTORY:    Past Surgical History:   Procedure Laterality Date    COLONOSCOPY Left 7/22/2019    COLONOSCOPY performed by Irina Ivey MD at [place] [address]  07/2005    Dr. Karthik Cruz N/A 5/29/2019    INCOMPLETE COLONOSCOPY (second attempt) due to incomplete prep/full colon. Dr. Giovanny Mar. due prn.  FOOT/TOES SURGERY PROC UNLISTED      Left Ornelas's Neuroma. [id number] [facility] HX BREAST BIOPSY Left     12 years ago neg    HX BREAST LUMPECTOMY  10/15/08    Left intraductal papilloma. Dr. Jorge L Castañeda HX COLONOSCOPY  05/27/15    due to anemia, rectal bleeding. due q 5 yrs. Dr. Cherie Kaplan  2001, 12/07/06, 2011    Dr. Peg Kanner. due q 5 yrs.  HX ENDOSCOPY  05/27/15    due to anemia. Dr. Patrick Freire.  HX HEART CATHETERIZATION  01/09/09    Dr. Janneth Hdz ARTHROSCOPY Left 04/17/15    due to [address]. Dr. Lamont Kearney.  HX MYOMECTOMY  [id number]    x4    HX OOPHORECTOMY Bilateral 1994    HX SHOULDER ARTHROSCOPY Right 11/09/2018    with subacromial decompression and open distal clavicle excision, Dr. Sedrick Cueto    endometriosis, fibroids.        FAMILY HISTORY:    Family History   Problem Relation Age of Onset    Hypertension Mother     Glaucoma Mother     Heart Failure Mother         CHF    Hypertension Father     Colon Cancer Father colon    Heart Attack Sister     Stroke Sister     Diabetes Sister         x 2 sisters    Diabetes Brother     Breast Cancer Maternal Aunt 45       SOCIAL HISTORY:    Social History     Socioeconomic History    Marital status: SINGLE     Spouse name: Not on file    Number of children: Not on file    Years of education: Not on file    Highest education level: Not on file   Tobacco Use    Smoking status: Never Smoker    Smokeless tobacco: Never Used   Substance and Sexual Activity    Alcohol use: Yes     Alcohol/week: 0.0 standard drinks     Comment: Social beer, social drinks, social shots of liquor,and social wine.  Drug use: No    Sexual activity: Never       IMMUNIZATIONS:    Immunization History   Administered Date(s) Administered    (RETIRED) Pneumococcal Vaccine (Unspecified Type) 12/21/2005    H1N1 Influenza Virus Vaccine 03/09/2010    Hepatitis B Vaccine 06/09/2008, 08/20/2008, 01/27/2009    Influenza High Dose Vaccine PF 11/11/2015, 09/07/2016, 10/19/2017, 08/01/2018, 10/15/2019    Influenza Vaccine 10/26/2018    Influenza Vaccine Split 10/08/2010, 11/08/2011, 10/24/2012    Influenza Vaccine Whole 01/27/2009    Pneumococcal Conjugate (PCV-13) 11/11/2015    Pneumococcal Polysaccharide (PPSV-23) 12/07/2016    Tdap 01/23/2018    Zoster Recombinant 05/25/2018, 08/04/2018    Zoster Vaccine, Live 01/20/2014    dT Vaccine 06/09/2008         PHYSICAL EXAMINATION (Unable to perform due to phone visit during Covid pandemic)    Vital Signs  There were no vitals taken for this visit. because pt does not have a bp cuff.     Weight Metrics 3/30/2020 10/15/2019 7/22/2019 6/7/2019 5/29/2019 4/1/2019 3/6/2019   Weight 187 lb 11.2 oz 182 lb 11.2 oz 181 lb 181 lb 14.4 oz 176 lb 179 lb 11.2 oz 179 lb 8 oz   BMI 28.54 kg/m2 27.78 kg/m2 27.52 kg/m2 27.26 kg/m2 26.37 kg/m2 27.32 kg/m2 27.29 kg/m2         DATA REVIEWED    Results for orders placed or performed in visit on 10/15/19   AMB POC HEMOGLOBIN A1C   Result Value Ref Range    Hemoglobin A1c (POC) 5.8 %     Lab Results   Component Value Date/Time    Sodium 141 04/19/2019 10:13 AM    Potassium 3.9 04/19/2019 10:13 AM    Chloride 99 04/19/2019 10:13 AM    CO2 25 04/19/2019 10:13 AM    Anion gap 11 09/03/2010 08:45 AM    Glucose 89 04/19/2019 10:13 AM    BUN 10 04/19/2019 10:13 AM    Creatinine 0.99 04/19/2019 10:13 AM    BUN/Creatinine ratio 10 (L) 04/19/2019 10:13 AM    GFR est AA 67 04/19/2019 10:13 AM    GFR est non-AA 58 (L) 04/19/2019 10:13 AM    Calcium 10.3 04/19/2019 10:13 AM    Bilirubin, total 0.3 04/19/2019 10:13 AM    Alk. phosphatase 69 04/19/2019 10:13 AM    Protein, total 7.3 04/19/2019 10:13 AM    Albumin 4.4 04/19/2019 10:13 AM    Globulin 3.5 09/03/2010 08:45 AM    A-G Ratio 1.5 04/19/2019 10:13 AM    ALT (SGPT) 6 04/19/2019 10:13 AM    AST (SGOT) 16 04/19/2019 10:13 AM     Lab Results   Component Value Date/Time    Cholesterol, total 151 04/19/2019 10:13 AM    HDL Cholesterol 69 04/19/2019 10:13 AM    LDL, calculated 73 04/19/2019 10:13 AM    VLDL, calculated 9 04/19/2019 10:13 AM    Triglyceride 47 04/19/2019 10:13 AM    CHOL/HDL Ratio 1.9 04/26/2010 08:00 AM     Lab Results   Component Value Date/Time    Microalbumin/Creat ratio (mg/g creat) 5 09/03/2010 08:45 AM    Microalb/Creat ratio (ug/mg creat.) <3.2 04/01/2019 02:01 PM    Microalbumin,urine random 1.21 09/03/2010 08:45 AM       ASSESSMENT and PLAN      ICD-10-CM ICD-9-CM    1. Medicare annual wellness visit, subsequent  Z00.00 V70.0    2. Controlled type 2 diabetes mellitus with diabetic neuropathy, without long-term current use of insulin ([facility])  O44.47 097.07 METABOLIC PANEL, COMPREHENSIVE     357.2 MICROALBUMIN, UR, RAND W/ MICROALB/CREAT RATIO      URINALYSIS W/ RFLX MICROSCOPIC      HEMOGLOBIN A1C WITH EAG      empagliflozin-metFORMIN (Synjardy) 12.5-1,000 mg per tablet    stable on Trulicity, Synjardy   3.  Pure hypercholesterolemia  E78.00 272.0 LIPID PANEL      METABOLIC PANEL, COMPREHENSIVE   4. Other chest pain  R07.89 786.59     \"sharp\", intermittently due to cardio vs GI vs other   5. Essential hypertension  I10 401.9 Blood Pressure Monitor kit      LIPID PANEL      METABOLIC PANEL, COMPREHENSIVE      TSH 3RD GENERATION      MICROALBUMIN, UR, RAND W/ MICROALB/CREAT RATIO      URINALYSIS W/ RFLX MICROSCOPIC   6. Fibromyalgia  M79.7 729.1    7. Vitamin D deficiency  E55.9 268.9 VITAMIN D, 25 HYDROXY   8. ACE inhibitor intolerance  Z78.9 995.27      E980.4    9. Thalassemia minor  D56.3 282.46    10. Age-related nuclear cataract of both eyes  H25.13 366.16    11. Family history of glaucoma  Z83.511 V19.11    12. S/P MARTIN (total abdominal hysterectomy)  Z90.710 V88.01    13. Screening for alcoholism  Z13.39 V79.1    14. Screening for depression  Z13.31 V79.0 [facility]     Annual wellness exam performed today. Reviewed specific diabetic recommendations: foot care discussed and Podiatry visits discussed, annual eye examinations at Ophthalmology discussed, glycohemoglobin and other lab monitoring discussed and labs immediately prior to next visit    Discussed the patient's BMI with her. The BMI follow up plan is as follows: I have counseled this patient on diet and exercise regimens. Addressed weight, diet and exercise with patient. Decrease carbohydrates (white foods, sweet foods, sweet drinks and alcohol), increase green leafy vegetables and protein (lean meats and beans) with each meal.  Avoid fried foods. Eat 3-5 small meals daily. Do not skip meals. Increase water intake. Increase physical activity to 30 minutes daily for health benefit or 60 minutes daily to prevent weight regain, as tolerated. Get 7-8 hours uninterrupted sleep nightly. Chart reviewed and updated.         Continue current medications and care  Prescriptions written and sent to pharmacy; medication side effects discussed:  Synjardy  Most recent tests reviewed  Recheck pertinent labs today  Recent office visit notes from my last ov reviewed  Get recent office visit notes from eye doctor, podiatrist  Counseled patient on health concerns:  Dm  Immunizations noted; Advised Flu vaccine Sept-Dec.  Otherwise, pt is UTD  Offered empathy, support, legitamation, prayers, partnership to patient  Praised patient for progress  Follow-up and Dispositions    · Return in about 3 months (around 12/30/2020) for results, diabetes, blood pressure. More than 21 mins spent with patient with more than 50% of this time spent in counseling or coordinating care    Patient was offered a choice/choices in the treatment plan today. Patient expresses understanding of the plan and agrees with recommendations. Documentation True and Accepted by Mario Taylor. Agnieszka Bruon. Patient Instructions       Medicare Wellness Visit, Female     The best way to live healthy is to have a lifestyle where you eat a well-balanced diet, exercise regularly, limit alcohol use, and quit all forms of tobacco/nicotine, if applicable. Regular preventive services are another way to keep healthy. Preventive services (vaccines, screening tests, monitoring & exams) can help personalize your care plan, which helps you manage your own care. Screening tests can find health problems at the earliest stages, when they are easiest to treat. Jan follows the current, evidence-based guidelines published by the [facility] States Jose Elias Nicolas (USPSTF) when recommending preventive services for our patients. Because we follow these guidelines, sometimes recommendations change over time as research supports it. (For example, mammograms used to be recommended annually. Even though Medicare will still pay for an annual mammogram, the newer guidelines recommend a mammogram every two years for women of average risk).   Of course, you and your doctor may decide to screen more often for some diseases, based on your risk and your co-morbidities (chronic disease you are already diagnosed with). Preventive services for you include:  - Medicare offers their members a free annual wellness visit, which is time for you and your primary care provider to discuss and plan for your preventive service needs. Take advantage of this benefit every year!  -All adults over the age of 72 should receive the recommended pneumonia vaccines. Current USPSTF guidelines recommend a series of two vaccines for the best pneumonia protection.   -All adults should have a flu vaccine yearly and a tetanus vaccine every 10 years.   -All adults age 48 and older should receive the shingles vaccines (series of two vaccines). -All adults age 38-68 who are overweight should have a diabetes screening test once every three years.   -All adults born between [birth year] and 1965 should be screened once for Hepatitis C.  -Other screening tests and preventive services for persons with diabetes include: an eye exam to screen for diabetic retinopathy, a kidney function test, a foot exam, and stricter control over your cholesterol.   -Cardiovascular screening for adults with routine risk involves an electrocardiogram (ECG) at intervals determined by your doctor.   -Colorectal cancer screenings should be done for adults age 54-65 with no increased risk factors for colorectal cancer. There are a number of acceptable methods of screening for this type of cancer. Each test has its own benefits and drawbacks. Discuss with your doctor what is most appropriate for you during your annual wellness visit. The different tests include: colonoscopy (considered the best screening method), a fecal occult blood test, a fecal DNA test, and sigmoidoscopy.    -A bone mass density test is recommended when a woman turns 65 to screen for osteoporosis. This test is only recommended one time, as a screening.  Some providers will use this same test as a disease monitoring tool if you already have osteoporosis. -Breast cancer screenings are recommended every other year for women of normal risk, age 54-69.  -Cervical cancer screenings for women over age 72 are only recommended with certain risk factors. Here is a list of your current Health Maintenance items (your personalized list of preventive services) with a due date:  Health Maintenance Due   Topic Date Due    Hemoglobin A1C    08/07/2019    Annual Well Visit  04/01/2020    Albumin Urine Test  04/01/2020    Cholesterol Test   04/19/2020    Diabetic Foot Care  06/07/2020              Learning About Living Tc  What is a living will? A living will is a legal form you use to write down the kind of care you want at the end of your life. It is used by the health professionals who will treat you if you aren't able to decide for yourself. If you put your wishes in writing, your loved ones and others will know what kind of care you want. They won't need to guess. This can ease your mind and be helpful to others. A living will is not the same as an estate or property will. An estate will explains what you want to happen with your money and property after you die. Is a living will a legal document? A living will is a legal document. Each state has its own laws about living draper. If you move to another state, make sure that your living will is legal in the state where you now live. Or you might use a universal form that has been approved by many states. This kind of form can sometimes be completed and stored online. Your electronic copy will then be available wherever you have a connection to the Internet. In most cases, doctors will respect your wishes even if you have a form from a different state. · You don't need an  to complete a living will. But legal advice can be helpful if your state's laws are unclear, your health history is complicated, or your family can't agree on what should be in your living will.   · You can change your living will at any time. Some people find that their wishes about end-of-life care change as their health changes. · In addition to making a living will, think about completing a medical power of  form. This form lets you name the person you want to make end-of-life treatment decisions for you (your \"health care agent\") if you're not able to. Many hospitals and nursing homes will give you the forms you need to complete a living will and a medical power of . · Your living will is used only if you can't make or communicate decisions for yourself anymore. If you become able to make decisions again, you can accept or refuse any treatment, no matter what you wrote in your living will. · Your state may offer an online registry. This is a place where you can store your living will online so the doctors and nurses who need to treat you can find it right away. What should you think about when creating a living will? Talk about your end-of-life wishes with your family members and your doctor. Let them know what you want. That way the people making decisions for you won't be surprised by your choices. Think about these questions as you make your living will:  · Do you know enough about life support methods that might be used? If not, talk to your doctor so you know what might be done if you can't breathe on your own, your heart stops, or you're unable to swallow. · What things would you still want to be able to do after you receive life-support methods? Would you want to be able to walk? To speak? To eat on your own? To live without the help of machines? · If you have a choice, where do you want to be cared for? In your home? At a hospital or nursing home? · Do you want certain Restorationist practices performed if you become very ill? · If you have a choice at the end of your life, where would you prefer to die? At home? In a hospital or nursing home? Somewhere else?   · Would you prefer to be buried or cremated? · Do you want your organs to be donated after you die? What should you do with your living will? · Make sure that your family members and your health care agent have copies of your living will. · Give your doctor a copy of your living will to keep in your medical record. If you have more than one doctor, make sure that each one has a copy. · You may want to put a copy of your living will where it can be easily found. Where can you learn more? Go to http://gregg-zunilda.info/. Enter D745 in the search box to learn more about \"Learning About Living Kamille Vicente. \"  Current as of: August 8, 2016  Content Version: 11.3  © 7205-5020 [employer]. Care instructions adapted under license by [employer] (which disclaims liability or warranty for this information). If you have questions about a medical condition or this instruction, always ask your healthcare professional. [place] [address] any warranty or liability for your use of this information.

## 2020-12-23 DIAGNOSIS — I10 ESSENTIAL HYPERTENSION: ICD-10-CM

## 2020-12-23 RX ORDER — VALSARTAN AND HYDROCHLOROTHIAZIDE 80; 12.5 MG/1; MG/1
TABLET, FILM COATED ORAL
Qty: 90 TAB | Refills: 0 | Status: SHIPPED | OUTPATIENT
Start: 2020-12-23 | End: 2021-08-12

## 2020-12-23 NOTE — TELEPHONE ENCOUNTER
Requested Prescriptions     Pending Prescriptions Disp Refills    valsartan-hydroCHLOROthiazide (DIOVAN-HCT) 80-12.5 mg per tablet 90 Tab 1     Sig: TAKE 1 TABLET BY MOUTH ONCE DAILY     90 day supply

## 2021-01-29 RX ORDER — DULAGLUTIDE 1.5 MG/.5ML
INJECTION, SOLUTION SUBCUTANEOUS
Qty: 6 ML | Refills: 0 | Status: SHIPPED | OUTPATIENT
Start: 2021-01-29 | End: 2021-04-14

## 2021-01-29 NOTE — TELEPHONE ENCOUNTER
Patient had an appt with Dr Wing Moya but was informed she will be out until March.   Patient is out of her Trulicity and patient wanted it for 3 months because she stated that she pays the same for 1 month and sent to [facility]

## 2021-02-04 DIAGNOSIS — E11.40 CONTROLLED TYPE 2 DIABETES MELLITUS WITH DIABETIC NEUROPATHY, WITHOUT LONG-TERM CURRENT USE OF INSULIN (HCC): ICD-10-CM

## 2021-02-04 NOTE — TELEPHONE ENCOUNTER
Last visit 09/30/2020 Virtual visit MD Josue Cárdenas  Next appointment 03/08/2021 MD Josue Cárdenas  Previous refill encounter(s)   12/26/2020 Synjardy #60     Requested Prescriptions     Pending Prescriptions Disp Refills    empagliflozin-metFORMIN (Synjardy) 12.5-1,000 mg per tablet 60 Tab 0     Sig: Take 1 Tab by mouth two (2) times daily (with meals).

## 2021-02-10 RX ORDER — EMPAGLIFLOZIN AND METFORMIN HYDROCHLORIDE 12.5; 1 MG/1; MG/1
1 TABLET ORAL 2 TIMES DAILY WITH MEALS
Qty: 60 TAB | Refills: 0 | Status: SHIPPED | OUTPATIENT
Start: 2021-02-10 | End: 2021-08-10 | Stop reason: SDUPTHER

## 2021-04-08 ENCOUNTER — OFFICE VISIT (OUTPATIENT)
Dept: INTERNAL MEDICINE CLINIC | Age: 71
End: 2021-04-08
Payer: MEDICARE

## 2021-04-08 VITALS
SYSTOLIC BLOOD PRESSURE: 137 MMHG | HEIGHT: 69 IN | BODY MASS INDEX: 28.26 KG/M2 | OXYGEN SATURATION: 98 % | HEART RATE: 83 BPM | RESPIRATION RATE: 16 BRPM | TEMPERATURE: 97.8 F | DIASTOLIC BLOOD PRESSURE: 81 MMHG | WEIGHT: 190.8 LBS

## 2021-04-08 DIAGNOSIS — E78.2 MIXED HYPERLIPIDEMIA: ICD-10-CM

## 2021-04-08 DIAGNOSIS — I10 ESSENTIAL HYPERTENSION: ICD-10-CM

## 2021-04-08 DIAGNOSIS — Z76.89 ENCOUNTER TO ESTABLISH CARE: ICD-10-CM

## 2021-04-08 DIAGNOSIS — E55.9 VITAMIN D DEFICIENCY: ICD-10-CM

## 2021-04-08 DIAGNOSIS — E11.40 CONTROLLED TYPE 2 DIABETES MELLITUS WITH DIABETIC NEUROPATHY, WITHOUT LONG-TERM CURRENT USE OF INSULIN (HCC): Primary | ICD-10-CM

## 2021-04-08 PROBLEM — K21.9 GASTRO-ESOPHAGEAL REFLUX DISEASE WITHOUT ESOPHAGITIS: Status: ACTIVE | Noted: 2021-04-08

## 2021-04-08 PROBLEM — Z79.84 LONG TERM (CURRENT) USE OF ORAL HYPOGLYCEMIC DRUGS: Status: ACTIVE | Noted: 2021-04-08

## 2021-04-08 PROBLEM — Z79.82 LONG TERM (CURRENT) USE OF ASPIRIN: Status: ACTIVE | Noted: 2021-04-08

## 2021-04-08 PROCEDURE — G8432 DEP SCR NOT DOC, RNG: HCPCS | Performed by: NURSE PRACTITIONER

## 2021-04-08 PROCEDURE — G8536 NO DOC ELDER MAL SCRN: HCPCS | Performed by: NURSE PRACTITIONER

## 2021-04-08 PROCEDURE — 1090F PRES/ABSN URINE INCON ASSESS: CPT | Performed by: NURSE PRACTITIONER

## 2021-04-08 PROCEDURE — G8754 DIAS BP LESS 90: HCPCS | Performed by: NURSE PRACTITIONER

## 2021-04-08 PROCEDURE — 1101F PT FALLS ASSESS-DOCD LE1/YR: CPT | Performed by: NURSE PRACTITIONER

## 2021-04-08 PROCEDURE — 3046F HEMOGLOBIN A1C LEVEL >9.0%: CPT | Performed by: NURSE PRACTITIONER

## 2021-04-08 PROCEDURE — G8752 SYS BP LESS 140: HCPCS | Performed by: NURSE PRACTITIONER

## 2021-04-08 PROCEDURE — G8427 DOCREV CUR MEDS BY ELIG CLIN: HCPCS | Performed by: NURSE PRACTITIONER

## 2021-04-08 PROCEDURE — G0463 HOSPITAL OUTPT CLINIC VISIT: HCPCS | Performed by: NURSE PRACTITIONER

## 2021-04-08 PROCEDURE — 3017F COLORECTAL CA SCREEN DOC REV: CPT | Performed by: NURSE PRACTITIONER

## 2021-04-08 PROCEDURE — 99214 OFFICE O/P EST MOD 30 MIN: CPT | Performed by: NURSE PRACTITIONER

## 2021-04-08 PROCEDURE — 2022F DILAT RTA XM EVC RTNOPTHY: CPT | Performed by: NURSE PRACTITIONER

## 2021-04-08 PROCEDURE — G8419 CALC BMI OUT NRM PARAM NOF/U: HCPCS | Performed by: NURSE PRACTITIONER

## 2021-04-08 PROCEDURE — G8399 PT W/DXA RESULTS DOCUMENT: HCPCS | Performed by: NURSE PRACTITIONER

## 2021-04-08 NOTE — PROGRESS NOTES
Farshad Ramos is a 70 y.o. female     Chief Complaint   Patient presents with   [facility] Establish Care     1. Have you been to the ER, urgent care clinic since your last visit? Hospitalized since your last visit? No    2. Have you seen or consulted any other health care providers outside of the [address] [place] [address] since your last visit? Include any pap smears or colon screening.  No     Visit Vitals  Temp 97.8 °F (36.6 °C) (Temporal)   Ht 5' 8.5\" (1.74 m)   Wt 190 lb 12.8 oz (86.5 kg)   BMI 28.59 kg/m²

## 2021-04-08 NOTE — PROGRESS NOTES
Kvng Perkins is a 70y.o. year old female who is a new patient to me today (04/08/21). She was previous followed by Dr. Luann Kumar. Assessment & Plan:     Diagnoses and all orders for this visit:    1. Controlled type 2 diabetes mellitus with diabetic neuropathy, without long-term current use of insulin (HCC)  Assessment & Plan:  Stable as per reported cBG hx. Labs ordered. Continue current medication regimen-no refills needed today. Counseled on improved ADA diet, increased exercise, and wt loss. Orders:  -     HEMOGLOBIN A1C WITH EAG; Future  -     METABOLIC PANEL, COMPREHENSIVE; Future  -     CBC WITH AUTOMATED DIFF; Future  -     TSH 3RD GENERATION; Future  -     LIPID PANEL; Future  -     MICROALBUMIN, UR, RAND W/ MICROALB/CREAT RATIO; Future  -     HM DIABETES FOOT EXAM    2. Encounter to establish care    3. Essential hypertension  Assessment & Plan:  Stable. Labs ordered. Weight loss/exercise and monitoring salt intake advised. No changes to current medication regimen-no refills needed today. Orders:  -     CBC WITH AUTOMATED DIFF; Future  -     TSH 3RD GENERATION; Future  -     LIPID PANEL; Future    4. Vitamin D deficiency  -     VITAMIN D, 25 HYDROXY; Future    5. Mixed hyperlipidemia  Assessment & Plan:  Due for labs-ordered. Continue current medication regimen-no refills needed today. Orders:  -     LIPID PANEL; Future    Follow-up and Dispositions    · Return in about 6 months (around 10/8/2021), or as needed, for [employer]. Subjective:   Snehal Rincon was seen today for Establish Care  Medical hx reviewed with her and chart updated. Medication reconciliation completed with her and med list accurate. Had Medicare Wellness on 9/30/2020 with Dr. Mahsa Vazquez never had labs completed that were ordered. Agreeable to have labs and will plan to write updated orders.  No acute complaints today-multiple chronic states at baseline including musculoskeletal pains and increased urinary frequency/bladder spasms/urge incontinence. Type 2 DM-known hx of neuropathy and nephropathy. Pt reports most recently running cBG fasting 120-140's-has not been testing later day/PP. Currently on Synjardy 12.5/1,000 mg BID with meals and Trulicity 1.5 mg weekly. Denies SE from medication or s/s hypo/hyperglycemia. HTN-BP has been fairly well controlled on current med regimen of Valsartan/HCTZ 80-12.5 mg daily. Denies SE or problems with medication. BP slightly higher than previously recorded-pt thinks is related to wt gain-she is up 5-8 lbs since COVID pandemic-has plans to resume walking/exercise now. Hyperlipidemia-Due for updated labs. Currently on Simvastatin 40 mg at HS-denies SE from medication such as leg pain or weakness. Follows with Dr. Nader Patel for GYN-records requested. The following sections were reviewed & updated as appropriate: PMH, PL, PSH, FH, and SH.       Patient Active Problem List   Diagnosis Code    Internal hemorrhoids K64.8    Lumbar spondylosis M47.816    Fibromyalgia M79.7    Headache(784.0) R51    Neck arthritis M47.812    Thalassemia minor D56.3    Vitamin D deficiency E55.9    Left knee pain M25.562    Right shoulder pain M25.511    Essential hypertension I10    Stenosis colon ([address] K56.699    Schatzki's ring K22.2    Hiatal hernia K44.9    Allergic rhinitis due to pollen J30.1    Iron deficiency anemia D50.9    Intraocular pressure increase H40.059    Neck pain M54.2    High cholesterol E78.00    Cataract H26.9    ACE inhibitor intolerance Z78.9    S/P MARTIN (total abdominal hysterectomy) Z90.710    Neuropathic pain of foot M79.2    Advance care planning Z71.89    Controlled type 2 diabetes mellitus with diabetic neuropathy, without long-term current use of insulin (HCC) E11.40    Ulnar neuropathy of left upper extremity G56.22    Acne rosacea L71.9    Type 2 diabetes mellitus with diabetic neuropathy (HCC) E11.40  OAB (overactive bladder) N32.81    Urinary incontinence R32    Type 2 diabetes with nephropathy (HCC) E11.21    Family history of glaucoma Z83.511    Gastro-esophageal reflux disease without esophagitis K21.9    Long term (current) use of aspirin Z79.82    Long term (current) use of oral hypoglycemic drugs Z79.84    Mixed hyperlipidemia E78.2      Past Medical History:   Diagnosis Date    Allergic rhinitis     Dr. Teena Barriga 2015, 2019    Dr. Rubi Duenas. Dr. Darío Rueda.  Chest pain 01/15/09    Dr. Francisco Oleary Chickenpox childhood    DDD (degenerative disc disease), cervical 10/2015    Dr. Williams Narayanan.  DDD (degenerative disc disease), lumbar 2014    Dr. Spring Williamson.  Diabetes ([address] 06/2007    Che Villegas OD. Dr. Shalonda Nava, pod. Dr. Estelle Hyatt.  Endometriosis     Dr. Myra Cadet    Essential hypertension, benign     Dr. Nilam Bermudez, uterine     Dr. Barroso Query     Dr. Mariaelena Gupta 08/2009    Dr. Gifty Murillo. Dr. Shalonda Nava.  Hand paresthesia 2011    Dr. Dhara Aranda. Dr. Caron Garcia.  [facility](491.3) 2012    Dr. Kya Gamino. Dr. Gifty Murillo, ENT.  Hemoptysis 06/13/07    Dr. Alonzo Labor    Hiatal hernia 05/27/15    small. Radha Carrington MD   [facility] Internal hemorrhoids 12/2006, 05/27/15    Dr. Genny Lynn. Dr. Jeanette Hopkins.  Intraocular pressure increase 07/27/05    bilaterally. Dr. Compa Gupta. Dr. Che Villegas.  Knee pain 04/04/13    Bilaterally. due to fall. Dr. Tiffany Magana.  Left knee pain 11/26/14    Left. due to OA, torn medial and lateral meniscal tears. Dr. Chris Severino. Dr. Christy Alegria.  Lumbar spondylosis 3/2008    Dr. Sree Keene. Dr. Honorio Hood (overactive bladder)     Dr. Elliott. Dr. Checo Willis. 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 Trevin Neither. due to Diabetic Neuropathy. Dr. Shalonda Nava.  Proteinuria 2009    Dr. Kirby Almodovar Right shoulder pain 01/30/06    Dr. Alma Rosa Engel. impingement tendinitis    Right shoulder pain 07/2014    Dr. Kirby Hanna.  Schatzki's ring 05/27/15    with partial obstruction at GE junction. Dr. Steven Magana. Lazarus Decamp Dr. Valli Rolling. Dr. Caren Vargas    Stenosis colon [facility]) 05/27/15    SIGMOID WITH TORTUOUS DESCENDING COLON. Dr. Dominguez Umanzor. [facility].  Thalassemia minor 1980s    Urinary incontinence     Dr. Willi Harman. Dr. Moose Corenll D deficiency 05/2011    Rad Johnson NP     Past Surgical History:   Procedure Laterality Date    COLONOSCOPY Left 7/22/2019    COLONOSCOPY performed by Francisco Ricci MD at [place] [address]  07/2005    Dr. Francois Harden N/A 5/29/2019    INCOMPLETE COLONOSCOPY (second attempt) due to incomplete prep/full colon. Dr. Ally Ang. due prn.  FOOT/TOES SURGERY PROC UNLISTED      Left Ornelas's Neuroma. [id number] [facility] HX BREAST BIOPSY Left     12 years ago neg    HX BREAST LUMPECTOMY  10/15/08    Left intraductal papilloma. Dr. Kia Berger HX COLONOSCOPY  05/27/15    due to anemia, rectal bleeding. due q 5 yrs. Dr. Stefani Avila  2001, 12/07/06, 2011    Dr. Chilango Lemon. due q 5 yrs.  HX ENDOSCOPY  05/27/15    due to anemia. Dr. Steven Magana.  HX HEART CATHETERIZATION  01/09/09    Dr. Caterina Pierce ARTHROSCOPY Left 04/17/15    due to [address] Dr. Nick Jansen.  HX MYOMECTOMY  [id number]    x4    HX OOPHORECTOMY Bilateral 1994    HX SHOULDER ARTHROSCOPY Right 11/09/2018    with subacromial decompression and open distal clavicle excision, Dr. Melissa Hirsch    endometriosis, fibroids. Prior to Admission medications    Medication Sig Start Date End Date Taking?  Authorizing Provider   empagliflozin-metFORMIN Alexis Jaramillo) 12.5-1,000 mg per tablet Take 1 Tab by mouth two (2) times daily (with meals). 2/10/21  Yes Reyes Homans, MD   valsartan-hydroCHLOROthiazide (DIOVAN-HCT) 80-12.5 mg per tablet TAKE 1 TABLET BY MOUTH ONCE DAILY 12/23/20  Yes Yamilex Rosa MD   OneTouch Ultra Blue Test Strip strip USE TO TEST 1 TO 2 TIMES A DAY BEFORE BREAKFAST OR 2 HOURS AFTER ANY MEAL OR AT BEDTIME 5/6/20  Yes Daly Quiñones DO   amitriptyline (ELAVIL) 25 mg tablet  3/24/20  Yes Provider, Historical   cholecalciferol (VITAMIN D3) (1000 Units /25 mcg) tablet 1,000 Int'l Units. Yes Provider, Historical   simvastatin (ZOCOR) 40 mg tablet TAKE 1 TABLET BY MOUTH AT BEDTIME 3/30/20  Yes Sg PATTERSON DO   chlorhexidine (PERIDEX) 0.12 % solution  4/17/19  Yes Provider, Historical   traZODone (DESYREL) 100 mg tablet Take 100 mg by mouth nightly. Yes Provider, Historical   Blood-Glucose Meter monitoring kit One Touch Ultra 2 Use to test blood sugar 1-2 per day (blood sugar before breakfast or 2 hours after any meal or at bedtime) DX.   E11.65 5/5/18  Yes Daly Quiñones DO   sodium chloride (OCEAN) 0.65 % nasal spray 1 Spray as needed for Congestion. Yes Provider, Historical   solifenacin (VESICARE) 10 mg tablet Take 10 mg by mouth daily. Yes Provider, Historical   aspirin delayed-release 81 mg tablet Take 1 Tab by mouth daily. 3/14/17  Yes Daly Quiñones DO   valACYclovir (VALTREX) 500 mg tablet 500 mg daily. 9/16/16  Yes Provider, Historical   evening primrose oil (EVENING PRIMROSE) 500 mg cap Take 1 Cap by mouth two (2) times a day. Yes Provider, Historical   cyclobenzaprine (FLEXERIL) 10 mg tablet Take 10 mg by mouth as needed. Yes Provider, Historical   tramadol (ULTRAM) 50 mg tablet Take 50 mg by mouth every eight (8) hours as needed.    Yes Provider, Historical   dulaglutide (Trulicity) 1.5 RL/7.5 mL sub-q pen INJECT 0.5ML (=1.5 MG)     SUBCUTANEOUSLY EVERY 7 DAYS 4/14/21   Juanis Ricardo MD   Blood Pressure Monitor kit 1 Device by Does Not Apply route daily. 9/30/20   Jesica Briana,    [place] 6.5 mg inst  3/19/20   Provider, Historical          Allergies   Allergen Reactions    Influenza Virus Vaccine, Specific Swelling     R ARM PAIN, NUMBNESS, SWELLING  R HAND NUMBNESS AND TINGLING, WORSE IN 4TH AND FIFTH FINGERS    Lisinopril Cough     Other reaction(s): Lisinopril  Other reaction(s): coughing, Other (see comments)  Coughing      Singulair [Montelukast] Other (comments)     headache            Review of Systems   Constitutional: Negative for chills, fever and malaise/fatigue. Respiratory: Negative for cough, hemoptysis, sputum production, shortness of breath and wheezing. Cardiovascular: Negative for chest pain, palpitations, orthopnea, claudication and leg swelling. Gastrointestinal: Negative for abdominal pain, blood in stool, constipation, diarrhea, heartburn, melena, nausea and vomiting. Genitourinary: Positive for frequency. Negative for dysuria and hematuria. Musculoskeletal: Positive for back pain (chronic), joint pain (chronic) and myalgias (chronic). Negative for falls. Neurological: Positive for tingling and headaches (chronic). Negative for dizziness, speech change, focal weakness, seizures, loss of consciousness and weakness. Endo/Heme/Allergies: Negative for polydipsia. Psychiatric/Behavioral: Negative. Objective:   Physical Exam  Vitals signs reviewed. Constitutional:       General: She is not in acute distress. Appearance: She is well-developed, well-groomed and overweight. HENT:      Head: Normocephalic and atraumatic. Eyes:      General: No scleral icterus. Neck:      Musculoskeletal: Normal range of motion and neck supple. Thyroid: No thyromegaly. Vascular: No carotid bruit or JVD. Cardiovascular:      Rate and Rhythm: Normal rate and regular rhythm. Pulses: Normal pulses.            Dorsalis pedis pulses are 2+ on the right side and 2+ on the left side. Posterior tibial pulses are 2+ on the right side and 2+ on the left side. Heart sounds: Normal heart sounds, S1 normal and S2 normal.   Pulmonary:      Effort: Pulmonary effort is normal. No respiratory distress. Breath sounds: Normal breath sounds. Abdominal:      General: Bowel sounds are normal. There is no distension. Palpations: Abdomen is soft. Tenderness: There is no abdominal tenderness. There is no right CVA tenderness or left CVA tenderness. Musculoskeletal: Normal range of motion. Right lower leg: No edema. Left lower leg: No edema. Left foot: Deformity (hammer toe 2nd metatarsal) present. Feet:      Right foot:      Skin integrity: No ulcer. Toenail Condition: Right toenails are normal.      Left foot:      Skin integrity: No ulcer. Toenail Condition: Left toenails are normal.      Comments: Good sensation bilateral feet and toes with monofilament testing. Lymphadenopathy:      Cervical: No cervical adenopathy. Skin:     General: Skin is warm and dry. Neurological:      Mental Status: She is alert and oriented to person, place, and time. Motor: No weakness. Coordination: Coordination normal.      Gait: Gait normal.   Psychiatric:         Mood and Affect: Mood normal.         Behavior: Behavior normal. Behavior is cooperative. Visit Vitals  /81 (BP 1 Location: Left upper arm, BP Patient Position: Sitting, BP Cuff Size: Adult)   Pulse 83   Temp 97.8 °F (36.6 °C) (Temporal)   Resp 16   Ht 5' 8.5\" (1.74 m)   Wt 190 lb 12.8 oz (86.5 kg)   SpO2 98%   BMI 28.59 kg/m²         Disclaimer:  Advised her to call back or return to office if symptoms worsen/change/persist.  Discussed expected course/resolution/complications of diagnosis in detail with patient. Medication risks/benefits/costs/interactions/alternatives discussed with patient.   She was given an after visit summary which includes diagnoses, current medications, & vitals. She expressed understanding and agrees with the diagnosis and plan.         JANI Laws

## 2021-04-08 NOTE — PATIENT INSTRUCTIONS
Diabetes Foot Health: Care Instructions Your Care Instructions When you have diabetes, your feet need extra care and attention. Diabetes can damage the nerve endings and blood vessels in your feet, making you less likely to notice when your feet are injured. Diabetes also limits your body's ability to fight infection and get blood to areas that need it. If you get a minor foot injury, it could become an ulcer or a serious infection. With good foot care, you can prevent most of these problems. Caring for your feet can be quick and easy. Most of the care can be done when you are bathing or getting ready for bed. Follow-up care is a key part of your treatment and safety. Be sure to make and go to all appointments, and call your doctor if you are having problems. It's also a good idea to know your test results and keep a list of the medicines you take. How can you care for yourself at home? · Keep your blood sugar close to normal by watching what and how much you eat, monitoring blood sugar, taking medicines if prescribed, and getting regular exercise. · Do not smoke. Smoking affects blood flow and can make foot problems worse. If you need help quitting, talk to your doctor about stop-smoking programs and medicines. These can increase your chances of quitting for good. · Eat a diet that is low in fats. High fat intake can cause fat to build up in your blood vessels and decrease blood flow. · Inspect your feet daily for blisters, cuts, cracks, or sores. If you cannot see well, use a mirror or have someone help you. · Take care of your feet: 
? Wash your feet every day. Use warm (not hot) water. Check the water temperature with your wrists or other part of your body, not your feet. ? Dry your feet well. Pat them dry. Do not rub the skin on your feet too hard. Dry well between your toes. If the skin on your feet stays moist, bacteria or a fungus can grow, which can lead to infection. ?  Keep your skin soft. Use moisturizing skin cream to keep the skin on your feet soft and prevent calluses and cracks. But do not put the cream between your toes, and stop using any cream that causes a rash. ? Clean underneath your toenails carefully. Do not use a sharp object to clean underneath your toenails. Use the blunt end of a nail file or other rounded tool. ? Trim and file your toenails straight across to prevent ingrown toenails. Use a nail clipper, not scissors. Use an emery board to smooth the edges. · Change socks daily. Socks without seams are best, because seams often rub the feet. You can find socks for people with diabetes from specialty catalogs. · Look inside your shoes every day for things like gravel or torn linings, which could cause blisters or sores. · Buy shoes that fit well: 
? Look for shoes that have plenty of space around the toes. This helps prevent bunions and blisters. ? Try on shoes while wearing the kind of socks you will usually wear with the shoes. ? Avoid plastic shoes. They may rub your feet and cause blisters. Good shoes should be made of materials that are flexible and breathable, such as leather or cloth. ? Break in new shoes slowly by wearing them for no more than an hour a day for several days. Take extra time to check your feet for red areas, blisters, or other problems after you wear new shoes. · Do not go barefoot. Do not wear sandals, and do not wear shoes with very thin soles. Thin soles are easy to puncture. They also do not protect your feet from hot pavement or cold weather. · Have your doctor check your feet during each visit. If you have a foot problem, see your doctor. Do not try to treat an early foot problem at home. Home remedies or treatments that you can buy without a prescription (such as corn removers) can be harmful. · Always get early treatment for foot problems. A minor irritation can lead to a major problem if not properly cared for early.  
When should you call for help? Call your doctor now or seek immediate medical care if: 
  · You have a foot sore, an ulcer or break in the skin that is not healing after 4 days, bleeding corns or calluses, or an ingrown toenail.  
  · You have blue or black areas, which can mean bruising or blood flow problems.  
  · You have peeling skin or tiny blisters between your toes or cracking or oozing of the skin.  
  · You have a fever for more than 24 hours and a foot sore.  
  · You have new numbness or tingling in your feet that does not go away after you move your feet or change positions.  
  · You have unexplained or unusual swelling of the foot or ankle. Watch closely for changes in your health, and be sure to contact your doctor if: 
  · You cannot do proper foot care. Where can you learn more? Go to http://www.gray.com/ Enter A739 in the search box to learn more about \"Diabetes Foot Health: Care Instructions. \" Current as of: August 31, 2020               Content Version: 12.8 © 2006-2021 [employer]. Care instructions adapted under license by [employer] (which disclaims liability or warranty for this information). If you have questions about a medical condition or this instruction, always ask your healthcare professional. [address] any warranty or liability for your use of this information.

## 2021-04-14 ENCOUNTER — PATIENT MESSAGE (OUTPATIENT)
Dept: INTERNAL MEDICINE CLINIC | Age: 71
End: 2021-04-14

## 2021-04-18 PROBLEM — E78.2 MIXED HYPERLIPIDEMIA: Status: ACTIVE | Noted: 2021-04-18

## 2021-04-18 NOTE — ASSESSMENT & PLAN NOTE
Stable as per reported cBG hx. Labs ordered. Continue current medication regimen-no refills needed today. Counseled on improved ADA diet, increased exercise, and wt loss.

## 2021-04-18 NOTE — ASSESSMENT & PLAN NOTE
Stable. Labs ordered. Weight loss/exercise and monitoring salt intake advised. No changes to current medication regimen-no refills needed today.

## 2021-04-21 ENCOUNTER — PATIENT MESSAGE (OUTPATIENT)
Dept: INTERNAL MEDICINE CLINIC | Age: 71
End: 2021-04-21

## 2021-04-29 ENCOUNTER — TELEPHONE (OUTPATIENT)
Dept: INTERNAL MEDICINE CLINIC | Age: 71
End: 2021-04-29

## 2021-05-09 RX ORDER — DULAGLUTIDE 1.5 MG/.5ML
INJECTION, SOLUTION SUBCUTANEOUS
Qty: 12 EACH | Refills: 0 | Status: SHIPPED | OUTPATIENT
Start: 2021-05-09 | End: 2021-08-11

## 2021-05-10 NOTE — TELEPHONE ENCOUNTER
From: Britt Ashraf  Sent: 5/7/2021 6:26 PM EDT  To: JANI Hill  Subject: RE:Lab results    I will bring my A1C down. I do need my Trulicity prescription to be a 90 day supply with [facility] [phone]. Will you get that straight for me. Thanks for the info.

## 2021-06-22 ENCOUNTER — TELEPHONE (OUTPATIENT)
Dept: INTERNAL MEDICINE CLINIC | Age: 71
End: 2021-06-22

## 2021-06-23 ENCOUNTER — OFFICE VISIT (OUTPATIENT)
Dept: INTERNAL MEDICINE CLINIC | Age: 71
End: 2021-06-23
Payer: MEDICARE

## 2021-06-23 VITALS
HEART RATE: 69 BPM | TEMPERATURE: 97.6 F | HEIGHT: 69 IN | RESPIRATION RATE: 16 BRPM | WEIGHT: 191.6 LBS | DIASTOLIC BLOOD PRESSURE: 65 MMHG | SYSTOLIC BLOOD PRESSURE: 109 MMHG | BODY MASS INDEX: 28.38 KG/M2 | OXYGEN SATURATION: 99 %

## 2021-06-23 DIAGNOSIS — R04.0 BLEEDING FROM THE NOSE: ICD-10-CM

## 2021-06-23 DIAGNOSIS — J01.10 ACUTE NON-RECURRENT FRONTAL SINUSITIS: Primary | ICD-10-CM

## 2021-06-23 DIAGNOSIS — R05.9 COUGH: ICD-10-CM

## 2021-06-23 PROCEDURE — 1101F PT FALLS ASSESS-DOCD LE1/YR: CPT | Performed by: NURSE PRACTITIONER

## 2021-06-23 PROCEDURE — 1090F PRES/ABSN URINE INCON ASSESS: CPT | Performed by: NURSE PRACTITIONER

## 2021-06-23 PROCEDURE — G8536 NO DOC ELDER MAL SCRN: HCPCS | Performed by: NURSE PRACTITIONER

## 2021-06-23 PROCEDURE — G8399 PT W/DXA RESULTS DOCUMENT: HCPCS | Performed by: NURSE PRACTITIONER

## 2021-06-23 PROCEDURE — G9899 SCRN MAM PERF RSLTS DOC: HCPCS | Performed by: NURSE PRACTITIONER

## 2021-06-23 PROCEDURE — 99213 OFFICE O/P EST LOW 20 MIN: CPT | Performed by: NURSE PRACTITIONER

## 2021-06-23 PROCEDURE — 3017F COLORECTAL CA SCREEN DOC REV: CPT | Performed by: NURSE PRACTITIONER

## 2021-06-23 PROCEDURE — G0463 HOSPITAL OUTPT CLINIC VISIT: HCPCS | Performed by: NURSE PRACTITIONER

## 2021-06-23 PROCEDURE — G8427 DOCREV CUR MEDS BY ELIG CLIN: HCPCS | Performed by: NURSE PRACTITIONER

## 2021-06-23 PROCEDURE — G8752 SYS BP LESS 140: HCPCS | Performed by: NURSE PRACTITIONER

## 2021-06-23 PROCEDURE — G8754 DIAS BP LESS 90: HCPCS | Performed by: NURSE PRACTITIONER

## 2021-06-23 PROCEDURE — G8419 CALC BMI OUT NRM PARAM NOF/U: HCPCS | Performed by: NURSE PRACTITIONER

## 2021-06-23 PROCEDURE — G8432 DEP SCR NOT DOC, RNG: HCPCS | Performed by: NURSE PRACTITIONER

## 2021-06-23 RX ORDER — AZITHROMYCIN 250 MG/1
TABLET, FILM COATED ORAL
Qty: 6 TABLET | Refills: 0 | Status: SHIPPED | OUTPATIENT
Start: 2021-06-23 | End: 2021-06-28

## 2021-06-23 RX ORDER — ACETAMINOPHEN 500 MG
2 TABLET ORAL DAILY
COMMUNITY

## 2021-06-23 NOTE — PATIENT INSTRUCTIONS
Sinusitis: Care Instructions  Your Care Instructions     Sinusitis is an infection of the lining of the sinus cavities in your head. Sinusitis often follows a cold. It causes pain and pressure in your head and face. In most cases, sinusitis gets better on its own in 1 to 2 weeks. But some mild symptoms may last for several weeks. Sometimes antibiotics are needed. Follow-up care is a key part of your treatment and safety. Be sure to make and go to all appointments, and call your doctor if you are having problems. It's also a good idea to know your test results and keep a list of the medicines you take. How can you care for yourself at home? · Take an over-the-counter pain medicine, such as acetaminophen (Tylenol), ibuprofen (Advil, Motrin), or naproxen (Aleve). Read and follow all instructions on the label. · If the doctor prescribed antibiotics, take them as directed. Do not stop taking them just because you feel better. You need to take the full course of antibiotics. · Be careful when taking over-the-counter cold or flu medicines and Tylenol at the same time. Many of these medicines have acetaminophen, which is Tylenol. Read the labels to make sure that you are not taking more than the recommended dose. Too much acetaminophen (Tylenol) can be harmful. · Breathe warm, moist air from a steamy shower, a hot bath, or a sink filled with hot water. Avoid cold, dry air. Using a humidifier in your home may help. Follow the directions for cleaning the machine. · Use saline (saltwater) nasal washes. This can help keep your nasal passages open and wash out mucus and bacteria. You can buy saline nose drops at a grocery store or drugstore. Or you can make your own at home by adding 1 teaspoon of salt and 1 teaspoon of baking soda to 2 cups of distilled water. If you make your own, fill a bulb syringe with the solution, insert the tip into your nostril, and squeeze gently. Pansy Lavell your nose.   · Put a hot, wet towel or a warm gel pack on your face 3 or 4 times a day for 5 to 10 minutes each time. · Try a decongestant nasal spray like oxymetazoline (Afrin). Do not use it for more than 3 days in a row. Using it for more than 3 days can make your congestion worse. When should you call for help? Call your doctor now or seek immediate medical care if:    · You have new or worse swelling or redness in your face or around your eyes.     · You have a new or higher fever. Watch closely for changes in your health, and be sure to contact your doctor if:    · You have new or worse facial pain.     · The mucus from your nose becomes thicker (like pus) or has new blood in it.     · You are not getting better as expected. Where can you learn more? Go to http://www.gray.com/  Enter A419 in the search box to learn more about \"Sinusitis: Care Instructions. \"  Current as of: December 2, 2020               Content Version: 12.8  © 2006-2021 [employer]. Care instructions adapted under license by [employer] (which disclaims liability or warranty for this information). If you have questions about a medical condition or this instruction, always ask your healthcare professional. [place] [address] any warranty or liability for your use of this information.

## 2021-06-23 NOTE — PROGRESS NOTES
Juana Cosme is a 70 y.o. female who was seen in clinic today (6/23/2021) for an acute visit. Assessment & Plan:   Diagnoses and all orders for this visit:    1. Acute non-recurrent frontal sinusitis  Comments:  Azithromycin as directed-education given. (may also help sinus inflammation). Start saline nasal irrigation as directed. No steroids to avoid hyperglycemia. Orders:  -     azithromycin (ZITHROMAX) 250 mg tablet; use as directed    2. Cough  Comments:  Secondary to PN draiange. Start saline nasal irrigation. 3. Bleeding from the nose  Comments:  No acute bleeding. Start saline nasal irrigation. Follow-up and Dispositions    · Return if symptoms worsen or fail to improve. Subjective:   Gail Gomez was seen today for Cough and Headache  Started last Friday-did go to [facility] prior and thought allergy related. Started taking Zyrtec. Dry cough with sinus/nasal congestion, Frontal sinus HA. Post nasal drainage. Small amt blood from nose 3 times. Drainage is thick yellowish/green. Mild back ache but that has resolved. Denies fevers, chills, sore throat, earache, SOB, wheezing, chest congestion, CP, GI/ complaints, myalgias, rash or skin changes. No known sick contacts. Type 2 DM-cBG's have been running 124-150's. Prior to Admission medications    Medication Sig Start Date End Date Taking? Authorizing Provider   cholecalciferol (VITAMIN D3) (2,000 UNITS /50 MCG) cap capsule Take 2 Capsules by mouth daily. With food   Yes Provider, Historical   dulaglutide (Trulicity) 1.5 GM/7.7 mL sub-q pen INJECT 0.5ML (=1.5 MG)     SUBCUTANEOUSLY EVERY 7 DAYS 5/9/21  Yes JANI Worrell   empagliflozin-metFORMIN (Synjardy) 12.5-1,000 mg per tablet Take 1 Tab by mouth two (2) times daily (with meals).  2/10/21  Yes Carlito Schwartz MD   valsartan-hydroCHLOROthiazide (DIOVAN-HCT) 80-12.5 mg per tablet TAKE 1 TABLET BY MOUTH ONCE DAILY 12/23/20  Yes Chelsea Rosa Asp, MD   Blood Pressure Monitor kit 1 Device by Does Not Apply route daily. 9/30/20  Yes Daly Quiñones DO   OneTouch Ultra Blue Test Strip strip USE TO TEST 1 TO 2 TIMES A DAY BEFORE BREAKFAST OR 2 HOURS AFTER ANY MEAL OR AT BEDTIME 5/6/20  Yes Daly Quiñones DO   amitriptyline (ELAVIL) 25 mg tablet  3/24/20  Yes Provider, Historical   simvastatin (ZOCOR) 40 mg tablet TAKE 1 TABLET BY MOUTH AT BEDTIME 3/30/20  Yes Daly Quiñones DO   chlorhexidine (PERIDEX) 0.12 % solution  4/17/19  Yes Provider, Historical   traZODone (DESYREL) 100 mg tablet Take 100 mg by mouth nightly. Yes Provider, Historical   Blood-Glucose Meter monitoring kit One Touch Ultra 2 Use to test blood sugar 1-2 per day (blood sugar before breakfast or 2 hours after any meal or at bedtime) DX.   E11.65 5/5/18  Yes Sg Muniz R, DO   solifenacin (VESICARE) 10 mg tablet Take 10 mg by mouth daily. Yes Provider, Historical   aspirin delayed-release 81 mg tablet Take 1 Tab by mouth daily. 3/14/17  Yes Daly Quiñones DO   valACYclovir (VALTREX) 500 mg tablet 500 mg daily. 9/16/16  Yes Provider, Historical   evening primrose oil (EVENING PRIMROSE) 500 mg cap Take 1 Cap by mouth two (2) times a day. Yes Provider, Historical   cyclobenzaprine (FLEXERIL) 10 mg tablet Take 10 mg by mouth as needed. Yes Provider, Historical   tramadol (ULTRAM) 50 mg tablet Take 50 mg by mouth every eight (8) hours as needed. Yes Provider, Historical   [place] 6.5 mg inst  3/19/20   Provider, Historical   sodium chloride (OCEAN) 0.65 % nasal spray 1 Spray as needed for Congestion.   Patient not taking: Reported on 6/23/2021    Provider, Historical          Allergies   Allergen Reactions    Influenza Virus Vaccine, Specific Swelling     R ARM PAIN, NUMBNESS, SWELLING  R HAND NUMBNESS AND TINGLING, WORSE IN 4TH AND FIFTH FINGERS    Lisinopril Cough     Other reaction(s): Lisinopril  Other reaction(s): coughing, Other (see comments)  Coughing      Singulair [Montelukast] Other (comments)     headache           ROS - per HPI        Objective:   Physical Exam  Vitals reviewed. Constitutional:       General: She is not in acute distress. Appearance: She is well-groomed. She is ill-appearing. HENT:      Right Ear: Tympanic membrane, ear canal and external ear normal.      Left Ear: Tympanic membrane, ear canal and external ear normal.      Nose: Congestion present. No signs of injury. Right Nostril: No epistaxis. Left Nostril: No epistaxis. Right Turbinates: Swollen. Left Turbinates: Swollen. Right Sinus: Frontal sinus tenderness present. No maxillary sinus tenderness. Left Sinus: Frontal sinus tenderness present. No maxillary sinus tenderness. Mouth/Throat:      Mouth: Mucous membranes are moist.      Pharynx: Posterior oropharyngeal erythema (post nasal drainage-purulent) present. No oropharyngeal exudate. Tonsils: No tonsillar abscesses. Pulmonary:      Effort: Pulmonary effort is normal. No respiratory distress. Breath sounds: Normal breath sounds. No wheezing, rhonchi or rales. Musculoskeletal:      Cervical back: Normal range of motion and neck supple. Lymphadenopathy:      Cervical: No cervical adenopathy. Neurological:      Mental Status: She is alert and oriented to person, place, and time. Psychiatric:         Mood and Affect: Mood normal.         Behavior: Behavior normal. Behavior is cooperative. Visit Vitals  /65   Pulse 69   Temp 97.6 °F (36.4 °C) (Temporal)   Resp 16   Ht 5' 8.5\" (1.74 m)   Wt 191 lb 9.6 oz (86.9 kg)   SpO2 99%   BMI 28.71 kg/m²         Disclaimer:  Advised her to call back or return to office if symptoms worsen/change/persist.  Discussed expected course/resolution/complications of diagnosis in detail with patient. Medication risks/benefits/costs/interactions/alternatives discussed with patient.   She was given an after visit summary which includes diagnoses, current medications, & vitals. She expressed understanding and agrees with the diagnosis and plan.         JANI Domingo

## 2021-07-30 DIAGNOSIS — I10 ESSENTIAL HYPERTENSION: ICD-10-CM

## 2021-08-10 DIAGNOSIS — E11.40 CONTROLLED TYPE 2 DIABETES MELLITUS WITH DIABETIC NEUROPATHY, WITHOUT LONG-TERM CURRENT USE OF INSULIN (HCC): ICD-10-CM

## 2021-08-11 RX ORDER — EMPAGLIFLOZIN AND METFORMIN HYDROCHLORIDE 12.5; 1 MG/1; MG/1
1 TABLET ORAL 2 TIMES DAILY WITH MEALS
Qty: 180 TABLET | Refills: 0 | Status: SHIPPED | OUTPATIENT
Start: 2021-08-11 | End: 2021-10-06 | Stop reason: SDUPTHER

## 2021-08-11 RX ORDER — DULAGLUTIDE 1.5 MG/.5ML
INJECTION, SOLUTION SUBCUTANEOUS
Qty: 12 EACH | Refills: 0 | Status: SHIPPED | OUTPATIENT
Start: 2021-08-11 | End: 2021-10-20 | Stop reason: DRUGHIGH

## 2021-08-12 RX ORDER — VALSARTAN AND HYDROCHLOROTHIAZIDE 80; 12.5 MG/1; MG/1
TABLET, FILM COATED ORAL
Qty: 90 TABLET | Refills: 1 | Status: SHIPPED | OUTPATIENT
Start: 2021-08-12 | End: 2022-01-06 | Stop reason: SDUPTHER

## 2021-10-06 ENCOUNTER — OFFICE VISIT (OUTPATIENT)
Dept: INTERNAL MEDICINE CLINIC | Age: 71
End: 2021-10-06
Payer: MEDICARE

## 2021-10-06 VITALS
HEART RATE: 80 BPM | WEIGHT: 194.4 LBS | SYSTOLIC BLOOD PRESSURE: 112 MMHG | TEMPERATURE: 97.3 F | BODY MASS INDEX: 29.46 KG/M2 | HEIGHT: 68 IN | DIASTOLIC BLOOD PRESSURE: 68 MMHG | RESPIRATION RATE: 16 BRPM | OXYGEN SATURATION: 100 %

## 2021-10-06 DIAGNOSIS — Z00.00 MEDICARE ANNUAL WELLNESS VISIT, SUBSEQUENT: Primary | ICD-10-CM

## 2021-10-06 DIAGNOSIS — I10 ESSENTIAL HYPERTENSION: ICD-10-CM

## 2021-10-06 DIAGNOSIS — Z23 NEEDS FLU SHOT: ICD-10-CM

## 2021-10-06 DIAGNOSIS — E78.2 MIXED HYPERLIPIDEMIA: ICD-10-CM

## 2021-10-06 DIAGNOSIS — E55.9 VITAMIN D DEFICIENCY: ICD-10-CM

## 2021-10-06 DIAGNOSIS — E11.65 TYPE 2 DIABETES MELLITUS WITH HYPERGLYCEMIA, WITHOUT LONG-TERM CURRENT USE OF INSULIN (HCC): ICD-10-CM

## 2021-10-06 LAB
25(OH)D3 SERPL-MCNC: 30.6 NG/ML (ref 30–100)
ALBUMIN SERPL-MCNC: 4 G/DL (ref 3.5–5)
ALBUMIN/GLOB SERPL: 1.1 {RATIO} (ref 1.1–2.2)
ALP SERPL-CCNC: 81 U/L (ref 45–117)
ALT SERPL-CCNC: 15 U/L (ref 12–78)
ANION GAP SERPL CALC-SCNC: 5 MMOL/L (ref 5–15)
AST SERPL-CCNC: 13 U/L (ref 15–37)
BASOPHILS # BLD: 0 K/UL (ref 0–0.1)
BASOPHILS NFR BLD: 1 % (ref 0–1)
BILIRUB SERPL-MCNC: 0.4 MG/DL (ref 0.2–1)
BUN SERPL-MCNC: 10 MG/DL (ref 6–20)
BUN/CREAT SERPL: 11 (ref 12–20)
CALCIUM SERPL-MCNC: 10 MG/DL (ref 8.5–10.1)
CHLORIDE SERPL-SCNC: 106 MMOL/L (ref 97–108)
CHOLEST SERPL-MCNC: 159 MG/DL
CO2 SERPL-SCNC: 28 MMOL/L (ref 21–32)
CREAT SERPL-MCNC: 0.94 MG/DL (ref 0.55–1.02)
DIFFERENTIAL METHOD BLD: ABNORMAL
EOSINOPHIL # BLD: 0.1 K/UL (ref 0–0.4)
EOSINOPHIL NFR BLD: 1 % (ref 0–7)
ERYTHROCYTE [DISTWIDTH] IN BLOOD BY AUTOMATED COUNT: 16.3 % (ref 11.5–14.5)
EST. AVERAGE GLUCOSE BLD GHB EST-MCNC: 160 MG/DL
GLOBULIN SER CALC-MCNC: 3.5 G/DL (ref 2–4)
GLUCOSE SERPL-MCNC: 104 MG/DL (ref 65–100)
HBA1C MFR BLD: 7.2 % (ref 4–5.6)
HCT VFR BLD AUTO: 36.8 % (ref 35–47)
HDLC SERPL-MCNC: 81 MG/DL
HDLC SERPL: 2 {RATIO} (ref 0–5)
HGB BLD-MCNC: 11.6 G/DL (ref 11.5–16)
IMM GRANULOCYTES # BLD AUTO: 0 K/UL (ref 0–0.04)
IMM GRANULOCYTES NFR BLD AUTO: 0 % (ref 0–0.5)
LDLC SERPL CALC-MCNC: 66 MG/DL (ref 0–100)
LYMPHOCYTES # BLD: 2.4 K/UL (ref 0.8–3.5)
LYMPHOCYTES NFR BLD: 44 % (ref 12–49)
MCH RBC QN AUTO: 23 PG (ref 26–34)
MCHC RBC AUTO-ENTMCNC: 31.5 G/DL (ref 30–36.5)
MCV RBC AUTO: 72.9 FL (ref 80–99)
MONOCYTES # BLD: 0.3 K/UL (ref 0–1)
MONOCYTES NFR BLD: 6 % (ref 5–13)
NEUTS SEG # BLD: 2.5 K/UL (ref 1.8–8)
NEUTS SEG NFR BLD: 48 % (ref 32–75)
NRBC # BLD: 0 K/UL (ref 0–0.01)
NRBC BLD-RTO: 0 PER 100 WBC
PLATELET # BLD AUTO: 350 K/UL (ref 150–400)
PMV BLD AUTO: 11.6 FL (ref 8.9–12.9)
POTASSIUM SERPL-SCNC: 4 MMOL/L (ref 3.5–5.1)
PROT SERPL-MCNC: 7.5 G/DL (ref 6.4–8.2)
RBC # BLD AUTO: 5.05 M/UL (ref 3.8–5.2)
SODIUM SERPL-SCNC: 139 MMOL/L (ref 136–145)
TRIGL SERPL-MCNC: 60 MG/DL (ref ?–150)
VLDLC SERPL CALC-MCNC: 12 MG/DL
WBC # BLD AUTO: 5.3 K/UL (ref 3.6–11)

## 2021-10-06 PROCEDURE — G8419 CALC BMI OUT NRM PARAM NOF/U: HCPCS | Performed by: NURSE PRACTITIONER

## 2021-10-06 PROCEDURE — G8427 DOCREV CUR MEDS BY ELIG CLIN: HCPCS | Performed by: NURSE PRACTITIONER

## 2021-10-06 PROCEDURE — G8432 DEP SCR NOT DOC, RNG: HCPCS | Performed by: NURSE PRACTITIONER

## 2021-10-06 PROCEDURE — G8399 PT W/DXA RESULTS DOCUMENT: HCPCS | Performed by: NURSE PRACTITIONER

## 2021-10-06 PROCEDURE — 3017F COLORECTAL CA SCREEN DOC REV: CPT | Performed by: NURSE PRACTITIONER

## 2021-10-06 PROCEDURE — 90694 VACC AIIV4 NO PRSRV 0.5ML IM: CPT | Performed by: NURSE PRACTITIONER

## 2021-10-06 PROCEDURE — G8754 DIAS BP LESS 90: HCPCS | Performed by: NURSE PRACTITIONER

## 2021-10-06 PROCEDURE — G9899 SCRN MAM PERF RSLTS DOC: HCPCS | Performed by: NURSE PRACTITIONER

## 2021-10-06 PROCEDURE — G8536 NO DOC ELDER MAL SCRN: HCPCS | Performed by: NURSE PRACTITIONER

## 2021-10-06 PROCEDURE — 2022F DILAT RTA XM EVC RTNOPTHY: CPT | Performed by: NURSE PRACTITIONER

## 2021-10-06 PROCEDURE — 1101F PT FALLS ASSESS-DOCD LE1/YR: CPT | Performed by: NURSE PRACTITIONER

## 2021-10-06 PROCEDURE — G8752 SYS BP LESS 140: HCPCS | Performed by: NURSE PRACTITIONER

## 2021-10-06 PROCEDURE — G0439 PPPS, SUBSEQ VISIT: HCPCS | Performed by: NURSE PRACTITIONER

## 2021-10-06 RX ORDER — GABAPENTIN 300 MG/1
300 CAPSULE ORAL 3 TIMES DAILY
COMMUNITY
Start: 2021-09-28

## 2021-10-06 RX ORDER — EMPAGLIFLOZIN AND METFORMIN HYDROCHLORIDE 12.5; 1 MG/1; MG/1
1 TABLET ORAL 2 TIMES DAILY WITH MEALS
Qty: 180 TABLET | Refills: 1 | Status: SHIPPED | OUTPATIENT
Start: 2021-10-06 | End: 2021-10-20 | Stop reason: SDUPTHER

## 2021-10-06 NOTE — PROGRESS NOTES
Vitamin D improved at 30.6. A1C still above goal at 7.2% (previous 7.3%). Lipid panel now normal and improved from previous. Fasting glucose 104, and remainder of CMP unremarkable. CBC stable. Will send pt MyChart message and recommend increasing Trulicity from 1.5 mg weekly to 3 mg weekly.

## 2021-10-06 NOTE — PROGRESS NOTES
This is the Subsequent Medicare Annual Wellness Exam, performed 12 months or more after the Initial AWV or the last Subsequent AWV    I have reviewed the patient's medical history in detail and updated the computerized patient record. Assessment/Plan   Education and counseling provided:  Are appropriate based on today's review and evaluation  Influenza Vaccine  Cardiovascular screening blood test    1. Medicare annual wellness visit, subsequent  Comments:  Recommended screenings reviewed with pt. Labs ordered. Flu vaccine given. 2. Needs flu shot  -     FLU (FLUAD QUAD INFLUENZA VACCINE,QUAD,ADJUVANTED)  3. Mixed hyperlipidemia  Assessment & Plan:   unclear control, continue current medications pending work up below, lifestyle modifications recommended  Orders:  -     LIPID PANEL; Future  4. Essential hypertension  Assessment & Plan:   well controlled, continue current medications pending work up below, lifestyle modifications recommended  Orders:  -     CBC WITH AUTOMATED DIFF; Future  -     METABOLIC PANEL, COMPREHENSIVE; Future  5. Type 2 diabetes mellitus with hyperglycemia, without long-term current use of insulin ([facility] [address]  Assessment & Plan:   unclear control, continue current medications pending work up below, lifestyle modifications recommended  Orders:  -     HEMOGLOBIN A1C WITH EAG; Future  6.  Vitamin D deficiency  -     VITAMIN D, 25 HYDROXY; Future       Depression Risk Factor Screening     3 most recent PHQ Screens 10/6/2021   Little interest or pleasure in doing things Not at all   Feeling down, depressed, irritable, or hopeless Not at all   Total Score PHQ 2 0   Trouble falling or staying asleep, or sleeping too much -   Feeling tired or having little energy -   Poor appetite, weight loss, or overeating -   Feeling bad about yourself - or that you are a failure or have let yourself or your family down -   Trouble concentrating on things such as school, work, reading, or watching TV -   Moving or speaking so slowly that other people could have noticed; or the opposite being so fidgety that others notice -   Thoughts of being better off dead, or hurting yourself in some way -   PHQ 9 Score -       Alcohol Risk Screen    Do you average more than 1 drink per night or more than 7 drinks a week:  No    On any one occasion in the past three months have you have had more than 3 drinks containing alcohol:  No        Functional Ability and Level of Safety    Hearing: Hearing is good. Activities of Daily Living: The home contains: no safety equipment. Patient does total self care      Ambulation: with no difficulty     Fall Risk:  Fall Risk Assessment, last 12 mths 10/6/2021   Able to walk? Yes   Fall in past 12 months? 0   Do you feel unsteady? 0   Are you worried about falling 0      Abuse Screen:  Patient is not abused       Cognitive Screening    Has your family/caregiver stated any concerns about your memory: no       Physical Exam  Vitals reviewed. Constitutional:       General: She is not in acute distress. Appearance: She is well-developed, well-groomed and overweight. HENT:      Head: Normocephalic and atraumatic. Eyes:      General: No scleral icterus. Neck:      Thyroid: No thyromegaly. Vascular: No carotid bruit or JVD. Cardiovascular:      Rate and Rhythm: Normal rate and regular rhythm. Pulses: Normal pulses. Dorsalis pedis pulses are 2+ on the right side and 2+ on the left side. Posterior tibial pulses are 2+ on the right side and 2+ on the left side. Heart sounds: Normal heart sounds, S1 normal and S2 normal.   Pulmonary:      Effort: Pulmonary effort is normal. No respiratory distress. Breath sounds: Normal breath sounds. Abdominal:      General: Bowel sounds are normal. There is no distension. Palpations: Abdomen is soft. Tenderness: There is no abdominal tenderness. There is no right CVA tenderness or left CVA tenderness. Musculoskeletal:         General: Normal range of motion. Cervical back: Normal range of motion and neck supple. Right lower leg: No edema. Left lower leg: No edema. Left foot: Deformity (hammer toe 2nd metatarsal) present. Feet:      Right foot:      Skin integrity: No ulcer. Toenail Condition: Right toenails are normal.      Left foot:      Skin integrity: No ulcer. Toenail Condition: Left toenails are normal.      Comments: Good sensation bilateral feet and toes with monofilament testing. Lymphadenopathy:      Cervical: No cervical adenopathy. Skin:     General: Skin is warm and dry. Neurological:      Mental Status: She is alert and oriented to person, place, and time. Motor: No weakness. Coordination: Coordination normal.      Gait: Gait normal.   Psychiatric:         Mood and Affect: Mood normal.         Behavior: Behavior normal. Behavior is cooperative.          Health Maintenance Due     Health Maintenance Due   Topic Date Due    Flu Vaccine (1) 09/01/2021       Patient Care Team   Patient Care Team:  [place], [place] as PCP - General (Nurse Practitioner)  [place] as PCP - [facility]  Lennox Belfast, MD (Gastroenterology)  Karla Garvey MD (Rheumatology)  Nubia Daniel MD (Cardiology)  Davina Ricci MD (Neurology)  Sergio Hooker DO (Dermatology)  Kelsie Clay MD (Podiatry)  Gayle Gregory MD (Dermatology)  Julisa Barboza MD (Ophthalmology)  Nany Champagne MD (Ophthalmology)  Pratima Riojas MD (Otolaryngology)  Nicole Matthews MD (Urology)  Pita Stout MD (Obstetrics & Gynecology)    History     Patient Active Problem List   Diagnosis Code    Internal hemorrhoids K64.8    Lumbar spondylosis M47.816    Fibromyalgia M79.7    Headache(784.0) R51    Neck arthritis M47.812    Thalassemia minor D56.3    Vitamin D deficiency E55.9    Left knee pain M25.562    Right shoulder pain M25.511    Essential hypertension I10    Stenosis colon ([facility]) K56.699    Schatzki's ring K22.2    Hiatal hernia K44.9    Allergic rhinitis due to pollen J30.1    Iron deficiency anemia D50.9    Intraocular pressure increase H40.059    Neck pain M54.2    High cholesterol E78.00    Cataract H26.9    ACE inhibitor intolerance Z78.9    S/P MARTIN (total abdominal hysterectomy) Z90.710    Neuropathic pain of foot M79.2    Advance care planning Z71.89    Controlled type 2 diabetes mellitus with diabetic neuropathy, without long-term current use of insulin ([facility]) E11.40    Ulnar neuropathy of left upper extremity G56.22    Acne rosacea L71.9    Type 2 diabetes mellitus with diabetic neuropathy ([facility]) E11.40    OAB (overactive bladder) N32.81    Urinary incontinence R32    Type 2 diabetes with nephropathy ([facility]) E11.21    Family history of glaucoma Z83.511    Gastro-esophageal reflux disease without esophagitis K21.9    Long term (current) use of aspirin Z79.82    Long term (current) use of oral hypoglycemic drugs Z79.84    Mixed hyperlipidemia E78.2     Past Medical History:   Diagnosis Date    Allergic rhinitis     Dr. Magnolia Hampton 2015, 2019    Dr. Gayle Ricci. Dr. [place].  Chest pain 01/15/09    Dr. Richard Alvarado Chickenpox childhood    DDD (degenerative disc disease), cervical 10/2015    Dr. Shantal Alaniz.  DDD (degenerative disc disease), lumbar 2014    Dr. Nita Alvarez.  [facility] 06/2007    Sydney Redding, OD. Dr. Neo Garcia, pod. Dr. Enrique Taylor.  Endometriosis     Dr. Hurtado Resides    Essential hypertension, benign     Dr. Natalie Pelayo, uterine     Dr. Margarito Klein 08/2009    Dr. Yvonne Zhang. Dr. Neo Garcia.  Hand paresthesia 2011    Dr. Andreas Govea. Dr. Lucie Farnsworth.  KAAAJCOQ(564.0) 2012    Dr. Sheri Alvarez. Dr. Yvonne Zhang, ENT.     Hemoptysis 06/13/07    Dr. Vannessa Elkins    Hiatal hernia 05/27/15    small. Trip Mccollum MD   [facility] Internal hemorrhoids 12/2006, 05/27/15    Dr. Tl Ann. Dr. Pura Schuster.  Intraocular pressure increase 07/27/05    bilaterally. Dr. Wilbert Duque. Dr. Claire Chand.  Knee pain 04/04/13    Bilaterally. due to fall. Dr. Jayesh Jones.  Left knee pain 11/26/14    Left. due to OA, torn medial and lateral meniscal tears. Dr. Sara Rutledge. Dr. Hay Heart.  Lumbar spondylosis 3/2008    Dr. Destini Sheppard. Dr. Joseph Lind (overactive bladder)     Dr. Nelly Hampton. Dr. Alessandra Campa. 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 Alexandra Prose. due to Diabetic Neuropathy. Dr. Ayush Howard.  Proteinuria 2009    Dr. Alexis Shirley Right shoulder pain 01/30/06    Dr. Rosa Sterling. impingement tendinitis    Right shoulder pain 07/2014    Dr. Padma Bentley.  Schatzki's ring 05/27/15    with partial obstruction at GE junction. Dr. Pura Schuster. Yung Shape     Dr. Destini Sheppard. Dr. Rich Horowitz    Stenosis colon [facility]) 05/27/15    SIGMOID WITH TORTUOUS DESCENDING COLON. Dr. Janna Ayala. [facility].  Thalassemia minor 1980s    Urinary incontinence     Dr. Nelly Hampton. Dr. Lilliam Durham D deficiency 05/2011    Papo Faulkner NP      Past Surgical History:   Procedure Laterality Date    COLONOSCOPY Left 7/22/2019    COLONOSCOPY performed by Didier Terrell MD at [place] [address]  07/2005    Dr. Renetta Almonte N/A 5/29/2019    INCOMPLETE COLONOSCOPY (second attempt) due to incomplete prep/full colon. Dr. Trip Mccollum. due prn.  FOOT/TOES SURGERY PROC UNLISTED      Left Ornelas's Neuroma. [id number] [facility] HX BREAST BIOPSY Left     12 years ago neg    HX BREAST LUMPECTOMY  10/15/08    Left intraductal papilloma. Dr. Field Barefoot HX COLONOSCOPY  05/27/15    due to anemia, rectal bleeding. due q 5 yrs.  Dr. Trip Mccollum  HX COLONOSCOPY  2001, 12/07/06, 2011    Dr. Jesusita Pederson. due q 5 yrs.  HX ENDOSCOPY  05/27/15    due to anemia. Dr. Rhett Belle.  HX HEART CATHETERIZATION  01/09/09    Dr. Bethany Vega ARTHROSCOPY Left 04/17/15    due to [address]. Dr. Jaime Grullon.  HX MYOMECTOMY  [id number]    x4    HX OOPHORECTOMY Bilateral 1994    HX SHOULDER ARTHROSCOPY Right 11/09/2018    with subacromial decompression and open distal clavicle excision, Dr. Joaquim Gonzalez    endometriosis, fibroids. Current Outpatient Medications   Medication Sig Dispense Refill    gabapentin (NEURONTIN) 300 mg capsule Take 300 mg by mouth three (3) times daily.  valsartan-hydroCHLOROthiazide (DIOVAN-HCT) 80-12.5 mg per tablet TAKE 1 TABLET ONCE DAILY 90 Tablet 1    dulaglutide (Trulicity) 1.5 RY/7.6 mL sub-q pen INJECT 0.5ML (=1.5MG)      SUBCUTANEOUSLY EVERY 7 DAYS 12 Each 0    empagliflozin-metFORMIN (Synjardy) 12.5-1,000 mg per tablet Take 1 Tablet by mouth two (2) times daily (with meals). 180 Tablet 0    cholecalciferol (VITAMIN D3) (2,000 UNITS /50 MCG) cap capsule Take 2 Capsules by mouth daily. With food      Blood Pressure Monitor kit 1 Device by Does Not Apply route daily. 1 Kit 0    OneTouch Ultra Blue Test Strip strip USE TO TEST 1 TO 2 TIMES A DAY BEFORE BREAKFAST OR 2 HOURS AFTER ANY MEAL OR AT BEDTIME 100 Strip 3    amitriptyline (ELAVIL) 25 mg tablet       simvastatin (ZOCOR) 40 mg tablet TAKE 1 TABLET BY MOUTH AT BEDTIME 90 Tab 3    chlorhexidine (PERIDEX) 0.12 % solution   0    traZODone (DESYREL) 100 mg tablet Take 100 mg by mouth nightly.  Blood-Glucose Meter monitoring kit One Touch Ultra 2 Use to test blood sugar 1-2 per day (blood sugar before breakfast or 2 hours after any meal or at bedtime) DX.   E11.65 1 Kit 0    sodium chloride (OCEAN) 0.65 % nasal spray 1 Spray as needed for Congestion.       solifenacin (VESICARE) 10 mg tablet Take 10 mg by mouth daily.  aspirin delayed-release 81 mg tablet Take 1 Tab by mouth daily. 90 Tab 3    valACYclovir (VALTREX) 500 mg tablet 500 mg daily.  evening primrose oil (EVENING PRIMROSE) 500 mg cap Take 1 Cap by mouth two (2) times a day.  cyclobenzaprine (FLEXERIL) 10 mg tablet Take 10 mg by mouth as needed. Allergies   Allergen Reactions    Lisinopril Cough     Other reaction(s): Lisinopril  Other reaction(s): coughing, Other (see comments)  Coughing      Singulair [Montelukast] Other (comments)     headache       Family History   Problem Relation Age of Onset   [place] Favorite Hypertension Mother    [place] Favorite Glaucoma Mother     Heart Failure Mother         CHF    Hypertension Father     Colon Cancer Father         colon    Heart Attack Sister     Stroke Sister     Diabetes Sister         x 2 sisters    Diabetes Brother     Breast Cancer Maternal Aunt 45     Social History     Tobacco Use    Smoking status: Never Smoker    Smokeless tobacco: Never Used   Substance Use Topics    Alcohol use: Yes     Alcohol/week: 0.0 standard drinks     Comment: Social beer, social drinks, social shots of liquor,and social wine. JANI Mendosa     Advance Care Planning     Advance Care Planning (ACP) Physician/NP/PA (Provider) Conversation    Date of ACP Conversation: 10/06/21  Persons included in Conversation:  patient  Length of ACP Conversation in minutes: <16 minutes (Non-Billable)      She has NO advanced directive - recommended completing forms. Reviewed DNR/DNI and patient is not interested.          JANI Mendosa

## 2021-10-06 NOTE — LETTER
10/14/2021 12:23 PM    Ms. Zoe Castaneda   [facility] [id number]    Cherry Milks,  I reviewed your lab results. Your A1C is still above goal slightly at 7.2%. Your fasting sugar was good at 104. I recommend that you try increasing your weekly Trulicity dose from 1.5 mg to 3 mg to see if helps your blood sugars-send me your blood sugar readings for review in 3 weeks after this dose increase. Your liver and kidney function are good. Your cholesterol has improved. Your vitamin D is at the low end of normal now at 30.6 (normal is ). Take at least 4,000 units daily of the Vitamin D3 as we discussed. Your blood counts are stable-no anemia.    Let me know if you have any questions,  Yinka Jensen          Sincerely,      JANI Wagner

## 2021-10-06 NOTE — PATIENT INSTRUCTIONS
Vaccine Information Statement Influenza (Flu) Vaccine (Inactivated or Recombinant): What You Need to Know Many vaccine information statements are available in [place] and other languages. See www.immunize.org/vis. Hojas de información sobre vacunas están disponibles en español y en muchos otros idiomas. Visite www.immunize.org/vis. 1. Why get vaccinated? Influenza vaccine can prevent influenza (flu). Flu is a contagious disease that spreads around the [facility] every year, usually between October and May. Anyone can get the flu, but it is more dangerous for some people. Infants and young children, people 72 years and older, pregnant people, and people with certain health conditions or a weakened immune system are at greatest risk of flu complications. Pneumonia, bronchitis, sinus infections, and ear infections are examples of flu-related complications. If you have a medical condition, such as heart disease, cancer, or diabetes, flu can make it worse. Flu can cause fever and chills, sore throat, muscle aches, fatigue, cough, headache, and runny or stuffy nose. Some people may have vomiting and diarrhea, though this is more common in children than adults. In an average year, thousands of people in the [facility] die from flu, and many more are hospitalized. Flu vaccine prevents millions of illnesses and flu-related visits to the doctor each year. 2. Influenza vaccines CDC recommends everyone 6 months and older get vaccinated every flu season. Children 6 months through 6years of age may need 2 doses during a single flu season. Everyone else needs only 1 dose each flu season. It takes about 2 weeks for protection to develop after vaccination. There are many flu viruses, and they are always changing. Each year a new flu vaccine is made to protect against the influenza viruses believed to be likely to cause disease in the upcoming flu season.  Even when the vaccine doesnt exactly match these viruses, it may still provide some protection. Influenza vaccine does not cause flu. Influenza vaccine may be given at the same time as other vaccines. 3. Talk with your health care provider Tell your vaccination provider if the person getting the vaccine: 
 Has had an allergic reaction after a previous dose of influenza vaccine, or has any severe, life-threatening allergies  Has ever had Guillain-Barré Syndrome (also called GBS) In some cases, your health care provider may decide to postpone influenza vaccination until a future visit. Influenza vaccine can be administered at any time during pregnancy. People who are or will be pregnant during influenza season should receive inactivated influenza vaccine. People with minor illnesses, such as a cold, may be vaccinated. People who are moderately or severely ill should usually wait until they recover before getting influenza vaccine. Your health care provider can give you more information. 4. Risks of a vaccine reaction  Soreness, redness, and swelling where the shot is given, fever, muscle aches, and headache can happen after influenza vaccination.  There may be a very small increased risk of Guillain-Barré Syndrome (GBS) after inactivated influenza vaccine (the flu shot). Flakito Odom children who get the flu shot along with pneumococcal vaccine (PCV13) and/or DTaP vaccine at the same time might be slightly more likely to have a seizure caused by fever. Tell your health care provider if a child who is getting flu vaccine has ever had a seizure. People sometimes faint after medical procedures, including vaccination. Tell your provider if you feel dizzy or have vision changes or ringing in the ears. As with any medicine, there is a very remote chance of a vaccine causing a severe allergic reaction, other serious injury, or death. 5. What if there is a serious problem?  
 
An allergic reaction could occur after the vaccinated person leaves the clinic. If you see signs of a severe allergic reaction (hives, swelling of the face and throat, difficulty breathing, a fast heartbeat, dizziness, or weakness), call 9-1-1 and get the person to the nearest hospital. 
 
For other signs that concern you, call your health care provider. Adverse reactions should be reported to the Vaccine Adverse Event Reporting System (VAERS). Your health care provider will usually file this report, or you can do it yourself. Visit the VAERS website at www.vaers. [facility].gov or call 2-571.973.4363. VAERS is only for reporting reactions, and VAERS staff members do not give medical advice. 6. The National Vaccine Injury Compensation Program 
 
The [facility] Vaccine Injury Compensation Program (VICP) is a federal program that was created to compensate people who may have been injured by certain vaccines. Claims regarding alleged injury or death due to vaccination have a time limit for filing, which may be as short as two years. Visit the VICP website at www.[facility].gov/vaccinecompensation or call 4-244.131.4335 to learn about the program and about filing a claim. 7. How can I learn more?  Ask your health care provider.  Call your local or state health department.  Visit the website of the Food and Drug Administration (FDA) for vaccine package inserts and additional information at www.fda.gov/vaccines-blood-biologics/vaccines.  Contact the Centers for Disease Control and Prevention (CDC): 
- Call 1-669.733.7969 (9-237-ADR-INFO) or 
- Visit CDCs influenza website at www.cdc.gov/flu. Vaccine Information Statement Inactivated Influenza Vaccine 8/6/2021 
42  [id number] Department of [employer] [place] Centers for Disease Control and Prevention Office Use Only Medicare Wellness Visit, Female The best way to live healthy is to have a lifestyle where you eat a well-balanced diet, exercise regularly, limit alcohol use, and quit all forms of tobacco/nicotine, if applicable. Regular preventive services are another way to keep healthy. Preventive services (vaccines, screening tests, monitoring & exams) can help personalize your care plan, which helps you manage your own care. Screening tests can find health problems at the earliest stages, when they are easiest to treat. Jan follows the current, evidence-based guidelines published by the [facility] Jose Elias Nicolas ([facility]) when recommending preventive services for our patients. Because we follow these guidelines, sometimes recommendations change over time as research supports it. (For example, mammograms used to be recommended annually. Even though Medicare will still pay for an annual mammogram, the newer guidelines recommend a mammogram every two years for women of average risk). Of course, you and your doctor may decide to screen more often for some diseases, based on your risk and your co-morbidities (chronic disease you are already diagnosed with). Preventive services for you include: - Medicare offers their members a free annual wellness visit, which is time for you and your primary care provider to discuss and plan for your preventive service needs. Take advantage of this benefit every year! 
-All adults over the age of 72 should receive the recommended pneumonia vaccines. Current USPSTF guidelines recommend a series of two vaccines for the best pneumonia protection.  
-All adults should have a flu vaccine yearly and a tetanus vaccine every 10 years.  
-All adults age 48 and older should receive the shingles vaccines (series of two vaccines).      
-All adults age 38-68 who are overweight should have a diabetes screening test once every three years.  
-All adults born between [birth year] and 1965 should be screened once for Hepatitis C. 
-Other screening tests and preventive services for persons with diabetes include: an eye exam to screen for diabetic retinopathy, a kidney function test, a foot exam, and stricter control over your cholesterol.  
-Cardiovascular screening for adults with routine risk involves an electrocardiogram (ECG) at intervals determined by your doctor.  
-Colorectal cancer screenings should be done for adults age 54-65 with no increased risk factors for colorectal cancer. There are a number of acceptable methods of screening for this type of cancer. Each test has its own benefits and drawbacks. Discuss with your doctor what is most appropriate for you during your annual wellness visit. The different tests include: colonoscopy (considered the best screening method), a fecal occult blood test, a fecal DNA test, and sigmoidoscopy. 
 
-A bone mass density test is recommended when a woman turns 65 to screen for osteoporosis. This test is only recommended one time, as a screening. Some providers will use this same test as a disease monitoring tool if you already have osteoporosis. -Breast cancer screenings are recommended every other year for women of normal risk, age 54-69. 
-Cervical cancer screenings for women over age 72 are only recommended with certain risk factors. Here is a list of your current Health Maintenance items (your personalized list of preventive services) with a due date: 
Health Maintenance Due Topic Date Due  Yearly Flu Vaccine (1) 09/01/2021

## 2021-10-06 NOTE — PROGRESS NOTES
Pt. Is here for medicare wellness. Pt. States she is not allergic to flu vaccine, had swelling once years ago but has gotten the vaccine the last 5yrs without a reaction. Miguelina Petersen  is a 70 y.o. @  who present for routine immunizations. Prior to vaccine administration: Consent was obtained. Risks and adverse reactions were discussed. The patient was provided the VIS and they were given an opportunity to ask questions; all questions were addressed. She  denies any symptoms, reactions or allergies that would exclude them from being immunized today. There were no adverse reactions observed post vaccination. Patient was advised to seek medical or call the office with any questions or concerns post vaccination. Patient verbalized understanding.    Raford Nissen, LPN

## 2021-10-20 ENCOUNTER — TELEPHONE (OUTPATIENT)
Dept: INTERNAL MEDICINE CLINIC | Age: 71
End: 2021-10-20

## 2021-10-20 DIAGNOSIS — E11.40 CONTROLLED TYPE 2 DIABETES MELLITUS WITH DIABETIC NEUROPATHY, WITHOUT LONG-TERM CURRENT USE OF INSULIN (HCC): ICD-10-CM

## 2021-10-20 RX ORDER — DULAGLUTIDE 3 MG/.5ML
3 INJECTION, SOLUTION SUBCUTANEOUS
Qty: 12 EACH | Refills: 1 | Status: SHIPPED | OUTPATIENT
Start: 2021-10-20 | End: 2021-12-06 | Stop reason: DRUGHIGH

## 2021-10-20 RX ORDER — EMPAGLIFLOZIN AND METFORMIN HYDROCHLORIDE 12.5; 1 MG/1; MG/1
1 TABLET ORAL 2 TIMES DAILY WITH MEALS
Qty: 180 TABLET | Refills: 1 | Status: SHIPPED | OUTPATIENT
Start: 2021-10-20 | End: 2021-12-06 | Stop reason: SDUPTHER

## 2021-10-20 RX ORDER — DULAGLUTIDE 1.5 MG/.5ML
INJECTION, SOLUTION SUBCUTANEOUS
Qty: 6 ML | Refills: 0 | OUTPATIENT
Start: 2021-10-20

## 2021-10-20 NOTE — TELEPHONE ENCOUNTER
----- Message from My [place] sent at 10/19/2021  5:12 PM EDT -----  Regarding: RE: Prescription Question  Contact: [phone]  Hello, I requested for my Synjardy medication to be refilled on Oct. 6, 2021. I just spoke with [facility] and they said they have not received any approval from this office in regards to Cass. Is there a problem with getting this medication filled. I have not had this med in 2+ weeks. I've been waiting for the shipment. I need a 90 day supply which I have received before. I do not understand what the problem is. Cass is on Hold? ??

## 2021-10-26 ENCOUNTER — TRANSCRIBE ORDER (OUTPATIENT)
Dept: SCHEDULING | Age: 71
End: 2021-10-26

## 2021-10-26 DIAGNOSIS — M54.16 RIGHT LUMBAR RADICULOPATHY: Primary | ICD-10-CM

## 2021-11-02 PROBLEM — E11.65 TYPE 2 DIABETES MELLITUS WITH HYPERGLYCEMIA, WITHOUT LONG-TERM CURRENT USE OF INSULIN (HCC): Status: ACTIVE | Noted: 2019-06-07

## 2021-11-02 NOTE — ASSESSMENT & PLAN NOTE
well controlled, continue current medications pending work up below, lifestyle modifications recommended

## 2021-11-02 NOTE — ASSESSMENT & PLAN NOTE
unclear control, continue current medications pending work up below, lifestyle modifications recommended

## 2021-11-13 ENCOUNTER — HOSPITAL ENCOUNTER (OUTPATIENT)
Dept: MRI IMAGING | Age: 71
Discharge: HOME | End: 2021-11-13
Attending: PHYSICIAN ASSISTANT
Payer: MEDICARE

## 2021-11-13 ENCOUNTER — TRANSCRIBE ORDER (OUTPATIENT)
Dept: REGISTRATION | Age: 71
End: 2021-11-13

## 2021-11-13 ENCOUNTER — HOSPITAL ENCOUNTER (OUTPATIENT)
Dept: GENERAL RADIOLOGY | Age: 71
Discharge: HOME | End: 2021-11-13
Attending: PHYSICIAN ASSISTANT
Payer: MEDICARE

## 2021-11-13 DIAGNOSIS — M54.16 RADICULOPATHY, LUMBAR REGION: Primary | ICD-10-CM

## 2021-11-13 DIAGNOSIS — M54.16 RIGHT LUMBAR RADICULOPATHY: ICD-10-CM

## 2021-11-13 DIAGNOSIS — M54.16 RADICULOPATHY, LUMBAR REGION: ICD-10-CM

## 2021-11-13 PROCEDURE — 72110 X-RAY EXAM L-2 SPINE 4/>VWS: CPT

## 2021-11-13 PROCEDURE — 72148 MRI LUMBAR SPINE W/O DYE: CPT

## 2021-12-06 ENCOUNTER — OFFICE VISIT (OUTPATIENT)
Dept: PRIMARY CARE CLINIC | Age: 71
End: 2021-12-06
Payer: MEDICARE

## 2021-12-06 VITALS
DIASTOLIC BLOOD PRESSURE: 75 MMHG | OXYGEN SATURATION: 99 % | SYSTOLIC BLOOD PRESSURE: 136 MMHG | BODY MASS INDEX: 30.1 KG/M2 | HEART RATE: 70 BPM | TEMPERATURE: 97.1 F | RESPIRATION RATE: 18 BRPM | HEIGHT: 68 IN | WEIGHT: 198.6 LBS

## 2021-12-06 DIAGNOSIS — E11.40 CONTROLLED TYPE 2 DIABETES MELLITUS WITH DIABETIC NEUROPATHY, WITHOUT LONG-TERM CURRENT USE OF INSULIN (HCC): ICD-10-CM

## 2021-12-06 DIAGNOSIS — E78.2 MIXED HYPERLIPIDEMIA: ICD-10-CM

## 2021-12-06 DIAGNOSIS — E11.65 TYPE 2 DIABETES MELLITUS WITH HYPERGLYCEMIA, WITHOUT LONG-TERM CURRENT USE OF INSULIN (HCC): Primary | ICD-10-CM

## 2021-12-06 DIAGNOSIS — N39.498 OTHER URINARY INCONTINENCE: ICD-10-CM

## 2021-12-06 DIAGNOSIS — M79.7 FIBROMYALGIA: ICD-10-CM

## 2021-12-06 DIAGNOSIS — I10 PRIMARY HYPERTENSION: ICD-10-CM

## 2021-12-06 DIAGNOSIS — D56.3 THALASSEMIA MINOR: ICD-10-CM

## 2021-12-06 PROCEDURE — G8399 PT W/DXA RESULTS DOCUMENT: HCPCS | Performed by: INTERNAL MEDICINE

## 2021-12-06 PROCEDURE — 1101F PT FALLS ASSESS-DOCD LE1/YR: CPT | Performed by: INTERNAL MEDICINE

## 2021-12-06 PROCEDURE — G8752 SYS BP LESS 140: HCPCS | Performed by: INTERNAL MEDICINE

## 2021-12-06 PROCEDURE — 3051F HG A1C>EQUAL 7.0%<8.0%: CPT | Performed by: INTERNAL MEDICINE

## 2021-12-06 PROCEDURE — G8427 DOCREV CUR MEDS BY ELIG CLIN: HCPCS | Performed by: INTERNAL MEDICINE

## 2021-12-06 PROCEDURE — 2022F DILAT RTA XM EVC RTNOPTHY: CPT | Performed by: INTERNAL MEDICINE

## 2021-12-06 PROCEDURE — G8417 CALC BMI ABV UP PARAM F/U: HCPCS | Performed by: INTERNAL MEDICINE

## 2021-12-06 PROCEDURE — 1090F PRES/ABSN URINE INCON ASSESS: CPT | Performed by: INTERNAL MEDICINE

## 2021-12-06 PROCEDURE — G8754 DIAS BP LESS 90: HCPCS | Performed by: INTERNAL MEDICINE

## 2021-12-06 PROCEDURE — 99213 OFFICE O/P EST LOW 20 MIN: CPT | Performed by: INTERNAL MEDICINE

## 2021-12-06 PROCEDURE — G8536 NO DOC ELDER MAL SCRN: HCPCS | Performed by: INTERNAL MEDICINE

## 2021-12-06 PROCEDURE — 3017F COLORECTAL CA SCREEN DOC REV: CPT | Performed by: INTERNAL MEDICINE

## 2021-12-06 PROCEDURE — G9899 SCRN MAM PERF RSLTS DOC: HCPCS | Performed by: INTERNAL MEDICINE

## 2021-12-06 PROCEDURE — G8432 DEP SCR NOT DOC, RNG: HCPCS | Performed by: INTERNAL MEDICINE

## 2021-12-06 RX ORDER — EMPAGLIFLOZIN AND METFORMIN HYDROCHLORIDE 12.5; 1 MG/1; MG/1
1 TABLET ORAL 2 TIMES DAILY WITH MEALS
Qty: 180 TABLET | Refills: 1 | Status: SHIPPED | OUTPATIENT
Start: 2021-12-06 | End: 2022-04-20

## 2021-12-06 RX ORDER — DULAGLUTIDE 1.5 MG/.5ML
1.5 INJECTION, SOLUTION SUBCUTANEOUS
Qty: 12 EACH | Refills: 3 | Status: SHIPPED | OUTPATIENT
Start: 2021-12-06

## 2021-12-06 RX ORDER — DICLOFENAC SODIUM 75 MG/1
75 TABLET, DELAYED RELEASE ORAL
COMMUNITY
End: 2022-04-04

## 2021-12-06 NOTE — PROGRESS NOTES
Amber Humphrey is a 70 y.o.  female and presents with     Chief Complaint   Patient presents with   [address]     Pt is here to establish care as previous practivce closed. Pt has h/o DM, HTN, hyperchol, Pt has firbomyalgia and sees pain management  Pt needs refills on meds,  Pt sees Neurology, rheumatology. Pt had colonscopy that was normal  Pt has h/o Thalassemia minor    Pt wants to continue Trulicity 1.5 mg dose. Past Medical History:   Diagnosis Date    Allergic rhinitis     Dr. Sarai Feliz 2015, 2019    Dr. Briseida Walker. Dr. Anibal Bernheim.  Chest pain 01/15/09    Dr. Fletcher Griffith Chickenpox childhood    DDD (degenerative disc disease), cervical 10/2015    Dr. Gissel Childs.  DDD (degenerative disc disease), lumbar 2014    Dr. Enrique Cruz.  Diabetes ([facility] [address] 06/2007    Josue Carlson, OD. Dr. Bran Zhou, pod. Dr. Nubia Harman.  Endometriosis     Dr. Jose Raul Mar    Essential hypertension, benign     Dr. Ivonne Olson, uterine     Dr. Trevor Bryantette Band 08/2009    Dr. Brett Ansari. Dr. Bran Zhou.  Hand paresthesia 2011    Dr. Hayden Peters. Dr. Yola Viera.  HCUXAEKT(730.0) 2012    Dr. Jia Hernandez. Dr. Brett Ansari, ENT.  Hemoptysis 06/13/07    Dr. Anais Fierro    Hiatal hernia 05/27/15    small. Lisette Coley MD   [facility] Internal hemorrhoids 12/2006, 05/27/15    Dr. Alpa Brenner. Dr. Carmen Singh.  Intraocular pressure increase 07/27/05    bilaterally. Dr. Shashank Souza. Dr. Josue Calrson.  Knee pain 04/04/13    Bilaterally. due to fall. Dr. Erika Mclaughlin.  Left knee pain 11/26/14    Left. due to OA, torn medial and lateral meniscal tears. Dr. Kevin Alvarado. Dr. Raymond Jones.  Lumbar spondylosis 3/2008    Dr. [place]. Dr. Casi Garland (overactive bladder)     Dr. Lynn Mays. Dr. Jerson Escalante.    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 Zeina Sarah. due to Diabetic Neuropathy. Dr. Evangelist Landa.  Proteinuria 2009    Dr. Mendes Post Right shoulder pain 01/30/06    Dr. Lisa Dove. impingement tendinitis    Right shoulder pain 07/2014    Dr. Enrique Matthews.  Schatzki's ring 05/27/15    with partial obstruction at GE junction. Dr. Radha Soler. [place]     Dr. Princess Bee. Dr. 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 Dr. Dane Lamb. [facility].  Thalassemia minor 1980s    Urinary incontinence     Dr. Chandrakant Larose. Dr. Norberto Brewster D deficiency 05/2011    Nirali Mckenzie NP     Past Surgical History:   Procedure Laterality Date    COLONOSCOPY Left 7/22/2019    COLONOSCOPY performed by Janes Trinidad MD at [place] [id number]  07/2005    Dr. Gay Rios N/A 5/29/2019    INCOMPLETE COLONOSCOPY (second attempt) due to incomplete prep/full colon. Dr. Verito Rivera. due prn.  FOOT/TOES SURGERY PROC UNLISTED      Left Ornelas's Neuroma. [id number] HX BREAST BIOPSY Left     12 years ago neg    HX BREAST LUMPECTOMY  10/15/08    Left intraductal papilloma. Dr. Taylor Carry HX COLONOSCOPY  05/27/15    due to anemia, rectal bleeding. due q 5 yrs. Dr. Shasta Tripathi  2001, 12/07/06, 2011    Dr. Parris Schumacher. due q 5 yrs.  HX ENDOSCOPY  05/27/15    due to anemia. Dr. Radha Soler.  HX HEART CATHETERIZATION  01/09/09    Dr. Carolyn Sewell ARTHROSCOPY Left 04/17/15    due to [address]. Dr. John Schuster.  HX MYOMECTOMY  [id number]    x4    HX OOPHORECTOMY Bilateral 1994    HX SHOULDER ARTHROSCOPY Right 11/09/2018    with subacromial decompression and open distal clavicle excision, Dr. Denae Guido    endometriosis, fibroids.      Current Outpatient Medications   Medication Sig    diclofenac EC (VOLTAREN) 75 mg EC tablet Take 75 mg by mouth. Take 1 tablet by mouth twice daily as directed    empagliflozin-metFORMIN (Synjardy) 12.5-1,000 mg per tablet Take 1 Tablet by mouth two (2) times daily (with meals).  dulaglutide (Trulicity) 1.5 OQ/1.8 mL sub-q pen 0.5 mL by SubCUTAneous route every seven (7) days.  gabapentin (NEURONTIN) 300 mg capsule Take 300 mg by mouth three (3) times daily.  valsartan-hydroCHLOROthiazide (DIOVAN-HCT) 80-12.5 mg per tablet TAKE 1 TABLET ONCE DAILY    Blood Pressure Monitor kit 1 Device by Does Not Apply route daily.  OneTouch Ultra Blue Test Strip strip USE TO TEST 1 TO 2 TIMES A DAY BEFORE BREAKFAST OR 2 HOURS AFTER ANY MEAL OR AT BEDTIME    simvastatin (ZOCOR) 40 mg tablet TAKE 1 TABLET BY MOUTH AT BEDTIME    chlorhexidine (PERIDEX) 0.12 % solution     Blood-Glucose Meter monitoring kit One Touch Ultra 2 Use to test blood sugar 1-2 per day (blood sugar before breakfast or 2 hours after any meal or at bedtime) DX.   E11.65    sodium chloride (OCEAN) 0.65 % nasal spray 1 Spray as needed for Congestion.  solifenacin (VESICARE) 10 mg tablet Take 10 mg by mouth daily.  aspirin delayed-release 81 mg tablet Take 1 Tab by mouth daily.  valACYclovir (VALTREX) 500 mg tablet 500 mg daily.  evening primrose oil (EVENING PRIMROSE) 500 mg cap Take 1 Cap by mouth two (2) times a day.  cyclobenzaprine (FLEXERIL) 10 mg tablet Take 10 mg by mouth as needed.  cholecalciferol (VITAMIN D3) (2,000 UNITS /50 MCG) cap capsule Take 2 Capsules by mouth daily. With food (Patient not taking: Reported on 12/6/2021)     No current facility-administered medications for this visit.      Health Maintenance   Topic Date Due    COVID-19 Vaccine (3 - Booster for Pfizer series) 10/01/2021    Foot Exam Q1  04/08/2022    MICROALBUMIN Q1  04/20/2022    Breast Cancer Screen Mammogram  06/03/2022    A1C test (Diabetic or Prediabetic)  10/06/2022    Lipid Screen  10/06/2022    Medicare Yearly Exam  10/07/2022    Eye Exam Retinal or Dilated  10/27/2022    Colorectal Cancer Screening Combo  10/22/2024    DTaP/Tdap/Td series (3 - Td or Tdap) 01/23/2028    Hepatitis C Screening  Completed    Bone Densitometry (Dexa) Screening  Completed    Shingrix Vaccine Age 50>  Completed    Flu Vaccine  Completed    Pneumococcal 65+ years  Completed     Immunization History   Administered Date(s) Administered    (RETIRED) Pneumococcal Vaccine (Unspecified Type) 12/21/2005    COVID-19, PFIZER, MRNA, LNP-S, PF, 30MCG/0.3ML DOSE 03/11/2021, 04/01/2021    H1N1 Influenza Virus Vaccine 03/09/2010    Hepatitis B Vaccine 06/09/2008, 08/20/2008, 01/27/2009    Influenza High Dose Vaccine PF 11/11/2015, 09/07/2016, 10/19/2017, 08/01/2018, 10/15/2019    Influenza Vaccine 10/26/2018    Influenza Vaccine Split 10/08/2010, 11/08/2011, 10/24/2012    Influenza Vaccine Whole 01/27/2009    Influenza, Quadrivalent, Adjuvanted (>65 Yrs FLUAD QUAD 19812) 10/06/2021    Pneumococcal Conjugate (PCV-13) 11/11/2015    Pneumococcal Polysaccharide (PPSV-23) 12/07/2016    Tdap 01/23/2018    Zoster Recombinant 05/25/2018, 08/04/2018    Zoster Vaccine, Live 01/20/2014    dT Vaccine 06/09/2008     No LMP recorded. Patient has had a hysterectomy. Allergies and Intolerances: Allergies   Allergen Reactions    Lisinopril Cough     Other reaction(s): Lisinopril  Other reaction(s): coughing, Other (see comments)  Coughing      Singulair [Montelukast] Other (comments)     headache       Family History:   Family History   Problem Relation Age of Onset   [address] [facility] Hypertension Mother     Glaucoma Mother     Heart Failure Mother         CHF    Hypertension Father     Colon Cancer Father         colon    Heart Attack Sister     Stroke Sister     Diabetes Sister         x 2 sisters    Diabetes Brother     Breast Cancer Maternal Aunt 45       Social History:   She  reports that she has never smoked.  She has never used smokeless tobacco.  She  reports current alcohol use. Review of Systems:   General: negative for - chills, fatigue, fever, weight change  Psych: negative for - anxiety, depression, irritability or mood swings  ENT: negative for - headaches, hearing change, nasal congestion, oral lesions, sneezing or sore throat  Heme/ Lymph: negative for - bleeding problems, bruising, pallor or swollen lymph nodes  Endo: negative for - hot flashes, polydipsia/polyuria or temperature intolerance  Resp: negative for - cough, shortness of breath or wheezing  CV: negative for - chest pain, edema or palpitations  GI: negative for - abdominal pain, change in bowel habits, constipation, diarrhea or nausea/vomiting  : negative for - dysuria, hematuria, incontinence, pelvic pain or vulvar/vaginal symptoms  MSK: negative for - joint pain, joint swelling or muscle pain  Neuro: negative for - confusion, headaches, seizures or weakness  Derm: negative for - dry skin, hair changes, rash or skin lesion changes          Physical:   Vitals:   Vitals:    12/06/21 0855   BP: 136/75   Pulse: 70   Resp: 18   Temp: 97.1 °F (36.2 °C)   TempSrc: Temporal   SpO2: 99%   Weight: 198 lb 9.6 oz (90.1 kg)   Height: 5' 8\" (1.727 m)           Exam:   HEENT- atraumatic,normocephalic, awake, oriented, well nourished  Neck - supple,no enlarged lymph nodes, no JVD, no thyromegaly  Chest- CTA, no rhonchi, no crackles  Heart- rrr, no murmurs / gallop/rub  Abdomen- soft,BS+,NT, no hepatosplenomegaly  Ext - no c/c/edema   Neuro- no focal deficits. Power 5/5 all extremities  Skin - warm,dry, no obvious rashes.           Review of Data:   LABS:   Lab Results   Component Value Date/Time    WBC 5.3 10/06/2021 11:23 AM    HGB 11.6 10/06/2021 11:23 AM    HCT 36.8 10/06/2021 11:23 AM    PLATELET 243 73/25/1677 11:23 AM     Lab Results   Component Value Date/Time    Sodium 139 10/06/2021 11:23 AM    Potassium 4.0 10/06/2021 11:23 AM    Chloride 106 10/06/2021 11:23 AM    CO2 28 10/06/2021 11:23 AM    Glucose 104 (H) 10/06/2021 11:23 AM    BUN 10 10/06/2021 11:23 AM    Creatinine 0.94 10/06/2021 11:23 AM     Lab Results   Component Value Date/Time    Cholesterol, total 159 10/06/2021 11:23 AM    HDL Cholesterol 81 10/06/2021 11:23 AM    LDL, calculated 66 10/06/2021 11:23 AM    Triglyceride 60 10/06/2021 11:23 AM     No components found for: GPT        Impression / Plan:        ICD-10-CM ICD-9-CM    1. Type 2 diabetes mellitus with hyperglycemia, without long-term current use of insulin (HCC)  E11.65 250.00 CBC WITH AUTOMATED DIFF     914.15 METABOLIC PANEL, COMPREHENSIVE      LIPID PANEL      HEMOGLOBIN A1C WITH EAG      MICROALBUMIN, UR, RAND W/ MICROALB/CREAT RATIO   2. Mixed hyperlipidemia  E78.2 272.2    3. Other urinary incontinence  N39.498 788.39    4. Primary hypertension  I10 401.9    5. Fibromyalgia  M79.7 729.1    6. Controlled type 2 diabetes mellitus with diabetic neuropathy, without long-term current use of insulin ([facility])  E11.40 250.60 empagliflozin-metFORMIN (Synjardy) 12.5-1,000 mg per tablet     357.2 dulaglutide (Trulicity) 1.5 MN/3.6 mL sub-q pen    stable on Trulicity, Synjardy   7. Thalassemia minor  D56.3 282.46          Explained to patient risk benefits of the medications. Advised patient to stop meds if having any side effects. Pt verbalized understanding of the instructions. I have discussed the diagnosis with the patient and the intended plan as seen in the above orders. The patient has received an after-visit summary and questions were answered concerning future plans. I have discussed medication side effects and warnings with the patient as well. I have reviewed the plan of care with the patient, accepted their input and they are in agreement with the treatment goals. Reviewed plan of care. Patient has provided input and agrees with goals. Follow-up and Dispositions    · Return in about 4 months (around 4/6/2022).          Arias Davison, MD

## 2021-12-06 NOTE — PROGRESS NOTES
Chief Complaint   Patient presents with   [employer] Establish Care        Visit Vitals  /75 (BP 1 Location: Left upper arm, BP Patient Position: Sitting)   Pulse 70   Temp 97.1 °F (36.2 °C) (Temporal)   Resp 18   Ht 5' 8\" (1.727 m)   Wt 198 lb 9.6 oz (90.1 kg)   SpO2 99%   BMI 30.20 kg/m²

## 2022-01-01 NOTE — DISCHARGE INSTRUCTIONS
[address] [facility].  [address], [facility]                                  Matt Alicia  [id number]  1950    COLON DISCHARGE INSTRUCTIONS    DISCOMFORT:  Redness at IV site- apply warm compress to area; if redness or soreness persist- contact your physician  There may be a slight amount of blood passed from the rectum  Gaseous discomfort- walking, belching will help relieve any discomfort  You may not operate a vehicle for 12 hours  You may not  engage in an occupation involving machinery or appliances for rest of today  You may not  drink alcoholic beverages for at least 12 hours  Avoid making any critical decisions for at least 24 hour    DIET:   High fiber diet. - however -  remember your colon is empty and a heavy meal will produce gas. Avoid these foods:  vegetables, fried / greasy foods, carbonated drinks for today     ACTIVITY:  It is recommended that you spend the remainder of the day resting -  avoid any strenuous activity. CALL M.D. ANY SIGN OF:   Increasing pain, nausea, vomiting  Abdominal distension (swelling)  New increased bleeding (oral or rectal)  Fever (chills)  Pain in chest area  Bloody discharge from nose or mouth  Shortness of breath    You may not  take any Advil, Aspirin, Ibuprofen, Motrin, Aleve, or Goodys for 3 days, ONLY  Tylenol as needed for pain. Post procedure diagnosis: 1.incomplete prep      Follow-up Instructions:    Call Dr. Travon Bess for any questions or problems.      Telephone # [phone] Statement Selected

## 2022-01-06 DIAGNOSIS — I10 ESSENTIAL HYPERTENSION: ICD-10-CM

## 2022-01-07 NOTE — TELEPHONE ENCOUNTER
Requested Prescriptions     Pending Prescriptions Disp Refills    valsartan-hydroCHLOROthiazide (DIOVAN-HCT) 80-12.5 mg per tablet 90 Tablet 1     Sig: TAKE 1 TABLET ONCE DAILY        Last Visit 12/06/21  Last Refill 08/12/21

## 2022-01-09 RX ORDER — VALSARTAN AND HYDROCHLOROTHIAZIDE 80; 12.5 MG/1; MG/1
TABLET, FILM COATED ORAL
Qty: 90 TABLET | Refills: 1 | Status: SHIPPED | OUTPATIENT
Start: 2022-01-09 | End: 2022-07-11

## 2022-02-02 ENCOUNTER — OFFICE VISIT (OUTPATIENT)
Dept: PRIMARY CARE CLINIC | Age: 72
End: 2022-02-02
Payer: MEDICARE

## 2022-02-02 ENCOUNTER — HOSPITAL ENCOUNTER (OUTPATIENT)
Dept: GENERAL RADIOLOGY | Age: 72
Discharge: HOME | End: 2022-02-02
Attending: INTERNAL MEDICINE
Payer: MEDICARE

## 2022-02-02 VITALS
DIASTOLIC BLOOD PRESSURE: 77 MMHG | SYSTOLIC BLOOD PRESSURE: 131 MMHG | WEIGHT: 190.4 LBS | TEMPERATURE: 97.1 F | HEART RATE: 82 BPM | OXYGEN SATURATION: 99 % | RESPIRATION RATE: 18 BRPM | HEIGHT: 68 IN | BODY MASS INDEX: 28.85 KG/M2

## 2022-02-02 DIAGNOSIS — M54.6 PAIN IN THORACIC SPINE: ICD-10-CM

## 2022-02-02 DIAGNOSIS — M54.6 PAIN IN THORACIC SPINE: Primary | ICD-10-CM

## 2022-02-02 PROCEDURE — 99213 OFFICE O/P EST LOW 20 MIN: CPT | Performed by: INTERNAL MEDICINE

## 2022-02-02 PROCEDURE — G8417 CALC BMI ABV UP PARAM F/U: HCPCS | Performed by: INTERNAL MEDICINE

## 2022-02-02 PROCEDURE — G8427 DOCREV CUR MEDS BY ELIG CLIN: HCPCS | Performed by: INTERNAL MEDICINE

## 2022-02-02 PROCEDURE — G8432 DEP SCR NOT DOC, RNG: HCPCS | Performed by: INTERNAL MEDICINE

## 2022-02-02 PROCEDURE — G8752 SYS BP LESS 140: HCPCS | Performed by: INTERNAL MEDICINE

## 2022-02-02 PROCEDURE — G8536 NO DOC ELDER MAL SCRN: HCPCS | Performed by: INTERNAL MEDICINE

## 2022-02-02 PROCEDURE — 1090F PRES/ABSN URINE INCON ASSESS: CPT | Performed by: INTERNAL MEDICINE

## 2022-02-02 PROCEDURE — G8754 DIAS BP LESS 90: HCPCS | Performed by: INTERNAL MEDICINE

## 2022-02-02 PROCEDURE — 3017F COLORECTAL CA SCREEN DOC REV: CPT | Performed by: INTERNAL MEDICINE

## 2022-02-02 PROCEDURE — 1101F PT FALLS ASSESS-DOCD LE1/YR: CPT | Performed by: INTERNAL MEDICINE

## 2022-02-02 PROCEDURE — G8399 PT W/DXA RESULTS DOCUMENT: HCPCS | Performed by: INTERNAL MEDICINE

## 2022-02-02 PROCEDURE — 72072 X-RAY EXAM THORAC SPINE 3VWS: CPT

## 2022-02-02 RX ORDER — AMITRIPTYLINE HYDROCHLORIDE 25 MG/1
TABLET, FILM COATED ORAL
COMMUNITY

## 2022-02-02 NOTE — PROGRESS NOTES
Chief Complaint   Patient presents with    Post-COVID Symptoms     Back pain from coughing         Visit Vitals  /77 (BP 1 Location: Left upper arm, BP Patient Position: Sitting)   Pulse 82   Temp 97.1 °F (36.2 °C) (Temporal)   Resp 18   Ht 5' 8\" (1.727 m)   Wt 190 lb 6.4 oz (86.4 kg)   SpO2 99%   BMI 28.95 kg/m²        1. Have you been to the ER, urgent care clinic since your last visit? Hospitalized since your last visit? No    2. Have you seen or consulted any other health care providers outside of the [address] [place] [address] since your last visit? Include any pap smears or colon screening.  No

## 2022-02-02 NOTE — PROGRESS NOTES
Karin Hay is a 70 y.o.  female and presents with     Chief Complaint   Patient presents with    Post-COVID Symptoms     Back pain from coughing      Pts granddaughter  Tested pos for COVID and later pt did a home test of January 10th and tested pos for COVID. Since then she had the cough. COugh is better . It is random and dry in nature  Pt starttd with back pain . She had some back pain but got worse after COVID. Pt sees pain management for Fibromyalgia and back pain/ Has bone spur in spine. Patient is concerned that she may have pneumonia in the lung        Past Medical History:   Diagnosis Date    Allergic rhinitis     Dr. Green Ar 2015, 2019    Dr. Ira Martínez. Dr. Kirstie Mcgrath.  Chest pain 01/15/09    Dr. Fox Gomez Chickenpox childhood    DDD (degenerative disc disease), cervical 10/2015    Dr. Julien Brunner.  DDD (degenerative disc disease), lumbar 2014    Dr. Becka Plaza.  Diabetes ([address] 06/2007    Rena Villalobos, OD. Dr. Xoimy Quintana, pod. Dr. Corinne Quinn.  Endometriosis     Dr. Tanvi Campos    Essential hypertension, benign     Dr. Blanco Meier, uterine     Dr. Wes Arauz 08/2009    Dr. David Saleh. Dr. Xiomy Quintana.  Hand paresthesia 2011    Dr. Kylee Teague. Dr. Yuri Crocker.  QERUTKVP(395.6) 2012    Dr. Kelsey Bragg. Dr. David Saleh, ENT.  Hemoptysis 06/13/07    Dr. Justin Perez    Hiatal hernia 05/27/15    small. India Cox MD   [facility] Internal hemorrhoids 12/2006, 05/27/15    Dr. Erick Aguirre. Dr. Dale Don.  Intraocular pressure increase 07/27/05    bilaterally. Dr. Preeti Ames. Dr. Rena Villaolbos.  Knee pain 04/04/13    Bilaterally. due to fall. Dr. Supriya Jacobsen.  Left knee pain 11/26/14    Left. due to OA, torn medial and lateral meniscal tears. Dr. Elizabet Graham. Dr. Nikki Garcia.  Lumbar spondylosis 3/2008    Dr. Blake James.    Attila Perales    Menopause 1994    OAB (overactive bladder)     Dr. Preeti Bowden. Dr. Stoney Vega. 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 Erin Orellana. due to Diabetic Neuropathy. Dr. Cate Narayanan.  Proteinuria 2009    Dr. Deb Jordan Right shoulder pain 01/30/06    Dr. Bob Estrada. impingement tendinitis    Right shoulder pain 07/2014    Dr. Yosvany Romeo.  Schatzki's ring 05/27/15    with partial obstruction at GE junction. Dr. Cate Mcwilliams. Louis Jones. Dr. Attila Perales    Stenosis colon [facility]) 05/27/15    SIGMOID WITH TORTUOUS DESCENDING COLON. Dr. Ilan Castañeda. [facility].  Thalassemia minor 1980s    Urinary incontinence     Dr. Preeti Bowden. Dr. Aleks Lamar D deficiency 05/2011    Lonnie Benitez NP     Past Surgical History:   Procedure Laterality Date    COLONOSCOPY Left 7/22/2019    COLONOSCOPY performed by Meryle Bing, MD at [place] [address]  07/2005    Dr. Papo Puente N/A 5/29/2019    INCOMPLETE COLONOSCOPY (second attempt) due to incomplete prep/full colon. Dr. Nick Mayes. due prn.  FOOT/TOES SURGERY PROC UNLISTED      Left Ornelas's Neuroma. [address] [facility] HX BREAST BIOPSY Left     12 years ago neg    HX BREAST LUMPECTOMY  10/15/08    Left intraductal papilloma. Dr. Candy Marcial HX COLONOSCOPY  05/27/15    due to anemia, rectal bleeding. due q 5 yrs. Dr. Taco Delgado  2001, 12/07/06, 2011    Dr. Gabi Martinez. due q 5 yrs.  HX ENDOSCOPY  05/27/15    due to anemia. Dr. Cate Mcwilliams.  HX HEART CATHETERIZATION  01/09/09    Dr. Narvis Sicard ARTHROSCOPY Left 04/17/15    due to [address] Dr. Dandy Damon.     HX MYOMECTOMY  [id number]    x4    HX OOPHORECTOMY Bilateral 1994    HX SHOULDER ARTHROSCOPY Right 11/09/2018    with subacromial decompression and open distal clavicle excision, Dr. [place] Garcia [place] H[place] MARTIN AND BSO  1994    endometriosis, fibroids. Current Outpatient Medications   Medication Sig    amitriptyline (ELAVIL) 25 mg tablet Take  by mouth nightly.  valsartan-hydroCHLOROthiazide (DIOVAN-HCT) 80-12.5 mg per tablet TAKE 1 TABLET ONCE DAILY    diclofenac EC (VOLTAREN) 75 mg EC tablet Take 75 mg by mouth. Take 1 tablet by mouth twice daily as directed    empagliflozin-metFORMIN (Synjardy) 12.5-1,000 mg per tablet Take 1 Tablet by mouth two (2) times daily (with meals).  dulaglutide (Trulicity) 1.5 JR/2.6 mL sub-q pen 0.5 mL by SubCUTAneous route every seven (7) days.  gabapentin (NEURONTIN) 300 mg capsule Take 300 mg by mouth three (3) times daily.  cholecalciferol (VITAMIN D3) (2,000 UNITS /50 MCG) cap capsule Take 2 Capsules by mouth daily. With food    OneTouch Ultra Blue Test Strip strip USE TO TEST 1 TO 2 TIMES A DAY BEFORE BREAKFAST OR 2 HOURS AFTER ANY MEAL OR AT BEDTIME    simvastatin (ZOCOR) 40 mg tablet TAKE 1 TABLET BY MOUTH AT BEDTIME    chlorhexidine (PERIDEX) 0.12 % solution     Blood-Glucose Meter monitoring kit One Touch Ultra 2 Use to test blood sugar 1-2 per day (blood sugar before breakfast or 2 hours after any meal or at bedtime) DX.   E11.65    sodium chloride (OCEAN) 0.65 % nasal spray 1 Spray as needed for Congestion.  solifenacin (VESICARE) 10 mg tablet Take 10 mg by mouth daily.  aspirin delayed-release 81 mg tablet Take 1 Tab by mouth daily.  valACYclovir (VALTREX) 500 mg tablet 500 mg daily.  evening primrose oil (EVENING PRIMROSE) 500 mg cap Take 1 Cap by mouth two (2) times a day.  cyclobenzaprine (FLEXERIL) 10 mg tablet Take 10 mg by mouth as needed.  Blood Pressure Monitor kit 1 Device by Does Not Apply route daily. (Patient not taking: Reported on 2/2/2022)     No current facility-administered medications for this visit.      Health Maintenance   Topic Date Due    COVID-19 Vaccine (3 - Booster for Torres Peter series) 09/01/2021    Foot Exam Q1  04/08/2022    MICROALBUMIN Q1  04/20/2022    Breast Cancer Screen Mammogram  06/03/2022    Depression Screen  10/06/2022    A1C test (Diabetic or Prediabetic)  10/06/2022    Lipid Screen  10/06/2022    Medicare Yearly Exam  10/07/2022    Eye Exam Retinal or Dilated  10/27/2022    Colorectal Cancer Screening Combo  10/22/2024    DTaP/Tdap/Td series (3 - Td or Tdap) 01/23/2028    Hepatitis C Screening  Completed    Bone Densitometry (Dexa) Screening  Completed    Shingrix Vaccine Age 50>  Completed    Flu Vaccine  Completed    Pneumococcal 65+ years  Completed     Immunization History   Administered Date(s) Administered    (RETIRED) Pneumococcal Vaccine (Unspecified Type) 12/21/2005    COVID-19, Pfizer Purple top, DILUTE for use, 12+ yrs, 30mcg/0.3mL dose 03/11/2021, 04/01/2021    H1N1 Influenza Virus Vaccine 03/09/2010    Hepatitis B Vaccine 06/09/2008, 08/20/2008, 01/27/2009    Influenza High Dose Vaccine PF 11/11/2015, 09/07/2016, 10/19/2017, 08/01/2018, 10/15/2019    Influenza Vaccine 10/26/2018    Influenza Vaccine Split 10/08/2010, 11/08/2011, 10/24/2012    Influenza Vaccine Whole 01/27/2009    Influenza, Quadrivalent, Adjuvanted (>65 Yrs FLUAD QUAD [id number]) 10/06/2021    Pneumococcal Conjugate (PCV-13) 11/11/2015    Pneumococcal Polysaccharide (PPSV-23) 12/07/2016    Tdap 01/23/2018    Zoster Recombinant 05/25/2018, 08/04/2018    Zoster Vaccine, Live 01/20/2014    dT Vaccine 06/09/2008     No LMP recorded. Patient has had a hysterectomy. Allergies and Intolerances:    Allergies   Allergen Reactions    Lisinopril Cough     Other reaction(s): Lisinopril  Other reaction(s): coughing, Other (see comments)  Coughing      Singulair [Montelukast] Other (comments)     headache       Family History:   Family History   Problem Relation Age of Onset   [facility] Hypertension Mother     Glaucoma Mother     Heart Failure Mother         CHF    Hypertension Father    [facility] Colon Cancer Father         colon    Heart Attack Sister     Stroke Sister     Diabetes Sister         x 2 sisters    Diabetes Brother     Breast Cancer Maternal Aunt 45       Social History:   She  reports that she has never smoked. She has never used smokeless tobacco.  She  reports current alcohol use. Review of Systems:   General: negative for - chills, fatigue, fever, weight change  Psych: negative for - anxiety, depression, irritability or mood swings  ENT: negative for - headaches, hearing change, nasal congestion, oral lesions, sneezing or sore throat  Heme/ Lymph: negative for - bleeding problems, bruising, pallor or swollen lymph nodes  Endo: negative for - hot flashes, polydipsia/polyuria or temperature intolerance  Resp: negative for - cough, shortness of breath or wheezing  CV: negative for - chest pain, edema or palpitations  GI: negative for - abdominal pain, change in bowel habits, constipation, diarrhea or nausea/vomiting  : negative for - dysuria, hematuria, incontinence, pelvic pain or vulvar/vaginal symptoms  MSK: negative for - joint pain, joint swelling or muscle pain  Neuro: negative for - confusion, headaches, seizures or weakness  Derm: negative for - dry skin, hair changes, rash or skin lesion changes          Physical:   Vitals:   Vitals:    02/02/22 1435   BP: 131/77   Pulse: 82   Resp: 18   Temp: 97.1 °F (36.2 °C)   TempSrc: Temporal   SpO2: 99%   Weight: 190 lb 6.4 oz (86.4 kg)   Height: 5' 8\" (1.727 m)           Exam:   HEENT- atraumatic,normocephalic, awake, oriented, well nourished  Neck - supple,no enlarged lymph nodes, no JVD, no thyromegaly  Chest- CTA, no rhonchi, no crackles  Heart- rrr, no murmurs / gallop/rub  Abdomen- soft,BS+,NT, no hepatosplenomegaly  Ext - no c/c/edema   Neuro- no focal deficits. Power 5/5 all extremities  Skin - warm,dry, no obvious rashes.           Review of Data:   LABS:   Lab Results   Component Value Date/Time    WBC 5.3 10/06/2021 11:23 AM    HGB 11.6 10/06/2021 11:23 AM    HCT 36.8 10/06/2021 11:23 AM    PLATELET 746 73/16/7816 11:23 AM     Lab Results   Component Value Date/Time    Sodium 139 10/06/2021 11:23 AM    Potassium 4.0 10/06/2021 11:23 AM    Chloride 106 10/06/2021 11:23 AM    CO2 28 10/06/2021 11:23 AM    Glucose 104 (H) 10/06/2021 11:23 AM    BUN 10 10/06/2021 11:23 AM    Creatinine 0.94 10/06/2021 11:23 AM     Lab Results   Component Value Date/Time    Cholesterol, total 159 10/06/2021 11:23 AM    HDL Cholesterol 81 10/06/2021 11:23 AM    LDL, calculated 66 10/06/2021 11:23 AM    Triglyceride 60 10/06/2021 11:23 AM     No components found for: GPT        Impression / Plan:        ICD-10-CM ICD-9-CM    1. Pain in thoracic spine  M54.6 724.1 XR SPINE THORAC 3 V     Reassured patient clinically there is no evidence of pneumonia. Pain in her back is most likely due to muscular pain caused by recent cough could have pulled a muscle, ligament    Explained to patient risk benefits of the medications. Advised patient to stop meds if having any side effects. Pt verbalized understanding of the instructions. I have discussed the diagnosis with the patient and the intended plan as seen in the above orders. The patient has received an after-visit summary and questions were answered concerning future plans. I have discussed medication side effects and warnings with the patient as well. I have reviewed the plan of care with the patient, accepted their input and they are in agreement with the treatment goals. Reviewed plan of care. Patient has provided input and agrees with goals.         Kathie Gold MD

## 2022-02-04 ENCOUNTER — TELEPHONE (OUTPATIENT)
Dept: PRIMARY CARE CLINIC | Age: 72
End: 2022-02-04

## 2022-03-10 ENCOUNTER — TELEPHONE (OUTPATIENT)
Dept: PRIMARY CARE CLINIC | Age: 72
End: 2022-03-10

## 2022-03-15 ENCOUNTER — TELEPHONE (OUTPATIENT)
Dept: PRIMARY CARE CLINIC | Age: 72
End: 2022-03-15

## 2022-03-15 NOTE — TELEPHONE ENCOUNTER
Left a message on patients voicemail to reschedule her appointment on April 6, 2022 with Dr. Jeison Arboleda. Dr. Jeison Arboleda will not be in the office that afternoon.

## 2022-03-18 PROBLEM — Z79.84 LONG TERM (CURRENT) USE OF ORAL HYPOGLYCEMIC DRUGS: Status: ACTIVE | Noted: 2021-04-08

## 2022-03-19 PROBLEM — L71.9 ACNE ROSACEA: Status: ACTIVE | Noted: 2018-01-23

## 2022-03-19 PROBLEM — E11.65 TYPE 2 DIABETES MELLITUS WITH HYPERGLYCEMIA, WITHOUT LONG-TERM CURRENT USE OF INSULIN (HCC): Status: ACTIVE | Noted: 2019-06-07

## 2022-03-19 PROBLEM — G56.22 ULNAR NEUROPATHY OF LEFT UPPER EXTREMITY: Status: ACTIVE | Noted: 2017-08-16

## 2022-03-20 PROBLEM — Z79.82 LONG TERM (CURRENT) USE OF ASPIRIN: Status: ACTIVE | Noted: 2021-04-08

## 2022-03-20 PROBLEM — Z83.511 FAMILY HISTORY OF GLAUCOMA: Status: ACTIVE | Noted: 2020-09-30

## 2022-03-20 PROBLEM — E78.2 MIXED HYPERLIPIDEMIA: Status: ACTIVE | Noted: 2021-04-18

## 2022-03-20 PROBLEM — K21.9 GASTRO-ESOPHAGEAL REFLUX DISEASE WITHOUT ESOPHAGITIS: Status: ACTIVE | Noted: 2021-04-08

## 2022-03-20 PROBLEM — E11.40 TYPE 2 DIABETES MELLITUS WITH DIABETIC NEUROPATHY (HCC): Status: ACTIVE | Noted: 2018-04-10

## 2022-04-04 ENCOUNTER — OFFICE VISIT (OUTPATIENT)
Dept: PRIMARY CARE CLINIC | Age: 72
End: 2022-04-04
Payer: MEDICARE

## 2022-04-04 VITALS
WEIGHT: 191 LBS | RESPIRATION RATE: 18 BRPM | HEART RATE: 64 BPM | DIASTOLIC BLOOD PRESSURE: 80 MMHG | TEMPERATURE: 97.1 F | SYSTOLIC BLOOD PRESSURE: 150 MMHG | HEIGHT: 68 IN | BODY MASS INDEX: 28.95 KG/M2 | OXYGEN SATURATION: 100 %

## 2022-04-04 DIAGNOSIS — M79.7 FIBROMYALGIA: ICD-10-CM

## 2022-04-04 DIAGNOSIS — E78.2 MIXED HYPERLIPIDEMIA: ICD-10-CM

## 2022-04-04 DIAGNOSIS — E55.9 VITAMIN D DEFICIENCY: ICD-10-CM

## 2022-04-04 DIAGNOSIS — I10 ESSENTIAL HYPERTENSION: ICD-10-CM

## 2022-04-04 DIAGNOSIS — D56.3 THALASSEMIA MINOR: ICD-10-CM

## 2022-04-04 DIAGNOSIS — E11.65 TYPE 2 DIABETES MELLITUS WITH HYPERGLYCEMIA, WITHOUT LONG-TERM CURRENT USE OF INSULIN (HCC): Primary | ICD-10-CM

## 2022-04-04 PROCEDURE — 2022F DILAT RTA XM EVC RTNOPTHY: CPT | Performed by: INTERNAL MEDICINE

## 2022-04-04 PROCEDURE — G8417 CALC BMI ABV UP PARAM F/U: HCPCS | Performed by: INTERNAL MEDICINE

## 2022-04-04 PROCEDURE — 3017F COLORECTAL CA SCREEN DOC REV: CPT | Performed by: INTERNAL MEDICINE

## 2022-04-04 PROCEDURE — 3046F HEMOGLOBIN A1C LEVEL >9.0%: CPT | Performed by: INTERNAL MEDICINE

## 2022-04-04 PROCEDURE — G8432 DEP SCR NOT DOC, RNG: HCPCS | Performed by: INTERNAL MEDICINE

## 2022-04-04 PROCEDURE — G8753 SYS BP > OR = 140: HCPCS | Performed by: INTERNAL MEDICINE

## 2022-04-04 PROCEDURE — G8754 DIAS BP LESS 90: HCPCS | Performed by: INTERNAL MEDICINE

## 2022-04-04 PROCEDURE — 1090F PRES/ABSN URINE INCON ASSESS: CPT | Performed by: INTERNAL MEDICINE

## 2022-04-04 PROCEDURE — 99214 OFFICE O/P EST MOD 30 MIN: CPT | Performed by: INTERNAL MEDICINE

## 2022-04-04 PROCEDURE — 1101F PT FALLS ASSESS-DOCD LE1/YR: CPT | Performed by: INTERNAL MEDICINE

## 2022-04-04 PROCEDURE — G8399 PT W/DXA RESULTS DOCUMENT: HCPCS | Performed by: INTERNAL MEDICINE

## 2022-04-04 PROCEDURE — G8427 DOCREV CUR MEDS BY ELIG CLIN: HCPCS | Performed by: INTERNAL MEDICINE

## 2022-04-04 PROCEDURE — G8536 NO DOC ELDER MAL SCRN: HCPCS | Performed by: INTERNAL MEDICINE

## 2022-04-04 RX ORDER — ACETAMINOPHEN 500 MG
TABLET ORAL
Qty: 1 KIT | Refills: 0 | Status: SHIPPED | OUTPATIENT
Start: 2022-04-04 | End: 2022-07-11

## 2022-04-04 NOTE — PROGRESS NOTES
Kevin Farnsworth is a 67 y.o.  female and presents with     Chief Complaint   Patient presents with    Diabetes    Hypertension     Pt is here for follow up  Pt sometimes gets headaches. Has not had a chance to do labs,    Blood pressure is slightly elevated. Past Medical History:   Diagnosis Date    Allergic rhinitis     Dr. Brock Suero 2015, 2019    Dr. Olga Bah. Dr. Rodríguez Castaneda.  Chest pain 01/15/09    Dr. Gissell Terrell Chickenpox childhood    DDD (degenerative disc disease), cervical 10/2015    Dr. Terri Dallas.  DDD (degenerative disc disease), lumbar 2014    Dr. Eder Brandon.  Diabetes ([facility] 06/2007    Cristin Roque, OD. Dr. Devin Butcher, pod. Dr. Tolu Day.  Endometriosis     Dr. Filemon Aguilar    Essential hypertension, benign     Dr. Aryan Vazquez, uterine     Dr. Rizwana Yoon 08/2009    Dr. Lexa Diamond. Dr. Devin Butcher.  Hand paresthesia 2011    Dr. Flora Rios. Dr. Tierra Bishop.  QTCQMTXG(613.4) 2012    Dr. Shashi Schaffer. Dr. Lexa Diamond, ENT.  Hemoptysis 06/13/07    Dr. Minna Sandoval    Hiatal hernia 05/27/15    small. Elen Gamez MD   [facility] Internal hemorrhoids 12/2006, 05/27/15    Dr. Larisa Lr. Dr. Francisco Navarro.  Intraocular pressure increase 07/27/05    bilaterally. Dr. Jose De Jesus Almazan. Dr. Cristin Roque.  Knee pain 04/04/13    Bilaterally. due to fall. Dr. Donald Carney.  Left knee pain 11/26/14    Left. due to OA, torn medial and lateral meniscal tears. Dr. Jeri Dsouza. Dr. Odell Gamez.  Lumbar spondylosis 3/2008    Dr. Brooklyn Nicholson. Dr. Lyla Smith (overactive bladder)     Dr. Jessica Garza. Dr. Cathryn Bailey. 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 Leroy England. due to Diabetic Neuropathy. Dr. Devin Butcher.     Proteinuria 2009    Dr. Lul Apodaca Right shoulder pain 01/30/06    Dr. Rikki Gottron Pierce.  impingement tendinitis    Right shoulder pain 07/2014    Dr. Brittany Stokes.  Schatzki's ring 05/27/15    with partial obstruction at GE junction. Dr. Francisco Navarro. Naun Nicholson. Dr. Rodriguez Baptist    Stenosis colon [facility]) 05/27/15    SIGMOID WITH TORTUOUS DESCENDING COLON. Dr. Citlali Lucio. [facility].  Thalassemia minor 1980s    Urinary incontinence     Dr. Jessica Garza. Dr. Brook Saez D deficiency 05/2011    Suzanne Foster NP     Past Surgical History:   Procedure Laterality Date    COLONOSCOPY Left 7/22/2019    COLONOSCOPY performed by Ivone Torres MD at [place] [address]  07/2005    Dr. Ayesha Klein N/A 5/29/2019    INCOMPLETE COLONOSCOPY (second attempt) due to incomplete prep/full colon. Dr. Elen Gamez. due prn.  FOOT/TOES SURGERY PROC UNLISTED      Left Ornelas's Neuroma. [id number] [facility] HX BREAST BIOPSY Left     12 years ago neg    HX BREAST LUMPECTOMY  10/15/08    Left intraductal papilloma. Dr. Devi Finley HX COLONOSCOPY  05/27/15    due to anemia, rectal bleeding. due q 5 yrs. Dr. Latrell Linares  2001, 12/07/06, 2011    Dr. Harshil Gallegos. due q 5 yrs.  HX ENDOSCOPY  05/27/15    due to anemia. Dr. Francisco Navarro.  HX HEART CATHETERIZATION  01/09/09    Dr. Trinidad Mendosa ARTHROSCOPY Left 04/17/15    due to [address]. Dr. Odell Gamez.  HX MYOMECTOMY  [id number]    x4    HX OOPHORECTOMY Bilateral 1994    HX SHOULDER ARTHROSCOPY Right 11/09/2018    with subacromial decompression and open distal clavicle excision, Dr. Rose Damon    endometriosis, fibroids. Current Outpatient Medications   Medication Sig    Blood Pressure Monitor kit Monitor blood pressure twice daily    amitriptyline (ELAVIL) 25 mg tablet Take  by mouth nightly.     valsartan-hydroCHLOROthiazide (DIOVAN-HCT) 80-12.5 mg per tablet TAKE 1 TABLET ONCE DAILY    empagliflozin-metFORMIN (Synjardy) 12.5-1,000 mg per tablet Take 1 Tablet by mouth two (2) times daily (with meals).  dulaglutide (Trulicity) 1.5 [place]/9.3 mL sub-q pen 0.5 mL by SubCUTAneous route every seven (7) days.  gabapentin (NEURONTIN) 300 mg capsule Take 300 mg by mouth three (3) times daily.  cholecalciferol (VITAMIN D3) (2,000 UNITS /50 MCG) cap capsule Take 2 Capsules by mouth daily. With food    OneTouch Ultra Blue Test Strip strip USE TO TEST 1 TO 2 TIMES A DAY BEFORE BREAKFAST OR 2 HOURS AFTER ANY MEAL OR AT BEDTIME    simvastatin (ZOCOR) 40 mg tablet TAKE 1 TABLET BY MOUTH AT BEDTIME    chlorhexidine (PERIDEX) 0.12 % solution     Blood-Glucose Meter monitoring kit One Touch Ultra 2 Use to test blood sugar 1-2 per day (blood sugar before breakfast or 2 hours after any meal or at bedtime) DX.   E11.65    sodium chloride (OCEAN) 0.65 % nasal spray 1 Spray as needed for Congestion.  solifenacin (VESICARE) 10 mg tablet Take 10 mg by mouth daily.  aspirin delayed-release 81 mg tablet Take 1 Tab by mouth daily.  valACYclovir (VALTREX) 500 mg tablet 500 mg daily.  evening primrose oil (EVENING PRIMROSE) 500 mg cap Take 1 Cap by mouth two (2) times a day.  cyclobenzaprine (FLEXERIL) 10 mg tablet Take 10 mg by mouth as needed.  Blood Pressure Monitor kit 1 Device by Does Not Apply route daily. (Patient not taking: Reported on 2/2/2022)     No current facility-administered medications for this visit.      Health Maintenance   Topic Date Due    COVID-19 Vaccine (3 - Booster for Pfizer series) 09/01/2021    Foot Exam Q1  04/08/2022    MICROALBUMIN Q1  04/20/2022    Breast Cancer Screen Mammogram  06/03/2022    Depression Screen  10/06/2022    A1C test (Diabetic or Prediabetic)  10/06/2022    Lipid Screen  10/06/2022    Medicare Yearly Exam  10/07/2022    Eye Exam Retinal or Dilated  10/27/2022    Colorectal Cancer Screening Combo 10/22/2024    DTaP/Tdap/Td series (3 - Td or Tdap) 01/23/2028    Hepatitis C Screening  Completed    Bone Densitometry (Dexa) Screening  Completed    Shingrix Vaccine Age 50>  Completed    Flu Vaccine  Completed    Pneumococcal 65+ years  Completed     Immunization History   Administered Date(s) Administered    (RETIRED) Pneumococcal Vaccine (Unspecified Type) 12/21/2005    COVID-19, Pfizer Purple top, DILUTE for use, 12+ yrs, 30mcg/0.3mL dose 03/11/2021, 04/01/2021    H1N1 Influenza Virus Vaccine 03/09/2010    Hepatitis B Vaccine 06/09/2008, 08/20/2008, 01/27/2009    Influenza High Dose Vaccine PF 11/11/2015, 09/07/2016, 10/19/2017, 08/01/2018, 10/15/2019    Influenza Vaccine 10/26/2018    Influenza Vaccine Split 10/08/2010, 11/08/2011, 10/24/2012    Influenza Vaccine Whole 01/27/2009    Influenza, Quadrivalent, Adjuvanted (>65 Yrs FLUAD QUAD 17902) 10/06/2021    Pneumococcal Conjugate (PCV-13) 11/11/2015    Pneumococcal Polysaccharide (PPSV-23) 12/07/2016    Tdap 01/23/2018    Zoster Recombinant 05/25/2018, 08/04/2018    Zoster Vaccine, Live 01/20/2014    dT Vaccine 06/09/2008     No LMP recorded. Patient has had a hysterectomy. Allergies and Intolerances: Allergies   Allergen Reactions    Lisinopril Cough     Other reaction(s): Lisinopril  Other reaction(s): coughing, Other (see comments)  Coughing      Singulair [Montelukast] Other (comments)     headache       Family History:   Family History   Problem Relation Age of Onset   Pham Hypertension Mother     Glaucoma Mother     Heart Failure Mother         CHF    Hypertension Father     Colon Cancer Father         colon    Heart Attack Sister     Stroke Sister     Diabetes Sister         x 2 sisters    Diabetes Brother     Breast Cancer Maternal Aunt 45       Social History:   She  reports that she has never smoked. She has never used smokeless tobacco.  She  reports current alcohol use.             Review of Systems:   General: negative for - chills, fatigue, fever, weight change  Psych: negative for - anxiety, depression, irritability or mood swings  ENT: negative for - headaches, hearing change, nasal congestion, oral lesions, sneezing or sore throat  Heme/ Lymph: negative for - bleeding problems, bruising, pallor or swollen lymph nodes  Endo: negative for - hot flashes, polydipsia/polyuria or temperature intolerance  Resp: negative for - cough, shortness of breath or wheezing  CV: negative for - chest pain, edema or palpitations  GI: negative for - abdominal pain, change in bowel habits, constipation, diarrhea or nausea/vomiting  : negative for - dysuria, hematuria, incontinence, pelvic pain or vulvar/vaginal symptoms  MSK: negative for - joint pain, joint swelling or muscle pain  Neuro: negative for - confusion, headaches, seizures or weakness  Derm: negative for - dry skin, hair changes, rash or skin lesion changes          Physical:   Vitals:   Vitals:    04/04/22 1053 04/04/22 1125 04/04/22 1143   BP: (!) 170/78 (!) 144/71 (!) 150/80   Pulse: 64     Resp: 18     Temp: 97.1 °F (36.2 °C)     TempSrc: Temporal     SpO2: 100%     Weight: 191 lb (86.6 kg)     Height: 5' 8\" (1.727 m)             Exam:   HEENT- atraumatic,normocephalic, awake, oriented, well nourished  Neck - supple,no enlarged lymph nodes, no JVD, no thyromegaly  Chest- CTA, no rhonchi, no crackles  Heart- rrr, no murmurs / gallop/rub  Abdomen- soft,BS+,NT, no hepatosplenomegaly  Ext - no c/c/edema   Neuro- no focal deficits. Power 5/5 all extremities  Skin - warm,dry, no obvious rashes.           Review of Data:   LABS:   Lab Results   Component Value Date/Time    WBC 5.3 10/06/2021 11:23 AM    HGB 11.6 10/06/2021 11:23 AM    HCT 36.8 10/06/2021 11:23 AM    PLATELET 042 06/66/7479 11:23 AM     Lab Results   Component Value Date/Time    Sodium 139 10/06/2021 11:23 AM    Potassium 4.0 10/06/2021 11:23 AM    Chloride 106 10/06/2021 11:23 AM    CO2 28 10/06/2021 11:23 AM Glucose 104 (H) 10/06/2021 11:23 AM    BUN 10 10/06/2021 11:23 AM    Creatinine 0.94 10/06/2021 11:23 AM     Lab Results   Component Value Date/Time    Cholesterol, total 159 10/06/2021 11:23 AM    HDL Cholesterol 81 10/06/2021 11:23 AM    LDL, calculated 66 10/06/2021 11:23 AM    Triglyceride 60 10/06/2021 11:23 AM     No components found for: GPT        Impression / Plan:        ICD-10-CM ICD-9-CM    1. Type 2 diabetes mellitus with hyperglycemia, without long-term current use of insulin (HCC)  E11.65 250.00 HEMOGLOBIN A1C WITH EAG     790.29 CBC WITH AUTOMATED DIFF      MICROALBUMIN, UR, RAND W/ MICROALB/CREAT RATIO      METABOLIC PANEL, COMPREHENSIVE      LIPID PANEL   2. Essential hypertension  I10 401.9 Blood Pressure Monitor kit   3. Mixed hyperlipidemia  E78.2 272.2    4. Fibromyalgia  M79.7 729.1    5. Thalassemia minor  D56.3 282.46    6. Vitamin D deficiency  E55.9 268.9 VITAMIN D, 25 HYDROXY     DM - stable    HTN - low salt diet ,   Monitor blood pressure , low salt diet. Hyperchol - stable    Explained to patient risk benefits of the medications. Advised patient to stop meds if having any side effects. Pt verbalized understanding of the instructions. I have discussed the diagnosis with the patient and the intended plan as seen in the above orders. The patient has received an after-visit summary and questions were answered concerning future plans. I have discussed medication side effects and warnings with the patient as well. I have reviewed the plan of care with the patient, accepted their input and they are in agreement with the treatment goals. Reviewed plan of care. Patient has provided input and agrees with goals. Follow-up and Dispositions    · Return in about 3 months (around 7/4/2022).          Ani Delgado MD

## 2022-04-04 NOTE — PROGRESS NOTES
Chief Complaint   Patient presents with    Diabetes    Hypertension        Visit Vitals  BP (!) 170/78 (BP 1 Location: Right upper arm, BP Patient Position: Sitting)   Pulse 64   Temp 97.1 °F (36.2 °C) (Temporal)   Resp 18   Ht 5' 8\" (1.727 m)   Wt 191 lb (86.6 kg)   SpO2 100%   BMI 29.04 kg/m²        1. Have you been to the ER, urgent care clinic since your last visit? Hospitalized since your last visit? No    2. Have you seen or consulted any other health care providers outside of the [address] [place] [address] since your last visit? Include any pap smears or colon screening.  No

## 2022-04-18 ENCOUNTER — PATIENT MESSAGE (OUTPATIENT)
Dept: PRIMARY CARE CLINIC | Age: 72
End: 2022-04-18

## 2022-04-18 NOTE — TELEPHONE ENCOUNTER
From: Emerita Nielsen  To: Umair Ricketts MD  Sent: 4/18/2022 11:08 AM EDT  Subject: 2022 Prescription Update to Ney Terrell:  Please update my 3 months bulk prescriptions to my online mail prescriptions with [facility]. In addition:  Please renew my order for One Touch Ultra Blue Test Strips for 3 months bulk (150 strips), as well. As soon as possible, please. Have a good day!

## 2022-04-19 DIAGNOSIS — E11.40 CONTROLLED TYPE 2 DIABETES MELLITUS WITH DIABETIC NEUROPATHY, WITHOUT LONG-TERM CURRENT USE OF INSULIN (HCC): ICD-10-CM

## 2022-04-20 RX ORDER — EMPAGLIFLOZIN AND METFORMIN HYDROCHLORIDE 12.5; 1 MG/1; MG/1
TABLET ORAL
Qty: 180 TABLET | Refills: 1 | Status: SHIPPED | OUTPATIENT
Start: 2022-04-20

## 2022-04-24 RX ORDER — BLOOD SUGAR DIAGNOSTIC
STRIP MISCELLANEOUS
Qty: 100 STRIP | Refills: 3 | Status: SHIPPED | OUTPATIENT
Start: 2022-04-24 | End: 2022-07-11

## 2022-04-29 ENCOUNTER — TELEPHONE (OUTPATIENT)
Dept: PRIMARY CARE CLINIC | Age: 72
End: 2022-04-29

## 2022-04-29 NOTE — TELEPHONE ENCOUNTER
Patient calling for symptoms of sore throat. No appointment available patient was advised to go to urgent care.  Patient knowledge understanding

## 2022-06-20 ENCOUNTER — TELEPHONE (OUTPATIENT)
Dept: PRIMARY CARE CLINIC | Age: 72
End: 2022-06-20

## 2022-07-09 DIAGNOSIS — I10 ESSENTIAL HYPERTENSION: ICD-10-CM

## 2022-07-11 ENCOUNTER — OFFICE VISIT (OUTPATIENT)
Dept: PRIMARY CARE CLINIC | Age: 72
End: 2022-07-11
Payer: MEDICARE

## 2022-07-11 VITALS
TEMPERATURE: 97.1 F | HEART RATE: 71 BPM | RESPIRATION RATE: 18 BRPM | HEIGHT: 68 IN | SYSTOLIC BLOOD PRESSURE: 124 MMHG | BODY MASS INDEX: 28.34 KG/M2 | DIASTOLIC BLOOD PRESSURE: 79 MMHG | WEIGHT: 187 LBS | OXYGEN SATURATION: 100 %

## 2022-07-11 DIAGNOSIS — E78.00 HYPERCHOLESTEREMIA: ICD-10-CM

## 2022-07-11 DIAGNOSIS — D56.3 THALASSEMIA MINOR: ICD-10-CM

## 2022-07-11 DIAGNOSIS — E11.40 CONTROLLED TYPE 2 DIABETES MELLITUS WITH DIABETIC NEUROPATHY, WITHOUT LONG-TERM CURRENT USE OF INSULIN (HCC): Primary | ICD-10-CM

## 2022-07-11 DIAGNOSIS — I10 ESSENTIAL HYPERTENSION: ICD-10-CM

## 2022-07-11 PROCEDURE — 2022F DILAT RTA XM EVC RTNOPTHY: CPT | Performed by: INTERNAL MEDICINE

## 2022-07-11 PROCEDURE — 99213 OFFICE O/P EST LOW 20 MIN: CPT | Performed by: INTERNAL MEDICINE

## 2022-07-11 PROCEDURE — 1101F PT FALLS ASSESS-DOCD LE1/YR: CPT | Performed by: INTERNAL MEDICINE

## 2022-07-11 PROCEDURE — G8754 DIAS BP LESS 90: HCPCS | Performed by: INTERNAL MEDICINE

## 2022-07-11 PROCEDURE — G8752 SYS BP LESS 140: HCPCS | Performed by: INTERNAL MEDICINE

## 2022-07-11 PROCEDURE — G8417 CALC BMI ABV UP PARAM F/U: HCPCS | Performed by: INTERNAL MEDICINE

## 2022-07-11 PROCEDURE — G8432 DEP SCR NOT DOC, RNG: HCPCS | Performed by: INTERNAL MEDICINE

## 2022-07-11 PROCEDURE — 3017F COLORECTAL CA SCREEN DOC REV: CPT | Performed by: INTERNAL MEDICINE

## 2022-07-11 PROCEDURE — 1090F PRES/ABSN URINE INCON ASSESS: CPT | Performed by: INTERNAL MEDICINE

## 2022-07-11 PROCEDURE — G8536 NO DOC ELDER MAL SCRN: HCPCS | Performed by: INTERNAL MEDICINE

## 2022-07-11 PROCEDURE — G8427 DOCREV CUR MEDS BY ELIG CLIN: HCPCS | Performed by: INTERNAL MEDICINE

## 2022-07-11 PROCEDURE — G8399 PT W/DXA RESULTS DOCUMENT: HCPCS | Performed by: INTERNAL MEDICINE

## 2022-07-11 PROCEDURE — 3051F HG A1C>EQUAL 7.0%<8.0%: CPT | Performed by: INTERNAL MEDICINE

## 2022-07-11 PROCEDURE — 1123F ACP DISCUSS/DSCN MKR DOCD: CPT | Performed by: INTERNAL MEDICINE

## 2022-07-11 RX ORDER — VALSARTAN AND HYDROCHLOROTHIAZIDE 80; 12.5 MG/1; MG/1
TABLET, FILM COATED ORAL
Qty: 90 TABLET | Refills: 1 | Status: SHIPPED | OUTPATIENT
Start: 2022-07-11

## 2022-07-11 NOTE — PROGRESS NOTES
Rosibel Cui is a 67 y.o.  female and presents with     Chief Complaint   Patient presents with    Diabetes    Hypertension     Pt sees Endocirnology and also Integrated pain management  Pt takes Elavil for headaches and gabapentin for back pain - pain management igves it  Pt feels well. Taking meds for DM, HTN and elevated chol.  uptodate on MMg - done in June 2022.  uptodate on colonoscopy   Due next year. Past Medical History:   Diagnosis Date    Allergic rhinitis     Dr. Corley Self 2015, 2019    Dr. Jared Bradley. Dr. Suzette Xiong.  Chest pain 01/15/09    Dr. Allyson Kamara Chickenpox childhood    DDD (degenerative disc disease), cervical 10/2015    Dr. Pamela Serrato.  DDD (degenerative disc disease), lumbar 2014    Dr. David Woodward.  [facility]    Sharon Hoang, OD. Dr. Lara Etienne, pod. Dr. Brandie Flynn.  Endometriosis     DrSherri Emolamont Signs    Essential hypertension, benign     Dr. Hung Guerrero, uterine     Dr. Gali Ordoñez 08/2009    Dr. France Lincoln. Dr. Lara Etienne.  Hand paresthesia 2011    Dr. Jean Marie Belle. Dr. Nirav Hunter.  OLITTRQO(189.7) 2012    Dr. Renee Gurrola. Dr. France Lincoln, ENT.  Hemoptysis 06/13/07    Dr. Sae Cobb    Hiatal hernia 05/27/15    small. MD Fredy Wolf Internal hemorrhoids 12/2006, 05/27/15    Dr. Smith Necessary. Dr. Arin Rizvi.  Intraocular pressure increase 07/27/05    bilaterally. Dr. Tricia Vora. Dr. Sharon Hoang.  Knee pain 04/04/13    Bilaterally. due to fall. Dr. Veronica Renteria.  Left knee pain 11/26/14    Left. due to OA, torn medial and lateral meniscal tears. Dr. Fabi Mcguire. Dr. Nava Nicolas.  Lumbar spondylosis 3/2008    Dr. Shanel Bah. Dr. Ramón Rasmussen (overactive bladder)     Dr. Marti Villegas. Dr. Odessa Valencia.     Paresthesia of foot     Left  due to Ornelas's Neuromo Jaida Ruiz. due to Diabetic Neuropathy. Dr. Ly Medina.  Proteinuria 2009    Dr. Ky Callaway Right shoulder pain 01/30/06    Dr. Francheska Taylor. impingement tendinitis    Right shoulder pain 07/2014    Dr. Katelynn Bruce.  Schatzki's ring 05/27/15    with partial obstruction at GE junction. Dr. Ariadna Rich. Karla Nephew     Dr. Malcolm Linn. Dr. Ame Lynn    Stenosis colon [facility]) 05/27/15    SIGMOID WITH TORTUOUS DESCENDING COLON. Dr. Tee Garcia. [facility].  Thalassemia minor 1980s    Urinary incontinence     Dr. Liudmila Bolton. Dr. Brenna Lu D deficiency 05/2011    Malina Delvalle NP     Past Surgical History:   Procedure Laterality Date    COLONOSCOPY Left 7/22/2019    COLONOSCOPY performed by Inocencio Hodges MD at [place] [address]  07/2005    Dr. Caitlyn Parr N/A 5/29/2019    INCOMPLETE COLONOSCOPY (second attempt) due to incomplete prep/full colon. Dr. Lisa Price. due prn.  FOOT/TOES SURGERY PROC UNLISTED      Left Ornelas's Neuroma. [address] [facility] HX BREAST BIOPSY Left     12 years ago neg    HX BREAST LUMPECTOMY  10/15/08    Left intraductal papilloma. Dr. Rula Anne HX COLONOSCOPY  05/27/15    due to anemia, rectal bleeding. due q 5 yrs. Dr. Pham De  2001, 12/07/06, 2011    Dr. Emigdio Fagan. due q 5 yrs.  HX ENDOSCOPY  05/27/15    due to anemia. Dr. Ariadna Rich.  HX HEART CATHETERIZATION  01/09/09    Dr. Teresita Florez ARTHROSCOPY Left 04/17/15    due to [address]. Dr. Ele Benton.  HX MYOMECTOMY  [id number]    x4    HX OOPHORECTOMY Bilateral 1994    HX SHOULDER ARTHROSCOPY Right 11/09/2018    with subacromial decompression and open distal clavicle excision, Dr. iRcci Phipps    endometriosis, fibroids.      Current Outpatient Medications   Medication Sig    Synjardy 12.5-1,000 mg per tablet TAKE 1 TABLET TWICE DAILY  WITH MEALS    amitriptyline (ELAVIL) 25 mg tablet Take  by mouth nightly.  dulaglutide (Trulicity) 1.5 [place]/8.7 mL sub-q pen 0.5 mL by SubCUTAneous route every seven (7) days.  gabapentin (NEURONTIN) 300 mg capsule Take 300 mg by mouth three (3) times daily.  cholecalciferol (VITAMIN D3) (2,000 UNITS /50 MCG) cap capsule Take 2 Capsules by mouth daily. With food    simvastatin (ZOCOR) 40 mg tablet TAKE 1 TABLET BY MOUTH AT BEDTIME    chlorhexidine (PERIDEX) 0.12 % solution     Blood-Glucose Meter monitoring kit One Touch Ultra 2 Use to test blood sugar 1-2 per day (blood sugar before breakfast or 2 hours after any meal or at bedtime) DX.   E11.65    sodium chloride (OCEAN) 0.65 % nasal spray 1 Spray as needed for Congestion.  solifenacin (VESICARE) 10 mg tablet Take 10 mg by mouth daily.  aspirin delayed-release 81 mg tablet Take 1 Tab by mouth daily.  valACYclovir (VALTREX) 500 mg tablet 500 mg daily.  evening primrose oil (EVENING PRIMROSE) 500 mg cap Take 1 Cap by mouth two (2) times a day.  cyclobenzaprine (FLEXERIL) 10 mg tablet Take 10 mg by mouth as needed.  valsartan-hydroCHLOROthiazide (DIOVAN-HCT) 80-12.5 mg per tablet TAKE 1 TABLET ONCE DAILY     No current facility-administered medications for this visit.      Health Maintenance   Topic Date Due    COVID-19 Vaccine (3 - Booster for Pfizer series) 09/01/2021    Foot Exam Q1  04/08/2022    Breast Cancer Screen Mammogram  06/03/2022    Flu Vaccine (1) 09/01/2022    Depression Screen  10/06/2022    Medicare Yearly Exam  10/07/2022    Eye Exam Retinal or Dilated  10/27/2022    A1C test (Diabetic or Prediabetic)  04/06/2023    MICROALBUMIN Q1  04/06/2023    Lipid Screen  04/06/2023    Colorectal Cancer Screening Combo  10/22/2024    DTaP/Tdap/Td series (3 - Td or Tdap) 01/23/2028    Hepatitis C Screening  Completed    Bone Densitometry (Dexa) Screening  Completed    Shingrix Vaccine Age 50> Completed    Pneumococcal 65+ years  Completed     Immunization History   Administered Date(s) Administered    (RETIRED) Pneumococcal Vaccine (Unspecified Type) 12/21/2005    COVID-19, PFIZER PURPLE top, DILUTE for use, (age 15 y+), IM, 30mcg/0.3mL 03/11/2021, 04/01/2021    H1N1 Influenza Virus Vaccine 03/09/2010    Hepatitis B Vaccine 06/09/2008, 08/20/2008, 01/27/2009    Influenza High Dose Vaccine PF 11/11/2015, 09/07/2016, 10/19/2017, 08/01/2018, 10/15/2019    Influenza Vaccine 10/26/2018    Influenza Vaccine Split 10/08/2010, 11/08/2011, 10/24/2012    Influenza Vaccine Whole 01/27/2009    Influenza, Quadrivalent, Adjuvanted (>65 Yrs FLUAD QUAD 71497) 10/06/2021    Pneumococcal Conjugate (PCV-13) 11/11/2015    Pneumococcal Polysaccharide (PPSV-23) 12/07/2016    Tdap 01/23/2018    Zoster Recombinant 05/25/2018, 08/04/2018    Zoster Vaccine, Live 01/20/2014    dT Vaccine 06/09/2008     No LMP recorded. Patient has had a hysterectomy. Allergies and Intolerances: Allergies   Allergen Reactions    Lisinopril Cough     Other reaction(s): Lisinopril  Other reaction(s): coughing, Other (see comments)  Coughing      Singulair [Montelukast] Other (comments)     headache       Family History:   Family History   Problem Relation Age of Onset   Nolvia Del Angel Hypertension Mother     Glaucoma Mother     Heart Failure Mother         CHF    Hypertension Father     Colon Cancer Father         colon    Heart Attack Sister     Stroke Sister     Diabetes Sister         x 2 sisters    Diabetes Brother     Breast Cancer Maternal Aunt 45       Social History:   She  reports that she has never smoked. She has never used smokeless tobacco.  She  reports current alcohol use.             Review of Systems:   General: negative for - chills, fatigue, fever, weight change  Psych: negative for - anxiety, depression, irritability or mood swings  ENT: negative for - headaches, hearing change, nasal congestion, oral lesions, sneezing or sore throat  Heme/ Lymph: negative for - bleeding problems, bruising, pallor or swollen lymph nodes  Endo: negative for - hot flashes, polydipsia/polyuria or temperature intolerance  Resp: negative for - cough, shortness of breath or wheezing  CV: negative for - chest pain, edema or palpitations  GI: negative for - abdominal pain, change in bowel habits, constipation, diarrhea or nausea/vomiting  : negative for - dysuria, hematuria, incontinence, pelvic pain or vulvar/vaginal symptoms  MSK: negative for - joint pain, joint swelling or muscle pain  Neuro: negative for - confusion, headaches, seizures or weakness  Derm: negative for - dry skin, hair changes, rash or skin lesion changes          Physical:   Vitals:   Vitals:    07/11/22 1005   BP: 124/79   Pulse: 71   Resp: 18   Temp: 97.1 °F (36.2 °C)   TempSrc: Temporal   SpO2: 100%   Weight: 187 lb (84.8 kg)   Height: 5' 8\" (1.727 m)           Exam:   HEENT- atraumatic,normocephalic, awake, oriented, well nourished  Neck - supple,no enlarged lymph nodes, no JVD, no thyromegaly  Chest- CTA, no rhonchi, no crackles  Heart- rrr, no murmurs / gallop/rub  Abdomen- soft,BS+,NT, no hepatosplenomegaly  Ext - no c/c/edema   Neuro- no focal deficits. Power 5/5 all extremities  Skin - warm,dry, no obvious rashes.           Review of Data:   LABS:   Lab Results   Component Value Date/Time    WBC 7.7 04/06/2022 11:26 AM    HGB 12.5 04/06/2022 11:26 AM    HCT 40.4 04/06/2022 11:26 AM    PLATELET 837 58/06/6398 11:26 AM     Lab Results   Component Value Date/Time    Sodium 133 (L) 04/06/2022 11:26 AM    Potassium 3.9 04/06/2022 11:26 AM    Chloride 97 04/06/2022 11:26 AM    CO2 29 04/06/2022 11:26 AM    Glucose 140 (H) 04/06/2022 11:26 AM    BUN 20 04/06/2022 11:26 AM    Creatinine 1.06 (H) 04/06/2022 11:26 AM     Lab Results   Component Value Date/Time    Cholesterol, total 223 (H) 04/06/2022 11:26 AM    HDL Cholesterol 91 04/06/2022 11:26 AM    LDL, calculated 117.6 (H) 04/06/2022 11:26 AM    Triglyceride 72 04/06/2022 11:26 AM     No components found for: GPT        Impression / Plan:        ICD-10-CM ICD-9-CM    1. Controlled type 2 diabetes mellitus with diabetic neuropathy, without long-term current use of insulin (HCC)  E11.40 250.60      357.2    2. Thalassemia minor  D56.3 282.46    3. Essential hypertension  I10 401.9    4. Hypercholesteremia  E78.00 272.0          Explained to patient risk benefits of the medications. Advised patient to stop meds if having any side effects. Pt verbalized understanding of the instructions. I have discussed the diagnosis with the patient and the intended plan as seen in the above orders. The patient has received an after-visit summary and questions were answered concerning future plans. I have discussed medication side effects and warnings with the patient as well. I have reviewed the plan of care with the patient, accepted their input and they are in agreement with the treatment goals. Reviewed plan of care. Patient has provided input and agrees with goals. Follow-up and Dispositions    · Return in about 6 months (around 1/11/2023).          Jasmine Tinajero MD

## 2022-07-11 NOTE — PROGRESS NOTES
Chief Complaint   Patient presents with    Diabetes    Hypertension        Visit Vitals  /79 (BP 1 Location: Left arm, BP Patient Position: Sitting)   Pulse 71   Temp 97.1 °F (36.2 °C) (Temporal)   Resp 18   Ht 5' 8\" (1.727 m)   Wt 187 lb (84.8 kg)   BMI 28.43 kg/m²        Health Maintenance Due   Topic    COVID-19 Vaccine (3 - Booster for Pfizer series)    Foot Exam Q1     Breast Cancer Screen Mammogram         1. Have you been to the ER, urgent care clinic since your last visit? Hospitalized since your last visit? No    2. Have you seen or consulted any other health care providers outside of the [address] [place] [address] since your last visit? Include any pap smears or colon screening.  No

## 2022-11-30 DIAGNOSIS — E11.40 CONTROLLED TYPE 2 DIABETES MELLITUS WITH DIABETIC NEUROPATHY, WITHOUT LONG-TERM CURRENT USE OF INSULIN (HCC): ICD-10-CM

## 2022-11-30 RX ORDER — DULAGLUTIDE 1.5 MG/.5ML
INJECTION, SOLUTION SUBCUTANEOUS
Qty: 6 ML | Refills: 3 | Status: SHIPPED | OUTPATIENT
Start: 2022-11-30

## 2023-01-11 ENCOUNTER — OFFICE VISIT (OUTPATIENT)
Dept: PRIMARY CARE CLINIC | Age: 73
End: 2023-01-11
Payer: MEDICARE

## 2023-01-11 VITALS
BODY MASS INDEX: 29.1 KG/M2 | HEART RATE: 60 BPM | WEIGHT: 192 LBS | TEMPERATURE: 97.5 F | SYSTOLIC BLOOD PRESSURE: 160 MMHG | RESPIRATION RATE: 18 BRPM | DIASTOLIC BLOOD PRESSURE: 80 MMHG | OXYGEN SATURATION: 99 % | HEIGHT: 68 IN

## 2023-01-11 DIAGNOSIS — E11.40 CONTROLLED TYPE 2 DIABETES MELLITUS WITH DIABETIC NEUROPATHY, WITHOUT LONG-TERM CURRENT USE OF INSULIN (HCC): ICD-10-CM

## 2023-01-11 DIAGNOSIS — E55.9 VITAMIN D DEFICIENCY: ICD-10-CM

## 2023-01-11 DIAGNOSIS — I10 ESSENTIAL HYPERTENSION: ICD-10-CM

## 2023-01-11 DIAGNOSIS — E11.65 TYPE 2 DIABETES MELLITUS WITH HYPERGLYCEMIA, WITHOUT LONG-TERM CURRENT USE OF INSULIN (HCC): Primary | ICD-10-CM

## 2023-01-11 PROCEDURE — 3079F DIAST BP 80-89 MM HG: CPT | Performed by: INTERNAL MEDICINE

## 2023-01-11 PROCEDURE — 1101F PT FALLS ASSESS-DOCD LE1/YR: CPT | Performed by: INTERNAL MEDICINE

## 2023-01-11 PROCEDURE — G8417 CALC BMI ABV UP PARAM F/U: HCPCS | Performed by: INTERNAL MEDICINE

## 2023-01-11 PROCEDURE — 3077F SYST BP >= 140 MM HG: CPT | Performed by: INTERNAL MEDICINE

## 2023-01-11 PROCEDURE — 1090F PRES/ABSN URINE INCON ASSESS: CPT | Performed by: INTERNAL MEDICINE

## 2023-01-11 PROCEDURE — G8427 DOCREV CUR MEDS BY ELIG CLIN: HCPCS | Performed by: INTERNAL MEDICINE

## 2023-01-11 PROCEDURE — G8399 PT W/DXA RESULTS DOCUMENT: HCPCS | Performed by: INTERNAL MEDICINE

## 2023-01-11 PROCEDURE — G8536 NO DOC ELDER MAL SCRN: HCPCS | Performed by: INTERNAL MEDICINE

## 2023-01-11 PROCEDURE — 3046F HEMOGLOBIN A1C LEVEL >9.0%: CPT | Performed by: INTERNAL MEDICINE

## 2023-01-11 PROCEDURE — 2022F DILAT RTA XM EVC RTNOPTHY: CPT | Performed by: INTERNAL MEDICINE

## 2023-01-11 PROCEDURE — 1123F ACP DISCUSS/DSCN MKR DOCD: CPT | Performed by: INTERNAL MEDICINE

## 2023-01-11 PROCEDURE — 99213 OFFICE O/P EST LOW 20 MIN: CPT | Performed by: INTERNAL MEDICINE

## 2023-01-11 PROCEDURE — 3017F COLORECTAL CA SCREEN DOC REV: CPT | Performed by: INTERNAL MEDICINE

## 2023-01-11 PROCEDURE — G8510 SCR DEP NEG, NO PLAN REQD: HCPCS | Performed by: INTERNAL MEDICINE

## 2023-01-11 RX ORDER — VALSARTAN AND HYDROCHLOROTHIAZIDE 160; 12.5 MG/1; MG/1
160 TABLET, FILM COATED ORAL DAILY
COMMUNITY

## 2023-01-11 RX ORDER — AMLODIPINE BESYLATE 5 MG/1
5 TABLET ORAL DAILY
Qty: 90 TABLET | Refills: 1 | Status: SHIPPED | OUTPATIENT
Start: 2023-01-11

## 2023-01-11 NOTE — PROGRESS NOTES
Chief Complaint   Patient presents with    Follow-up       There were no vitals taken for this visit. 1. Have you been to the ER, urgent care clinic since your last visit? Hospitalized since your last visit? No    2. Have you seen or consulted any other health care providers outside of the [address] [place] [address] since your last visit? Include any pap smears or colon screening. No      3. For patients aged 39-70: Has the patient had a colonoscopy / FIT/ Cologuard? Yes - Care Gap present. Most recent result on file      If the patient is female:    4. For patients aged 41-77: Has the patient had a mammogram within the past 2 years? Yes - Care Gap present. Most recent result on file      5. For patients aged 21-65: Has the patient had a pap smear?  No

## 2023-01-11 NOTE — PROGRESS NOTES
Russell Zhang is a 67 y.o.  female and presents with     Chief Complaint   Patient presents with    Follow-up     Pt reports that her cardiologist, Dr Laura Jackson , increased the dose of valsartan to 160 mg / HCTZ 12.5 mg po daily. BP is elevated. Pt sees Obgyn and they do MMG. She is seeing multiple specialist for her various underlying conditions          Past Medical History:   Diagnosis Date    Allergic rhinitis     Dr. Alis Meza 2015, 2019    Dr. Nazia Waterman. Dr. Elen Griffiths. Chest pain 01/15/09    Dr. Ainsley Han    Chickenpox childhood    DDD (degenerative disc disease), cervical 10/2015    Dr. Patricia Whitney. DDD (degenerative disc disease), lumbar 2014    Dr. Hue Lopez. [facility] 06/2007    Natalie Mcdonnell, OD. Dr. Mackenzie De, pod. Dr. Michele Stout. Endometriosis     Dr. Mortimer Olmsted    Essential hypertension, benign     Dr. Karla Dixon, uterine     Dr. Renetta Vinson 08/2009    Dr. Tiki Morse. Dr. Mackenzie De. Hand paresthesia 2011    Dr. Mata Zhang. Dr. Henna Yu. BBLUCGQU(869.6) 2012    Dr. Sarahi Grier. Dr. Tiki Morse, ENT. Hemoptysis 06/13/07    Dr. Jonathon Parada    Hiatal hernia 05/27/15    small. Maxine Dodd MD    Internal hemorrhoids 12/2006, 05/27/15    Dr. Ghassan Dietrich. Dr. Gayatri Dias. Intraocular pressure increase 07/27/05    bilaterally. Dr. Melissa Schreiber. Dr. Natalie Mcdonnell. Knee pain 04/04/13    Bilaterally. due to fall. Dr. Edith Evans. Left knee pain 11/26/14    Left. due to OA, torn medial and lateral meniscal tears. Dr. Frank Tierney. Dr. Moni Ruiz. Lumbar spondylosis 3/2008    Dr. Hassan Boas. Dr. Fabiola Vargas (overactive bladder)     Dr. Mignon Doshi. Dr. Maru Vital. 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 Chauhanleonardo Kaminskict. due to Diabetic Neuropathy. Dr. Mackenzie De.     Proteinuria 2009    Dr. Quevedo Right    Right shoulder pain 01/30/06    Dr. Alberto Hu. impingement tendinitis    Right shoulder pain 07/2014    Dr. Morgan Alan. Schatzki's ring 05/27/15    with partial obstruction at GE junction. Dr. Juan Alcantar. Sciatica     Dr. Garry Fontanez. Dr. Ashley Charles    Stenosis colon [facility]) 05/27/15    SIGMOID WITH TORTUOUS DESCENDING COLON. Dr. Quincy Calle. [facility]. Thalassemia minor 1980s    Urinary incontinence     Dr. Moses Mckinney. Dr. Aundrea Ruth    Vitamin D deficiency 05/2011    Dina Leyva NP     Past Surgical History:   Procedure Laterality Date    COLONOSCOPY Left 7/22/2019    COLONOSCOPY performed by Nidia Daley MD at [address] [employer]  07/2005    Dr. Carolann Terrell N/A 5/29/2019    INCOMPLETE COLONOSCOPY (second attempt) due to incomplete prep/full colon. Dr. Rogelio Jane. due prn. HX BREAST BIOPSY Left     12 years ago neg    HX BREAST LUMPECTOMY  10/15/08    Left intraductal papilloma. Dr. Kary Nguyen    HX COLONOSCOPY  05/27/15    due to anemia, rectal bleeding. due q 5 yrs. Dr. Tremaine Brambila  2001, 12/07/06, 2011    Dr. Teresa Key. due q 5 yrs. HX ENDOSCOPY  05/27/15    due to anemia. Dr. Juan Alcantar. HX HEART CATHETERIZATION  01/09/09    Dr. Farida Willis    HX [address] [place] [address]    HX KNEE ARTHROSCOPY Left 04/17/15    due to [place] [address]. Dr. Fabiola Draper. HX MYOMECTOMY  [id number]    x4    HX OOPHORECTOMY Bilateral 1994    HX SHOULDER ARTHROSCOPY Right 11/09/2018    with subacromial decompression and open distal clavicle excision, Dr. Gurdeep Quevedo    endometriosis, fibroids. [facility] UNLISTED PROCED[facility] FOOT/TOES      Left Ornelas's Neuroma. [facility].     Current Outpatient Medications   Medication Sig    valsartan-hydroCHLOROthiazide (DIOVAN-HCT) 160-12.5 mg per tablet Take 160 Tablets by mouth daily.     amLODIPine (NORVASC) 5 mg tablet Take 1 Tablet by mouth daily. Trulicity 1.5 HV/3.9 mL sub-q pen INJECT 0.5ML (=1.5 MG)     SUBCUTANEOUSLY EVERY 7 DAYS    Synjardy 12.5-1,000 mg per tablet TAKE 1 TABLET TWICE DAILY  WITH MEALS    amitriptyline (ELAVIL) 25 mg tablet Take  by mouth nightly.    gabapentin (NEURONTIN) 300 mg capsule Take 300 mg by mouth three (3) times daily. cholecalciferol (VITAMIN D3) (2,000 UNITS /50 MCG) cap capsule Take 2 Capsules by mouth daily. With food    simvastatin (ZOCOR) 40 mg tablet TAKE 1 TABLET BY MOUTH AT BEDTIME    chlorhexidine (PERIDEX) 0.12 % solution     Blood-Glucose Meter monitoring kit One Touch Ultra 2 Use to test blood sugar 1-2 per day (blood sugar before breakfast or 2 hours after any meal or at bedtime) DX.   E11.65    sodium chloride (OCEAN) 0.65 % nasal spray 1 Spray as needed for Congestion. solifenacin (VESICARE) 10 mg tablet Take 10 mg by mouth daily. aspirin delayed-release 81 mg tablet Take 1 Tab by mouth daily. valACYclovir (VALTREX) 500 mg tablet 500 mg daily. evening primrose oil 500 mg cap Take 1 Cap by mouth two (2) times a day. cyclobenzaprine (FLEXERIL) 10 mg tablet Take 10 mg by mouth as needed. No current facility-administered medications for this visit.      Health Maintenance   Topic Date Due    COVID-19 Vaccine (3 - Booster for Pfizer series) 05/27/2021    Foot Exam Q1  04/08/2022    Breast Cancer Screen Mammogram  06/03/2022    Flu Vaccine (1) 08/01/2022    Medicare Yearly Exam  10/07/2022    Eye Exam Retinal or Dilated  10/27/2022    Diabetic Alb to Cr ratio (uACR) test  04/06/2023    GFR test (Diabetes, CKD 3-4, OR last GFR 15-59)  04/06/2023    A1C test (Diabetic or Prediabetic)  04/06/2023    Lipid Screen  04/06/2023    Depression Screen  01/11/2024    Colorectal Cancer Screening Combo  10/22/2024    DTaP/Tdap/Td series (3 - Td or Tdap) 01/23/2028    Hepatitis C Screening  Completed    Bone Densitometry (Dexa) Screening  Completed    Shingles Vaccine  Completed Pneumococcal 65+ years  Completed     Immunization History   Administered Date(s) Administered    (RETIRED) Pneumococcal Vaccine (Unspecified Type) 12/21/2005    COVID-19, PFIZER PURPLE top, DILUTE for use, (age 15 y+), IM, 30mcg/0.3mL 03/11/2021, 04/01/2021    H1N1 Influenza Virus Vaccine 03/09/2010    Hepatitis B Vaccine 06/09/2008, 08/20/2008, 01/27/2009    Influenza High Dose Vaccine PF 11/11/2015, 09/07/2016, 10/19/2017, 08/01/2018, 10/15/2019    Influenza Vaccine 10/26/2018    Influenza Vaccine Split 10/08/2010, 11/08/2011, 10/24/2012    Influenza Vaccine Whole 01/27/2009    Influenza, FLUAD, (age 72 y+), Adjuvanted 10/06/2021    Pneumococcal Conjugate (PCV-13) 11/11/2015    Pneumococcal Polysaccharide (PPSV-23) 12/07/2016    Tdap 01/23/2018    Zoster Recombinant 05/25/2018, 08/04/2018    Zoster Vaccine, Live 01/20/2014    dT Vaccine 06/09/2008     No LMP recorded. Patient has had a hysterectomy. Allergies and Intolerances: Allergies   Allergen Reactions    Lisinopril Cough     Other reaction(s): Lisinopril  Other reaction(s): coughing, Other (see comments)  Coughing      Singulair [Montelukast] Other (comments)     headache       Family History:   Family History   Problem Relation Age of Onset    Hypertension Mother     Glaucoma Mother     Heart Failure Mother         CHF    Hypertension Father     Colon Cancer Father         colon    Heart Attack Sister     Stroke Sister     Diabetes Sister         x 2 sisters    Diabetes Brother     Breast Cancer Maternal Aunt 45       Social History:   She  reports that she has never smoked. She has never used smokeless tobacco.  She  reports current alcohol use.             Review of Systems:   General: negative for - chills, fatigue, fever, weight change  Psych: negative for - anxiety, depression, irritability or mood swings  ENT: negative for - headaches, hearing change, nasal congestion, oral lesions, sneezing or sore throat  Heme/ Lymph: negative for - bleeding problems, bruising, pallor or swollen lymph nodes  Endo: negative for - hot flashes, polydipsia/polyuria or temperature intolerance  Resp: negative for - cough, shortness of breath or wheezing  CV: negative for - chest pain, edema or palpitations  GI: negative for - abdominal pain, change in bowel habits, constipation, diarrhea or nausea/vomiting  : negative for - dysuria, hematuria, incontinence, pelvic pain or vulvar/vaginal symptoms  MSK: negative for - joint pain, joint swelling or muscle pain  Neuro: negative for - confusion, headaches, seizures or weakness  Derm: negative for - dry skin, hair changes, rash or skin lesion changes          Physical:   Vitals:   Vitals:    01/11/23 1005 01/11/23 1037   BP: (!) 175/91 (!) 160/80   Pulse: 60    Resp: 18    Temp: 97.5 °F (36.4 °C)    TempSrc: Temporal    SpO2: 99%    Weight: 192 lb (87.1 kg)    Height: 5' 8\" (1.727 m)            Exam:   HEENT- atraumatic,normocephalic, awake, oriented, well nourished  Neck - supple,no enlarged lymph nodes, no JVD, no thyromegaly  Chest- CTA, no rhonchi, no crackles  Heart- rrr, no murmurs / gallop/rub  Abdomen- soft,BS+,NT, no hepatosplenomegaly  Ext - no c/c/edema   Neuro- no focal deficits. Power 5/5 all extremities  Skin - warm,dry, no obvious rashes.           Review of Data:   LABS:   Lab Results   Component Value Date/Time    WBC 7.7 04/06/2022 11:26 AM    HGB 12.5 04/06/2022 11:26 AM    HCT 40.4 04/06/2022 11:26 AM    PLATELET 063 11/42/1283 11:26 AM     Lab Results   Component Value Date/Time    Sodium 133 (L) 04/06/2022 11:26 AM    Potassium 3.9 04/06/2022 11:26 AM    Chloride 97 04/06/2022 11:26 AM    CO2 29 04/06/2022 11:26 AM    Glucose 140 (H) 04/06/2022 11:26 AM    BUN 20 04/06/2022 11:26 AM    Creatinine 1.06 (H) 04/06/2022 11:26 AM     Lab Results   Component Value Date/Time    Cholesterol, total 223 (H) 04/06/2022 11:26 AM    HDL Cholesterol 91 04/06/2022 11:26 AM    LDL, calculated 117.6 (H) 04/06/2022 11:26 AM    Triglyceride 72 04/06/2022 11:26 AM     No components found for: GPT        Impression / Plan:        ICD-10-CM ICD-9-CM    1. Type 2 diabetes mellitus with hyperglycemia, without long-term current use of insulin (HCC)  E11.65 250.00 CBC WITH AUTOMATED DIFF     587.78 METABOLIC PANEL, COMPREHENSIVE      LIPID PANEL      HEMOGLOBIN A1C WITH EAG      MICROALBUMIN, UR, RAND W/ MICROALB/CREAT RATIO      MICROALBUMIN, UR, RAND W/ MICROALB/CREAT RATIO      HEMOGLOBIN A1C WITH EAG      LIPID PANEL      METABOLIC PANEL, COMPREHENSIVE      CBC WITH AUTOMATED DIFF      2. Essential hypertension  I10 401.9 amLODIPine (NORVASC) 5 mg tablet      3. Controlled type 2 diabetes mellitus with diabetic neuropathy, without long-term current use of insulin ([facility])  E11.40 250.60      357.2       4. Vitamin D deficiency  E55.9 268.9 VITAMIN D, 25 HYDROXY      VITAMIN D, 25 HYDROXY        Hypertension-blood pressure elevated, will add amlodipine 5 mg at bedtime    Diabetes/hypercholesterolemia-check labs to see if they are well controlled. Explained to patient risk benefits of the medications. Advised patient to stop meds if having any side effects. Pt verbalized understanding of the instructions. I have discussed the diagnosis with the patient and the intended plan as seen in the above orders. The patient has received an after-visit summary and questions were answered concerning future plans. I have discussed medication side effects and warnings with the patient as well. I have reviewed the plan of care with the patient, accepted their input and they are in agreement with the treatment goals. Reviewed plan of care. Patient has provided input and agrees with goals. Follow-up and Dispositions    Return in about 2 months (around 3/11/2023) for FULL PHYSICAL - 30 minutes.          Jaswant Thacker MD

## 2023-01-12 LAB
25(OH)D3 SERPL-MCNC: 23.5 NG/ML (ref 30–100)
ALBUMIN SERPL-MCNC: 4.2 G/DL (ref 3.5–5)
ALBUMIN/GLOB SERPL: 1.4 (ref 1.1–2.2)
ALP SERPL-CCNC: 66 U/L (ref 45–117)
ALT SERPL-CCNC: 9 U/L (ref 12–78)
ANION GAP SERPL CALC-SCNC: 5 MMOL/L (ref 5–15)
AST SERPL-CCNC: 9 U/L (ref 15–37)
BASOPHILS # BLD: 0 K/UL (ref 0–0.1)
BASOPHILS NFR BLD: 1 % (ref 0–1)
BILIRUB SERPL-MCNC: 0.3 MG/DL (ref 0.2–1)
BUN SERPL-MCNC: 15 MG/DL (ref 6–20)
BUN/CREAT SERPL: 13 (ref 12–20)
CALCIUM SERPL-MCNC: 9.9 MG/DL (ref 8.5–10.1)
CHLORIDE SERPL-SCNC: 105 MMOL/L (ref 97–108)
CHOLEST SERPL-MCNC: 212 MG/DL
CO2 SERPL-SCNC: 29 MMOL/L (ref 21–32)
CREAT SERPL-MCNC: 1.17 MG/DL (ref 0.55–1.02)
CREAT UR-MCNC: 154 MG/DL
DIFFERENTIAL METHOD BLD: ABNORMAL
EOSINOPHIL # BLD: 0.1 K/UL (ref 0–0.4)
EOSINOPHIL NFR BLD: 1 % (ref 0–7)
ERYTHROCYTE [DISTWIDTH] IN BLOOD BY AUTOMATED COUNT: 17.2 % (ref 11.5–14.5)
EST. AVERAGE GLUCOSE BLD GHB EST-MCNC: 148 MG/DL
GLOBULIN SER CALC-MCNC: 3.1 G/DL (ref 2–4)
GLUCOSE SERPL-MCNC: 107 MG/DL (ref 65–100)
HBA1C MFR BLD: 6.8 % (ref 4–5.6)
HCT VFR BLD AUTO: 39.5 % (ref 35–47)
HDLC SERPL-MCNC: 80 MG/DL
HDLC SERPL: 2.7 (ref 0–5)
HGB BLD-MCNC: 11.7 G/DL (ref 11.5–16)
IMM GRANULOCYTES # BLD AUTO: 0 K/UL (ref 0–0.04)
IMM GRANULOCYTES NFR BLD AUTO: 0 % (ref 0–0.5)
LDLC SERPL CALC-MCNC: 113.2 MG/DL (ref 0–100)
LYMPHOCYTES # BLD: 3.2 K/UL (ref 0.8–3.5)
LYMPHOCYTES NFR BLD: 50 % (ref 12–49)
MCH RBC QN AUTO: 22.3 PG (ref 26–34)
MCHC RBC AUTO-ENTMCNC: 29.6 G/DL (ref 30–36.5)
MCV RBC AUTO: 75.2 FL (ref 80–99)
MICROALBUMIN UR-MCNC: 1.06 MG/DL
MICROALBUMIN/CREAT UR-RTO: 7 MG/G (ref 0–30)
MONOCYTES # BLD: 0.5 K/UL (ref 0–1)
MONOCYTES NFR BLD: 7 % (ref 5–13)
NEUTS SEG # BLD: 2.7 K/UL (ref 1.8–8)
NEUTS SEG NFR BLD: 41 % (ref 32–75)
NRBC # BLD: 0 K/UL (ref 0–0.01)
NRBC BLD-RTO: 0 PER 100 WBC
PLATELET # BLD AUTO: 326 K/UL (ref 150–400)
PMV BLD AUTO: 11.9 FL (ref 8.9–12.9)
POTASSIUM SERPL-SCNC: 4.2 MMOL/L (ref 3.5–5.1)
PROT SERPL-MCNC: 7.3 G/DL (ref 6.4–8.2)
RBC # BLD AUTO: 5.25 M/UL (ref 3.8–5.2)
SODIUM SERPL-SCNC: 139 MMOL/L (ref 136–145)
TRIGL SERPL-MCNC: 94 MG/DL (ref ?–150)
VLDLC SERPL CALC-MCNC: 18.8 MG/DL
WBC # BLD AUTO: 6.5 K/UL (ref 3.6–11)

## 2023-01-13 NOTE — PROGRESS NOTES
Vitamin D level is slightly low. Would recommend vitamin D 1000 units daily over-the-counter.   Diabetes is well controlled, would recommend low-fat diet and exercise  Cholesterol is slightly elevated  Kidney liver function and blood count are normal

## 2023-03-01 DIAGNOSIS — I10 ESSENTIAL HYPERTENSION: ICD-10-CM

## 2023-03-01 RX ORDER — AMLODIPINE BESYLATE 5 MG/1
5 TABLET ORAL DAILY
Qty: 90 TABLET | Refills: 1 | Status: SHIPPED | OUTPATIENT
Start: 2023-03-01

## 2023-03-01 NOTE — TELEPHONE ENCOUNTER
Patient requested 90-day. Jeaneth King they never received their January shipment from [employer]. PCP: Junior Hammad MD    Last appt: 1/11/2023  Future Appointments   Date Time Provider [place] Enriquez   3/14/2023  9:30 AM Junior Hammad MD [facility] AMB       Requested Prescriptions     Pending Prescriptions Disp Refills    amLODIPine (NORVASC) 5 mg tablet 90 Tablet 1     Sig: Take 1 Tablet by mouth daily.        Prior labs and Blood pressures:  BP Readings from Last 3 Encounters:   01/11/23 (!) 160/80   07/11/22 124/79   04/04/22 (!) 150/80     Lab Results   Component Value Date/Time    Sodium 139 01/11/2023 10:52 AM    Potassium 4.2 01/11/2023 10:52 AM    Chloride 105 01/11/2023 10:52 AM    CO2 29 01/11/2023 10:52 AM    Anion gap 5 01/11/2023 10:52 AM    Glucose 107 (H) 01/11/2023 10:52 AM    BUN 15 01/11/2023 10:52 AM    Creatinine 1.17 (H) 01/11/2023 10:52 AM    BUN/Creatinine ratio 13 01/11/2023 10:52 AM    GFR est AA >60 04/06/2022 11:26 AM    GFR est non-AA 51 (L) 04/06/2022 11:26 AM    Calcium 9.9 01/11/2023 10:52 AM

## 2023-03-14 ENCOUNTER — PATIENT MESSAGE (OUTPATIENT)
Dept: PRIMARY CARE CLINIC | Age: 73
End: 2023-03-14

## 2023-03-14 ENCOUNTER — OFFICE VISIT (OUTPATIENT)
Dept: PRIMARY CARE CLINIC | Age: 73
End: 2023-03-14
Payer: MEDICARE

## 2023-03-14 ENCOUNTER — HOSPITAL ENCOUNTER (OUTPATIENT)
Dept: GENERAL RADIOLOGY | Age: 73
Discharge: HOME | End: 2023-03-14
Payer: MEDICARE

## 2023-03-14 VITALS
TEMPERATURE: 99.8 F | HEIGHT: 68 IN | SYSTOLIC BLOOD PRESSURE: 143 MMHG | OXYGEN SATURATION: 99 % | BODY MASS INDEX: 29.34 KG/M2 | RESPIRATION RATE: 17 BRPM | HEART RATE: 64 BPM | WEIGHT: 193.6 LBS | DIASTOLIC BLOOD PRESSURE: 83 MMHG

## 2023-03-14 DIAGNOSIS — M54.2 NECK PAIN: ICD-10-CM

## 2023-03-14 DIAGNOSIS — E78.00 HYPERCHOLESTEREMIA: ICD-10-CM

## 2023-03-14 DIAGNOSIS — D56.3 THALASSEMIA MINOR: ICD-10-CM

## 2023-03-14 DIAGNOSIS — E11.65 TYPE 2 DIABETES MELLITUS WITH HYPERGLYCEMIA, WITHOUT LONG-TERM CURRENT USE OF INSULIN (HCC): ICD-10-CM

## 2023-03-14 DIAGNOSIS — Z00.00 MEDICARE ANNUAL WELLNESS VISIT, SUBSEQUENT: Primary | ICD-10-CM

## 2023-03-14 DIAGNOSIS — I10 ESSENTIAL HYPERTENSION: ICD-10-CM

## 2023-03-14 PROCEDURE — G8432 DEP SCR NOT DOC, RNG: HCPCS | Performed by: INTERNAL MEDICINE

## 2023-03-14 PROCEDURE — 3077F SYST BP >= 140 MM HG: CPT | Performed by: INTERNAL MEDICINE

## 2023-03-14 PROCEDURE — 3017F COLORECTAL CA SCREEN DOC REV: CPT | Performed by: INTERNAL MEDICINE

## 2023-03-14 PROCEDURE — G8399 PT W/DXA RESULTS DOCUMENT: HCPCS | Performed by: INTERNAL MEDICINE

## 2023-03-14 PROCEDURE — G8417 CALC BMI ABV UP PARAM F/U: HCPCS | Performed by: INTERNAL MEDICINE

## 2023-03-14 PROCEDURE — 2022F DILAT RTA XM EVC RTNOPTHY: CPT | Performed by: INTERNAL MEDICINE

## 2023-03-14 PROCEDURE — 72040 X-RAY EXAM NECK SPINE 2-3 VW: CPT

## 2023-03-14 PROCEDURE — 1123F ACP DISCUSS/DSCN MKR DOCD: CPT | Performed by: INTERNAL MEDICINE

## 2023-03-14 PROCEDURE — G0439 PPPS, SUBSEQ VISIT: HCPCS | Performed by: INTERNAL MEDICINE

## 2023-03-14 PROCEDURE — G8536 NO DOC ELDER MAL SCRN: HCPCS | Performed by: INTERNAL MEDICINE

## 2023-03-14 PROCEDURE — 3079F DIAST BP 80-89 MM HG: CPT | Performed by: INTERNAL MEDICINE

## 2023-03-14 PROCEDURE — 3044F HG A1C LEVEL LT 7.0%: CPT | Performed by: INTERNAL MEDICINE

## 2023-03-14 PROCEDURE — 1101F PT FALLS ASSESS-DOCD LE1/YR: CPT | Performed by: INTERNAL MEDICINE

## 2023-03-14 PROCEDURE — G8427 DOCREV CUR MEDS BY ELIG CLIN: HCPCS | Performed by: INTERNAL MEDICINE

## 2023-03-14 RX ORDER — BLOOD SUGAR DIAGNOSTIC
STRIP MISCELLANEOUS
COMMUNITY

## 2023-03-14 NOTE — PROGRESS NOTES
Eduarda Balbuena is a 67 y.o.  female and presents with     Chief Complaint   Patient presents with    Physical     Pt sees Obgyn and had MMG done last year., Dr Genet Macias  Has some neck pain. No tingling or numbness or weakness in her arms no tingling or weakness in both arms    No chest pain or SOB. Has appt with Dr Fide Hicks this month. Past Medical History:   Diagnosis Date    Allergic rhinitis     Dr. Issa Funez 2015, 2019    Dr. Chester Darby. Dr. Rae Villarreal. Chest pain 01/15/09    Dr. Jacquelyn Weeks    Chickenpox childhood    DDD (degenerative disc disease), cervical 10/2015    Dr. Estela Henderson. DDD (degenerative disc disease), lumbar 2014    Dr. Edwin Thacker. [facility] 06/2007    Francisco Leal, OD. Dr. Sam Newman, pod. Dr. Maira Epperson. Endometriosis     Dr. Adiel Bryant    Essential hypertension, benign     Dr. Radha Hudson, uterine     Dr. Acosta Amble     Dr. Sheffield Shows 08/2009    Dr. Richard De. Dr. Sam Newman. Hand paresthesia 2011    Dr. Wilian Collins. Dr. Kody Oreilly. [facility](890.1) 2012    Dr. Koko Mckeon. Dr. Richard De, ENT. Hemoptysis 06/13/07    Dr. Omar Mtz    Hiatal hernia 05/27/15    small. Meme Bo MD    Internal hemorrhoids 12/2006, 05/27/15    Dr. Nell Aden. Dr. Fide Hicsk. Intraocular pressure increase 07/27/05    bilaterally. Dr. Taryn Estrada. Dr. Francisco Leal. Knee pain 04/04/13    Bilaterally. due to fall. Dr. Clrak Son. Left knee pain 11/26/14    Left. due to OA, torn medial and lateral meniscal tears. Dr. Roque Murphy. Dr. Lucy Davis. Lumbar spondylosis 3/2008    Dr. Sanjuana Gudino. Dr. Ruma Hilliard (overactive bladder)     Dr. Ana Blanchard. Dr. Lacy Mae. 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 Arnold York. due to Diabetic Neuropathy. Dr. Sam Newman.     Proteinuria 2009    Dr. Oliverio Grant Assi    Right shoulder pain 01/30/06    Dr. Rachel Youngblood. impingement tendinitis    Right shoulder pain 07/2014    Dr. Nuha Allen. Schatzki's ring 05/27/15    with partial obstruction at GE junction. Dr. Melissa Castañeda. Sciatica     Dr. Kendall Ríos. Dr. Blanquita Langford    Stenosis colon [facility]) 05/27/15    SIGMOID WITH TORTUOUS DESCENDING COLON. Dr. Landeros Sherri [facility]. Thalassemia minor 1980s    Urinary incontinence     Dr. oYkasta Wall. Dr. Jolie Wells    Vitamin D deficiency 05/2011    Dante Henley NP     Past Surgical History:   Procedure Laterality Date    COLONOSCOPY Left 7/22/2019    COLONOSCOPY performed by Holley Nyhan, MD at [address] [facility]  07/2005    Dr. Hans Bhandari N/A 5/29/2019    INCOMPLETE COLONOSCOPY (second attempt) due to incomplete prep/full colon. Dr. Amanda Garcia. due prn. HX BREAST BIOPSY Left     12 years ago neg    HX BREAST LUMPECTOMY  10/15/08    Left intraductal papilloma. Dr. Aleida Mccall    HX COLONOSCOPY  05/27/15    due to anemia, rectal bleeding. due q 5 yrs. Dr. Anais Cruz  2001, 12/07/06, 2011    Dr. Laurel Cantu. due q 5 yrs. HX ENDOSCOPY  05/27/15    due to anemia. Dr. Melissa Castañeda. HX HEART CATHETERIZATION  01/09/09    Dr. Rima Cedeno    HX Crta. [facility]    HX KNEE ARTHROSCOPY Left 04/17/15    due to [address]. Dr. Holden Has. HX MYOMECTOMY  [id number]    x4    HX OOPHORECTOMY Bilateral 1994    HX SHOULDER ARTHROSCOPY Right 11/09/2018    with subacromial decompression and open distal clavicle excision, Dr. Latha Ravi    endometriosis, fibroids. [place] UNLISTED PROCEDURE FOOT/TOES      Left Ornelas's Neuroma.   [facility].     Current Outpatient Medications   Medication Sig    glucose blood VI test strips (OneTouch Ultra Test) strip OneTouch Ultra Test strips    trazodone HCl (TRAZODONE PO) trazodone    valsartan-hydroCHLOROthiazide (DIOVAN-HCT) 160-12.5 mg per tablet Take 160 Tablets by mouth daily. Trulicity 1.5 PO/7.6 mL sub-q pen INJECT 0.5ML (=1.5 MG)     SUBCUTANEOUSLY EVERY 7 DAYS    Synjardy 12.5-1,000 mg per tablet TAKE 1 TABLET TWICE DAILY  WITH MEALS    amitriptyline (ELAVIL) 25 mg tablet Take  by mouth nightly.    gabapentin (NEURONTIN) 300 mg capsule Take 300 mg by mouth three (3) times daily. cholecalciferol (VITAMIN D3) (2,000 UNITS /50 MCG) cap capsule Take 2 Capsules by mouth daily. With food    simvastatin (ZOCOR) 40 mg tablet TAKE 1 TABLET BY MOUTH AT BEDTIME    chlorhexidine (PERIDEX) 0.12 % solution     Blood-Glucose Meter monitoring kit One Touch Ultra 2 Use to test blood sugar 1-2 per day (blood sugar before breakfast or 2 hours after any meal or at bedtime) DX.   E11.65    sodium chloride (OCEAN) 0.65 % nasal spray 1 Spray as needed for Congestion. solifenacin (VESICARE) 10 mg tablet Take 10 mg by mouth daily. aspirin delayed-release 81 mg tablet Take 1 Tab by mouth daily. valACYclovir (VALTREX) 500 mg tablet 500 mg daily. evening primrose oil 500 mg cap Take 1 Cap by mouth two (2) times a day. cyclobenzaprine (FLEXERIL) 10 mg tablet Take 10 mg by mouth as needed. amLODIPine (NORVASC) 5 mg tablet Take 1 Tablet by mouth daily. No current facility-administered medications for this visit.      Health Maintenance   Topic Date Due    COVID-19 Vaccine (3 - Booster for Pfizer series) 05/27/2021    Foot Exam Q1  04/08/2022    Breast Cancer Screen Mammogram  06/03/2022    Flu Vaccine (1) 08/01/2022    Eye Exam Retinal or Dilated  10/27/2022    Diabetic Alb to Cr ratio (uACR) test  01/11/2024    GFR test (Diabetes, CKD 3-4, OR last GFR 15-59)  01/11/2024    A1C test (Diabetic or Prediabetic)  01/11/2024    Lipid Screen  01/11/2024    Medicare Yearly Exam  03/14/2024    Depression Screen  03/14/2024    Colorectal Cancer Screening Combo  10/22/2024    DTaP/Tdap/Td series (3 - Td or Tdap) 01/23/2028 Hepatitis C Screening  Completed    Bone Densitometry (Dexa) Screening  Completed    Shingles Vaccine  Completed    Pneumococcal 65+ years  Completed     Immunization History   Administered Date(s) Administered    (RETIRED) Pneumococcal Vaccine (Unspecified Type) 12/21/2005    COVID-19, PFIZER PURPLE top, DILUTE for use, (age 15 y+), IM, 30mcg/0.3mL 03/11/2021, 04/01/2021    H1N1 Influenza Virus Vaccine 03/09/2010    Hepatitis B Vaccine 06/09/2008, 08/20/2008, 01/27/2009    Influenza High Dose Vaccine PF 11/11/2015, 09/07/2016, 10/19/2017, 08/01/2018, 10/15/2019    Influenza Vaccine 10/26/2018    Influenza Vaccine Split 10/08/2010, 11/08/2011, 10/24/2012    Influenza Vaccine Whole 01/27/2009    Influenza, FLUAD, (age 72 y+), Adjuvanted 10/06/2021    Pneumococcal Conjugate (PCV-13) 11/11/2015    Pneumococcal Polysaccharide (PPSV-23) 12/07/2016    Tdap 01/23/2018    Zoster Recombinant 05/25/2018, 08/04/2018    Zoster Vaccine, Live 01/20/2014    dT Vaccine 06/09/2008     No LMP recorded. Patient has had a hysterectomy. Allergies and Intolerances: Allergies   Allergen Reactions    Lisinopril Cough     Other reaction(s): Lisinopril  Other reaction(s): coughing, Other (see comments)  Coughing      Singulair [Montelukast] Other (comments)     headache       Family History:   Family History   Problem Relation Age of Onset    Hypertension Mother     Glaucoma Mother     Heart Failure Mother         CHF    Hypertension Father     Colon Cancer Father         colon    Heart Attack Sister     Stroke Sister     Diabetes Sister         x 2 sisters    Diabetes Brother     Breast Cancer Maternal Aunt 45       Social History:   She  reports that she has never smoked. She has never used smokeless tobacco.  She  reports current alcohol use.             Review of Systems:   General: negative for - chills, fatigue, fever, weight change  Psych: negative for - anxiety, depression, irritability or mood swings  ENT: negative for - headaches, hearing change, nasal congestion, oral lesions, sneezing or sore throat  Heme/ Lymph: negative for - bleeding problems, bruising, pallor or swollen lymph nodes  Endo: negative for - hot flashes, polydipsia/polyuria or temperature intolerance  Resp: negative for - cough, shortness of breath or wheezing  CV: negative for - chest pain, edema or palpitations  GI: negative for - abdominal pain, change in bowel habits, constipation, diarrhea or nausea/vomiting  : negative for - dysuria, hematuria, incontinence, pelvic pain or vulvar/vaginal symptoms  MSK: negative for - joint pain, joint swelling or muscle pain  Neuro: negative for - confusion, headaches, seizures or weakness  Derm: negative for - dry skin, hair changes, rash or skin lesion changes          Physical:   Vitals:   Vitals:    03/14/23 0930 03/14/23 0937   BP: (!) 153/83 (!) 143/83   Pulse: 64    Resp: 17    Temp: 99.8 °F (37.7 °C)    SpO2: 99%    Weight: 193 lb 9.6 oz (87.8 kg)    Height: 5' 8\" (1.727 m)            Exam:   HEENT- atraumatic,normocephalic, awake, oriented, well nourished  Neck - supple,no enlarged lymph nodes, no JVD, no thyromegaly, range of movement at the neck is limited  Chest- CTA, no rhonchi, no crackles  Heart- rrr, no murmurs / gallop/rub  Abdomen- soft,BS+,NT, no hepatosplenomegaly  Ext - no c/c/edema   Neuro- no focal deficits. Power 5/5 all extremities  Skin - warm,dry, no obvious rashes.           Review of Data:   LABS:   Lab Results   Component Value Date/Time    WBC 6.5 01/11/2023 10:52 AM    HGB 11.7 01/11/2023 10:52 AM    HCT 39.5 01/11/2023 10:52 AM    PLATELET 405 26/01/1221 10:52 AM     Lab Results   Component Value Date/Time    Sodium 139 01/11/2023 10:52 AM    Potassium 4.2 01/11/2023 10:52 AM    Chloride 105 01/11/2023 10:52 AM    CO2 29 01/11/2023 10:52 AM    Glucose 107 (H) 01/11/2023 10:52 AM    BUN 15 01/11/2023 10:52 AM    Creatinine 1.17 (H) 01/11/2023 10:52 AM     Lab Results   Component Value Date/Time    Cholesterol, total 212 (H) 01/11/2023 10:52 AM    HDL Cholesterol 80 01/11/2023 10:52 AM    LDL, calculated 113.2 (H) 01/11/2023 10:52 AM    Triglyceride 94 01/11/2023 10:52 AM     No components found for: GPT        Impression / Plan:        ICD-10-CM ICD-9-CM    1. Medicare annual wellness visit, subsequent  Z00.00 V70.0       2. Essential hypertension  I10 401.9       3. Type 2 diabetes mellitus with hyperglycemia, without long-term current use of insulin (HCC)  E11.65 250.00      790.29       4. Hypercholesteremia  E78.00 272.0       5. Thalassemia minor  D56.3 282.46       6. Neck pain  M54.2 723.1 XR SPINE CERV PA LAT ODONT 3 V MAX        Hypertension/diabetes/hypercholesterolemia-stable    Explained to patient risk benefits of the medications. Advised patient to stop meds if having any side effects. Pt verbalized understanding of the instructions. I have discussed the diagnosis with the patient and the intended plan as seen in the above orders. The patient has received an after-visit summary and questions were answered concerning future plans. I have discussed medication side effects and warnings with the patient as well. I have reviewed the plan of care with the patient, accepted their input and they are in agreement with the treatment goals. Reviewed plan of care. Patient has provided input and agrees with goals. Follow-up and Dispositions    Return in about 6 months (around 9/14/2023). Shruti Lazaro MD    ------------------------------------------      HE SUBSEQUENT MEDICARE Yevette Sheffield VISIT PROGRESS NOTES    This is a Subsequent Medicare Annual Wellness Visit providing Personalized Prevention Plan Services (PPPS) (Performed 12 months after initial AWV and PPPS )    I have reviewed the patient's medical history in detail and updated the computerized patient record. Em De La Torre is a 67 y.o.   female and presents for an subsequent annual wellness exam     ROS   Negative except neck pain    All other systems reviewed and are negative. History     Past Medical History:   Diagnosis Date    Allergic rhinitis     Dr. Karen Negrete 2015, 2019    Dr. Tim Anderson. Dr. Adenike Tom. Chest pain 01/15/09    Dr. Kurt Hudson    Chickenpox childhood    DDD (degenerative disc disease), cervical 10/2015    Dr. Lida Khan. DDD (degenerative disc disease), lumbar 2014    Dr. Brandon Hubbard. Diabetes ([address] 06/2007    Regina Reece, OD. Dr. Ruma England, pod. Dr. Penny Whitaker. Endometriosis     Dr. Dakota Cooper    Essential hypertension, benign     Dr. Isaiah Manzano, uterine     Dr. Lilia Shelbyoked     Dr. Lindsey Monreal 08/2009    Dr. Dakota Montoya. Dr. Ruma England. Hand paresthesia 2011    Dr. Zoie Laura. Dr. Jeremiah Kim. CVQTASBA(003.4) 2012    Dr. Omari Mendes. Dr. Dakota Montoya, ENT. Hemoptysis 06/13/07    Dr. Efren Trujillo    Hiatal hernia 05/27/15    small. Corrina Nowak MD    Internal hemorrhoids 12/2006, 05/27/15    Dr. Velma Cabot. Dr. Horn. Intraocular pressure increase 07/27/05    bilaterally. Dr. Aron Sicard. Dr. Regina Reece. Knee pain 04/04/13    Bilaterally. due to fall. Dr. Bernabe Garvey. Left knee pain 11/26/14    Left. due to OA, torn medial and lateral meniscal tears. Dr. Jemma Longoria. Dr. Rosalinda Krueger. Lumbar spondylosis 3/2008    Dr. Nikki Vega. Dr. Caren Trammell (overactive bladder)     Dr. Carli Silva. Dr. Car Garcia. 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 Georgina Moder. due to Diabetic Neuropathy. Dr. Ruma England. Proteinuria 2009    Dr. Lopez Speaker    Right shoulder pain 01/30/06    Dr. Elmo Rodriguez. impingement tendinitis    Right shoulder pain 07/2014    Dr. Tana Jang. Schatzki's ring 05/27/15    with partial obstruction at GE junction. Dr. Horn.     Sciatica Dr. Ad Palma. Dr. Levin Ka    Stenosis colon [facility]) 05/27/15    SIGMOID WITH TORTUOUS DESCENDING COLON. Dr. Shweta Santillan. [facility]. Thalassemia minor 1980s    Urinary incontinence     Dr. Xiomara Varghese. Dr. Lisa Tripp    Vitamin D deficiency 05/2011    Jovana Beauchamp NP      Past Surgical History:   Procedure Laterality Date    COLONOSCOPY Left 7/22/2019    COLONOSCOPY performed by Samul Habermann, MD at [address]  07/2005    Dr. Audi Villavicencio N/A 5/29/2019    INCOMPLETE COLONOSCOPY (second attempt) due to incomplete prep/full colon. Dr. James Esparza. due prn. HX BREAST BIOPSY Left     12 years ago neg    HX BREAST LUMPECTOMY  10/15/08    Left intraductal papilloma. Dr. Joselyn Cardenas    HX COLONOSCOPY  05/27/15    due to anemia, rectal bleeding. due q 5 yrs. Dr. Sarah Frazier  2001, 12/07/06, 2011    Dr. Monika Braswell. due q 5 yrs. HX ENDOSCOPY  05/27/15    due to anemia. Dr. Kaye Gregory. HX HEART CATHETERIZATION  01/09/09    Dr. Mago Mott    HX [address]ta. [place] [address]    HX KNEE ARTHROSCOPY Left 04/17/15    due to [address]. Dr. Deneen Hernandes. HX MYOMECTOMY  [id number]    x4    HX OOPHORECTOMY Bilateral 1994    HX SHOULDER ARTHROSCOPY Right 11/09/2018    with subacromial decompression and open distal clavicle excision, Dr. Phyllis Key    endometriosis, fibroids. [place] UNLISTED PROCEDURE FOOT/TOES      Left Ornelas's Neuroma. [facility].     Current Outpatient Medications   Medication Sig Dispense Refill    glucose blood VI test strips (OneTouch Ultra Test) strip OneTouch Ultra Test strips      trazodone HCl (TRAZODONE PO) trazodone      valsartan-hydroCHLOROthiazide (DIOVAN-HCT) 160-12.5 mg per tablet Take 160 Tablets by mouth daily.       Trulicity 1.5 UP/7.1 mL sub-q pen INJECT 0.5ML (=1.5 MG)     SUBCUTANEOUSLY EVERY 7 DAYS 6 mL 3    Synjardy 12.5-1,000 mg per tablet TAKE 1 TABLET TWICE DAILY  WITH MEALS 180 Tablet 1    amitriptyline (ELAVIL) 25 mg tablet Take  by mouth nightly.      gabapentin (NEURONTIN) 300 mg capsule Take 300 mg by mouth three (3) times daily. cholecalciferol (VITAMIN D3) (2,000 UNITS /50 MCG) cap capsule Take 2 Capsules by mouth daily. With food      simvastatin (ZOCOR) 40 mg tablet TAKE 1 TABLET BY MOUTH AT BEDTIME 90 Tab 3    chlorhexidine (PERIDEX) 0.12 % solution   0    Blood-Glucose Meter monitoring kit One Touch Ultra 2 Use to test blood sugar 1-2 per day (blood sugar before breakfast or 2 hours after any meal or at bedtime) DX.   E11.65 1 Kit 0    sodium chloride (OCEAN) 0.65 % nasal spray 1 Spray as needed for Congestion. solifenacin (VESICARE) 10 mg tablet Take 10 mg by mouth daily. aspirin delayed-release 81 mg tablet Take 1 Tab by mouth daily. 90 Tab 3    valACYclovir (VALTREX) 500 mg tablet 500 mg daily. evening primrose oil 500 mg cap Take 1 Cap by mouth two (2) times a day. cyclobenzaprine (FLEXERIL) 10 mg tablet Take 10 mg by mouth as needed. amLODIPine (NORVASC) 5 mg tablet Take 1 Tablet by mouth daily. 90 Tablet 1     Allergies   Allergen Reactions    Lisinopril Cough     Other reaction(s): Lisinopril  Other reaction(s): coughing, Other (see comments)  Coughing      Singulair [Montelukast] Other (comments)     headache     Family History   Problem Relation Age of Onset    Hypertension Mother     Glaucoma Mother     Heart Failure Mother         CHF    Hypertension Father     Colon Cancer Father         colon    Heart Attack Sister     Stroke Sister     Diabetes Sister         x 2 sisters    Diabetes Brother     Breast Cancer Maternal Aunt 45     Social History     Tobacco Use    Smoking status: Never    Smokeless tobacco: Never   Substance Use Topics    Alcohol use: Yes     Alcohol/week: 0.0 standard drinks     Comment: Social beer, social drinks, social shots of liquor,and social wine.      Patient Active Problem List Diagnosis Code    Internal hemorrhoids K64.8    Lumbar spondylosis M47.816    Fibromyalgia M79.7    Headache(784.0) R51    Neck arthritis M47.812    Thalassemia minor D56.3    Vitamin D deficiency E55.9    Left knee pain M25.562    Right shoulder pain M25.511    Essential hypertension I10    Stenosis colon (HCC) K56.699    Schatzki's ring K22.2    Hiatal hernia K44.9    Allergic rhinitis due to pollen J30.1    Iron deficiency anemia D50.9    Intraocular pressure increase H40.059    Neck pain M54.2    High cholesterol E78.00    Cataract H26.9    ACE inhibitor intolerance Z78.9    S/P MARTIN (total abdominal hysterectomy) Z90.710    Neuropathic pain of foot M79.2    Advance care planning Z71.89    Ulnar neuropathy of left upper extremity G56.22    Acne rosacea L71.9    Type 2 diabetes mellitus with diabetic neuropathy (HCC) E11.40    OAB (overactive bladder) N32.81    Urinary incontinence R32    Type 2 diabetes mellitus with hyperglycemia, without long-term current use of insulin ([facility]) E11.65    Family history of glaucoma Z83.511    Gastro-esophageal reflux disease without esophagitis K21.9    Long term (current) use of aspirin Z79.82    Long term (current) use of oral hypoglycemic drugs Z79.84    Mixed hyperlipidemia E78.2       Health Maintenance History  Immunizations reviewed, dtap up-to-date, pneumovax up-to-date, zoster up-to-date  colonoscopy: Up-to-date,   Eye exam: Up-to-date  Mammo up-to-date  Dexascan up-to-date    Health Care Directive or Living Will: no    Depression Risk Factor Screening:      Patient Health Questionnaire (PHQ-2)   Over the last 2 weeks, how often have you been bothered by any of the following problems? Little interest or pleasure in doing things? Not at all. [0]  Feeling down, depressed, or hopeless? Not at all. [0]    Total Score: 0/6  PHQ-2 Assessment Scoring:   A score of 2 or more requires further screening with the PHQ-9    Alcohol Risk Factor Screening:     Women:  On any occasion during the past 3 months, have you had more than 3 drinks containing alcohol? Do you average more than 7 drinks per week? Men: On any occasion during the past 3 months, have you had more than 4 drinks containing alcohol? Do you average more than 14 drinks per week? Functional Ability and Level of Safety:     Hearing Loss    Hearing is good. Activities of Daily Living   Self-care. Requires assistance with: no ADLs    Fall Risk   No fall risk factors    Abuse Screen   Patient is not abused  None      Examination   Physical Examination  Vitals:    03/14/23 0930 03/14/23 0937   BP: (!) 153/83 (!) 143/83   Pulse: 64    Resp: 17    Temp: 99.8 °F (37.7 °C)    SpO2: 99%    Weight: 193 lb 9.6 oz (87.8 kg)    Height: 5' 8\" (1.727 m)      Body mass index is 29.44 kg/m². Evaluation of Cognitive Function:  Mood/affect:normal  Appearance:normal  Family member/caregiver input:none    alert, well appearing, and in no distress    Patient Care Team:  Ashley Carrion MD as PCP - General (Internal Medicine Physician)  Ashley Carrion MD as PCP - [facility] Empaneled Provider  Micah Burgos MD (Gastroenterology)  Jenn Rush MD (Rheumatology Internal Medicine)  Domo Quan MD (Cardiovascular Disease Physician)  Sarahi Gutierrez MD (Neurology)  Narcisa Lawson DO (Dermatology Physician)  Bob Santillan MD (Podiatry)  Jeanette Luis MD (Dermatology Physician)  Foster Rogel MD (Ophthalmology)  Honorio Fischer MD (Ophthalmology)  Aparna Baker MD (Otolaryngology)  Sim Sr MD (Urology)  Danis Dasilva MD (Obstetrics & Gynecology)    Advice/Referrals/Counseling/Plan:   Education and counseling provided:  Are appropriate based on today's review and evaluation  Include in education list (weight loss, physical activity, smoking cessation, fall prevention, and nutrition)  current treatment plan is effective, no change in therapy.     I have discussed the diagnosis with the patient and the intended plan as seen in the above orders. The patient has received an after-visit summary and questions were answered concerning future plans. I have discussed medication side effects and warnings with the patient as well. I have reviewed the plan of care with the patient, accepted their input and they are in agreement with the treatment goals. Follow-up and Dispositions    Return in about 6 months (around 9/14/2023).          _____________________________________________________________

## 2023-03-14 NOTE — PROGRESS NOTES
Chief Complaint   Patient presents with    Physical        Visit Vitals  BP (!) 153/83 (BP 1 Location: Left upper arm)   Pulse 64   Temp 99.8 °F (37.7 °C)   Resp 17   Ht 5' 8\" (1.727 m)   Wt 193 lb 9.6 oz (87.8 kg)   SpO2 99%   BMI 29.44 kg/m²     Blood pressure (!) 143/83, pulse 64, temperature 99.8 °F (37.7 °C), resp. rate 17, height 5' 8\" (1.727 m), weight 193 lb 9.6 oz (87.8 kg), SpO2 99 %. 1. Have you been to the ER, urgent care clinic since your last visit? Hospitalized since your last visit? No    2. Have you seen or consulted any other health care providers outside of the [address] [place] [address] since your last visit? Include any pap smears or colon screening. No     Health Maintenance Due   Topic Date Due    COVID-19 Vaccine (3 - Booster for Pfizer series) 05/27/2021    Foot Exam Q1  04/08/2022    Breast Cancer Screen Mammogram  06/03/2022    Flu Vaccine (1) 08/01/2022    Medicare Yearly Exam  10/07/2022    Eye Exam Retinal or Dilated  10/27/2022        3 most recent PHQ Screens 3/14/2023   PHQ Not Done -   Little interest or pleasure in doing things Not at all   Feeling down, depressed, irritable, or hopeless Not at all   Total Score PHQ 2 0   Trouble falling or staying asleep, or sleeping too much -   Feeling tired or having little energy -   Poor appetite, weight loss, or overeating -   Feeling bad about yourself - or that you are a failure or have let yourself or your family down -   Trouble concentrating on things such as school, work, reading, or watching TV -   Moving or speaking so slowly that other people could have noticed; or the opposite being so fidgety that others notice -   Thoughts of being better off dead, or hurting yourself in some way -   PHQ 9 Score -        Fall Risk Assessment, last 12 mths 3/14/2023   Able to walk? Yes   Fall in past 12 months? 0   Do you feel unsteady?  0   Are you worried about falling 0       Learning Assessment 4/10/2018   PRIMARY LEARNER Patient HIGHEST LEVEL OF EDUCATION - PRIMARY LEARNER  4 YEARS OF COLLEGE   BARRIERS PRIMARY LEARNER NONE   CO-LEARNER CAREGIVER No   CO-LEARNER NAME -   PRIMARY LANGUAGE ENGLISH    NEED No   LEARNER PREFERENCE PRIMARY LISTENING     READING     DEMONSTRATION     PICTURES   LEARNING SPECIAL TOPICS n0   ANSWERED BY patient   RELATIONSHIP SELF   ASSESSMENT COMMENT 4/10/18ah

## 2023-03-15 DIAGNOSIS — E11.40 CONTROLLED TYPE 2 DIABETES MELLITUS WITH DIABETIC NEUROPATHY, WITHOUT LONG-TERM CURRENT USE OF INSULIN (HCC): ICD-10-CM

## 2023-03-15 RX ORDER — EMPAGLIFLOZIN AND METFORMIN HYDROCHLORIDE 12.5; 1 MG/1; MG/1
1 TABLET ORAL 2 TIMES DAILY WITH MEALS
Qty: 180 TABLET | Refills: 1 | Status: SHIPPED | OUTPATIENT
Start: 2023-03-15

## 2023-03-17 NOTE — TELEPHONE ENCOUNTER
From: Ever Cough  To: Darby Amado MD  Sent: 3/14/2023 4:44 PM EDT  Subject: Eliza Im    Please acknowledge the PA from [facility] for the [facility] today. Thank you!

## 2023-03-17 NOTE — TELEPHONE ENCOUNTER
Messaged patient about specifying where this PA is. I have looked everywhere. Will speak to patient.

## 2023-03-24 ENCOUNTER — TELEPHONE (OUTPATIENT)
Dept: PRIMARY CARE CLINIC | Age: 73
End: 2023-03-24

## 2023-06-06 ENCOUNTER — TELEPHONE (OUTPATIENT)
Dept: PRIMARY CARE CLINIC | Facility: CLINIC | Age: 73
End: 2023-06-06

## 2023-06-06 NOTE — TELEPHONE ENCOUNTER
----- Message from Sallie sent at 6/2/2023  4:08 PM EDT -----  Subject: Message to Provider    QUESTIONS  Information for Provider? pt has appt dougie dubose scheduled for 07/18 asked   to be called if she can be seen sooner   ---------------------------------------------------------------------------  --------------  [id number]  [phone]; OK to leave message on voicemail  ---------------------------------------------------------------------------  --------------  SCRIPT ANSWERS  Relationship to Patient?  Self

## 2023-06-26 ENCOUNTER — TELEMEDICINE (OUTPATIENT)
Dept: PRIMARY CARE CLINIC | Facility: CLINIC | Age: 73
End: 2023-06-26
Payer: MEDICARE

## 2023-06-26 DIAGNOSIS — R05.1 ACUTE COUGH: Primary | ICD-10-CM

## 2023-06-26 PROCEDURE — 99213 OFFICE O/P EST LOW 20 MIN: CPT | Performed by: INTERNAL MEDICINE

## 2023-06-26 PROCEDURE — 4004F PT TOBACCO SCREEN RCVD TLK: CPT | Performed by: INTERNAL MEDICINE

## 2023-06-26 PROCEDURE — G8419 CALC BMI OUT NRM PARAM NOF/U: HCPCS | Performed by: INTERNAL MEDICINE

## 2023-06-26 PROCEDURE — 1090F PRES/ABSN URINE INCON ASSESS: CPT | Performed by: INTERNAL MEDICINE

## 2023-06-26 PROCEDURE — 3017F COLORECTAL CA SCREEN DOC REV: CPT | Performed by: INTERNAL MEDICINE

## 2023-06-26 PROCEDURE — 1123F ACP DISCUSS/DSCN MKR DOCD: CPT | Performed by: INTERNAL MEDICINE

## 2023-06-26 PROCEDURE — G8399 PT W/DXA RESULTS DOCUMENT: HCPCS | Performed by: INTERNAL MEDICINE

## 2023-06-26 PROCEDURE — G8427 DOCREV CUR MEDS BY ELIG CLIN: HCPCS | Performed by: INTERNAL MEDICINE

## 2023-06-26 RX ORDER — GUAIFENESIN AND CODEINE PHOSPHATE 100; 10 MG/5ML; MG/5ML
10 SOLUTION ORAL NIGHTLY PRN
Qty: 70 ML | Refills: 0 | Status: SHIPPED | OUTPATIENT
Start: 2023-06-26 | End: 2023-07-03

## 2023-06-26 RX ORDER — AZITHROMYCIN 250 MG/1
250 TABLET, FILM COATED ORAL SEE ADMIN INSTRUCTIONS
Qty: 6 TABLET | Refills: 0 | Status: SHIPPED | OUTPATIENT
Start: 2023-06-26 | End: 2023-07-01

## 2023-06-26 RX ORDER — BENZONATATE 100 MG/1
100 CAPSULE ORAL 3 TIMES DAILY PRN
Qty: 30 CAPSULE | Refills: 0 | Status: SHIPPED | OUTPATIENT
Start: 2023-06-26 | End: 2023-07-06

## 2023-06-26 SDOH — ECONOMIC STABILITY: FOOD INSECURITY: WITHIN THE PAST 12 MONTHS, YOU WORRIED THAT YOUR FOOD WOULD RUN OUT BEFORE YOU GOT MONEY TO BUY MORE.: NEVER TRUE

## 2023-06-26 SDOH — ECONOMIC STABILITY: INCOME INSECURITY: HOW HARD IS IT FOR YOU TO PAY FOR THE VERY BASICS LIKE FOOD, HOUSING, MEDICAL CARE, AND HEATING?: NOT HARD AT ALL

## 2023-06-26 SDOH — ECONOMIC STABILITY: FOOD INSECURITY: WITHIN THE PAST 12 MONTHS, THE FOOD YOU BOUGHT JUST DIDN'T LAST AND YOU DIDN'T HAVE MONEY TO GET MORE.: NEVER TRUE

## 2023-06-26 SDOH — ECONOMIC STABILITY: HOUSING INSECURITY
IN THE LAST 12 MONTHS, WAS THERE A TIME WHEN YOU DID NOT HAVE A STEADY PLACE TO SLEEP OR SLEPT IN A SHELTER (INCLUDING NOW)?: NO

## 2023-06-26 ASSESSMENT — PATIENT HEALTH QUESTIONNAIRE - PHQ9
1. LITTLE INTEREST OR PLEASURE IN DOING THINGS: 0
SUM OF ALL RESPONSES TO PHQ QUESTIONS 1-9: 0
SUM OF ALL RESPONSES TO PHQ9 QUESTIONS 1 & 2: 0
2. FEELING DOWN, DEPRESSED OR HOPELESS: 0
SUM OF ALL RESPONSES TO PHQ QUESTIONS 1-9: 0

## 2023-07-25 ENCOUNTER — OFFICE VISIT (OUTPATIENT)
Dept: PRIMARY CARE CLINIC | Facility: CLINIC | Age: 73
End: 2023-07-25
Payer: MEDICARE

## 2023-07-25 VITALS
OXYGEN SATURATION: 100 % | DIASTOLIC BLOOD PRESSURE: 80 MMHG | HEIGHT: 68 IN | RESPIRATION RATE: 18 BRPM | SYSTOLIC BLOOD PRESSURE: 120 MMHG | TEMPERATURE: 97.8 F | HEART RATE: 65 BPM | WEIGHT: 187 LBS | BODY MASS INDEX: 28.34 KG/M2

## 2023-07-25 DIAGNOSIS — E78.00 PURE HYPERCHOLESTEROLEMIA, UNSPECIFIED: ICD-10-CM

## 2023-07-25 DIAGNOSIS — M54.2 NECK PAIN: Primary | ICD-10-CM

## 2023-07-25 DIAGNOSIS — E11.65 TYPE 2 DIABETES MELLITUS WITH HYPERGLYCEMIA, WITHOUT LONG-TERM CURRENT USE OF INSULIN (HCC): ICD-10-CM

## 2023-07-25 DIAGNOSIS — E55.9 VITAMIN D DEFICIENCY, UNSPECIFIED: ICD-10-CM

## 2023-07-25 PROCEDURE — 1123F ACP DISCUSS/DSCN MKR DOCD: CPT | Performed by: INTERNAL MEDICINE

## 2023-07-25 PROCEDURE — 99214 OFFICE O/P EST MOD 30 MIN: CPT | Performed by: INTERNAL MEDICINE

## 2023-07-25 PROCEDURE — 2022F DILAT RTA XM EVC RTNOPTHY: CPT | Performed by: INTERNAL MEDICINE

## 2023-07-25 PROCEDURE — 4004F PT TOBACCO SCREEN RCVD TLK: CPT | Performed by: INTERNAL MEDICINE

## 2023-07-25 PROCEDURE — 3074F SYST BP LT 130 MM HG: CPT | Performed by: INTERNAL MEDICINE

## 2023-07-25 PROCEDURE — 3079F DIAST BP 80-89 MM HG: CPT | Performed by: INTERNAL MEDICINE

## 2023-07-25 PROCEDURE — G8399 PT W/DXA RESULTS DOCUMENT: HCPCS | Performed by: INTERNAL MEDICINE

## 2023-07-25 PROCEDURE — 1090F PRES/ABSN URINE INCON ASSESS: CPT | Performed by: INTERNAL MEDICINE

## 2023-07-25 PROCEDURE — G8427 DOCREV CUR MEDS BY ELIG CLIN: HCPCS | Performed by: INTERNAL MEDICINE

## 2023-07-25 PROCEDURE — 3044F HG A1C LEVEL LT 7.0%: CPT | Performed by: INTERNAL MEDICINE

## 2023-07-25 PROCEDURE — 3017F COLORECTAL CA SCREEN DOC REV: CPT | Performed by: INTERNAL MEDICINE

## 2023-07-25 PROCEDURE — G8419 CALC BMI OUT NRM PARAM NOF/U: HCPCS | Performed by: INTERNAL MEDICINE

## 2023-07-25 NOTE — PROGRESS NOTES
Stanford Jc is a 68 y.o.  female and presents with     Chief Complaint   Patient presents with    Follow-up     Patient states has been having right neck pain and stiffness - no improvement since last visit     Pt has neck pain and stiffness. Saw ENT - was asked to go to [facility] but had no relief. Xray shows  degenerative disc disease. Pt has neuropathy , has DM  Taking medications for diabetes and elevated cholesterol. Denies any side effects          Past Medical History:   Diagnosis Date    Allergic rhinitis     Dr. Cory Chavez 2015, 2019    Dr. Armani Neely. Dr. Naveed Gipson. Chest pain 01/15/09    Dr. Ernst Marrero    Chickenpox childhood    DDD (degenerative disc disease), cervical 10/2015    Dr. Thanh Velez. DDD (degenerative disc disease), lumbar 2014    Dr. Tal Bonilla. Diabetes ([facility]) 06/2007    Ruth Decker, OD. Dr. Penelope Herndon, pod. Dr. Jose Snyder. Endometriosis     Dr. Martinez Langley    Essential hypertension, benign     Dr. Chato Bedoya, uterine     Dr. Christina Rahman 08/2009    Dr. Olive Hough. Dr. Penelope Herndon. Hand paresthesia 2011    Dr. Heraclio Izaguirre. Dr. Gaby Gupta. [id number](146.9) 2012    Dr. Albina Horan. Dr. Jennifer Thibodeaux, ENT. Hemoptysis 06/13/07    Dr. Cristian Ochoa    Hiatal hernia 05/27/15    small. Lisa Ryan MD    Internal hemorrhoids 12/2006, 05/27/15    Dr. Joel Fall. Dr. Hailee Dominguez. Intraocular pressure increase 07/27/05    bilaterally. Dr. Elayne Siddiqi. Dr. Ruth Decker. Knee pain 04/04/13    Bilaterally. due to fall. Dr. Lilly Vázquez. Left knee pain 11/26/14    Left. due to OA, torn medial and lateral meniscal tears. Dr. Karina Mayo. Dr. Reji Del Rio. Lumbar spondylosis 3/2008    Dr. Harriet Cool. Dr. Josefa Resendiz (overactive bladder)     Dr. Aldo Lopez. Dr. Joaquin Huerta.     Paresthesia of foot     Left  due

## 2023-07-25 NOTE — PROGRESS NOTES
Chief Complaint   Patient presents with    Follow-up     Patient states has been having right neck pain and stiffness - no improvement since last visit       There were no vitals taken for this visit. 1. Have you been to the ER, urgent care clinic since your last visit? Hospitalized since your last visit? No    2. Have you seen or consulted any other health care providers outside of the [address] [place] [address] since your last visit? Include any pap smears or colon screening. ENT - told patient to go to therapy for having the neck pain but did not help      3. For patients aged 43-73: Has the patient had a colonoscopy / FIT/ Cologuard? yes      If the patient is female:    4. For patients aged 43-66: Has the patient had a mammogram within the past 2 years? Yes      5. For patients aged 21-65: Has the patient had a pap smear?  No

## 2023-07-26 LAB
ALBUMIN SERPL-MCNC: 4.5 G/DL (ref 3.5–5)
ALBUMIN/GLOB SERPL: 1.3 (ref 1.1–2.2)
ALP SERPL-CCNC: 86 U/L (ref 45–117)
ALT SERPL-CCNC: 12 U/L (ref 12–78)
ANION GAP SERPL CALC-SCNC: 6 MMOL/L (ref 5–15)
AST SERPL-CCNC: 14 U/L (ref 15–37)
BILIRUB SERPL-MCNC: 0.4 MG/DL (ref 0.2–1)
BUN SERPL-MCNC: 18 MG/DL (ref 6–20)
BUN/CREAT SERPL: 15 (ref 12–20)
CALCIUM SERPL-MCNC: 10 MG/DL (ref 8.5–10.1)
CHLORIDE SERPL-SCNC: 102 MMOL/L (ref 97–108)
CHOLEST SERPL-MCNC: 182 MG/DL
CO2 SERPL-SCNC: 30 MMOL/L (ref 21–32)
CREAT SERPL-MCNC: 1.22 MG/DL (ref 0.55–1.02)
CREAT UR-MCNC: 121 MG/DL
ERYTHROCYTE [DISTWIDTH] IN BLOOD BY AUTOMATED COUNT: 17.3 % (ref 11.5–14.5)
EST. AVERAGE GLUCOSE BLD GHB EST-MCNC: 151 MG/DL
GLOBULIN SER CALC-MCNC: 3.6 G/DL (ref 2–4)
GLUCOSE SERPL-MCNC: 115 MG/DL (ref 65–100)
HBA1C MFR BLD: 6.9 % (ref 4–5.6)
HCT VFR BLD AUTO: 39.5 % (ref 35–47)
HDLC SERPL-MCNC: 91 MG/DL
HDLC SERPL: 2 (ref 0–5)
HGB BLD-MCNC: 12 G/DL (ref 11.5–16)
LDLC SERPL CALC-MCNC: 75 MG/DL (ref 0–100)
MCH RBC QN AUTO: 22.9 PG (ref 26–34)
MCHC RBC AUTO-ENTMCNC: 30.4 G/DL (ref 30–36.5)
MCV RBC AUTO: 75.2 FL (ref 80–99)
MICROALBUMIN UR-MCNC: 1.11 MG/DL
MICROALBUMIN/CREAT UR-RTO: 9 MG/G (ref 0–30)
NRBC # BLD: 0 K/UL (ref 0–0.01)
NRBC BLD-RTO: 0 PER 100 WBC
PLATELET # BLD AUTO: 301 K/UL (ref 150–400)
PMV BLD AUTO: 11.4 FL (ref 8.9–12.9)
POTASSIUM SERPL-SCNC: 3.9 MMOL/L (ref 3.5–5.1)
PROT SERPL-MCNC: 8.1 G/DL (ref 6.4–8.2)
RBC # BLD AUTO: 5.25 M/UL (ref 3.8–5.2)
SODIUM SERPL-SCNC: 138 MMOL/L (ref 136–145)
TRIGL SERPL-MCNC: 80 MG/DL
VLDLC SERPL CALC-MCNC: 16 MG/DL
WBC # BLD AUTO: 6.9 K/UL (ref 3.6–11)

## 2023-08-04 DIAGNOSIS — I10 ESSENTIAL (PRIMARY) HYPERTENSION: Primary | ICD-10-CM

## 2023-08-05 RX ORDER — AMLODIPINE BESYLATE 5 MG/1
TABLET ORAL
Qty: 90 TABLET | Refills: 1 | Status: SHIPPED | OUTPATIENT
Start: 2023-08-05

## 2023-08-05 RX ORDER — EMPAGLIFLOZIN AND METFORMIN HYDROCHLORIDE 12.5; 1 MG/1; MG/1
TABLET ORAL
Qty: 180 TABLET | Refills: 1 | Status: SHIPPED | OUTPATIENT
Start: 2023-08-05

## 2023-08-22 ENCOUNTER — HOSPITAL ENCOUNTER (OUTPATIENT)
Facility: HOSPITAL | Age: 73
Discharge: HOME | End: 2023-08-25
Attending: ORTHOPAEDIC SURGERY
Payer: MEDICARE

## 2023-08-22 DIAGNOSIS — M50.30 DDD (DEGENERATIVE DISC DISEASE), CERVICAL: ICD-10-CM

## 2023-08-22 DIAGNOSIS — M54.2 CERVICAL PAIN: ICD-10-CM

## 2023-08-22 DIAGNOSIS — M62.838 NECK MUSCLE SPASM: ICD-10-CM

## 2023-08-22 DIAGNOSIS — M54.12 CERVICAL RADICULOPATHY: ICD-10-CM

## 2023-08-22 DIAGNOSIS — M47.812 FACET ARTHROPATHY, CERVICAL: ICD-10-CM

## 2023-08-22 PROCEDURE — 72141 MRI NECK SPINE W/O DYE: CPT

## 2023-09-14 RX ORDER — DULAGLUTIDE 1.5 MG/.5ML
INJECTION, SOLUTION SUBCUTANEOUS
Qty: 6 ML | Refills: 3 | Status: SHIPPED | OUTPATIENT
Start: 2023-09-14

## 2023-11-28 ENCOUNTER — OFFICE VISIT (OUTPATIENT)
Dept: PRIMARY CARE CLINIC | Facility: CLINIC | Age: 73
End: 2023-11-28
Payer: MEDICARE

## 2023-11-28 VITALS
SYSTOLIC BLOOD PRESSURE: 119 MMHG | RESPIRATION RATE: 18 BRPM | BODY MASS INDEX: 28.38 KG/M2 | DIASTOLIC BLOOD PRESSURE: 78 MMHG | WEIGHT: 191.6 LBS | TEMPERATURE: 98.6 F | HEART RATE: 62 BPM | OXYGEN SATURATION: 100 % | HEIGHT: 69 IN

## 2023-11-28 DIAGNOSIS — E11.65 TYPE 2 DIABETES MELLITUS WITH HYPERGLYCEMIA, WITHOUT LONG-TERM CURRENT USE OF INSULIN (HCC): ICD-10-CM

## 2023-11-28 DIAGNOSIS — E78.00 PURE HYPERCHOLESTEROLEMIA, UNSPECIFIED: ICD-10-CM

## 2023-11-28 DIAGNOSIS — I10 ESSENTIAL (PRIMARY) HYPERTENSION: ICD-10-CM

## 2023-11-28 DIAGNOSIS — Z23 NEED FOR VACCINATION: Primary | ICD-10-CM

## 2023-11-28 DIAGNOSIS — E11.9 ENCOUNTER FOR DIABETIC FOOT EXAM (HCC): ICD-10-CM

## 2023-11-28 PROCEDURE — G8484 FLU IMMUNIZE NO ADMIN: HCPCS | Performed by: INTERNAL MEDICINE

## 2023-11-28 PROCEDURE — 1036F TOBACCO NON-USER: CPT | Performed by: INTERNAL MEDICINE

## 2023-11-28 PROCEDURE — G8419 CALC BMI OUT NRM PARAM NOF/U: HCPCS | Performed by: INTERNAL MEDICINE

## 2023-11-28 PROCEDURE — G8399 PT W/DXA RESULTS DOCUMENT: HCPCS | Performed by: INTERNAL MEDICINE

## 2023-11-28 PROCEDURE — 3078F DIAST BP <80 MM HG: CPT | Performed by: INTERNAL MEDICINE

## 2023-11-28 PROCEDURE — 90694 VACC AIIV4 NO PRSRV 0.5ML IM: CPT | Performed by: INTERNAL MEDICINE

## 2023-11-28 PROCEDURE — 3017F COLORECTAL CA SCREEN DOC REV: CPT | Performed by: INTERNAL MEDICINE

## 2023-11-28 PROCEDURE — 3074F SYST BP LT 130 MM HG: CPT | Performed by: INTERNAL MEDICINE

## 2023-11-28 PROCEDURE — 1090F PRES/ABSN URINE INCON ASSESS: CPT | Performed by: INTERNAL MEDICINE

## 2023-11-28 PROCEDURE — 99214 OFFICE O/P EST MOD 30 MIN: CPT | Performed by: INTERNAL MEDICINE

## 2023-11-28 PROCEDURE — G0008 ADMIN INFLUENZA VIRUS VAC: HCPCS | Performed by: INTERNAL MEDICINE

## 2023-11-28 PROCEDURE — G8427 DOCREV CUR MEDS BY ELIG CLIN: HCPCS | Performed by: INTERNAL MEDICINE

## 2023-11-28 PROCEDURE — 1123F ACP DISCUSS/DSCN MKR DOCD: CPT | Performed by: INTERNAL MEDICINE

## 2023-11-28 PROCEDURE — 3044F HG A1C LEVEL LT 7.0%: CPT | Performed by: INTERNAL MEDICINE

## 2023-11-28 PROCEDURE — 2022F DILAT RTA XM EVC RTNOPTHY: CPT | Performed by: INTERNAL MEDICINE

## 2023-11-28 ASSESSMENT — PATIENT HEALTH QUESTIONNAIRE - PHQ9
1. LITTLE INTEREST OR PLEASURE IN DOING THINGS: 0
SUM OF ALL RESPONSES TO PHQ QUESTIONS 1-9: 0
SUM OF ALL RESPONSES TO PHQ9 QUESTIONS 1 & 2: 0
SUM OF ALL RESPONSES TO PHQ QUESTIONS 1-9: 0
2. FEELING DOWN, DEPRESSED OR HOPELESS: 0

## 2024-01-13 ENCOUNTER — PATIENT MESSAGE (OUTPATIENT)
Dept: PRIMARY CARE CLINIC | Facility: CLINIC | Age: 74
End: 2024-01-13

## 2024-01-15 RX ORDER — BLOOD SUGAR DIAGNOSTIC
1 STRIP MISCELLANEOUS
COMMUNITY
End: 2024-01-15 | Stop reason: SDUPTHER

## 2024-01-15 RX ORDER — BLOOD SUGAR DIAGNOSTIC
STRIP MISCELLANEOUS
Qty: 100 EACH | Refills: 3 | Status: SHIPPED | OUTPATIENT
Start: 2024-01-15

## 2024-01-15 NOTE — TELEPHONE ENCOUNTER
From: Liz Pat  To: Dr. Gutierrez Cobos  Sent: 1/13/2024 12:12 PM EST  Subject: Strips    I need my strip prescription refilled.

## 2024-02-28 ENCOUNTER — TELEPHONE (OUTPATIENT)
Dept: PRIMARY CARE CLINIC | Facility: CLINIC | Age: 74
End: 2024-02-28

## 2024-02-28 NOTE — TELEPHONE ENCOUNTER
Per [facility] Reference# [id number]  Fax: [phone]    Trulicity 4 pen 1.5/0.5 is on back order   NOT AVAILABLE    Please prescribe an alternative.

## 2024-02-29 DIAGNOSIS — I10 ESSENTIAL (PRIMARY) HYPERTENSION: ICD-10-CM

## 2024-02-29 RX ORDER — AMLODIPINE BESYLATE 5 MG/1
5 TABLET ORAL DAILY
Qty: 90 TABLET | Refills: 0 | Status: SHIPPED | OUTPATIENT
Start: 2024-02-29

## 2024-03-07 ENCOUNTER — TELEPHONE (OUTPATIENT)
Dept: PRIMARY CARE CLINIC | Facility: CLINIC | Age: 74
End: 2024-03-07

## 2024-03-09 DIAGNOSIS — E11.65 TYPE 2 DIABETES MELLITUS WITH HYPERGLYCEMIA, WITHOUT LONG-TERM CURRENT USE OF INSULIN (HCC): Primary | ICD-10-CM

## 2024-04-09 RX ORDER — EMPAGLIFLOZIN AND METFORMIN HYDROCHLORIDE 12.5; 1 MG/1; MG/1
1 TABLET ORAL 2 TIMES DAILY WITH MEALS
Qty: 180 TABLET | Refills: 0 | Status: SHIPPED | OUTPATIENT
Start: 2024-04-09

## 2024-04-09 NOTE — TELEPHONE ENCOUNTER
PCP: Gutierrez Cobos MD    Last Visit 11/28/2023   Future Appointments   Date Time Provider Department Center   5/29/2024  9:30 AM Gutierrez Cobos MD [facility]       Requested Prescriptions      No prescriptions requested or ordered in this encounter         Other Comments: Last Refill

## 2024-04-11 ENCOUNTER — PATIENT MESSAGE (OUTPATIENT)
Dept: PRIMARY CARE CLINIC | Facility: CLINIC | Age: 74
End: 2024-04-11

## 2024-04-11 DIAGNOSIS — E11.65 TYPE 2 DIABETES MELLITUS WITH HYPERGLYCEMIA, WITHOUT LONG-TERM CURRENT USE OF INSULIN (HCC): ICD-10-CM

## 2024-05-29 ENCOUNTER — OFFICE VISIT (OUTPATIENT)
Dept: PRIMARY CARE CLINIC | Facility: CLINIC | Age: 74
End: 2024-05-29
Payer: MEDICARE

## 2024-05-29 ENCOUNTER — HOSPITAL ENCOUNTER (OUTPATIENT)
Facility: HOSPITAL | Age: 74
Discharge: HOME | End: 2024-06-01
Payer: MEDICARE

## 2024-05-29 VITALS
DIASTOLIC BLOOD PRESSURE: 72 MMHG | HEIGHT: 69 IN | HEART RATE: 64 BPM | SYSTOLIC BLOOD PRESSURE: 116 MMHG | BODY MASS INDEX: 25.68 KG/M2 | RESPIRATION RATE: 18 BRPM | WEIGHT: 173.4 LBS | TEMPERATURE: 98.2 F | OXYGEN SATURATION: 96 %

## 2024-05-29 DIAGNOSIS — R53.83 OTHER FATIGUE: ICD-10-CM

## 2024-05-29 DIAGNOSIS — E83.52 HYPERCALCEMIA: Primary | ICD-10-CM

## 2024-05-29 DIAGNOSIS — R61 NIGHT SWEATS: ICD-10-CM

## 2024-05-29 DIAGNOSIS — D56.3 THALASSEMIA MINOR: ICD-10-CM

## 2024-05-29 DIAGNOSIS — Z00.00 MEDICARE ANNUAL WELLNESS VISIT, SUBSEQUENT: Primary | ICD-10-CM

## 2024-05-29 DIAGNOSIS — E11.40 TYPE 2 DIABETES MELLITUS WITH DIABETIC NEUROPATHY, WITHOUT LONG-TERM CURRENT USE OF INSULIN (HCC): ICD-10-CM

## 2024-05-29 DIAGNOSIS — E11.65 TYPE 2 DIABETES MELLITUS WITH HYPERGLYCEMIA, WITHOUT LONG-TERM CURRENT USE OF INSULIN (HCC): ICD-10-CM

## 2024-05-29 DIAGNOSIS — E55.9 VITAMIN D DEFICIENCY, UNSPECIFIED: ICD-10-CM

## 2024-05-29 DIAGNOSIS — I10 ESSENTIAL (PRIMARY) HYPERTENSION: ICD-10-CM

## 2024-05-29 DIAGNOSIS — E78.00 PURE HYPERCHOLESTEROLEMIA, UNSPECIFIED: ICD-10-CM

## 2024-05-29 DIAGNOSIS — Z23 NEED FOR VACCINATION: ICD-10-CM

## 2024-05-29 LAB
25(OH)D3 SERPL-MCNC: 28.7 NG/ML (ref 30–100)
ALBUMIN SERPL-MCNC: 4.2 G/DL (ref 3.5–5)
ALBUMIN/GLOB SERPL: 1.1 (ref 1.1–2.2)
ALP SERPL-CCNC: 75 U/L (ref 45–117)
ALT SERPL-CCNC: 12 U/L (ref 12–78)
ANION GAP SERPL CALC-SCNC: 7 MMOL/L (ref 5–15)
AST SERPL-CCNC: 13 U/L (ref 15–37)
BILIRUB SERPL-MCNC: 0.5 MG/DL (ref 0.2–1)
BUN SERPL-MCNC: 12 MG/DL (ref 6–20)
BUN/CREAT SERPL: 12 (ref 12–20)
CALCIUM SERPL-MCNC: 10.8 MG/DL (ref 8.5–10.1)
CHLORIDE SERPL-SCNC: 102 MMOL/L (ref 97–108)
CHOLEST SERPL-MCNC: 206 MG/DL
CO2 SERPL-SCNC: 29 MMOL/L (ref 21–32)
CREAT SERPL-MCNC: 0.97 MG/DL (ref 0.55–1.02)
EST. AVERAGE GLUCOSE BLD GHB EST-MCNC: 143 MG/DL
GLOBULIN SER CALC-MCNC: 3.8 G/DL (ref 2–4)
GLUCOSE SERPL-MCNC: 130 MG/DL (ref 65–100)
HBA1C MFR BLD: 6.6 % (ref 4–5.6)
HDLC SERPL-MCNC: 79 MG/DL
HDLC SERPL: 2.6 (ref 0–5)
LDLC SERPL CALC-MCNC: 113 MG/DL (ref 0–100)
POTASSIUM SERPL-SCNC: 3.4 MMOL/L (ref 3.5–5.1)
PROT SERPL-MCNC: 8 G/DL (ref 6.4–8.2)
SODIUM SERPL-SCNC: 138 MMOL/L (ref 136–145)
T4 FREE SERPL-MCNC: 1 NG/DL (ref 0.8–1.5)
TRIGL SERPL-MCNC: 70 MG/DL
TSH SERPL DL<=0.05 MIU/L-ACNC: 1.26 UIU/ML (ref 0.36–3.74)
VLDLC SERPL CALC-MCNC: 14 MG/DL

## 2024-05-29 PROCEDURE — 1123F ACP DISCUSS/DSCN MKR DOCD: CPT | Performed by: INTERNAL MEDICINE

## 2024-05-29 PROCEDURE — 3044F HG A1C LEVEL LT 7.0%: CPT | Performed by: INTERNAL MEDICINE

## 2024-05-29 PROCEDURE — 3078F DIAST BP <80 MM HG: CPT | Performed by: INTERNAL MEDICINE

## 2024-05-29 PROCEDURE — 3017F COLORECTAL CA SCREEN DOC REV: CPT | Performed by: INTERNAL MEDICINE

## 2024-05-29 PROCEDURE — 3074F SYST BP LT 130 MM HG: CPT | Performed by: INTERNAL MEDICINE

## 2024-05-29 PROCEDURE — 71046 X-RAY EXAM CHEST 2 VIEWS: CPT

## 2024-05-29 PROCEDURE — G0439 PPPS, SUBSEQ VISIT: HCPCS | Performed by: INTERNAL MEDICINE

## 2024-05-29 ASSESSMENT — PATIENT HEALTH QUESTIONNAIRE - PHQ9
SUM OF ALL RESPONSES TO PHQ QUESTIONS 1-9: 0
SUM OF ALL RESPONSES TO PHQ QUESTIONS 1-9: 0
1. LITTLE INTEREST OR PLEASURE IN DOING THINGS: NOT AT ALL
2. FEELING DOWN, DEPRESSED OR HOPELESS: NOT AT ALL
SUM OF ALL RESPONSES TO PHQ QUESTIONS 1-9: 0
SUM OF ALL RESPONSES TO PHQ QUESTIONS 1-9: 0
SUM OF ALL RESPONSES TO PHQ9 QUESTIONS 1 & 2: 0

## 2024-05-29 ASSESSMENT — LIFESTYLE VARIABLES
HOW OFTEN DO YOU HAVE A DRINK CONTAINING ALCOHOL: 2-4 TIMES A MONTH
HOW MANY STANDARD DRINKS CONTAINING ALCOHOL DO YOU HAVE ON A TYPICAL DAY: 1 OR 2

## 2024-05-29 NOTE — PROGRESS NOTES
Liz Pat is a 74 y.o. female and presents with     No chief complaint on file.      History of Present Illness  The patient presents for Medicare wellness.    The patient monitors her blood glucose levels at home, with the most recent reading being 190. Her current medication regimen includes Synjardy.    The patient reports overall good health, however, her gynecologist has advised her to consult with me regarding her hot flashes, a condition she has been experiencing for the past 14 years. She also experiences night sweats and has been managing these symptoms with evening primrose.           Past Medical History:   Diagnosis Date    Allergic rhinitis     Dr. Win Piper    Arthritis     Cataract 2015, 2019    Dr. Siddiqui.  Dr. Jenni Dewitt.    Chest pain 01/15/09    Dr. Cookie Otto    Chickenpox childhood    DDD (degenerative disc disease), cervical 10/2015    Dr. Doron Collazo.    DDD (degenerative disc disease), lumbar 2014    Dr. Akil Carcamo.    Diabetes ([facility]) 06/2007    Kimberly Siddiqui, OD.  Dr. José Miguel Treviño, pod.  Dr. Sandra Amaral.    Endometriosis     Dr. Jocelyn Reyes    Essential hypertension, benign     Dr. Cookie Otto    Fibroid, uterine     Dr. Jocelyn Reyes    Fibromyalgia     Dr. Trell Stanley 08/2009    Dr. rBent Baird.  Dr. José Miguel Treviño.    Hand paresthesia 2011    Dr. Miranda.  Dr. Wai Jacobs.    Headache(784.0) 2012    Dr. Jacobs.  Dr. Garcia, ENT.    Hemoptysis 06/13/07    Dr. Sudeep Willis    Hiatal hernia 05/27/15    small.  Monique Abreu MD    Internal hemorrhoids 12/2006, 05/27/15    Dr. Wadsworth.  Dr. Abreu.    Intraocular pressure increase 07/27/05    bilaterally.  Dr. Blu Meredith.  Dr. Kimberly Tang.    Knee pain 04/04/13    Bilaterally.  due to fall.  Dr. Bandar Carey.      Left knee pain 11/26/14    Left.  due to OA, torn medial and lateral meniscal tears.  Dr. Carey.  Dr. Jameel Steve.    Lumbar spondylosis 3/2008

## 2024-05-29 NOTE — PROGRESS NOTES
\"Have you been to the ER, urgent care clinic since your last visit?  Hospitalized since your last visit?\"    NO    “Have you seen or consulted any other health care providers outside of [facility] since your last visit?”    NO            Click Here for Release of Records Request

## 2024-05-29 NOTE — PATIENT INSTRUCTIONS
or this instruction, always ask your healthcare professional. [employer], [employer] disclaims any warranty or liability for your use of this information.           Advance Directives: Care Instructions  Overview  An advance directive is a legal way to state your wishes at the end of your life. It tells your family and your doctor what to do if you can't say what you want.  There are two main types of advance directives. You can change them any time your wishes change.  Living will.  This form tells your family and your doctor your wishes about life support and other treatment. The form is also called a declaration.  Medical power of .  This form lets you name a person to make treatment decisions for you when you can't speak for yourself. This person is called a health care agent (health care proxy, health care surrogate). The form is also called a durable power of  for health care.  If you do not have an advance directive, decisions about your medical care may be made by a family member, or by a doctor or a  who doesn't know you.  It may help to think of an advance directive as a gift to the people who care for you. If you have one, they won't have to make tough decisions by themselves.  For more information, including forms for your state, see the [employer] website (www.caringinfo.org/planning/advance-directives/).  Follow-up care is a key part of your treatment and safety. Be sure to make and go to all appointments, and call your doctor if you are having problems. It's also a good idea to know your test results and keep a list of the medicines you take.  What should you include in an advance directive?  Many states have a unique advance directive form. (It may ask you to address specific issues.) Or you might use a universal form that's approved by many states.  If your form doesn't tell you what to address, it may be hard to know what to include in your advance directive. Use the

## 2024-06-17 DIAGNOSIS — E11.65 TYPE 2 DIABETES MELLITUS WITH HYPERGLYCEMIA, WITHOUT LONG-TERM CURRENT USE OF INSULIN (HCC): ICD-10-CM

## 2024-06-18 RX ORDER — EMPAGLIFLOZIN AND METFORMIN HYDROCHLORIDE 12.5; 1 MG/1; MG/1
1 TABLET ORAL 2 TIMES DAILY WITH MEALS
Qty: 180 TABLET | Refills: 1 | Status: SHIPPED | OUTPATIENT
Start: 2024-06-18 | End: 2024-06-20 | Stop reason: SDUPTHER

## 2024-06-20 DIAGNOSIS — E83.52 HYPERCALCEMIA: ICD-10-CM

## 2024-06-20 LAB
CALCIUM SERPL-MCNC: 10.2 MG/DL (ref 8.5–10.1)
PTH-INTACT SERPL-MCNC: 56.5 PG/ML (ref 18.4–88)

## 2024-06-20 RX ORDER — EMPAGLIFLOZIN AND METFORMIN HYDROCHLORIDE 12.5; 1 MG/1; MG/1
1 TABLET ORAL 2 TIMES DAILY WITH MEALS
Qty: 30 TABLET | Refills: 0 | Status: SHIPPED | OUTPATIENT
Start: 2024-06-20

## 2024-06-20 NOTE — TELEPHONE ENCOUNTER
Patient calling to see if she can get short supply of her Empagliflozin-metFORMIN HCl (SYNJARDY) 12.5-1000 MG TAB until the mail order comes because she is out of medication. Medication can be sent to [facility] [address], [place] [address]

## 2024-06-21 ENCOUNTER — TELEPHONE (OUTPATIENT)
Dept: PRIMARY CARE CLINIC | Facility: CLINIC | Age: 74
End: 2024-06-21

## 2024-06-24 LAB — CA-I SERPL ISE-MCNC: 5.3 MG/DL (ref 4.5–5.6)

## 2024-07-05 ENCOUNTER — TELEPHONE (OUTPATIENT)
Dept: PRIMARY CARE CLINIC | Facility: CLINIC | Age: 74
End: 2024-07-05

## 2024-07-05 NOTE — TELEPHONE ENCOUNTER
Patient aware, per [facility], the guide lines have change, RSV must be 75 years old or  older for immunization.

## 2024-07-05 NOTE — TELEPHONE ENCOUNTER
[facility] said patient was ineligible for the vaccine we sent them for. (Patient said \"RFVP\" or \"RSVP,\" was unsure of the exact name, may have meant RSV.) Patient wants to know what made them ineligible since [facility] did not say and what their next steps are.

## 2024-08-21 ENCOUNTER — OFFICE VISIT (OUTPATIENT)
Age: 74
End: 2024-08-21

## 2024-08-21 VITALS
WEIGHT: 176 LBS | BODY MASS INDEX: 25.99 KG/M2 | DIASTOLIC BLOOD PRESSURE: 82 MMHG | HEART RATE: 86 BPM | TEMPERATURE: 98.5 F | SYSTOLIC BLOOD PRESSURE: 127 MMHG | OXYGEN SATURATION: 100 %

## 2024-08-21 DIAGNOSIS — Z86.39 HISTORY OF HYPOPARATHYROIDISM: Primary | ICD-10-CM

## 2024-08-21 DIAGNOSIS — K14.8 LESION OF TONGUE: ICD-10-CM

## 2024-08-21 NOTE — PROGRESS NOTES
Liz Pat (:  1950) is a 74 y.o. female,Established patient, here for evaluation of the following chief complaint(s):  Oral Pain (Mouth, throat, and tongue pain x3 mos )      Assessment & Plan :  Visit Diagnoses and Associated Orders       History of hypoparathyroidism    -  Primary    [facility] - Regis Marshall MD, Endocrinology, [place] [REF22 Custom]           Lesion of tongue        External Referral To Oral Maxillofacial Surgery [[id number] Custom]                 Patient was seen today for evaluation of 2 issues    For the lesion of the tongue, because you feel like this is changing slightly over the past year, I do recommend following up with an oral and facial surgeon  I have placed a referral to [facility] oral and facial surgery, please call for an appointment    For the swelling in her neck, I do recommend following up with an endocrinologist due to her history of hyperparathyroidism  Her vital signs are stable, physical exam is benign, I have low suspicion for bacterial infection today  I have placed a referral to a local endocrinologist, please call for an appointment    Please follow-up with your PCP as scheduled later this year       Subjective :    Oral Pain          74 y.o. female presents for evaluation of the following 2 issues:    Firstly, she has a lesion on the left side of her tongue which has been present for many years.  She states that she saw an oral and facial specialist about a year ago and was advised just to monitor the lesion.  She has been doing so, notes that perhaps the lesion has gotten a little larger or deeper since she was seen 1 year ago.  She is just coming in for a second opinion on this today.  Notes changes to the lesion have been minimal, no dramatic growth or change.  No night sweats or weight loss    Second, she has some mild swelling in her bilateral neck which she has noticed over the past 3 months.  She reports perhaps very mild discomfort with this.

## 2024-08-21 NOTE — PATIENT INSTRUCTIONS
Patient was seen today for evaluation of 2 issues    For the lesion of the tongue, because you feel like this is changing slightly over the past year, I do recommend following up with an oral and facial surgeon  I have placed a referral to [facility] oral and facial surgery, please call for an appointment    For the swelling near your thyroid, I do recommend following up with an endocrinologist  I have placed a referral to a local endocrine office, please call for an appointment    Please follow-up with your PCP as scheduled later this year

## 2024-09-03 ENCOUNTER — OFFICE VISIT (OUTPATIENT)
Dept: PRIMARY CARE CLINIC | Facility: CLINIC | Age: 74
End: 2024-09-03
Payer: MEDICARE

## 2024-09-03 VITALS
TEMPERATURE: 98.7 F | RESPIRATION RATE: 18 BRPM | OXYGEN SATURATION: 100 % | WEIGHT: 175 LBS | BODY MASS INDEX: 25.92 KG/M2 | SYSTOLIC BLOOD PRESSURE: 128 MMHG | HEIGHT: 69 IN | HEART RATE: 78 BPM | DIASTOLIC BLOOD PRESSURE: 80 MMHG

## 2024-09-03 DIAGNOSIS — J02.9 SORE THROAT: ICD-10-CM

## 2024-09-03 DIAGNOSIS — I10 ESSENTIAL (PRIMARY) HYPERTENSION: Primary | ICD-10-CM

## 2024-09-03 DIAGNOSIS — I10 ESSENTIAL (PRIMARY) HYPERTENSION: ICD-10-CM

## 2024-09-03 PROCEDURE — 99214 OFFICE O/P EST MOD 30 MIN: CPT | Performed by: INTERNAL MEDICINE

## 2024-09-03 PROCEDURE — G8419 CALC BMI OUT NRM PARAM NOF/U: HCPCS | Performed by: INTERNAL MEDICINE

## 2024-09-03 PROCEDURE — 3074F SYST BP LT 130 MM HG: CPT | Performed by: INTERNAL MEDICINE

## 2024-09-03 PROCEDURE — G8399 PT W/DXA RESULTS DOCUMENT: HCPCS | Performed by: INTERNAL MEDICINE

## 2024-09-03 PROCEDURE — 3017F COLORECTAL CA SCREEN DOC REV: CPT | Performed by: INTERNAL MEDICINE

## 2024-09-03 PROCEDURE — 1036F TOBACCO NON-USER: CPT | Performed by: INTERNAL MEDICINE

## 2024-09-03 PROCEDURE — 1090F PRES/ABSN URINE INCON ASSESS: CPT | Performed by: INTERNAL MEDICINE

## 2024-09-03 PROCEDURE — 3079F DIAST BP 80-89 MM HG: CPT | Performed by: INTERNAL MEDICINE

## 2024-09-03 PROCEDURE — 1123F ACP DISCUSS/DSCN MKR DOCD: CPT | Performed by: INTERNAL MEDICINE

## 2024-09-03 PROCEDURE — G8427 DOCREV CUR MEDS BY ELIG CLIN: HCPCS | Performed by: INTERNAL MEDICINE

## 2024-09-03 RX ORDER — AZITHROMYCIN 250 MG/1
TABLET, FILM COATED ORAL
Qty: 6 TABLET | Refills: 0 | Status: SHIPPED | OUTPATIENT
Start: 2024-09-03 | End: 2024-09-03 | Stop reason: SDUPTHER

## 2024-09-03 RX ORDER — BENZONATATE 100 MG/1
100 CAPSULE ORAL 3 TIMES DAILY PRN
Qty: 30 CAPSULE | Refills: 0 | Status: SHIPPED | OUTPATIENT
Start: 2024-09-03 | End: 2024-09-13

## 2024-09-03 RX ORDER — VALSARTAN 160 MG/1
160 TABLET ORAL DAILY
Qty: 90 TABLET | Refills: 1 | Status: SHIPPED | OUTPATIENT
Start: 2024-09-03

## 2024-09-03 RX ORDER — VALSARTAN 160 MG/1
160 TABLET ORAL DAILY
Qty: 90 TABLET | Refills: 1 | Status: SHIPPED | OUTPATIENT
Start: 2024-09-03 | End: 2024-09-03 | Stop reason: SDUPTHER

## 2024-09-03 RX ORDER — AZITHROMYCIN 250 MG/1
TABLET, FILM COATED ORAL
Qty: 6 TABLET | Refills: 0 | Status: SHIPPED | OUTPATIENT
Start: 2024-09-03 | End: 2024-09-13

## 2024-09-03 RX ORDER — BENZONATATE 100 MG/1
100 CAPSULE ORAL 3 TIMES DAILY PRN
Qty: 30 CAPSULE | Refills: 0 | Status: SHIPPED | OUTPATIENT
Start: 2024-09-03 | End: 2024-09-03 | Stop reason: SDUPTHER

## 2024-09-03 SDOH — ECONOMIC STABILITY: INCOME INSECURITY: HOW HARD IS IT FOR YOU TO PAY FOR THE VERY BASICS LIKE FOOD, HOUSING, MEDICAL CARE, AND HEATING?: PATIENT DECLINED

## 2024-09-03 SDOH — ECONOMIC STABILITY: FOOD INSECURITY: WITHIN THE PAST 12 MONTHS, YOU WORRIED THAT YOUR FOOD WOULD RUN OUT BEFORE YOU GOT MONEY TO BUY MORE.: PATIENT DECLINED

## 2024-09-03 SDOH — ECONOMIC STABILITY: FOOD INSECURITY: WITHIN THE PAST 12 MONTHS, THE FOOD YOU BOUGHT JUST DIDN'T LAST AND YOU DIDN'T HAVE MONEY TO GET MORE.: PATIENT DECLINED

## 2024-09-03 NOTE — PROGRESS NOTES
Liz Pat is a 74 y.o. female and presents with     Chief Complaint   Patient presents with    Discuss Labs    Knee Pain     Had last shot in knee, she states the shot gives relief       History of Present Illness  The patient presents for evaluation of multiple medical concerns.    She has noticed a dark spot on the underside of her tongue, which has been present for approximately 1.5 to 2 years. The spot has deepened in color but does not cause any pain. She reports no history of smoking or tobacco use. Her primary concern is the possibility of cancer.    She has been experiencing a sore throat, which she attributes to allergies. This morning, she also reported an itchy sensation in her throat. She has a dry cough and mild difficulty swallowing. She mentions that her 9-month-old grandchild has been suffering from ear infections.    She is currently on valsartan with HCTZ for blood pressure management.    She had an injection in her knee last week.         Past Medical History:   Diagnosis Date    Allergic rhinitis     Dr. Win Piper    Arthritis     Cataract 2015, 2019    Dr. Siddiqui.  Dr. Jenni Dewitt.    Chest pain 01/15/09    Dr. Cookie Otto    Chickenpox childhood    DDD (degenerative disc disease), cervical 10/2015    Dr. Doron Collazo.    DDD (degenerative disc disease), lumbar 2014    Dr. Akil Carcamo.    Diabetes (HCC) 06/2007    Kimberly Siddiqui, OD.  Dr. José Miguel Treviño, pod.  Dr. Sandra Amaral.    Endometriosis     Dr. Jocelyn Reyes    Essential hypertension, benign     Dr. Cookie Otto    Fibroid, uterine     Dr. Jocelyn Reyes    Fibromyalgia     Dr. Trell Stanley 08/2009    Dr. Brent Baird.  Dr. José Miguel Treviño.    Hand paresthesia 2011    Dr. Miranda.  Dr. Wai Jacobs.    Headache(784.0) 2012    Dr. Jacobs.  Dr. Garcia, ENT.    Hemoptysis 06/13/07    Dr. Sudeep Willis    Hiatal hernia 05/27/15    small.  Monique Abreu MD    Internal hemorrhoids 12/2006,

## 2024-09-03 NOTE — TELEPHONE ENCOUNTER
Patient called to see if Dr. DICKINSON could send the following meds to [facility] on [address] because [facility] [address] [facility] for [facility]:    Valsartan 160mg Tab  Azithromycin 250mg Tab  Benzonatate 100mg Cap    Patient didn't realize that [facility] on [address] was going to close down so soon.  Please call patient to let her know if this can be done at Ph#[phone]

## 2024-09-03 NOTE — PROGRESS NOTES
Health Decision Maker has been checked with the patient   Primary Decision Maker: Melinda Blandon Child - [phone]     AI form was signed    Chief Complaint   Patient presents with    Discuss Labs    Knee Pain     Had last shot in knee, she states the shot gives relief       \"Have you been to the ER, urgent care clinic since your last visit?  Hospitalized since your last visit?\"    NO    “Have you seen or consulted any other health care providers outside of [facility] since your last visit?”    NO      Vitals:    09/03/24 1126   Temp: 98 °F (36.7 °C)   TempSrc: Oral   Weight: 79.4 kg (175 lb)   Height: 1.753 m (5' 9\")      Depression: Not at risk (5/29/2024)    PHQ-2     PHQ-2 Score: 0              Click Here for Release of Records Request    Chart reviewed: immunizations are documented.   Immunization History   Administered Date(s) Administered    COVID-19, PFIZER Bivalent, DO NOT Dilute, (age 12y+), IM, 30 mcg/0.3 mL 09/26/2022    COVID-19, PFIZER PURPLE top, DILUTE for use, (age 12 y+), 30mcg/0.3mL 03/11/2021, 04/01/2021    DT (pediatric) 06/09/2008    Hepatitis B vaccine 06/09/2008, 08/20/2008, 01/27/2009    Influenza A ([id number]),all Formulations 03/09/2010    Influenza Virus Vaccine 01/27/2009, 10/08/2010, 11/08/2011, 10/24/2012, 10/26/2018, 01/01/2021    Influenza, FLUAD, (age 65 y+), IM, Quadv, 0.5mL 10/06/2021, 11/28/2023    Influenza, FLUZONE High Dose, (age 65 y+), IM, Trivalent PF, 0.5mL 11/11/2015, 09/07/2016, 10/19/2017, 08/01/2018, 10/15/2019    Pneumococcal Vaccine 12/21/2005    Pneumococcal, PCV-13, PREVNAR 13, (age 6w+), IM, 0.5mL 11/11/2015, 01/01/2021    Pneumococcal, PPSV23, PNEUMOVAX 23, (age 2y+), SC/IM, 0.5mL 12/07/2016, 01/01/2021    TDaP, ADACEL (age 10y-64y), BOOSTRIX (age 10y+), IM, 0.5mL 01/23/2018, 01/01/2019    Td vaccine (adult) 08/09/2005    Zoster Live (Zostavax) 01/20/2014    Zoster Recombinant (Shingrix) 05/25/2018, 08/04/2018, 01/01/2020

## 2024-09-27 ENCOUNTER — OFFICE VISIT (OUTPATIENT)
Age: 74
End: 2024-09-27

## 2024-09-27 VITALS
WEIGHT: 175 LBS | OXYGEN SATURATION: 96 % | HEART RATE: 69 BPM | HEIGHT: 69 IN | RESPIRATION RATE: 18 BRPM | DIASTOLIC BLOOD PRESSURE: 75 MMHG | SYSTOLIC BLOOD PRESSURE: 164 MMHG | BODY MASS INDEX: 25.92 KG/M2 | TEMPERATURE: 98.6 F

## 2024-09-27 DIAGNOSIS — H65.03 BILATERAL ACUTE SEROUS OTITIS MEDIA, RECURRENCE NOT SPECIFIED: Primary | ICD-10-CM

## 2024-09-27 ASSESSMENT — ENCOUNTER SYMPTOMS
SHORTNESS OF BREATH: 0
VOMITING: 0
NAUSEA: 0
SINUS PRESSURE: 0
DIARRHEA: 0
COUGH: 0

## 2025-01-20 ENCOUNTER — OFFICE VISIT (OUTPATIENT)
Age: 75
End: 2025-01-20
Payer: MEDICARE

## 2025-01-20 VITALS
HEART RATE: 85 BPM | SYSTOLIC BLOOD PRESSURE: 110 MMHG | BODY MASS INDEX: 25.92 KG/M2 | WEIGHT: 175 LBS | DIASTOLIC BLOOD PRESSURE: 65 MMHG | HEIGHT: 69 IN

## 2025-01-20 DIAGNOSIS — E21.3 HYPERPARATHYROIDISM (HCC): ICD-10-CM

## 2025-01-20 DIAGNOSIS — R23.2 VASOMOTOR FLUSHING: ICD-10-CM

## 2025-01-20 DIAGNOSIS — E83.52 HYPERCALCEMIA: Primary | ICD-10-CM

## 2025-01-20 DIAGNOSIS — E11.40 TYPE 2 DIABETES MELLITUS WITH DIABETIC NEUROPATHY, WITHOUT LONG-TERM CURRENT USE OF INSULIN (HCC): ICD-10-CM

## 2025-01-20 PROCEDURE — 99204 OFFICE O/P NEW MOD 45 MIN: CPT | Performed by: GENERAL ACUTE CARE HOSPITAL

## 2025-01-20 RX ORDER — VIBEGRON 75 MG/1
TABLET, FILM COATED ORAL DAILY
COMMUNITY

## 2025-01-20 NOTE — PROGRESS NOTES
[facility]  DR RUTH VERNON         The patient (or guardian, if applicable) and other individuals in attendance with the patient were advised that Artificial Intelligence will be utilized during this visit to record, process the conversation to generate a clinical note, and support improvement of the AI technology. The patient (or guardian, if applicable) and other individuals in attendance at the appointment consented to the use of AI, including the recording.      Liz Pat is a 74 y.o. female  has a past medical history of Allergic rhinitis, Arthritis, Cataract, Chest pain, Chickenpox, DDD (degenerative disc disease), cervical, DDD (degenerative disc disease), lumbar, Diabetes (HCC), Endometriosis, Essential hypertension, benign, Fibroid, uterine, Fibromyalgia, Hammertoe, Hand paresthesia, Headache(784.0), Hemoptysis, Hiatal hernia, Internal hemorrhoids, Intraocular pressure increase, Knee pain, Left knee pain, Lumbar spondylosis, Menopause, OAB (overactive bladder), Paresthesia of foot, Proteinuria, Right shoulder pain, Right shoulder pain, Schatzki's ring, Sciatica, Stenosis colon (HCC), Thalassemia minor, Urinary incontinence, and Vitamin D deficiency.     Assessment & Plan  Hypercalcemia  Hyperparathyroidism  - Calcium levels consistently elevated this year (10.8 in May, 10.2 in June)  - Parathyroid hormone (PTH) level: 56 (upper half of normal range) considered elevated for calcium level  - No kidney stones, previously normal kidney function  - Comprehensive evaluation needed for management strategy  - T-scores from 2019 bone density scan excellent, indicating good bone health  - Order 24-hour urine test and blood test to assess calcium levels  - Instructions for 24-hour urine collection: start with second urine of the day, include first urine of the following day, keep sample cool  - Maintain usual water intake during collection period  - Repeat bone density scan if

## 2025-01-31 ENCOUNTER — HOSPITAL ENCOUNTER (OUTPATIENT)
Facility: HOSPITAL | Age: 75
Discharge: HOME | End: 2025-01-31
Payer: MEDICARE

## 2025-01-31 ENCOUNTER — OFFICE VISIT (OUTPATIENT)
Dept: PRIMARY CARE CLINIC | Facility: CLINIC | Age: 75
End: 2025-01-31

## 2025-01-31 VITALS
WEIGHT: 176 LBS | BODY MASS INDEX: 27.62 KG/M2 | SYSTOLIC BLOOD PRESSURE: 183 MMHG | RESPIRATION RATE: 18 BRPM | TEMPERATURE: 97.6 F | HEIGHT: 67 IN | OXYGEN SATURATION: 100 % | DIASTOLIC BLOOD PRESSURE: 96 MMHG | HEART RATE: 71 BPM

## 2025-01-31 DIAGNOSIS — I10 ESSENTIAL (PRIMARY) HYPERTENSION: Primary | ICD-10-CM

## 2025-01-31 DIAGNOSIS — G89.29 CHRONIC MIDLINE THORACIC BACK PAIN: ICD-10-CM

## 2025-01-31 DIAGNOSIS — E53.1 VITAMIN B6 DEFICIENCY: ICD-10-CM

## 2025-01-31 DIAGNOSIS — E78.00 PURE HYPERCHOLESTEROLEMIA, UNSPECIFIED: ICD-10-CM

## 2025-01-31 DIAGNOSIS — E53.8 B12 DEFICIENCY: ICD-10-CM

## 2025-01-31 DIAGNOSIS — M54.6 CHRONIC MIDLINE THORACIC BACK PAIN: ICD-10-CM

## 2025-01-31 DIAGNOSIS — E11.9 ENCOUNTER FOR DIABETIC FOOT EXAM (HCC): ICD-10-CM

## 2025-01-31 DIAGNOSIS — E11.65 TYPE 2 DIABETES MELLITUS WITH HYPERGLYCEMIA, WITHOUT LONG-TERM CURRENT USE OF INSULIN (HCC): ICD-10-CM

## 2025-01-31 LAB
FOLATE SERPL-MCNC: 15.8 NG/ML (ref 5–21)
VIT B12 SERPL-MCNC: 427 PG/ML (ref 193–986)

## 2025-01-31 PROCEDURE — 72072 X-RAY EXAM THORAC SPINE 3VWS: CPT

## 2025-01-31 SDOH — ECONOMIC STABILITY: FOOD INSECURITY: WITHIN THE PAST 12 MONTHS, THE FOOD YOU BOUGHT JUST DIDN'T LAST AND YOU DIDN'T HAVE MONEY TO GET MORE.: NEVER TRUE

## 2025-01-31 SDOH — ECONOMIC STABILITY: FOOD INSECURITY: WITHIN THE PAST 12 MONTHS, YOU WORRIED THAT YOUR FOOD WOULD RUN OUT BEFORE YOU GOT MONEY TO BUY MORE.: NEVER TRUE

## 2025-01-31 ASSESSMENT — PATIENT HEALTH QUESTIONNAIRE - PHQ9
SUM OF ALL RESPONSES TO PHQ QUESTIONS 1-9: 0
SUM OF ALL RESPONSES TO PHQ9 QUESTIONS 1 & 2: 0
1. LITTLE INTEREST OR PLEASURE IN DOING THINGS: NOT AT ALL
SUM OF ALL RESPONSES TO PHQ QUESTIONS 1-9: 0
2. FEELING DOWN, DEPRESSED OR HOPELESS: NOT AT ALL

## 2025-01-31 NOTE — PROGRESS NOTES
Liz Pat is a 74 y.o. female and presents with     Chief Complaint   Patient presents with    Back Pain     Left side back pain x 3 months   Knot in left hand        History of Present Illness  The patient presents for evaluation of hypertension, left-sided back pain, tongue redness, weight management, and neuropathy.    She reports persistent elevated blood pressure despite adherence to her prescribed medication regimen, which includes valsartan 160 mg daily. Her blood pressure was 110/65 on 01/20/2025 when she saw Dr. Narvaez.    She experiences left-sided back pain, which she attributes to lifting her grandson. The pain is not severe but is described as a constant annoyance. She has no respiratory symptoms such as cough or difficulty breathing. She has no history of renal calculi. She has previously undergone physical therapy for this issue and believes she may need to resume these sessions. She has not had a bone density test since 2019.    She has been experiencing redness on the underside of her tongue for the past 2 years, without any associated symptoms. She reports no discomfort while eating, including spicy foods, although she rarely consumes them. She has consulted an oral surgeon regarding this issue a few years ago, who advised her to return for a follow-up if the redness persisted.    She has lost weight. She is on Ozempic.    She reports occasional tingling and numbness in her feet. She receives treatment for neuropathy at a pain management center every 3 to 4 months, which includes the application of peppermint wraps. She finds this treatment beneficial. She has not been taking amitriptyline for her neuropathy.    MEDICATIONS  Current: Valsartan, Jardiance, Synjardy, Ozempic, amitriptyline.         Past Medical History:   Diagnosis Date    Allergic rhinitis     Dr. Win Piper    Arthritis     Cataract 2015, 2019    Dr. Siddiqui.  Dr. Jenni Dewitt.    Chest pain 01/15/09

## 2025-01-31 NOTE — PROGRESS NOTES
\"Have you been to the ER, urgent care clinic since your last visit?  Hospitalized since your last visit?\"    NO    “Have you seen or consulted any other health care providers outside our system since your last visit?”    NO      “Have you had a colorectal cancer screening such as a colonoscopy/FIT/Cologuard?    NO    Date of last Colonoscopy: 10/22/2019  No cologuard on file  Date of last FIT: 3/27/2021   Date of last flexible sigmoidoscopy: 10/22/2019     “Have you had a diabetic eye exam?”    NO Dr. Dunn [phone]     Date of last diabetic eye exam: 11/8/2023

## 2025-02-04 LAB — VIT B1 BLD-SCNC: 80.7 NMOL/L (ref 66.5–200)

## 2025-02-06 LAB — VIT B6 SERPL-MCNC: 9.9 UG/L (ref 3.4–65.2)

## 2025-02-26 DIAGNOSIS — I10 ESSENTIAL (PRIMARY) HYPERTENSION: ICD-10-CM

## 2025-02-26 RX ORDER — VALSARTAN 160 MG/1
160 TABLET ORAL DAILY
Qty: 90 TABLET | Refills: 1 | Status: SHIPPED | OUTPATIENT
Start: 2025-02-26

## 2025-02-26 NOTE — TELEPHONE ENCOUNTER
PCP: Gutierrez Cobos MD    Last Visit 1/31/2025   Future Appointments   Date Time Provider Department Center   5/27/2025  2:30 PM Shasha Guaman MD [facility]   5/30/2025 10:00 AM Gutierrez Cobos MD [facility] DEP       Requested Prescriptions     Pending Prescriptions Disp Refills    valsartan (DIOVAN) 160 MG tablet [Pharmacy Med Name: VALSARTAN 160 MG TABLET] 90 tablet 1     Sig: TAKE 1 TABLET BY MOUTH EVERY DAY         Other Comments: Last Refill   09/03/24

## 2025-03-10 LAB
25(OH)D3 SERPL-MCNC: 30.9 NG/ML (ref 30–100)
ALBUMIN SERPL-MCNC: 4.3 G/DL (ref 3.5–5)
ALBUMIN/GLOB SERPL: 1.2 (ref 1.1–2.2)
ALP SERPL-CCNC: 94 U/L (ref 45–117)
ALT SERPL-CCNC: 12 U/L (ref 12–78)
ANION GAP SERPL CALC-SCNC: 5 MMOL/L (ref 2–12)
AST SERPL-CCNC: 16 U/L (ref 15–37)
BILIRUB SERPL-MCNC: 0.5 MG/DL (ref 0.2–1)
BUN SERPL-MCNC: 13 MG/DL (ref 6–20)
BUN/CREAT SERPL: 15 (ref 12–20)
CALCIUM 24H UR-MRATE: 274 MG/24 HR (ref 142–353)
CALCIUM SERPL-MCNC: 10.2 MG/DL (ref 8.5–10.1)
CALCIUM SERPL-MCNC: 10.4 MG/DL (ref 8.5–10.1)
CHLORIDE SERPL-SCNC: 103 MMOL/L (ref 97–108)
CO2 SERPL-SCNC: 30 MMOL/L (ref 21–32)
COLLECT DURATION TIME UR: 24 HR
COLLECT DURATION TIME UR: 24 HR
CREAT 24H UR-MRATE: 1402 MG/24HR (ref 600–1800)
CREAT SERPL-MCNC: 0.84 MG/DL (ref 0.55–1.02)
GLOBULIN SER CALC-MCNC: 3.5 G/DL (ref 2–4)
GLUCOSE SERPL-MCNC: 95 MG/DL (ref 65–100)
PHOSPHATE SERPL-MCNC: 3.9 MG/DL (ref 2.6–4.7)
POTASSIUM SERPL-SCNC: 4 MMOL/L (ref 3.5–5.1)
PROT SERPL-MCNC: 7.8 G/DL (ref 6.4–8.2)
PTH-INTACT SERPL-MCNC: 68.3 PG/ML (ref 18.4–88)
SODIUM SERPL-SCNC: 138 MMOL/L (ref 136–145)
SPECIMEN VOL 24H UR: 1450 ML
SPECIMEN VOL ?TM UR: 1450 ML

## 2025-04-11 ENCOUNTER — TRANSCRIBE ORDERS (OUTPATIENT)
Facility: HOSPITAL | Age: 75
End: 2025-04-11

## 2025-04-11 DIAGNOSIS — M54.17 RADICULOPATHY, LUMBOSACRAL REGION: Primary | ICD-10-CM

## 2025-04-15 ENCOUNTER — OFFICE VISIT (OUTPATIENT)
Dept: PRIMARY CARE CLINIC | Facility: CLINIC | Age: 75
End: 2025-04-15
Payer: MEDICARE

## 2025-04-15 VITALS
WEIGHT: 176.8 LBS | SYSTOLIC BLOOD PRESSURE: 146 MMHG | OXYGEN SATURATION: 100 % | DIASTOLIC BLOOD PRESSURE: 83 MMHG | TEMPERATURE: 97.1 F | HEIGHT: 69 IN | HEART RATE: 61 BPM | RESPIRATION RATE: 14 BRPM | BODY MASS INDEX: 26.19 KG/M2

## 2025-04-15 DIAGNOSIS — E11.65 TYPE 2 DIABETES MELLITUS WITH HYPERGLYCEMIA, WITHOUT LONG-TERM CURRENT USE OF INSULIN (HCC): ICD-10-CM

## 2025-04-15 DIAGNOSIS — E78.00 PURE HYPERCHOLESTEROLEMIA, UNSPECIFIED: ICD-10-CM

## 2025-04-15 DIAGNOSIS — J30.2 SEASONAL ALLERGIC RHINITIS, UNSPECIFIED TRIGGER: Primary | ICD-10-CM

## 2025-04-15 DIAGNOSIS — J02.9 SORE THROAT: ICD-10-CM

## 2025-04-15 DIAGNOSIS — I10 ESSENTIAL (PRIMARY) HYPERTENSION: ICD-10-CM

## 2025-04-15 PROCEDURE — 3079F DIAST BP 80-89 MM HG: CPT | Performed by: INTERNAL MEDICINE

## 2025-04-15 PROCEDURE — 1159F MED LIST DOCD IN RCRD: CPT | Performed by: INTERNAL MEDICINE

## 2025-04-15 PROCEDURE — G8399 PT W/DXA RESULTS DOCUMENT: HCPCS | Performed by: INTERNAL MEDICINE

## 2025-04-15 PROCEDURE — 1036F TOBACCO NON-USER: CPT | Performed by: INTERNAL MEDICINE

## 2025-04-15 PROCEDURE — G8427 DOCREV CUR MEDS BY ELIG CLIN: HCPCS | Performed by: INTERNAL MEDICINE

## 2025-04-15 PROCEDURE — 99213 OFFICE O/P EST LOW 20 MIN: CPT | Performed by: INTERNAL MEDICINE

## 2025-04-15 PROCEDURE — 1090F PRES/ABSN URINE INCON ASSESS: CPT | Performed by: INTERNAL MEDICINE

## 2025-04-15 PROCEDURE — 3046F HEMOGLOBIN A1C LEVEL >9.0%: CPT | Performed by: INTERNAL MEDICINE

## 2025-04-15 PROCEDURE — 3077F SYST BP >= 140 MM HG: CPT | Performed by: INTERNAL MEDICINE

## 2025-04-15 PROCEDURE — G8419 CALC BMI OUT NRM PARAM NOF/U: HCPCS | Performed by: INTERNAL MEDICINE

## 2025-04-15 PROCEDURE — 1123F ACP DISCUSS/DSCN MKR DOCD: CPT | Performed by: INTERNAL MEDICINE

## 2025-04-15 PROCEDURE — 1126F AMNT PAIN NOTED NONE PRSNT: CPT | Performed by: INTERNAL MEDICINE

## 2025-04-15 PROCEDURE — 3017F COLORECTAL CA SCREEN DOC REV: CPT | Performed by: INTERNAL MEDICINE

## 2025-04-15 PROCEDURE — 2022F DILAT RTA XM EVC RTNOPTHY: CPT | Performed by: INTERNAL MEDICINE

## 2025-04-15 RX ORDER — TROSPIUM CHLORIDE ER 60 MG/1
60 CAPSULE ORAL DAILY
COMMUNITY
Start: 2025-04-08

## 2025-04-15 RX ORDER — CETIRIZINE HYDROCHLORIDE 10 MG/1
10 TABLET ORAL DAILY
Qty: 90 TABLET | Refills: 1 | Status: SHIPPED | OUTPATIENT
Start: 2025-04-15

## 2025-04-15 RX ORDER — FLUTICASONE PROPIONATE 50 MCG
1 SPRAY, SUSPENSION (ML) NASAL DAILY
Qty: 32 G | Refills: 4 | Status: SHIPPED | OUTPATIENT
Start: 2025-04-15

## 2025-04-15 ASSESSMENT — PATIENT HEALTH QUESTIONNAIRE - PHQ9
SUM OF ALL RESPONSES TO PHQ QUESTIONS 1-9: 0
1. LITTLE INTEREST OR PLEASURE IN DOING THINGS: NOT AT ALL
2. FEELING DOWN, DEPRESSED OR HOPELESS: NOT AT ALL
SUM OF ALL RESPONSES TO PHQ QUESTIONS 1-9: 0

## 2025-04-15 NOTE — PROGRESS NOTES
\"Have you been to the ER, urgent care clinic since your last visit?  Hospitalized since your last visit?\"    NO    “Have you seen or consulted any other health care providers outside our system since your last visit?”    NO      “Have you had a colorectal cancer screening such as a colonoscopy/FIT/Cologuard?    YES - Type: Colonoscopy - Where: 2019     Date of last Colonoscopy: 10/22/2019  No cologuard on file  Date of last FIT: 3/27/2021   Date of last flexible sigmoidoscopy: 10/22/2019     “Have you had a diabetic eye exam?”    YES - Where: 2024     Date of last diabetic eye exam: 11/8/2023

## 2025-04-15 NOTE — PROGRESS NOTES
Liz Pat is a 75 y.o. female and presents with     Chief Complaint   Patient presents with    Pharyngitis     Patient states he has been having sore throat fro about 2-3 months would like a referral for ENT       History of Present Illness     Pt complains of sore throat.  Wants to see ENT  Pt denies difficulty swallowing  NO GERD.  Has cats       Past Medical History:   Diagnosis Date    Allergic rhinitis     Dr. Win Piper    Arthritis     Cataract 2015, 2019    Dr. Siddiqui.  Dr. Jenni Dewitt.    Chest pain 01/15/09    Dr. Cookie Otto    Chickenpox childhood    DDD (degenerative disc disease), cervical 10/2015    Dr. Doron Collazo.    DDD (degenerative disc disease), lumbar 2014    Dr. Akil Carcamo.    Diabetes (HCC) 06/2007    Kimberly Siddiqui, OD.  Dr. José Miguel Treviño, pod.  Dr. Sandra Aamral.    Endometriosis     Dr. Jocelyn Reyes    Essential hypertension, benign     Dr. Cookie Otto    Fibroid, uterine     Dr. Jocelyn Reyes    Fibromyalgia     Dr. Trell Stanley 08/2009    Dr. Brent Baird.  Dr. José Miguel Treviño.    Hand paresthesia 2011    Dr. Miranda.  Dr. Wai Jacobs.    Headache(784.0) 2012    Dr. Jacobs.  Dr. Garcia, ENT.    Hemoptysis 06/13/07    Dr. Sudeep Willis    Hiatal hernia 05/27/15    small.  Monique Abreu MD    Internal hemorrhoids 12/2006, 05/27/15    Dr. Wadsworth.  Dr. Abreu.    Intraocular pressure increase 07/27/05    bilaterally.  Dr. Blu Meredith.  Dr. Kimberly Tang.    Knee pain 04/04/13    Bilaterally.  due to fall.  Dr. Bandar Carey.      Left knee pain 11/26/14    Left.  due to OA, torn medial and lateral meniscal tears.  Dr. Carey.  Dr. Jameel Steve.    Lumbar spondylosis 3/2008    Dr. Méndez.  Dr. Zack Keen    Menopause 1994    OAB (overactive bladder)     Dr. Hernandez.  Dr. Milton Miranda.    Paresthesia of foot     Left  due to Romo's Neuromo Sxs.  [facility].  due to Diabetic Neuropathy.  Dr. José Miguel Treviño.    Proteinuria 2009

## 2025-04-16 DIAGNOSIS — G89.29 CHRONIC LEFT SHOULDER PAIN: Primary | ICD-10-CM

## 2025-04-16 DIAGNOSIS — M25.512 CHRONIC LEFT SHOULDER PAIN: Primary | ICD-10-CM

## 2025-04-30 ENCOUNTER — HOSPITAL ENCOUNTER (OUTPATIENT)
Age: 75
Discharge: HOME | End: 2025-05-03
Payer: MEDICARE

## 2025-04-30 DIAGNOSIS — M54.17 RADICULOPATHY, LUMBOSACRAL REGION: ICD-10-CM

## 2025-04-30 PROCEDURE — 72148 MRI LUMBAR SPINE W/O DYE: CPT

## 2025-05-16 ENCOUNTER — HOSPITAL ENCOUNTER (OUTPATIENT)
Facility: HOSPITAL | Age: 75
Discharge: HOME | End: 2025-05-19
Payer: MEDICARE

## 2025-05-16 ENCOUNTER — HOSPITAL ENCOUNTER (OUTPATIENT)
Age: 75
Discharge: HOME | End: 2025-05-19

## 2025-05-16 DIAGNOSIS — G89.29 CHRONIC LEFT SHOULDER PAIN: ICD-10-CM

## 2025-05-16 DIAGNOSIS — M25.512 CHRONIC LEFT SHOULDER PAIN: ICD-10-CM

## 2025-05-16 PROCEDURE — 73030 X-RAY EXAM OF SHOULDER: CPT

## 2025-05-20 ENCOUNTER — RESULTS FOLLOW-UP (OUTPATIENT)
Dept: PRIMARY CARE CLINIC | Facility: CLINIC | Age: 75
End: 2025-05-20

## 2025-05-27 ENCOUNTER — OFFICE VISIT (OUTPATIENT)
Age: 75
End: 2025-05-27
Payer: MEDICARE

## 2025-05-27 VITALS
SYSTOLIC BLOOD PRESSURE: 169 MMHG | BODY MASS INDEX: 26.11 KG/M2 | HEART RATE: 83 BPM | DIASTOLIC BLOOD PRESSURE: 78 MMHG | HEIGHT: 69 IN | OXYGEN SATURATION: 99 % | RESPIRATION RATE: 16 BRPM

## 2025-05-27 DIAGNOSIS — E83.52 HYPERCALCEMIA: Primary | ICD-10-CM

## 2025-05-27 DIAGNOSIS — E21.3 HYPERPARATHYROIDISM: ICD-10-CM

## 2025-05-27 DIAGNOSIS — I10 ESSENTIAL HYPERTENSION: ICD-10-CM

## 2025-05-27 DIAGNOSIS — E11.40 TYPE 2 DIABETES MELLITUS WITH DIABETIC NEUROPATHY, WITHOUT LONG-TERM CURRENT USE OF INSULIN (HCC): ICD-10-CM

## 2025-05-27 PROCEDURE — G8419 CALC BMI OUT NRM PARAM NOF/U: HCPCS | Performed by: GENERAL ACUTE CARE HOSPITAL

## 2025-05-27 PROCEDURE — G8427 DOCREV CUR MEDS BY ELIG CLIN: HCPCS | Performed by: GENERAL ACUTE CARE HOSPITAL

## 2025-05-27 PROCEDURE — 3078F DIAST BP <80 MM HG: CPT | Performed by: GENERAL ACUTE CARE HOSPITAL

## 2025-05-27 PROCEDURE — 1090F PRES/ABSN URINE INCON ASSESS: CPT | Performed by: GENERAL ACUTE CARE HOSPITAL

## 2025-05-27 PROCEDURE — 1123F ACP DISCUSS/DSCN MKR DOCD: CPT | Performed by: GENERAL ACUTE CARE HOSPITAL

## 2025-05-27 PROCEDURE — 3046F HEMOGLOBIN A1C LEVEL >9.0%: CPT | Performed by: GENERAL ACUTE CARE HOSPITAL

## 2025-05-27 PROCEDURE — 99214 OFFICE O/P EST MOD 30 MIN: CPT | Performed by: GENERAL ACUTE CARE HOSPITAL

## 2025-05-27 PROCEDURE — G8399 PT W/DXA RESULTS DOCUMENT: HCPCS | Performed by: GENERAL ACUTE CARE HOSPITAL

## 2025-05-27 PROCEDURE — G2211 COMPLEX E/M VISIT ADD ON: HCPCS | Performed by: GENERAL ACUTE CARE HOSPITAL

## 2025-05-27 PROCEDURE — 1036F TOBACCO NON-USER: CPT | Performed by: GENERAL ACUTE CARE HOSPITAL

## 2025-05-27 PROCEDURE — 2022F DILAT RTA XM EVC RTNOPTHY: CPT | Performed by: GENERAL ACUTE CARE HOSPITAL

## 2025-05-27 PROCEDURE — 3077F SYST BP >= 140 MM HG: CPT | Performed by: GENERAL ACUTE CARE HOSPITAL

## 2025-05-27 PROCEDURE — 3017F COLORECTAL CA SCREEN DOC REV: CPT | Performed by: GENERAL ACUTE CARE HOSPITAL

## 2025-05-27 PROCEDURE — 1159F MED LIST DOCD IN RCRD: CPT | Performed by: GENERAL ACUTE CARE HOSPITAL

## 2025-05-27 PROCEDURE — 1160F RVW MEDS BY RX/DR IN RCRD: CPT | Performed by: GENERAL ACUTE CARE HOSPITAL

## 2025-05-27 NOTE — PROGRESS NOTES
[facility]  DR RUTH VERNON         The patient (or guardian, if applicable) and other individuals in attendance with the patient were advised that Artificial Intelligence will be utilized during this visit to record, process the conversation to generate a clinical note, and support improvement of the AI technology. The patient (or guardian, if applicable) and other individuals in attendance at the appointment consented to the use of AI, including the recording.      Liz Pat is a 75 y.o. female  has a past medical history of Allergic rhinitis, Arthritis, Cataract, Chest pain, Chickenpox, DDD (degenerative disc disease), cervical, DDD (degenerative disc disease), lumbar, Diabetes (HCC), Endometriosis, Essential hypertension, benign, Fibroid, uterine, Fibromyalgia, Hammertoe, Hand paresthesia, Headache(784.0), Hemoptysis, Hiatal hernia, Internal hemorrhoids, Intraocular pressure increase, Knee pain, Left knee pain, Lumbar spondylosis, Menopause, OAB (overactive bladder), Paresthesia of foot, Proteinuria, Right shoulder pain, Right shoulder pain, Schatzki's ring, Sciatica, Stenosis colon (HCC), Thalassemia minor, Urinary incontinence, and Vitamin D deficiency.     Assessment & Plan  Hypercalcemia  Hyperparathyroidism  - Calcium levels consistently elevated this year (10.8 in May, 10.2 in June)  - Parathyroid hormone (PTH) level: 56 (upper half of normal range) considered elevated for calcium level  - No kidney stones, previously normal kidney function  - Comprehensive evaluation needed for management strategy  - Blood pressure remains elevated  - Creatinine levels indicate normal kidney function, filtration rate of 73  - BUN, glucose, carbon dioxide, chloride, and potassium levels within normal ranges  - Calcium level slightly elevated at 10.4, corrected calcium level 10.16  - Parathyroid hormone level 68, suggesting early-stage hyperparathyroidism  - No criteria for treatment

## 2025-05-28 ENCOUNTER — TELEPHONE (OUTPATIENT)
Dept: PRIMARY CARE CLINIC | Facility: CLINIC | Age: 75
End: 2025-05-28

## 2025-05-28 SDOH — HEALTH STABILITY: PHYSICAL HEALTH: ON AVERAGE, HOW MANY DAYS PER WEEK DO YOU ENGAGE IN MODERATE TO STRENUOUS EXERCISE (LIKE A BRISK WALK)?: 3 DAYS

## 2025-05-28 SDOH — HEALTH STABILITY: PHYSICAL HEALTH: ON AVERAGE, HOW MANY MINUTES DO YOU ENGAGE IN EXERCISE AT THIS LEVEL?: 30 MIN

## 2025-05-28 ASSESSMENT — PATIENT HEALTH QUESTIONNAIRE - PHQ9
2. FEELING DOWN, DEPRESSED OR HOPELESS: NOT AT ALL
SUM OF ALL RESPONSES TO PHQ QUESTIONS 1-9: 0
1. LITTLE INTEREST OR PLEASURE IN DOING THINGS: NOT AT ALL

## 2025-05-28 ASSESSMENT — LIFESTYLE VARIABLES
HOW OFTEN DO YOU HAVE A DRINK CONTAINING ALCOHOL: 2
HOW MANY STANDARD DRINKS CONTAINING ALCOHOL DO YOU HAVE ON A TYPICAL DAY: 1 OR 2
HOW MANY STANDARD DRINKS CONTAINING ALCOHOL DO YOU HAVE ON A TYPICAL DAY: 1
HOW OFTEN DO YOU HAVE SIX OR MORE DRINKS ON ONE OCCASION: 1
HOW OFTEN DO YOU HAVE A DRINK CONTAINING ALCOHOL: MONTHLY OR LESS

## 2025-05-29 LAB
ALBUMIN MFR UR ELPH: 54 %
ALBUMIN SERPL ELPH-MCNC: 4 G/DL (ref 2.9–4.4)
ALBUMIN/GLOB SERPL: 1.2 {RATIO} (ref 0.7–1.7)
ALPHA1 GLOB MFR UR ELPH: 3.3 %
ALPHA1 GLOB SERPL ELPH-MCNC: 0.2 G/DL (ref 0–0.4)
ALPHA2 GLOB MFR UR ELPH: 12.5 %
ALPHA2 GLOB SERPL ELPH-MCNC: 0.8 G/DL (ref 0.4–1)
B-GLOBULIN MFR UR ELPH: 16.7 %
B-GLOBULIN SERPL ELPH-MCNC: 1.3 G/DL (ref 0.7–1.3)
GAMMA GLOB MFR UR ELPH: 13.5 %
GAMMA GLOB SERPL ELPH-MCNC: 1 G/DL (ref 0.4–1.8)
GLOBULIN SER CALC-MCNC: 3.4 G/DL (ref 2.2–3.9)
LABORATORY COMMENT REPORT: NORMAL
LABORATORY COMMENT REPORT: NORMAL
M PROTEIN MFR UR ELPH: NORMAL %
M PROTEIN SERPL ELPH-MCNC: NORMAL G/DL
PROT SERPL-MCNC: 7.4 G/DL (ref 6–8.5)
PROT UR-MCNC: 5.9 MG/DL

## 2025-05-30 ENCOUNTER — OFFICE VISIT (OUTPATIENT)
Dept: PRIMARY CARE CLINIC | Facility: CLINIC | Age: 75
End: 2025-05-30

## 2025-05-30 ENCOUNTER — RESULTS FOLLOW-UP (OUTPATIENT)
Age: 75
End: 2025-05-30

## 2025-05-30 VITALS
HEIGHT: 69 IN | DIASTOLIC BLOOD PRESSURE: 86 MMHG | OXYGEN SATURATION: 99 % | TEMPERATURE: 98.5 F | HEART RATE: 64 BPM | WEIGHT: 177.8 LBS | BODY MASS INDEX: 26.33 KG/M2 | RESPIRATION RATE: 18 BRPM | SYSTOLIC BLOOD PRESSURE: 155 MMHG

## 2025-05-30 DIAGNOSIS — I10 ESSENTIAL (PRIMARY) HYPERTENSION: ICD-10-CM

## 2025-05-30 DIAGNOSIS — Z00.00 MEDICARE ANNUAL WELLNESS VISIT, SUBSEQUENT: Primary | ICD-10-CM

## 2025-05-30 DIAGNOSIS — E11.65 TYPE 2 DIABETES MELLITUS WITH HYPERGLYCEMIA, WITHOUT LONG-TERM CURRENT USE OF INSULIN (HCC): ICD-10-CM

## 2025-05-30 DIAGNOSIS — E78.00 PURE HYPERCHOLESTEROLEMIA, UNSPECIFIED: ICD-10-CM

## 2025-05-30 NOTE — PROGRESS NOTES
Liz Pat is a 75 y.o. female and presents with     Chief Complaint   Patient presents with    Medicare AWV       History of Present Illness  The patient presents for a follow-up visit.    The primary reason for this visit is to address her hypertension. She is currently on valsartan, taken in the morning, with the most recent dose 2 hours prior. She reports no recent leg swelling, occasional headaches, and no dizziness.    Seen by Dr. Paez on 05/29/2025 for a missing tooth causing tongue protrusion; advised to see a dentist.    Scheduled for mammogram and bone density test on 06/16/2025 at [facility]. Has not received COVID-19 vaccine. Reports no auditory issues and is not taking calcium supplements.    Medications: Ozempic, Synjardy, simvastatin, amitriptyline.         Past Medical History:   Diagnosis Date    Allergic rhinitis     Dr. Win Piper    Arthritis     Cataract 2015, 2019    Dr. Siddiqui.  Dr. Jenni Dewitt.    Chest pain 01/15/09    Dr. Cookie Otto    Chickenpox childhood    DDD (degenerative disc disease), cervical 10/2015    Dr. Doron Collazo.    DDD (degenerative disc disease), lumbar 2014    Dr. Akil Carcamo.    Diabetes (HCC) 06/2007    Kimberly Siddiqui, OD.  Dr. José Miguel Treviño, pod.  Dr. Sandra Amaral.    Endometriosis     Dr. Jocelyn Reyes    Essential hypertension, benign     Dr. Cookie Otto    Fibroid, uterine     Dr. Jocelyn Reyes    Fibromyalgia     Dr. Trell Stanley 08/2009    Dr. Brent Baird.  Dr. José Miguel Treviño.    Hand paresthesia 2011    Dr. Miranda.  Dr. Wai Jacobs.    Headache(784.0) 2012    Dr. Jacobs.  Dr. Garcia, ENT.    Hemoptysis 06/13/07    Dr. Sudeep Willis    Hiatal hernia 05/27/15    small.  Monique Abreu MD    Internal hemorrhoids 12/2006, 05/27/15    Dr. Wadsworth.  Dr. Abreu.    Intraocular pressure increase 07/27/05    bilaterally.  Dr. Blu Meredith.  Dr. Kimberly Tang.    Knee pain 04/04/13    Bilaterally.  due to fall.

## 2025-05-30 NOTE — PROGRESS NOTES
Have you been to the ER, urgent care clinic since your last visit?  Hospitalized since your last visit?   NO    Have you seen or consulted any other health care providers outside our system since your last visit?   NO      “Have you had a colorectal cancer screening such as a colonoscopy/FIT/Cologuard?    YES - Type: Colonoscopy - Where: April 24, 2025 [address]  Nurse/CMA to request most recent records if not in the chart     Date of last Colonoscopy: 10/22/2019  No cologuard on file  Date of last FIT: 3/27/2021   Date of last flexible sigmoidoscopy: 10/22/2019     “Have you had a diabetic eye exam?”    NO Dr. Mcdonough [address] [facility]   [phone]  Date of last diabetic eye exam: 11/8/2023

## 2025-05-30 NOTE — RESULT ENCOUNTER NOTE
Dear Liz Pat, your recent lab results are within normal limits, which is reassuring. No concerning findings were noted at this time. Please continue with your current care plan and healthy habits, and keep up the good work!  It has been a pleasure being part of your care. As you move forward, I wish you all the best in health and in life. Thank you for the trust you've placed in me-take good care and stay well!  Dr Guaman

## 2025-05-31 LAB
CHOLEST SERPL-MCNC: 190 MG/DL
CREAT UR-MCNC: 83.9 MG/DL
EST. AVERAGE GLUCOSE BLD GHB EST-MCNC: 128 MG/DL
HBA1C MFR BLD: 6.1 % (ref 4–5.6)
HDLC SERPL-MCNC: 97 MG/DL
HDLC SERPL: 2 (ref 0–5)
LDLC SERPL CALC-MCNC: 80.8 MG/DL (ref 0–100)
MICROALBUMIN UR-MCNC: 1.19 MG/DL
MICROALBUMIN/CREAT UR-RTO: 14 MG/G (ref 0–30)
TRIGL SERPL-MCNC: 61 MG/DL
VLDLC SERPL CALC-MCNC: 12.2 MG/DL

## 2025-06-01 ENCOUNTER — RESULTS FOLLOW-UP (OUTPATIENT)
Dept: PRIMARY CARE CLINIC | Facility: CLINIC | Age: 75
End: 2025-06-01

## 2025-06-09 DIAGNOSIS — E11.65 TYPE 2 DIABETES MELLITUS WITH HYPERGLYCEMIA, WITHOUT LONG-TERM CURRENT USE OF INSULIN (HCC): ICD-10-CM

## 2025-08-04 RX ORDER — SIMVASTATIN 40 MG
40 TABLET ORAL NIGHTLY
Qty: 90 TABLET | Refills: 0 | Status: SHIPPED | OUTPATIENT
Start: 2025-08-04

## 2025-09-01 DIAGNOSIS — I10 ESSENTIAL (PRIMARY) HYPERTENSION: ICD-10-CM

## 2025-09-02 RX ORDER — VALSARTAN 160 MG/1
160 TABLET ORAL DAILY
Qty: 90 TABLET | Refills: 0 | Status: SHIPPED | OUTPATIENT
Start: 2025-09-02

## (undated) DEVICE — Z DISCONTINUED NO SUB IDED SET EXTN W/ 4 W STPCOCK M SPIN LOK 36IN

## (undated) DEVICE — CONNECTOR TBNG AUX H2O JET DISP FOR OLY 160/180 SER

## (undated) DEVICE — SYRINGE MED 20ML STD CLR PLAS LUERLOCK TIP N CTRL DISP

## (undated) DEVICE — ENDO CARRY-ON PROCEDURE KIT INCLUDES ENZYMATIC SPONGE, GAUZE, BIOHAZARD LABEL, TRAY, LUBRICANT, DIRTY SCOPE LABEL, WATER LABEL, TRAY, DRAWSTRING PAD, AND DEFENDO 4-PIECE KIT.: Brand: ENDO CARRY-ON PROCEDURE KIT

## (undated) DEVICE — TUBING O2 PED L13FT NSL ORAL PT SAMP LN NONINTUBATED SMRT

## (undated) DEVICE — Z DISCONTINUED USE 2751540 TUBING IRRIG L10IN DISP PMP ENDOGATOR

## (undated) DEVICE — KENDALL RADIOLUCENT FOAM MONITORING ELECTRODE -RECTANGULAR SHAPE: Brand: KENDALL

## (undated) DEVICE — 1200 GUARD II KIT W/5MM TUBE W/O VAC TUBE: Brand: GUARDIAN

## (undated) DEVICE — AIRLIFE™ U/CONNECT-IT OXYGEN TUBING 7 FEET (2.1 M) CRUSH-RESISTANT OXYGEN TUBING, VINYL TIPPED: Brand: AIRLIFE™

## (undated) DEVICE — Device: Brand: MEDICAL ACTION INDUSTRIES

## (undated) DEVICE — QUILTED PREMIUM COMFORT UNDERPAD,EXTRA HEAVY: Brand: WINGS

## (undated) DEVICE — SET ADMIN 16ML TBNG L100IN 2 Y INJ SITE IV PIGGY BK DISP

## (undated) DEVICE — BW-412T DISP COMBO CLEANING BRUSH: Brand: SINGLE USE COMBINATION CLEANING BRUSH

## (undated) DEVICE — CANN NASAL O2 CAPNOGRAPHY AD -- FILTERLINE

## (undated) DEVICE — NEEDLE HYPO 18GA L1.5IN PNK S STL HUB POLYPR SHLD REG BVL

## (undated) DEVICE — SOLIDIFIER FLUID 3000 CC ABSORB

## (undated) DEVICE — BAG BELONG PT PERS CLEAR HANDL

## (undated) DEVICE — CATH IV AUTOGRD BC BLU 22GA 25 -- INSYTE